# Patient Record
Sex: FEMALE | Race: WHITE | Employment: UNEMPLOYED | URBAN - METROPOLITAN AREA
[De-identification: names, ages, dates, MRNs, and addresses within clinical notes are randomized per-mention and may not be internally consistent; named-entity substitution may affect disease eponyms.]

---

## 2019-10-21 ENCOUNTER — HOSPITAL ENCOUNTER (OUTPATIENT)
Age: 69
Discharge: HOME | End: 2019-10-21
Payer: MEDICARE

## 2019-10-21 ENCOUNTER — HOSPITAL ENCOUNTER (OUTPATIENT)
Dept: GENERAL RADIOLOGY | Age: 69
Discharge: HOME | End: 2019-10-21
Payer: MEDICARE

## 2019-10-21 ENCOUNTER — APPOINTMENT (OUTPATIENT)
Dept: CT IMAGING | Age: 69
End: 2019-10-21
Payer: MEDICARE

## 2019-10-21 ENCOUNTER — HOSPITAL ENCOUNTER (EMERGENCY)
Age: 69
Discharge: HOME | End: 2019-10-21
Payer: MEDICARE

## 2019-10-21 VITALS
HEIGHT: 58 IN | WEIGHT: 160 LBS | TEMPERATURE: 97.8 F | BODY MASS INDEX: 33.58 KG/M2 | DIASTOLIC BLOOD PRESSURE: 90 MMHG | RESPIRATION RATE: 16 BRPM | SYSTOLIC BLOOD PRESSURE: 182 MMHG | OXYGEN SATURATION: 96 % | HEART RATE: 90 BPM

## 2019-10-21 DIAGNOSIS — R10.9 STOMACH ACHE: ICD-10-CM

## 2019-10-21 DIAGNOSIS — K57.32 DIVERTICULITIS OF COLON: Primary | ICD-10-CM

## 2019-10-21 LAB
ALBUMIN SERPL-MCNC: 4.1 GM/DL (ref 3.4–5)
ALP BLD-CCNC: 139 IU/L (ref 40–128)
ALT SERPL-CCNC: 5 U/L (ref 10–40)
ANION GAP SERPL CALCULATED.3IONS-SCNC: 12 MMOL/L (ref 4–16)
AST SERPL-CCNC: 15 IU/L (ref 15–37)
BACTERIA: ABNORMAL /HPF
BASOPHILS ABSOLUTE: 0.1 K/CU MM
BASOPHILS RELATIVE PERCENT: 0.4 % (ref 0–1)
BILIRUB SERPL-MCNC: 0.2 MG/DL (ref 0–1)
BILIRUBIN URINE: NEGATIVE MG/DL
BLOOD, URINE: NEGATIVE
BUN BLDV-MCNC: 10 MG/DL (ref 6–23)
CALCIUM SERPL-MCNC: 9.8 MG/DL (ref 8.3–10.6)
CHLORIDE BLD-SCNC: 93 MMOL/L (ref 99–110)
CLARITY: ABNORMAL
CO2: 29 MMOL/L (ref 21–32)
COLOR: YELLOW
CREAT SERPL-MCNC: 0.7 MG/DL (ref 0.6–1.1)
DIFFERENTIAL TYPE: ABNORMAL
EOSINOPHILS ABSOLUTE: 0.2 K/CU MM
EOSINOPHILS RELATIVE PERCENT: 1.4 % (ref 0–3)
GFR AFRICAN AMERICAN: >60 ML/MIN/1.73M2
GFR NON-AFRICAN AMERICAN: >60 ML/MIN/1.73M2
GLUCOSE BLD-MCNC: 98 MG/DL (ref 70–99)
GLUCOSE, URINE: NEGATIVE MG/DL
HCT VFR BLD CALC: 46.5 % (ref 37–47)
HEMOGLOBIN: 14.4 GM/DL (ref 12.5–16)
IMMATURE NEUTROPHIL %: 0.6 % (ref 0–0.43)
KETONES, URINE: NEGATIVE MG/DL
LEUKOCYTE ESTERASE, URINE: NEGATIVE
LYMPHOCYTES ABSOLUTE: 1.6 K/CU MM
LYMPHOCYTES RELATIVE PERCENT: 9.5 % (ref 24–44)
MCH RBC QN AUTO: 29 PG (ref 27–31)
MCHC RBC AUTO-ENTMCNC: 31 % (ref 32–36)
MCV RBC AUTO: 93.8 FL (ref 78–100)
MONOCYTES ABSOLUTE: 1.4 K/CU MM
MONOCYTES RELATIVE PERCENT: 8.7 % (ref 0–4)
NITRITE URINE, QUANTITATIVE: NEGATIVE
NUCLEATED RBC %: 0 %
PDW BLD-RTO: 13.3 % (ref 11.7–14.9)
PH, URINE: 5 (ref 5–8)
PLATELET # BLD: 555 K/CU MM (ref 140–440)
PMV BLD AUTO: 9.6 FL (ref 7.5–11.1)
POTASSIUM SERPL-SCNC: 5.3 MMOL/L (ref 3.5–5.1)
PROTEIN UA: NEGATIVE MG/DL
RBC # BLD: 4.96 M/CU MM (ref 4.2–5.4)
RBC URINE: ABNORMAL /HPF (ref 0–6)
SEGMENTED NEUTROPHILS ABSOLUTE COUNT: 12.9 K/CU MM
SEGMENTED NEUTROPHILS RELATIVE PERCENT: 79.4 % (ref 36–66)
SODIUM BLD-SCNC: 134 MMOL/L (ref 135–145)
SPECIFIC GRAVITY UA: 1.03 (ref 1–1.03)
SQUAMOUS EPITHELIAL: 13 /HPF
TOTAL IMMATURE NEUTOROPHIL: 0.09 K/CU MM
TOTAL NUCLEATED RBC: 0 K/CU MM
TOTAL PROTEIN: 7.1 GM/DL (ref 6.4–8.2)
TRICHOMONAS: ABNORMAL /HPF
UROBILINOGEN, URINE: NORMAL MG/DL (ref 0.2–1)
WBC # BLD: 16.3 K/CU MM (ref 4–10.5)
WBC UA: <1 /HPF (ref 0–5)

## 2019-10-21 PROCEDURE — 87086 URINE CULTURE/COLONY COUNT: CPT

## 2019-10-21 PROCEDURE — 74019 RADEX ABDOMEN 2 VIEWS: CPT

## 2019-10-21 PROCEDURE — 80053 COMPREHEN METABOLIC PANEL: CPT

## 2019-10-21 PROCEDURE — 99284 EMERGENCY DEPT VISIT MOD MDM: CPT

## 2019-10-21 PROCEDURE — 81001 URINALYSIS AUTO W/SCOPE: CPT

## 2019-10-21 PROCEDURE — 85025 COMPLETE CBC W/AUTO DIFF WBC: CPT

## 2019-10-21 PROCEDURE — 36415 COLL VENOUS BLD VENIPUNCTURE: CPT

## 2019-10-21 PROCEDURE — 74176 CT ABD & PELVIS W/O CONTRAST: CPT

## 2019-10-21 RX ORDER — CIPROFLOXACIN 500 MG/1
500 TABLET, FILM COATED ORAL 2 TIMES DAILY
Qty: 20 TABLET | Refills: 0 | Status: ON HOLD | OUTPATIENT
Start: 2019-10-21 | End: 2019-10-30 | Stop reason: ALTCHOICE

## 2019-10-21 RX ORDER — CIPROFLOXACIN 2 MG/ML
400 INJECTION, SOLUTION INTRAVENOUS ONCE
Status: DISCONTINUED | OUTPATIENT
Start: 2019-10-21 | End: 2019-10-21 | Stop reason: HOSPADM

## 2019-10-21 RX ORDER — DICYCLOMINE HYDROCHLORIDE 10 MG/1
20 CAPSULE ORAL
Qty: 40 CAPSULE | Refills: 0 | Status: ON HOLD | OUTPATIENT
Start: 2019-10-21 | End: 2019-10-30 | Stop reason: ALTCHOICE

## 2019-10-21 RX ORDER — METRONIDAZOLE 500 MG/1
500 TABLET ORAL 3 TIMES DAILY
Qty: 30 TABLET | Refills: 0 | Status: ON HOLD | OUTPATIENT
Start: 2019-10-21 | End: 2019-10-30 | Stop reason: ALTCHOICE

## 2019-10-21 RX ORDER — ONDANSETRON 4 MG/1
4 TABLET, ORALLY DISINTEGRATING ORAL EVERY 6 HOURS
Qty: 20 TABLET | Refills: 0 | Status: ON HOLD | OUTPATIENT
Start: 2019-10-21 | End: 2019-10-30 | Stop reason: ALTCHOICE

## 2019-10-21 ASSESSMENT — PAIN DESCRIPTION - LOCATION: LOCATION: ABDOMEN

## 2019-10-21 ASSESSMENT — PAIN DESCRIPTION - PAIN TYPE: TYPE: ACUTE PAIN

## 2019-10-21 ASSESSMENT — PAIN DESCRIPTION - ORIENTATION: ORIENTATION: RIGHT;LEFT;LOWER

## 2019-10-21 ASSESSMENT — PAIN SCALES - GENERAL: PAINLEVEL_OUTOF10: 5

## 2019-10-23 LAB
CULTURE: ABNORMAL
CULTURE: ABNORMAL
Lab: ABNORMAL
SPECIMEN: ABNORMAL
TOTAL COLONY COUNT: ABNORMAL

## 2019-10-29 ENCOUNTER — HOSPITAL ENCOUNTER (INPATIENT)
Age: 69
LOS: 1 days | Discharge: HOME | DRG: 305 | End: 2019-10-31
Attending: EMERGENCY MEDICINE | Admitting: INTERNAL MEDICINE
Payer: MEDICARE

## 2019-10-29 ENCOUNTER — APPOINTMENT (OUTPATIENT)
Dept: CT IMAGING | Age: 69
DRG: 305 | End: 2019-10-29
Payer: MEDICARE

## 2019-10-29 DIAGNOSIS — R51.9 ACUTE INTRACTABLE HEADACHE, UNSPECIFIED HEADACHE TYPE: ICD-10-CM

## 2019-10-29 DIAGNOSIS — I16.0 HYPERTENSIVE URGENCY: Primary | ICD-10-CM

## 2019-10-29 LAB
ALBUMIN SERPL-MCNC: 3.5 GM/DL (ref 3.4–5)
ALP BLD-CCNC: 108 IU/L (ref 40–129)
ALT SERPL-CCNC: 9 U/L (ref 10–40)
ANION GAP SERPL CALCULATED.3IONS-SCNC: 8 MMOL/L (ref 4–16)
AST SERPL-CCNC: 30 IU/L (ref 15–37)
BASOPHILS ABSOLUTE: 0.1 K/CU MM
BASOPHILS RELATIVE PERCENT: 0.6 % (ref 0–1)
BILIRUB SERPL-MCNC: 0.1 MG/DL (ref 0–1)
BUN BLDV-MCNC: 13 MG/DL (ref 6–23)
CALCIUM SERPL-MCNC: 9.4 MG/DL (ref 8.3–10.6)
CHLORIDE BLD-SCNC: 97 MMOL/L (ref 99–110)
CO2: 31 MMOL/L (ref 21–32)
CREAT SERPL-MCNC: 0.7 MG/DL (ref 0.6–1.1)
DIFFERENTIAL TYPE: ABNORMAL
EOSINOPHILS ABSOLUTE: 0.2 K/CU MM
EOSINOPHILS RELATIVE PERCENT: 1.8 % (ref 0–3)
GFR AFRICAN AMERICAN: >60 ML/MIN/1.73M2
GFR NON-AFRICAN AMERICAN: >60 ML/MIN/1.73M2
GLUCOSE BLD-MCNC: 100 MG/DL (ref 70–99)
HCT VFR BLD CALC: 43.6 % (ref 37–47)
HEMOGLOBIN: 13.7 GM/DL (ref 12.5–16)
IMMATURE NEUTROPHIL %: 1.3 % (ref 0–0.43)
LYMPHOCYTES ABSOLUTE: 1.8 K/CU MM
LYMPHOCYTES RELATIVE PERCENT: 15.6 % (ref 24–44)
MCH RBC QN AUTO: 28.8 PG (ref 27–31)
MCHC RBC AUTO-ENTMCNC: 31.4 % (ref 32–36)
MCV RBC AUTO: 91.8 FL (ref 78–100)
MONOCYTES ABSOLUTE: 1 K/CU MM
MONOCYTES RELATIVE PERCENT: 8.6 % (ref 0–4)
NUCLEATED RBC %: 0 %
PDW BLD-RTO: 13.3 % (ref 11.7–14.9)
PLATELET # BLD: 557 K/CU MM (ref 140–440)
PMV BLD AUTO: 8.9 FL (ref 7.5–11.1)
POTASSIUM SERPL-SCNC: 4 MMOL/L (ref 3.5–5.1)
RBC # BLD: 4.75 M/CU MM (ref 4.2–5.4)
SEGMENTED NEUTROPHILS ABSOLUTE COUNT: 8.5 K/CU MM
SEGMENTED NEUTROPHILS RELATIVE PERCENT: 72.1 % (ref 36–66)
SODIUM BLD-SCNC: 136 MMOL/L (ref 135–145)
TOTAL IMMATURE NEUTOROPHIL: 0.15 K/CU MM
TOTAL NUCLEATED RBC: 0 K/CU MM
TOTAL PROTEIN: 6.7 GM/DL (ref 6.4–8.2)
TROPONIN T: <0.01 NG/ML
WBC # BLD: 11.8 K/CU MM (ref 4–10.5)

## 2019-10-29 PROCEDURE — 96374 THER/PROPH/DIAG INJ IV PUSH: CPT

## 2019-10-29 PROCEDURE — 36415 COLL VENOUS BLD VENIPUNCTURE: CPT

## 2019-10-29 PROCEDURE — 6360000002 HC RX W HCPCS: Performed by: EMERGENCY MEDICINE

## 2019-10-29 PROCEDURE — 85025 COMPLETE CBC W/AUTO DIFF WBC: CPT

## 2019-10-29 PROCEDURE — 93005 ELECTROCARDIOGRAM TRACING: CPT | Performed by: EMERGENCY MEDICINE

## 2019-10-29 PROCEDURE — 96361 HYDRATE IV INFUSION ADD-ON: CPT

## 2019-10-29 PROCEDURE — 2580000003 HC RX 258: Performed by: EMERGENCY MEDICINE

## 2019-10-29 PROCEDURE — 84484 ASSAY OF TROPONIN QUANT: CPT

## 2019-10-29 PROCEDURE — 80053 COMPREHEN METABOLIC PANEL: CPT

## 2019-10-29 PROCEDURE — 99284 EMERGENCY DEPT VISIT MOD MDM: CPT

## 2019-10-29 PROCEDURE — 70450 CT HEAD/BRAIN W/O DYE: CPT

## 2019-10-29 PROCEDURE — 6370000000 HC RX 637 (ALT 250 FOR IP): Performed by: EMERGENCY MEDICINE

## 2019-10-29 RX ORDER — PROCHLORPERAZINE EDISYLATE 5 MG/ML
10 INJECTION INTRAMUSCULAR; INTRAVENOUS ONCE
Status: COMPLETED | OUTPATIENT
Start: 2019-10-29 | End: 2019-10-29

## 2019-10-29 RX ORDER — 0.9 % SODIUM CHLORIDE 0.9 %
500 INTRAVENOUS SOLUTION INTRAVENOUS ONCE
Status: COMPLETED | OUTPATIENT
Start: 2019-10-29 | End: 2019-10-30

## 2019-10-29 RX ORDER — DIPHENHYDRAMINE HCL 25 MG
25 TABLET ORAL ONCE
Status: COMPLETED | OUTPATIENT
Start: 2019-10-29 | End: 2019-10-29

## 2019-10-29 RX ADMIN — DIPHENHYDRAMINE HYDROCHLORIDE 25 MG: 25 TABLET ORAL at 23:00

## 2019-10-29 RX ADMIN — PROCHLORPERAZINE EDISYLATE 10 MG: 5 INJECTION INTRAMUSCULAR; INTRAVENOUS at 23:00

## 2019-10-29 RX ADMIN — SODIUM CHLORIDE 500 ML: 9 INJECTION, SOLUTION INTRAVENOUS at 23:00

## 2019-10-29 ASSESSMENT — PAIN DESCRIPTION - DESCRIPTORS: DESCRIPTORS: THROBBING

## 2019-10-29 ASSESSMENT — ENCOUNTER SYMPTOMS
SHORTNESS OF BREATH: 0
ABDOMINAL PAIN: 0
BACK PAIN: 0
SORE THROAT: 0
VOMITING: 0
DIARRHEA: 0
CONSTIPATION: 0
COUGH: 0
NAUSEA: 0
RHINORRHEA: 0
EYE REDNESS: 0

## 2019-10-29 ASSESSMENT — PAIN SCALES - GENERAL: PAINLEVEL_OUTOF10: 8

## 2019-10-29 ASSESSMENT — PAIN DESCRIPTION - PAIN TYPE: TYPE: ACUTE PAIN

## 2019-10-29 ASSESSMENT — PAIN DESCRIPTION - LOCATION: LOCATION: HEAD

## 2019-10-30 PROBLEM — I16.0 HYPERTENSIVE URGENCY: Status: ACTIVE | Noted: 2019-10-30

## 2019-10-30 PROBLEM — E78.5 HYPERLIPIDEMIA WITH TARGET LOW DENSITY LIPOPROTEIN (LDL) CHOLESTEROL LESS THAN 70 MG/DL: Status: ACTIVE | Noted: 2019-10-30

## 2019-10-30 PROBLEM — K57.90 DIVERTICULOSIS: Status: ACTIVE | Noted: 2019-10-30

## 2019-10-30 LAB
BACTERIA: ABNORMAL /HPF
BILIRUBIN URINE: NEGATIVE MG/DL
BLOOD, URINE: NEGATIVE
CALCIUM OXALATE CRYSTALS: ABNORMAL /HPF
CLARITY: ABNORMAL
COLOR: YELLOW
EKG ATRIAL RATE: 76 BPM
EKG DIAGNOSIS: NORMAL
EKG P AXIS: 85 DEGREES
EKG P-R INTERVAL: 144 MS
EKG Q-T INTERVAL: 398 MS
EKG QRS DURATION: 78 MS
EKG QTC CALCULATION (BAZETT): 447 MS
EKG R AXIS: -52 DEGREES
EKG T AXIS: 47 DEGREES
EKG VENTRICULAR RATE: 76 BPM
GLUCOSE, URINE: NEGATIVE MG/DL
KETONES, URINE: NEGATIVE MG/DL
LEUKOCYTE ESTERASE, URINE: ABNORMAL
NITRITE URINE, QUANTITATIVE: NEGATIVE
PH, URINE: 5 (ref 5–8)
PROTEIN UA: NEGATIVE MG/DL
RBC URINE: 1 /HPF (ref 0–6)
SPECIFIC GRAVITY UA: 1.02 (ref 1–1.03)
SQUAMOUS EPITHELIAL: 3 /HPF
TRICHOMONAS: ABNORMAL /HPF
UROBILINOGEN, URINE: NORMAL MG/DL (ref 0.2–1)
WBC UA: 2 /HPF (ref 0–5)

## 2019-10-30 PROCEDURE — 2580000003 HC RX 258: Performed by: EMERGENCY MEDICINE

## 2019-10-30 PROCEDURE — 6370000000 HC RX 637 (ALT 250 FOR IP): Performed by: EMERGENCY MEDICINE

## 2019-10-30 PROCEDURE — 93010 ELECTROCARDIOGRAM REPORT: CPT | Performed by: INTERNAL MEDICINE

## 2019-10-30 PROCEDURE — 2500000003 HC RX 250 WO HCPCS: Performed by: EMERGENCY MEDICINE

## 2019-10-30 PROCEDURE — 6370000000 HC RX 637 (ALT 250 FOR IP): Performed by: INTERNAL MEDICINE

## 2019-10-30 PROCEDURE — 2140000000 HC CCU INTERMEDIATE R&B

## 2019-10-30 PROCEDURE — 81001 URINALYSIS AUTO W/SCOPE: CPT

## 2019-10-30 RX ORDER — ACETAMINOPHEN 325 MG/1
650 TABLET ORAL EVERY 4 HOURS PRN
Status: DISCONTINUED | OUTPATIENT
Start: 2019-10-30 | End: 2019-10-31 | Stop reason: HOSPADM

## 2019-10-30 RX ORDER — SODIUM CHLORIDE 0.9 % (FLUSH) 0.9 %
10 SYRINGE (ML) INJECTION PRN
Status: DISCONTINUED | OUTPATIENT
Start: 2019-10-30 | End: 2019-10-31 | Stop reason: HOSPADM

## 2019-10-30 RX ORDER — ATORVASTATIN CALCIUM 80 MG/1
80 TABLET, FILM COATED ORAL DAILY
COMMUNITY
End: 2022-01-03

## 2019-10-30 RX ORDER — ASPIRIN 81 MG/1
81 TABLET ORAL DAILY
Status: DISCONTINUED | OUTPATIENT
Start: 2019-10-30 | End: 2019-10-31 | Stop reason: HOSPADM

## 2019-10-30 RX ORDER — ATORVASTATIN CALCIUM 40 MG/1
80 TABLET, FILM COATED ORAL NIGHTLY
Status: DISCONTINUED | OUTPATIENT
Start: 2019-10-30 | End: 2019-10-31 | Stop reason: HOSPADM

## 2019-10-30 RX ORDER — LISINOPRIL 5 MG/1
5 TABLET ORAL DAILY
Status: DISCONTINUED | OUTPATIENT
Start: 2019-10-30 | End: 2019-10-31 | Stop reason: HOSPADM

## 2019-10-30 RX ORDER — SODIUM CHLORIDE 0.9 % (FLUSH) 0.9 %
10 SYRINGE (ML) INJECTION EVERY 12 HOURS SCHEDULED
Status: DISCONTINUED | OUTPATIENT
Start: 2019-10-30 | End: 2019-10-31 | Stop reason: HOSPADM

## 2019-10-30 RX ADMIN — DEXTROSE MONOHYDRATE 5 MG/HR: 50 INJECTION, SOLUTION INTRAVENOUS at 05:06

## 2019-10-30 RX ADMIN — ATORVASTATIN CALCIUM 80 MG: 40 TABLET, FILM COATED ORAL at 22:40

## 2019-10-30 RX ADMIN — DEXTROSE MONOHYDRATE 5 MG/HR: 50 INJECTION, SOLUTION INTRAVENOUS at 01:21

## 2019-10-30 RX ADMIN — ASPIRIN 81 MG: 81 TABLET, COATED ORAL at 12:58

## 2019-10-30 RX ADMIN — Medication 10 ML: at 22:41

## 2019-10-30 RX ADMIN — LISINOPRIL 5 MG: 5 TABLET ORAL at 16:17

## 2019-10-30 RX ADMIN — ACETAMINOPHEN 650 MG: 325 TABLET ORAL at 05:04

## 2019-10-30 RX ADMIN — ACETAMINOPHEN 650 MG: 325 TABLET ORAL at 22:40

## 2019-10-30 ASSESSMENT — PAIN DESCRIPTION - PROGRESSION
CLINICAL_PROGRESSION: NOT CHANGED

## 2019-10-30 ASSESSMENT — PAIN SCALES - GENERAL
PAINLEVEL_OUTOF10: 4
PAINLEVEL_OUTOF10: 4
PAINLEVEL_OUTOF10: 0
PAINLEVEL_OUTOF10: 0

## 2019-10-30 ASSESSMENT — PAIN - FUNCTIONAL ASSESSMENT: PAIN_FUNCTIONAL_ASSESSMENT: ACTIVITIES ARE NOT PREVENTED

## 2019-10-30 ASSESSMENT — PAIN DESCRIPTION - DESCRIPTORS: DESCRIPTORS: ACHING

## 2019-10-30 ASSESSMENT — PAIN DESCRIPTION - PAIN TYPE: TYPE: ACUTE PAIN

## 2019-10-30 ASSESSMENT — PAIN DESCRIPTION - LOCATION: LOCATION: HEAD

## 2019-10-30 ASSESSMENT — PAIN DESCRIPTION - FREQUENCY: FREQUENCY: CONTINUOUS

## 2019-10-30 ASSESSMENT — PAIN DESCRIPTION - ONSET: ONSET: GRADUAL

## 2019-10-31 VITALS
WEIGHT: 164.3 LBS | TEMPERATURE: 98.1 F | DIASTOLIC BLOOD PRESSURE: 67 MMHG | HEIGHT: 58 IN | HEART RATE: 80 BPM | OXYGEN SATURATION: 96 % | RESPIRATION RATE: 15 BRPM | BODY MASS INDEX: 34.49 KG/M2 | SYSTOLIC BLOOD PRESSURE: 153 MMHG

## 2019-10-31 PROCEDURE — 94761 N-INVAS EAR/PLS OXIMETRY MLT: CPT

## 2019-10-31 PROCEDURE — 6370000000 HC RX 637 (ALT 250 FOR IP): Performed by: INTERNAL MEDICINE

## 2019-10-31 RX ORDER — LISINOPRIL 5 MG/1
5 TABLET ORAL DAILY
Qty: 30 TABLET | Refills: 3 | Status: ON HOLD | OUTPATIENT
Start: 2019-11-01 | End: 2021-12-23 | Stop reason: SDUPTHER

## 2019-10-31 RX ADMIN — ASPIRIN 81 MG: 81 TABLET, COATED ORAL at 09:05

## 2019-10-31 RX ADMIN — LISINOPRIL 5 MG: 5 TABLET ORAL at 09:05

## 2019-12-28 ENCOUNTER — HOSPITAL ENCOUNTER (OUTPATIENT)
Age: 69
Discharge: HOME | End: 2019-12-28
Payer: MEDICARE

## 2019-12-28 LAB
ALBUMIN SERPL-MCNC: 4.2 GM/DL (ref 3.4–5)
ALP BLD-CCNC: 117 IU/L (ref 40–128)
ALT SERPL-CCNC: 9 U/L (ref 10–40)
ANION GAP SERPL CALCULATED.3IONS-SCNC: 11 MMOL/L (ref 4–16)
AST SERPL-CCNC: 16 IU/L (ref 15–37)
BACTERIA: ABNORMAL /HPF
BASOPHILS ABSOLUTE: 0.1 K/CU MM
BASOPHILS RELATIVE PERCENT: 0.7 % (ref 0–1)
BILIRUB SERPL-MCNC: 0.3 MG/DL (ref 0–1)
BILIRUBIN URINE: NEGATIVE MG/DL
BLOOD, URINE: ABNORMAL
BUN BLDV-MCNC: 17 MG/DL (ref 6–23)
CALCIUM SERPL-MCNC: 9.7 MG/DL (ref 8.3–10.6)
CHLORIDE BLD-SCNC: 104 MMOL/L (ref 99–110)
CHOLESTEROL: 144 MG/DL
CLARITY: ABNORMAL
CO2: 27 MMOL/L (ref 21–32)
COLOR: YELLOW
CREAT SERPL-MCNC: 0.8 MG/DL (ref 0.6–1.1)
DIFFERENTIAL TYPE: ABNORMAL
EOSINOPHILS ABSOLUTE: 0.4 K/CU MM
EOSINOPHILS RELATIVE PERCENT: 4.3 % (ref 0–3)
GFR AFRICAN AMERICAN: >60 ML/MIN/1.73M2
GFR NON-AFRICAN AMERICAN: >60 ML/MIN/1.73M2
GLUCOSE BLD-MCNC: 113 MG/DL (ref 70–99)
GLUCOSE, URINE: NEGATIVE MG/DL
HCT VFR BLD CALC: 45.7 % (ref 37–47)
HDLC SERPL-MCNC: 44 MG/DL
HEMOGLOBIN: 14.5 GM/DL (ref 12.5–16)
IMMATURE NEUTROPHIL %: 0.2 % (ref 0–0.43)
KETONES, URINE: NEGATIVE MG/DL
LDL CHOLESTEROL DIRECT: 81 MG/DL
LEUKOCYTE ESTERASE, URINE: ABNORMAL
LYMPHOCYTES ABSOLUTE: 1.7 K/CU MM
LYMPHOCYTES RELATIVE PERCENT: 18.7 % (ref 24–44)
MCH RBC QN AUTO: 29.4 PG (ref 27–31)
MCHC RBC AUTO-ENTMCNC: 31.7 % (ref 32–36)
MCV RBC AUTO: 92.5 FL (ref 78–100)
MONOCYTES ABSOLUTE: 0.7 K/CU MM
MONOCYTES RELATIVE PERCENT: 8.1 % (ref 0–4)
MUCUS: ABNORMAL HPF
NITRITE URINE, QUANTITATIVE: NEGATIVE
NUCLEATED RBC %: 0 %
PDW BLD-RTO: 14.6 % (ref 11.7–14.9)
PH, URINE: 5 (ref 5–8)
PLATELET # BLD: 497 K/CU MM (ref 140–440)
PMV BLD AUTO: 9.7 FL (ref 7.5–11.1)
POTASSIUM SERPL-SCNC: 4.4 MMOL/L (ref 3.5–5.1)
PROTEIN UA: NEGATIVE MG/DL
RBC # BLD: 4.94 M/CU MM (ref 4.2–5.4)
RBC URINE: 6 /HPF (ref 0–6)
SEGMENTED NEUTROPHILS ABSOLUTE COUNT: 6.2 K/CU MM
SEGMENTED NEUTROPHILS RELATIVE PERCENT: 68 % (ref 36–66)
SODIUM BLD-SCNC: 142 MMOL/L (ref 135–145)
SPECIFIC GRAVITY UA: 1.02 (ref 1–1.03)
SQUAMOUS EPITHELIAL: 4 /HPF
TOTAL IMMATURE NEUTOROPHIL: 0.02 K/CU MM
TOTAL NUCLEATED RBC: 0 K/CU MM
TOTAL PROTEIN: 6.7 GM/DL (ref 6.4–8.2)
TRICHOMONAS: ABNORMAL /HPF
TRIGL SERPL-MCNC: 127 MG/DL
TSH HIGH SENSITIVITY: 3.35 UIU/ML (ref 0.27–4.2)
UROBILINOGEN, URINE: NORMAL MG/DL (ref 0.2–1)
WBC # BLD: 9 K/CU MM (ref 4–10.5)
WBC CLUMP: ABNORMAL /HPF
WBC UA: 92 /HPF (ref 0–5)

## 2019-12-28 PROCEDURE — 36415 COLL VENOUS BLD VENIPUNCTURE: CPT

## 2019-12-28 PROCEDURE — 83721 ASSAY OF BLOOD LIPOPROTEIN: CPT

## 2019-12-28 PROCEDURE — 81001 URINALYSIS AUTO W/SCOPE: CPT

## 2019-12-28 PROCEDURE — 80053 COMPREHEN METABOLIC PANEL: CPT

## 2019-12-28 PROCEDURE — 85025 COMPLETE CBC W/AUTO DIFF WBC: CPT

## 2019-12-28 PROCEDURE — 84443 ASSAY THYROID STIM HORMONE: CPT

## 2019-12-28 PROCEDURE — 80061 LIPID PANEL: CPT

## 2021-07-03 ENCOUNTER — APPOINTMENT (OUTPATIENT)
Dept: GENERAL RADIOLOGY | Age: 71
End: 2021-07-03
Payer: MEDICARE

## 2021-07-03 ENCOUNTER — HOSPITAL ENCOUNTER (EMERGENCY)
Age: 71
Discharge: HOME | End: 2021-07-03
Payer: MEDICARE

## 2021-07-03 VITALS
RESPIRATION RATE: 16 BRPM | DIASTOLIC BLOOD PRESSURE: 103 MMHG | SYSTOLIC BLOOD PRESSURE: 215 MMHG | WEIGHT: 158 LBS | TEMPERATURE: 97.8 F | HEIGHT: 57 IN | HEART RATE: 98 BPM | BODY MASS INDEX: 34.09 KG/M2 | OXYGEN SATURATION: 92 %

## 2021-07-03 DIAGNOSIS — I16.0 HYPERTENSIVE URGENCY: ICD-10-CM

## 2021-07-03 DIAGNOSIS — M25.512 ACUTE PAIN OF LEFT SHOULDER: Primary | ICD-10-CM

## 2021-07-03 LAB
ALBUMIN SERPL-MCNC: 4.1 GM/DL (ref 3.4–5)
ALP BLD-CCNC: 119 IU/L (ref 40–129)
ALT SERPL-CCNC: 7 U/L (ref 10–40)
ANION GAP SERPL CALCULATED.3IONS-SCNC: 9 MMOL/L (ref 4–16)
AST SERPL-CCNC: 15 IU/L (ref 15–37)
BASOPHILS ABSOLUTE: 0 K/CU MM
BASOPHILS RELATIVE PERCENT: 0.3 % (ref 0–1)
BILIRUB SERPL-MCNC: 0.3 MG/DL (ref 0–1)
BUN BLDV-MCNC: 12 MG/DL (ref 6–23)
CALCIUM SERPL-MCNC: 9.1 MG/DL (ref 8.3–10.6)
CHLORIDE BLD-SCNC: 101 MMOL/L (ref 99–110)
CO2: 27 MMOL/L (ref 21–32)
CREAT SERPL-MCNC: 0.9 MG/DL (ref 0.6–1.1)
DIFFERENTIAL TYPE: ABNORMAL
EOSINOPHILS ABSOLUTE: 0.2 K/CU MM
EOSINOPHILS RELATIVE PERCENT: 1.6 % (ref 0–3)
GFR AFRICAN AMERICAN: >60 ML/MIN/1.73M2
GFR NON-AFRICAN AMERICAN: >60 ML/MIN/1.73M2
GLUCOSE BLD-MCNC: 163 MG/DL (ref 70–99)
HCT VFR BLD CALC: 44.6 % (ref 37–47)
HEMOGLOBIN: 14.7 GM/DL (ref 12.5–16)
IMMATURE NEUTROPHIL %: 0.3 % (ref 0–0.43)
LYMPHOCYTES ABSOLUTE: 1.4 K/CU MM
LYMPHOCYTES RELATIVE PERCENT: 15 % (ref 24–44)
MCH RBC QN AUTO: 30.6 PG (ref 27–31)
MCHC RBC AUTO-ENTMCNC: 33 % (ref 32–36)
MCV RBC AUTO: 92.9 FL (ref 78–100)
MONOCYTES ABSOLUTE: 0.5 K/CU MM
MONOCYTES RELATIVE PERCENT: 5.9 % (ref 0–4)
NUCLEATED RBC %: 0 %
PDW BLD-RTO: 13.5 % (ref 11.7–14.9)
PLATELET # BLD: 455 K/CU MM (ref 140–440)
PMV BLD AUTO: 9.1 FL (ref 7.5–11.1)
POTASSIUM SERPL-SCNC: 4.1 MMOL/L (ref 3.5–5.1)
RBC # BLD: 4.8 M/CU MM (ref 4.2–5.4)
SEGMENTED NEUTROPHILS ABSOLUTE COUNT: 7.1 K/CU MM
SEGMENTED NEUTROPHILS RELATIVE PERCENT: 76.9 % (ref 36–66)
SODIUM BLD-SCNC: 137 MMOL/L (ref 135–145)
TOTAL IMMATURE NEUTOROPHIL: 0.03 K/CU MM
TOTAL NUCLEATED RBC: 0 K/CU MM
TOTAL PROTEIN: 6.9 GM/DL (ref 6.4–8.2)
TROPONIN T: <0.01 NG/ML
WBC # BLD: 9.2 K/CU MM (ref 4–10.5)

## 2021-07-03 PROCEDURE — 36415 COLL VENOUS BLD VENIPUNCTURE: CPT

## 2021-07-03 PROCEDURE — 93005 ELECTROCARDIOGRAM TRACING: CPT | Performed by: PHYSICIAN ASSISTANT

## 2021-07-03 PROCEDURE — 71045 X-RAY EXAM CHEST 1 VIEW: CPT

## 2021-07-03 PROCEDURE — 85025 COMPLETE CBC W/AUTO DIFF WBC: CPT

## 2021-07-03 PROCEDURE — 6360000002 HC RX W HCPCS: Performed by: PHYSICIAN ASSISTANT

## 2021-07-03 PROCEDURE — 6370000000 HC RX 637 (ALT 250 FOR IP): Performed by: PHYSICIAN ASSISTANT

## 2021-07-03 PROCEDURE — 80053 COMPREHEN METABOLIC PANEL: CPT

## 2021-07-03 PROCEDURE — 96372 THER/PROPH/DIAG INJ SC/IM: CPT

## 2021-07-03 PROCEDURE — 73030 X-RAY EXAM OF SHOULDER: CPT

## 2021-07-03 PROCEDURE — 84484 ASSAY OF TROPONIN QUANT: CPT

## 2021-07-03 PROCEDURE — 99283 EMERGENCY DEPT VISIT LOW MDM: CPT

## 2021-07-03 RX ORDER — METHOCARBAMOL 500 MG/1
500 TABLET, FILM COATED ORAL 4 TIMES DAILY
Qty: 40 TABLET | Refills: 0 | Status: SHIPPED | OUTPATIENT
Start: 2021-07-03 | End: 2021-07-13

## 2021-07-03 RX ORDER — MELOXICAM 7.5 MG/1
15 TABLET ORAL DAILY
Qty: 20 TABLET | Refills: 0 | Status: SHIPPED | OUTPATIENT
Start: 2021-07-03 | End: 2021-07-03 | Stop reason: SDUPTHER

## 2021-07-03 RX ORDER — METHOCARBAMOL 500 MG/1
500 TABLET, FILM COATED ORAL ONCE
Status: COMPLETED | OUTPATIENT
Start: 2021-07-03 | End: 2021-07-03

## 2021-07-03 RX ORDER — KETOROLAC TROMETHAMINE 30 MG/ML
15 INJECTION, SOLUTION INTRAMUSCULAR; INTRAVENOUS ONCE
Status: COMPLETED | OUTPATIENT
Start: 2021-07-03 | End: 2021-07-03

## 2021-07-03 RX ORDER — METHOCARBAMOL 500 MG/1
500 TABLET, FILM COATED ORAL 4 TIMES DAILY
Qty: 40 TABLET | Refills: 0 | Status: SHIPPED | OUTPATIENT
Start: 2021-07-03 | End: 2021-07-03 | Stop reason: SDUPTHER

## 2021-07-03 RX ORDER — MELOXICAM 7.5 MG/1
15 TABLET ORAL DAILY
Qty: 20 TABLET | Refills: 0 | Status: ON HOLD | OUTPATIENT
Start: 2021-07-03 | End: 2022-01-11 | Stop reason: HOSPADM

## 2021-07-03 RX ORDER — HYDROCODONE BITARTRATE AND ACETAMINOPHEN 5; 325 MG/1; MG/1
1 TABLET ORAL ONCE
Status: COMPLETED | OUTPATIENT
Start: 2021-07-03 | End: 2021-07-03

## 2021-07-03 RX ADMIN — METHOCARBAMOL 500 MG: 500 TABLET ORAL at 19:00

## 2021-07-03 RX ADMIN — HYDROCODONE BITARTRATE AND ACETAMINOPHEN 1 TABLET: 5; 325 TABLET ORAL at 19:00

## 2021-07-03 RX ADMIN — KETOROLAC TROMETHAMINE 15 MG: 30 INJECTION, SOLUTION INTRAMUSCULAR; INTRAVENOUS at 19:00

## 2021-07-03 ASSESSMENT — PAIN SCALES - GENERAL
PAINLEVEL_OUTOF10: 10
PAINLEVEL_OUTOF10: 10

## 2021-07-03 NOTE — ED PROVIDER NOTES
EKG Interpretation    Interpreted by emergency department physician from July 3 at 1630    Rhythm: normal sinus   Rate: normal  Axis: left  Ectopy: none  Conduction: normal  ST Segments: no acute change  T Waves: no acute change  Q Waves: none    Clinical Impression: Normal sinus rhythm with a rate of 99 and a QTC of 451 with no ischemia or ectopy, movement noted.     MD Adam Barraza MD  07/03/21 [id number]

## 2021-07-04 LAB
EKG ATRIAL RATE: 99 BPM
EKG DIAGNOSIS: NORMAL
EKG P AXIS: 85 DEGREES
EKG P-R INTERVAL: 136 MS
EKG Q-T INTERVAL: 352 MS
EKG QRS DURATION: 74 MS
EKG QTC CALCULATION (BAZETT): 451 MS
EKG R AXIS: -57 DEGREES
EKG T AXIS: 54 DEGREES
EKG VENTRICULAR RATE: 99 BPM

## 2021-07-04 PROCEDURE — 93010 ELECTROCARDIOGRAM REPORT: CPT | Performed by: INTERNAL MEDICINE

## 2021-07-04 NOTE — ED PROVIDER NOTES
EMERGENCY DEPARTMENT ENCOUNTER      PCP: Nhi Ohara MD    [address] [facility]    Chief Complaint   Patient presents with    Generalized Body Aches    Arm Pain     nki, left, x couple weeks     This patient was not evaluated by the attending physician. I have independently evaluated this patient . HPI    Angelica Medellin is a 66 y.o. female who presents to the emergency department a day with a chief complaint of left-sided shoulder pain/left upper extremity pain. States developed over the last weeks progressing of the last several days. She describes a dull achy pain into the posterior shoulder radiating into the biceps area. She describes a pins-and-needles feeling. No weakness. She also states is intermittent in nature. No recent injury or trauma. Patient is denying chest pain palpitation shortness of breath. No dyspnea on exertion. Does have history of coronary artery disease, also has history of uncontrolled blood pressure, she states that she has not been compliant with her antihypertensive regimen. Denying any pleuritic pain. No other weakness of the face upper or lower extremity. No other strokelike symptoms. REVIEW OF SYSTEMS    General: Denies fever or chills  Cardiac: Denies chest pain  Pulmonary: Denies shortness of breath, wheezes, or difficulty breathing  GI: Denies abdominal pain, vomiting, or diarrhea  : No dysuria or hematuria  Denies any other muscles skeletal injuries, including chest wall and back.   All other review of systems are negative  See HPI and nursing notes for additional information     PAST MEDICAL & SURGICAL HISTORY    Past Medical History:   Diagnosis Date    CAD (coronary artery disease)     Hypertension      Past Surgical History:   Procedure Laterality Date    CAROTID ENDARTERECTOMY      CHOLECYSTECTOMY      CORONARY ARTERY BYPASS GRAFT      NECK SURGERY         CURRENT MEDICATIONS    Current Outpatient Rx   Medication Sig Dispense Refill    meloxicam (MOBIC) 7.5 MG tablet Take 2 tablets by mouth daily for 10 days 20 tablet 0    methocarbamol (ROBAXIN) 500 MG tablet Take 1 tablet by mouth 4 times daily for 10 days 40 tablet 0    lisinopril (PRINIVIL;ZESTRIL) 5 MG tablet Take 1 tablet by mouth daily 30 tablet 3    aspirin 81 MG tablet Take 81 mg by mouth daily      atorvastatin (LIPITOR) 80 MG tablet Take 80 mg by mouth daily         ALLERGIES    Allergies   Allergen Reactions    Penicillins     Sulfa Antibiotics Swelling       SOCIAL & FAMILY HISTORY    Social History     Socioeconomic History    Marital status:      Spouse name: None    Number of children: None    Years of education: None    Highest education level: None   Occupational History    None   Tobacco Use    Smoking status: Current Every Day Smoker     Packs/day: 1.50     Types: Cigarettes    Smokeless tobacco: Never Used   Substance and Sexual Activity    Alcohol use: No    Drug use: No    Sexual activity: None   Other Topics Concern    None   Social History Narrative    None     Social Determinants of Health     Financial Resource Strain:     Difficulty of Paying Living Expenses:    Food Insecurity:     Worried About Running Out of Food in the Last Year:     Ran Out of Food in the Last Year:    Transportation Needs:     Lack of Transportation (Medical):  Lack of Transportation (Non-Medical):    Physical Activity:     Days of Exercise per Week:     Minutes of Exercise per Session:    Stress:     Feeling of Stress :    Social Connections:     Frequency of Communication with Friends and Family:     Frequency of Social Gatherings with Friends and Family:     Attends Shinto Services:     Active Member of Clubs or Organizations:     Attends Club or Organization Meetings:     Marital Status:    Intimate Partner Violence:     Fear of Current or Ex-Partner:     Emotionally Abused:     Physically Abused:     Sexually Abused:      History reviewed.  No pertinent family history. PHYSICAL EXAM    VITAL SIGNS: BP (!) 215/103   Pulse 98   Temp 97.8 °F (36.6 °C) (Oral)   Resp 16   Ht 4' 9\" (1.448 m)   Wt 158 lb (71.7 kg)   SpO2 92%   BMI 34.19 kg/m²   Constitutional:  Well developed, Appears comfortable  HEENT. PERRLA, EOMI  Cardiovascular: Tachycardic rate, regular rhythm, no murmurs rubs or gallops  Respiratory: Clear to auscultation bilaterally, no wheezing rhonchi  Abdomen: Positive bowel sounds, no pulsatile mass, no tenderness  Musculoskeletal:    Neck:  No gross swelling or discoloration on inspection. No tenderness to palpation or palpable defect. Full range of motion without pain. Left Shoulder Exam:   -Inspection:   No obvious defects, deformities, or discoloration. Skin intact   - Palpation: No swelling     No redness, or warmth     No tenderness        -ROM:   Adequate range of motion   -Provocative tests: Manual manipulation difficult to assess secondary to patient pain and guarding    No swelling, discoloration, tenderness to palpation, or range of motion deficit of the ipsilateral elbow. Vascular: Distal pulses intact   Integument:  Well hydrated, no rash   Neurologic:  Alert and oriented. Distal sensation intact. No functional deficits of elbows, wrists, hands, or fingers.   Psychiatric: Cooperative, pleasant affect    EKG  See supervising physician note for EKG interpretation    Labs  Results for orders placed or performed during the hospital encounter of 07/03/21   CBC auto diff   Result Value Ref Range    WBC 9.2 4.0 - 10.5 K/CU MM    RBC 4.80 4.2 - 5.4 M/CU MM    Hemoglobin 14.7 12.5 - 16.0 GM/DL    Hematocrit 44.6 37 - 47 %    MCV 92.9 78 - 100 FL    MCH 30.6 27 - 31 PG    MCHC 33.0 32.0 - 36.0 %    RDW 13.5 11.7 - 14.9 %    Platelets 356 (H) 401 - 440 K/CU MM    MPV 9.1 7.5 - 11.1 FL    Differential Type AUTOMATED DIFFERENTIAL     Segs Relative 76.9 (H) 36 - 66 %    Lymphocytes % 15.0 (L) 24 - 44 %    Monocytes % 5.9 (H) 0 - 4 %    Eosinophils % 1.6 0 - 3 %    Basophils % 0.3 0 - 1 %    Segs Absolute 7.1 K/CU MM    Lymphocytes Absolute 1.4 K/CU MM    Monocytes Absolute 0.5 K/CU MM    Eosinophils Absolute 0.2 K/CU MM    Basophils Absolute 0.0 K/CU MM    Nucleated RBC % 0.0 %    Total Nucleated RBC 0.0 K/CU MM    Total Immature Neutrophil 0.03 K/CU MM    Immature Neutrophil % 0.3 0 - 0.43 %   CMP   Result Value Ref Range    Sodium 137 135 - 145 MMOL/L    Potassium 4.1 3.5 - 5.1 MMOL/L    Chloride 101 99 - 110 mMol/L    CO2 27 21 - 32 MMOL/L    BUN 12 6 - 23 MG/DL    CREATININE 0.9 0.6 - 1.1 MG/DL    Glucose 163 (H) 70 - 99 MG/DL    Calcium 9.1 8.3 - 10.6 MG/DL    Albumin 4.1 3.4 - 5.0 GM/DL    Total Protein 6.9 6.4 - 8.2 GM/DL    Total Bilirubin 0.3 0.0 - 1.0 MG/DL    ALT 7 (L) 10 - 40 U/L    AST 15 15 - 37 IU/L    Alkaline Phosphatase 119 40 - 129 IU/L    GFR Non-African American >60 >60 mL/min/1.73m2    GFR African American >60 >60 mL/min/1.73m2    Anion Gap 9 4 - 16   Troponin   Result Value Ref Range    Troponin T <0.010 <0.01 NG/ML       RADIOLOGY/PROCEDURES    XR SHOULDER LEFT (MIN 2 VIEWS)    Result Date: 7/3/2021  EXAMINATION: THREE XRAY VIEWS OF THE LEFT SHOULDER 7/3/2021 3:14 pm COMPARISON: Chest radiograph performed 1 hour prior to the exam. HISTORY: ORDERING SYSTEM PROVIDED HISTORY: left shoulder pain TECHNOLOGIST PROVIDED HISTORY: Reason for exam:->left shoulder pain Reason for Exam: left shoulder pain Acuity: Acute Type of Exam: Initial FINDINGS: No acute fracture or dislocation is identified. There is moderate to severe glenohumeral arthrosis, characterized by joint space loss and bony remodeling of the glenoid and the humeral head. Enthesopathy changes are seen involving the greater and lesser tuberosities. The acromioclavicular joint is mildly hypertrophied but otherwise unremarkable. Prominent cardiophrenic fat pad is noted. Visualized left lung otherwise unremarkable.      No acute abnormality detected within the left shoulder. XR CHEST PORTABLE    Result Date: 7/3/2021  EXAMINATION: ONE XRAY VIEW OF THE CHEST 7/3/2021 1:42 pm COMPARISON: 03/29/2010 HISTORY: ORDERING SYSTEM PROVIDED HISTORY: Chest pain TECHNOLOGIST PROVIDED HISTORY: Reason for exam:->Chest pain Reason for Exam: Chest pain Acuity: Unknown Type of Exam: Initial Additional signs and symptoms: none Relevant Medical/Surgical History: CAD FINDINGS: Cardial pericardial silhouette is unremarkable. Midline sternotomy wires are unchanged in configuration. Calcified granuloma seen in the left upper lung. No acute focal infiltrate. No pneumothorax is seen. No free air. No acute bony abnormality. No acute abnormality detected. ED COURSE & MEDICAL DECISION MAKING      Patient presents as above. Emerge etiologies considered. Patient hypertensive on arrival, has had history of hypertension urgency, has been noncompliant with her hypertension regimen. She states that her blood pressure has been normal she is only having high blood pressure because she is in pain, does not want me to intervene or provide any medications she just wants to be addressed for her shoulder pain. We did obtain an EKG that otherwise appeared well, chest x-ray was negative, lab work did not show any significant abnormalities troponin testing within normal limits. Left shoulder x-ray negative. Was given pain pill, IM Toradol a low dose. Again was offered for admission for blood pressure control and further evaluation patient refuses, wants to be treated for shoulder pain. She will be given NSAIDs, muscle relaxer and instructed to follow-up with orthopedics. Patient agrees to return emergency department if symptoms worsen or any new symptoms develop. Vital signs and nursing notes reviewed during ED course. All pertinent Lab data and radiographic results reviewed with patient at bedside.   The patient and/or the family were informed of the results of any tests/labs/imaging, the treatment plan, and time was allotted to answer questions. Clinical  IMPRESSION    1. Acute pain of left shoulder    2. Hypertensive urgency                Comment: Please note this report has been produced using speech recognition software and may contain errors related to that system including errors in grammar, punctuation, and spelling, as well as words and phrases that may be inappropriate. If there are any questions or concerns please feel free to contact the dictating provider for clarification.       Jose David Gorman [address], PA  07/03/21 7470

## 2021-10-25 ENCOUNTER — HOSPITAL ENCOUNTER (EMERGENCY)
Age: 71
Discharge: ANOTHER HOSPITAL | End: 2021-10-25
Attending: EMERGENCY MEDICINE
Payer: MEDICARE

## 2021-10-25 ENCOUNTER — APPOINTMENT (OUTPATIENT)
Dept: CT IMAGING | Age: 71
End: 2021-10-25
Payer: MEDICARE

## 2021-10-25 ENCOUNTER — APPOINTMENT (OUTPATIENT)
Dept: GENERAL RADIOLOGY | Age: 71
End: 2021-10-25
Payer: MEDICARE

## 2021-10-25 VITALS
DIASTOLIC BLOOD PRESSURE: 72 MMHG | RESPIRATION RATE: 23 BRPM | WEIGHT: 210 LBS | TEMPERATURE: 98.2 F | OXYGEN SATURATION: 93 % | SYSTOLIC BLOOD PRESSURE: 179 MMHG | BODY MASS INDEX: 45.3 KG/M2 | HEIGHT: 57 IN | HEART RATE: 83 BPM

## 2021-10-25 DIAGNOSIS — R47.01 APHASIA: ICD-10-CM

## 2021-10-25 DIAGNOSIS — R29.898 RIGHT LEG WEAKNESS: Primary | ICD-10-CM

## 2021-10-25 DIAGNOSIS — I63.9 CEREBROVASCULAR ACCIDENT (CVA), UNSPECIFIED MECHANISM (HCC): ICD-10-CM

## 2021-10-25 LAB
ALBUMIN SERPL-MCNC: 4.5 GM/DL (ref 3.4–5)
ALP BLD-CCNC: 144 IU/L (ref 40–129)
ALT SERPL-CCNC: 7 U/L (ref 10–40)
ANION GAP SERPL CALCULATED.3IONS-SCNC: 12 MMOL/L (ref 4–16)
AST SERPL-CCNC: 16 IU/L (ref 15–37)
BASOPHILS ABSOLUTE: 0 K/CU MM
BASOPHILS RELATIVE PERCENT: 0.3 % (ref 0–1)
BILIRUB SERPL-MCNC: 0.3 MG/DL (ref 0–1)
BUN BLDV-MCNC: 15 MG/DL (ref 6–23)
CALCIUM SERPL-MCNC: 9.6 MG/DL (ref 8.3–10.6)
CHLORIDE BLD-SCNC: 100 MMOL/L (ref 99–110)
CO2: 27 MMOL/L (ref 21–32)
CREAT SERPL-MCNC: 0.6 MG/DL (ref 0.6–1.1)
DIFFERENTIAL TYPE: ABNORMAL
EKG ATRIAL RATE: 97 BPM
EKG DIAGNOSIS: NORMAL
EKG P AXIS: 82 DEGREES
EKG P-R INTERVAL: 140 MS
EKG Q-T INTERVAL: 354 MS
EKG QRS DURATION: 74 MS
EKG QTC CALCULATION (BAZETT): 450 MS
EKG R AXIS: -49 DEGREES
EKG T AXIS: 73 DEGREES
EKG VENTRICULAR RATE: 97 BPM
EOSINOPHILS ABSOLUTE: 0.2 K/CU MM
EOSINOPHILS RELATIVE PERCENT: 2 % (ref 0–3)
GFR AFRICAN AMERICAN: >60 ML/MIN/1.73M2
GFR NON-AFRICAN AMERICAN: >60 ML/MIN/1.73M2
GLUCOSE BLD-MCNC: 157 MG/DL
GLUCOSE BLD-MCNC: 157 MG/DL (ref 70–99)
GLUCOSE BLD-MCNC: 168 MG/DL (ref 70–99)
HCT VFR BLD CALC: 46.2 % (ref 37–47)
HEMOGLOBIN: 14.7 GM/DL (ref 12.5–16)
IMMATURE NEUTROPHIL %: 0.3 % (ref 0–0.43)
INR BLD: 0.79 INDEX
LYMPHOCYTES ABSOLUTE: 1.9 K/CU MM
LYMPHOCYTES RELATIVE PERCENT: 16.1 % (ref 24–44)
MCH RBC QN AUTO: 29.5 PG (ref 27–31)
MCHC RBC AUTO-ENTMCNC: 31.8 % (ref 32–36)
MCV RBC AUTO: 92.8 FL (ref 78–100)
MONOCYTES ABSOLUTE: 0.8 K/CU MM
MONOCYTES RELATIVE PERCENT: 6.5 % (ref 0–4)
NUCLEATED RBC %: 0 %
PDW BLD-RTO: 13.2 % (ref 11.7–14.9)
PLATELET # BLD: 545 K/CU MM (ref 140–440)
PMV BLD AUTO: 9.5 FL (ref 7.5–11.1)
POTASSIUM SERPL-SCNC: 4.4 MMOL/L (ref 3.5–5.1)
PROTHROMBIN TIME: 10.2 SECONDS (ref 11.7–14.5)
RBC # BLD: 4.98 M/CU MM (ref 4.2–5.4)
SEGMENTED NEUTROPHILS ABSOLUTE COUNT: 8.8 K/CU MM
SEGMENTED NEUTROPHILS RELATIVE PERCENT: 74.8 % (ref 36–66)
SODIUM BLD-SCNC: 139 MMOL/L (ref 135–145)
TOTAL IMMATURE NEUTOROPHIL: 0.04 K/CU MM
TOTAL NUCLEATED RBC: 0 K/CU MM
TOTAL PROTEIN: 6.9 GM/DL (ref 6.4–8.2)
TROPONIN T: <0.01 NG/ML
WBC # BLD: 11.8 K/CU MM (ref 4–10.5)

## 2021-10-25 PROCEDURE — 37195 THROMBOLYTIC THERAPY STROKE: CPT

## 2021-10-25 PROCEDURE — 99291 CRITICAL CARE FIRST HOUR: CPT

## 2021-10-25 PROCEDURE — 96375 TX/PRO/DX INJ NEW DRUG ADDON: CPT

## 2021-10-25 PROCEDURE — 71045 X-RAY EXAM CHEST 1 VIEW: CPT

## 2021-10-25 PROCEDURE — 93010 ELECTROCARDIOGRAM REPORT: CPT | Performed by: INTERNAL MEDICINE

## 2021-10-25 PROCEDURE — 70498 CT ANGIOGRAPHY NECK: CPT

## 2021-10-25 PROCEDURE — 2580000003 HC RX 258: Performed by: EMERGENCY MEDICINE

## 2021-10-25 PROCEDURE — 99285 EMERGENCY DEPT VISIT HI MDM: CPT

## 2021-10-25 PROCEDURE — 6360000002 HC RX W HCPCS: Performed by: EMERGENCY MEDICINE

## 2021-10-25 PROCEDURE — 85610 PROTHROMBIN TIME: CPT

## 2021-10-25 PROCEDURE — 96365 THER/PROPH/DIAG IV INF INIT: CPT

## 2021-10-25 PROCEDURE — 82962 GLUCOSE BLOOD TEST: CPT

## 2021-10-25 PROCEDURE — 85025 COMPLETE CBC W/AUTO DIFF WBC: CPT

## 2021-10-25 PROCEDURE — 93005 ELECTROCARDIOGRAM TRACING: CPT | Performed by: EMERGENCY MEDICINE

## 2021-10-25 PROCEDURE — 6360000004 HC RX CONTRAST MEDICATION: Performed by: EMERGENCY MEDICINE

## 2021-10-25 PROCEDURE — 80053 COMPREHEN METABOLIC PANEL: CPT

## 2021-10-25 PROCEDURE — 84484 ASSAY OF TROPONIN QUANT: CPT

## 2021-10-25 PROCEDURE — 2500000003 HC RX 250 WO HCPCS: Performed by: EMERGENCY MEDICINE

## 2021-10-25 RX ORDER — LABETALOL HYDROCHLORIDE 5 MG/ML
10 INJECTION, SOLUTION INTRAVENOUS ONCE
Status: COMPLETED | OUTPATIENT
Start: 2021-10-25 | End: 2021-10-25

## 2021-10-25 RX ORDER — SODIUM CHLORIDE 0.9 % (FLUSH) 0.9 %
5-40 SYRINGE (ML) INJECTION 2 TIMES DAILY
Status: DISCONTINUED | OUTPATIENT
Start: 2021-10-25 | End: 2021-10-25 | Stop reason: HOSPADM

## 2021-10-25 RX ORDER — DEXTROSE MONOHYDRATE 25 G/50ML
12.5 INJECTION, SOLUTION INTRAVENOUS
Status: DISCONTINUED | OUTPATIENT
Start: 2021-10-25 | End: 2021-10-25 | Stop reason: HOSPADM

## 2021-10-25 RX ORDER — SODIUM CHLORIDE 9 MG/ML
25 INJECTION, SOLUTION INTRAVENOUS PRN
Status: DISCONTINUED | OUTPATIENT
Start: 2021-10-25 | End: 2021-10-25 | Stop reason: HOSPADM

## 2021-10-25 RX ORDER — 0.9 % SODIUM CHLORIDE 0.9 %
50 INTRAVENOUS SOLUTION INTRAVENOUS ONCE
Status: DISCONTINUED | OUTPATIENT
Start: 2021-10-25 | End: 2021-10-25 | Stop reason: HOSPADM

## 2021-10-25 RX ORDER — SODIUM CHLORIDE 0.9 % (FLUSH) 0.9 %
5-40 SYRINGE (ML) INJECTION PRN
Status: DISCONTINUED | OUTPATIENT
Start: 2021-10-25 | End: 2021-10-25 | Stop reason: HOSPADM

## 2021-10-25 RX ORDER — SODIUM CHLORIDE 0.9 % (FLUSH) 0.9 %
5-40 SYRINGE (ML) INJECTION EVERY 12 HOURS SCHEDULED
Status: DISCONTINUED | OUTPATIENT
Start: 2021-10-25 | End: 2021-10-25 | Stop reason: HOSPADM

## 2021-10-25 RX ADMIN — IOPAMIDOL 75 ML: 755 INJECTION, SOLUTION INTRAVENOUS at 07:20

## 2021-10-25 RX ADMIN — LABETALOL HYDROCHLORIDE 10 MG: 5 INJECTION, SOLUTION INTRAVENOUS at 08:13

## 2021-10-25 RX ADMIN — ALTEPLASE 8.6 MG: KIT at 08:09

## 2021-10-25 RX ADMIN — DEXTROSE MONOHYDRATE 3.14 MG/HR: 50 INJECTION, SOLUTION INTRAVENOUS at 08:47

## 2021-10-25 RX ADMIN — ALTEPLASE 77.2 MG: KIT at 08:24

## 2021-10-25 ASSESSMENT — ENCOUNTER SYMPTOMS
BACK PAIN: 0
SHORTNESS OF BREATH: 0
EYE PAIN: 0
EYE DISCHARGE: 0
SORE THROAT: 0
VOMITING: 0
NAUSEA: 0
COUGH: 0
RHINORRHEA: 0
ABDOMINAL PAIN: 0

## 2021-10-25 NOTE — ED NOTES
Bed: 03TR-03  Expected date:   Expected time:   Means of arrival:   Comments:  AMS lethargic        Johnathon El RN  10/25/21 [id number]

## 2021-10-25 NOTE — ED TRIAGE NOTES
Pt arrives via ems with c/o a fall. When she was walking she stumbled over her feet. Pt appears a/o x4 with some weakness on her right side.

## 2021-10-25 NOTE — ED PROVIDER NOTES
[id number]      Pt Name: Brennan Cabrales  MRN: [id number]  [place] 1950  Date of evaluation: 10/25/2021  Provider: Lamont García MD    CHIEF COMPLAINT       Chief Complaint   Patient presents with   Sierra Robert Altered Mental Status         HISTORY OF PRESENT ILLNESS      Brennan Cabrales is a 66 y.o. female who presents to the emergency department  for   Chief Complaint   Patient presents with   Sierra Robert Altered Mental Status       60-year-old female presents to the emergency department with acute onset right-sided weakness. She is coming from home. She typically does not have any sort of right-sided deficits or use any kind of walker at baseline. Reportedly she was okay this morning but  found her in the bathroom unable to get to a standing position. EMS was called to scene. They state that patient was unable to bear any weight on her right side. She is brought to the emergency department for evaluation. She was answering questions in the emergency department but did have a bit difficulty with words. She is grossly moving all extremities but cannot resist gravity with the right leg. No report of any other injuries. She denies any chest pain or abdominal pain. No fevers or chills. No respiratory difficulty. Stroke alert was called given that the onset of her symptoms seem to been around 6:00 in the morning. Nursing Notes, Triage Notes & Vital Signs were reviewed. REVIEW OF SYSTEMS    (2-9 systems for level 4, 10 or more for level 5)     Review of Systems   Constitutional: Negative for chills and fever. HENT: Negative for congestion, rhinorrhea and sore throat. Eyes: Negative for pain and discharge. Respiratory: Negative for cough and shortness of breath. Cardiovascular: Negative for chest pain and palpitations. Gastrointestinal: Negative for abdominal pain, nausea and vomiting. Endocrine: Negative for polydipsia and polyuria. Genitourinary: Negative for dysuria and flank pain. Musculoskeletal: Negative for back pain and neck pain. Neurological: Positive for weakness. Psychiatric/Behavioral: Positive for confusion. Except as noted above the remainder of the review of systems was reviewed and negative. PAST MEDICAL HISTORY     Past Medical History:   Diagnosis Date    CAD (coronary artery disease)     Hypertension        Prior to Admission medications    Medication Sig Start Date End Date Taking?  Authorizing Provider   meloxicam (MOBIC) 7.5 MG tablet Take 2 tablets by mouth daily for 10 days 7/3/21 7/13/21  BEBETO Ballesteros   lisinopril (PRINIVIL;ZESTRIL) 5 MG tablet Take 1 tablet by mouth daily 11/1/19   Matt Calderón MD   aspirin 81 MG tablet Take 81 mg by mouth daily    Historical Provider, MD   atorvastatin (LIPITOR) 80 MG tablet Take 80 mg by mouth daily    Historical Provider, MD        Patient Active Problem List   Diagnosis    Hypertensive urgency    CAD (coronary artery disease)    Diverticulosis    Hyperlipidemia with target low density lipoprotein (LDL) cholesterol less than 70 mg/dL         SURGICAL HISTORY       Past Surgical History:   Procedure Laterality Date    CAROTID ENDARTERECTOMY      CHOLECYSTECTOMY      CORONARY ARTERY BYPASS GRAFT      NECK SURGERY           CURRENT MEDICATIONS       Discharge Medication List as of 10/25/2021  9:17 AM      CONTINUE these medications which have NOT CHANGED    Details   meloxicam (MOBIC) 7.5 MG tablet Take 2 tablets by mouth daily for 10 days, Disp-20 tablet, R-0Print      lisinopril (PRINIVIL;ZESTRIL) 5 MG tablet Take 1 tablet by mouth daily, Disp-30 tablet, R-3Normal      aspirin 81 MG tablet Take 81 mg by mouth dailyHistorical Med      atorvastatin (LIPITOR) 80 MG tablet Take 80 mg by mouth dailyHistorical Med             ALLERGIES     Penicillins and Sulfa antibiotics    FAMILY HISTORY     No family history on file. SOCIAL HISTORY       Social History     Socioeconomic History    Marital status:      Spouse name: Not on file    Number of children: Not on file    Years of education: Not on file    Highest education level: Not on file   Occupational History    Not on file   Tobacco Use    Smoking status: Current Every Day Smoker     Packs/day: 1.50     Types: Cigarettes    Smokeless tobacco: Never Used   Substance and Sexual Activity    Alcohol use: No    Drug use: No    Sexual activity: Not on file   Other Topics Concern    Not on file   Social History Narrative    Not on file     Social Determinants of Health     Financial Resource Strain:     Difficulty of Paying Living Expenses:    Food Insecurity:     Worried About Running Out of Food in the Last Year:     [address] in the Last Year:    Transportation Needs:     Lack of Transportation (Medical):  Lack of Transportation (Non-Medical):    Physical Activity:     Days of Exercise per Week:     Minutes of Exercise per Session:    Stress:     Feeling of Stress :    Social Connections:     Frequency of Communication with Friends and Family:     Frequency of Social Gatherings with Friends and Family:     Attends Christian Services:     Active Member of Clubs or Organizations:     Attends Club or Organization Meetings:     Marital Status:    Intimate Partner Violence:     Fear of Current or Ex-Partner:     Emotionally Abused:     Physically Abused:     Sexually Abused:        SCREENINGS   NIH Stroke Scale  Interval: Hand-off/Transfer  Level of Consciousness (1a. ): Alert  LOC Questions (1b. ):  Answers one correctly  LOC Commands (1c. ): Performs one task correctly  Best Gaze (2. ): Normal  Visual (3. ): (!) Partial hemianopia  Facial Palsy (4. ): (!) Minor paralysis  Motor Arm, Left (5a. ): Drift, but does not hit bed  Motor Arm, Right (5b. ): Some effort against gravity  Motor Leg, Left (6a. ): Drift, but does not hit bed  Motor Leg, Right (6b. ): No effort against gravity, limb falls  Limb Ataxia (7. ): (!) Present in two limbs  Sensory (8. ): (!) Mild to Moderate  Best Language (9. ): Mild to moderate aphasia  Dysarthria (10. ): Mild to moderate, slurs some words  Extinction and Inattention (11): (!) Profound fidelia-inattention or extinction to more than one modality  Total: 18           PHYSICAL EXAM    (up to 7 for level 4, 8 or more for level 5)     ED Triage Vitals   BP Temp Temp src Pulse Resp SpO2 Height Weight   -- -- -- -- -- -- -- --       Physical Exam  Vitals reviewed. Constitutional:       Appearance: She is not toxic-appearing or diaphoretic. HENT:      Head: Normocephalic and atraumatic. Mouth/Throat:      Mouth: Mucous membranes are moist.   Eyes:      Extraocular Movements: Extraocular movements intact. Right eye: No nystagmus. Left eye: No nystagmus. Pupils: Pupils are equal, round, and reactive to light. Cardiovascular:      Rate and Rhythm: Normal rate. Heart sounds: No friction rub. No gallop. Pulmonary:      Breath sounds: No rales. Comments: B/l breath sounds  Respirations unlabored  Chest:      Chest wall: No tenderness. Abdominal:      Palpations: Abdomen is soft. Tenderness: There is no abdominal tenderness. There is no guarding. Musculoskeletal:         General: No swelling or tenderness. Cervical back: Normal range of motion and neck supple. Skin:     General: Skin is warm. Capillary Refill: Capillary refill takes less than 2 seconds. Neurological:      Mental Status: She is alert.       Comments: Not resisting therapy with right lower leg, described with other extremities, face is symmetric, does appear to be having some aphasia         DIAGNOSTIC RESULTS     Labs Reviewed   CBC WITH AUTO DIFFERENTIAL - Abnormal; Notable for the following components:       Result Value    WBC 11.8 (*)     MCHC 31.8 (*)     Platelets 715 (*) Segs Relative 74.8 (*)     Lymphocytes % 16.1 (*)     Monocytes % 6.5 (*)     All other components within normal limits   COMPREHENSIVE METABOLIC PANEL W/ REFLEX TO MG FOR LOW K - Abnormal; Notable for the following components:    Glucose 168 (*)     ALT 7 (*)     Alkaline Phosphatase 144 (*)     All other components within normal limits   PROTIME-INR - Abnormal; Notable for the following components:    Protime 10.2 (*)     All other components within normal limits   POCT GLUCOSE - Abnormal; Notable for the following components:    POC Glucose 157 (*)     All other components within normal limits   POCT GLUCOSE - Normal   TROPONIN          EKG: All EKG's are interpreted by the Emergency Department Physician who either signs or Co-signs this chart in the absence of a cardiologist.       EKG Interpretation    Interpreted by emergency department physician    EKG is interpreted by me. EKG shows tachycardia 106 beats per minutes, there is amount of artifact in the EKG making it difficult to interpret, no Abelino's elevations or depressions, T waves overall appear to be unremarkable, VT interval 140, QRS duration of 74, QTC of 4-70. Final impression, sinus tachycardia, nonspecific EKG. Wendy Guillen MD     RADIOLOGY:     Non-plain film images such as CT, Ultrasound and MRI are read by the radiologist. Plain radiographic images are visualized and preliminarily interpreted by the emergency physician. Interpretation per the Radiologist below, if available at the time of this note:    XR CHEST PORTABLE   Final Result   No acute process. CTA HEAD NECK W CONTRAST   Final Result   1. No large vessel occlusion identified within the [id number]. 2. Severe short-segment stenosis of the P2 segment of the left posterior   cerebral artery. 3. Severe short-segment stenosis of the distal aspect of the V4 segment of   the left vertebral artery.    4. Severe stenosis at the junction of the left carotid endarterectomy graft   and the left internal carotid artery measuring 80% based on NASCET criteria. 5. Short segment 60% stenosis of the proximal right internal carotid artery   based on NASCET criteria. 6. Moderate to severe stenosis at the origin of the right vertebral artery. 7. Moderate stenosis at the origin of the left subclavian artery. CT HEAD WO CONTRAST   Final Result   1. No acute intracranial process identified. 2. Remote left cerebellar infarct, unchanged. 3. Remote left occipital infarct, new from the previous exam.   4. Remote lacunar infarcts within the bilateral basal ganglia. The above findings were discussed with Dr. Juan C Rodriguez at 7:15 a.m. on   10/25/2021. ED BEDSIDE ULTRASOUND:   Performed by ED Physician Shelbie Curiel MD       LABS:  Labs Reviewed   CBC WITH AUTO DIFFERENTIAL - Abnormal; Notable for the following components:       Result Value    WBC 11.8 (*)     MCHC 31.8 (*)     Platelets 466 (*)     Segs Relative 74.8 (*)     Lymphocytes % 16.1 (*)     Monocytes % 6.5 (*)     All other components within normal limits   COMPREHENSIVE METABOLIC PANEL W/ REFLEX TO MG FOR LOW K - Abnormal; Notable for the following components:    Glucose 168 (*)     ALT 7 (*)     Alkaline Phosphatase 144 (*)     All other components within normal limits   PROTIME-INR - Abnormal; Notable for the following components:    Protime 10.2 (*)     All other components within normal limits   POCT GLUCOSE - Abnormal; Notable for the following components:    POC Glucose 157 (*)     All other components within normal limits   POCT GLUCOSE - Normal   TROPONIN       All other labs were within normal range or not returned as of this dictation.     EMERGENCY DEPARTMENT COURSE and DIFFERENTIAL DIAGNOSIS/MDM:   Vitals:    Vitals:    10/25/21 0843 10/25/21 0846 10/25/21 0857 10/25/21 0902   BP: (!) 181/78  (!) 177/73 (!) 179/72   Pulse: 80  84 83   Resp: 22  20 23   Temp:    98.2 °F (36.8 °C)   TempSrc:    Oral   SpO2: 93% 93% 93% 93%   Weight:       Height:               MDM  Number of Diagnoses or Management Options  Aphasia  Cerebrovascular accident (CVA), unspecified mechanism ([address]  Right leg weakness  Diagnosis management comments: 71-year-old female presents as a stroke alert for acute onset right sided leg weakness. Symptoms seemed of started around 6:00 in the morning. We did obtain collateral information from her . She was unable to get to a standing position so EMS was called to scene. She is brought to the emergency department with mild elevated blood pressure. Active saturations are in the low 90s on room air. She cannot resist gravity with her right leg and does have some aphasia. Stroke scale of 3 upon presentation. Stroke alert was called. She was taken to CT scan. CTA of the head neck was unremarkable. CT of the head was grossly unremarkable. She was evaluated by Dr. Zohreh Medina ([facility] stroke neurologist). Recommendation was for TPA. Patient's blood pressure did elevate the emergency department so she was given labetalol. Once the blood pressure was in the septal range, TPA was administered. She will be transferred to [place] as a level 1 stroke alert. She is transferred in stable condition.      -  Patient seen and evaluated in the emergency department. -  Triage and nursing notes reviewed and incorporated. -  Old chart records reviewed and incorporated. -  Work-up included:  See above  -  Results discussed with patient. REASSESSMENT          CRITICAL CARE TIME       Total critical care time today provided was 35 minutes. This excludes seperately billable procedure. Critical care time provided for stroke, right sided weakness, aphasia, stroke protocol, tpa administration that required close evaluation and/or intervention with concern for patient decompensation. This excludes seperately billable procedures and family discussion time.  Critical care time provided for obtaining history, conducting a physical exam, performing and monitoring interventions, ordering, collecting and interpreting tests, and establishing medical decision-making. There was a potential for life/limb threatening pathology requiring close evaluation and intervention with concern for patient decompensation. CONSULTS:  IP CONSULT TO PHARMACY  PHARMACY TO CHANGE BASE FLUIDS  IP CONSULT TO PHARMACY  PHARMACY TO CHANGE BASE FLUIDS  IP CONSULT TO STROKE TEAM    PROCEDURES:  None performed unless otherwise noted below     Procedures        FINAL IMPRESSION      1. Right leg weakness    2. Cerebrovascular accident (CVA), unspecified mechanism ([address]    3. Aphasia          DISPOSITION/PLAN   DISPOSITION Decision To Transfer 10/25/2021 08:28:02 AM      PATIENT REFERRED TO:  No follow-up provider specified. DISCHARGE MEDICATIONS:  Discharge Medication List as of 10/25/2021  9:17 AM          ED Provider Disposition Time  DISPOSITION Decision To Transfer 10/25/2021 08:28:02 AM      Appropriate personal protective equipment was worn during the patient's evaluation. These included surgical, eye protection, surgical mask or in 95 respirator and gloves. The patient was also placed in a surgical mask for the prevention of possible spread of respiratory viral illnesses. The Patient was instructed to read the package inserts with any medication that was prescribed. Major potential reactions and medication interactions were discussed. The Patient understands that there are numerous possible adverse reactions not covered. The patient was also instructed to arrange follow-up with his or her primary care provider for review of any pending labwork or incidental findings on any radiology results that were obtained. All efforts were made to discuss any incidental findings that require further monitoring. Controlled Substances Monitoring:     No flowsheet data found.     (Please note that portions of this note were completed with a voice recognition program.  Efforts were made to edit the dictations but occasionally words are mis-transcribed.)    Yun Parikh MD (electronically signed)  Attending Emergency Physician           Yun Parikh MD  10/25/21 [phone]

## 2021-11-15 ENCOUNTER — HOSPITAL ENCOUNTER (OUTPATIENT)
Age: 71
Discharge: HOME | End: 2021-11-15
Payer: MEDICARE

## 2021-11-15 LAB
ALBUMIN SERPL-MCNC: 4.6 GM/DL (ref 3.4–5)
ALP BLD-CCNC: 142 IU/L (ref 40–129)
ALT SERPL-CCNC: 8 U/L (ref 10–40)
ANION GAP SERPL CALCULATED.3IONS-SCNC: 10 MMOL/L (ref 4–16)
AST SERPL-CCNC: 17 IU/L (ref 15–37)
BASOPHILS ABSOLUTE: 0.1 K/CU MM
BASOPHILS RELATIVE PERCENT: 0.7 % (ref 0–1)
BILIRUB SERPL-MCNC: 0.3 MG/DL (ref 0–1)
BUN BLDV-MCNC: 12 MG/DL (ref 6–23)
CALCIUM SERPL-MCNC: 10.3 MG/DL (ref 8.3–10.6)
CHLORIDE BLD-SCNC: 99 MMOL/L (ref 99–110)
CHOLESTEROL: 183 MG/DL
CO2: 28 MMOL/L (ref 21–32)
CREAT SERPL-MCNC: 0.7 MG/DL (ref 0.6–1.1)
DIFFERENTIAL TYPE: ABNORMAL
EOSINOPHILS ABSOLUTE: 0.4 K/CU MM
EOSINOPHILS RELATIVE PERCENT: 3.5 % (ref 0–3)
ERYTHROCYTE SEDIMENTATION RATE: 9 MM/HR (ref 0–30)
FOLATE: 12.9 NG/ML (ref 3.1–17.5)
GFR AFRICAN AMERICAN: >60 ML/MIN/1.73M2
GFR NON-AFRICAN AMERICAN: >60 ML/MIN/1.73M2
GLUCOSE BLD-MCNC: 108 MG/DL (ref 70–99)
HCT VFR BLD CALC: 46.9 % (ref 37–47)
HDLC SERPL-MCNC: 42 MG/DL
HEMOGLOBIN: 14.3 GM/DL (ref 12.5–16)
IMMATURE NEUTROPHIL %: 0.5 % (ref 0–0.43)
LDL CHOLESTEROL DIRECT: 108 MG/DL
LYMPHOCYTES ABSOLUTE: 1.7 K/CU MM
LYMPHOCYTES RELATIVE PERCENT: 14.9 % (ref 24–44)
MCH RBC QN AUTO: 28.9 PG (ref 27–31)
MCHC RBC AUTO-ENTMCNC: 30.5 % (ref 32–36)
MCV RBC AUTO: 94.9 FL (ref 78–100)
MONOCYTES ABSOLUTE: 0.8 K/CU MM
MONOCYTES RELATIVE PERCENT: 7.2 % (ref 0–4)
NUCLEATED RBC %: 0 %
PDW BLD-RTO: 13.2 % (ref 11.7–14.9)
PLATELET # BLD: 712 K/CU MM (ref 140–440)
PMV BLD AUTO: 9.8 FL (ref 7.5–11.1)
POTASSIUM SERPL-SCNC: 4.5 MMOL/L (ref 3.5–5.1)
RBC # BLD: 4.94 M/CU MM (ref 4.2–5.4)
SEGMENTED NEUTROPHILS ABSOLUTE COUNT: 8.2 K/CU MM
SEGMENTED NEUTROPHILS RELATIVE PERCENT: 73.2 % (ref 36–66)
SODIUM BLD-SCNC: 137 MMOL/L (ref 135–145)
TOTAL IMMATURE NEUTOROPHIL: 0.06 K/CU MM
TOTAL NUCLEATED RBC: 0 K/CU MM
TOTAL PROTEIN: 7.3 GM/DL (ref 6.4–8.2)
TRIGL SERPL-MCNC: 225 MG/DL
TSH HIGH SENSITIVITY: 2.78 UIU/ML (ref 0.27–4.2)
VITAMIN B-12: 632.2 PG/ML (ref 211–911)
WBC # BLD: 11.2 K/CU MM (ref 4–10.5)

## 2021-11-15 PROCEDURE — 84443 ASSAY THYROID STIM HORMONE: CPT

## 2021-11-15 PROCEDURE — 36415 COLL VENOUS BLD VENIPUNCTURE: CPT

## 2021-11-15 PROCEDURE — 80053 COMPREHEN METABOLIC PANEL: CPT

## 2021-11-15 PROCEDURE — 82607 VITAMIN B-12: CPT

## 2021-11-15 PROCEDURE — 86200 CCP ANTIBODY: CPT

## 2021-11-15 PROCEDURE — 82746 ASSAY OF FOLIC ACID SERUM: CPT

## 2021-11-15 PROCEDURE — 86430 RHEUMATOID FACTOR TEST QUAL: CPT

## 2021-11-15 PROCEDURE — 86038 ANTINUCLEAR ANTIBODIES: CPT

## 2021-11-15 PROCEDURE — 80061 LIPID PANEL: CPT

## 2021-11-15 PROCEDURE — 85025 COMPLETE CBC W/AUTO DIFF WBC: CPT

## 2021-11-15 PROCEDURE — 83721 ASSAY OF BLOOD LIPOPROTEIN: CPT

## 2021-11-15 PROCEDURE — 85652 RBC SED RATE AUTOMATED: CPT

## 2021-11-16 ENCOUNTER — HOSPITAL ENCOUNTER (OUTPATIENT)
Age: 71
Setting detail: SPECIMEN
Discharge: HOME | End: 2021-11-16
Payer: MEDICARE

## 2021-11-16 PROBLEM — I63.9 CEREBROVASCULAR ACCIDENT (HCC): Status: ACTIVE | Noted: 2021-10-25

## 2021-11-16 LAB
BACTERIA: ABNORMAL /HPF
BILIRUBIN URINE: NEGATIVE MG/DL
BLOOD, URINE: NEGATIVE
CLARITY: ABNORMAL
COLOR: YELLOW
GLUCOSE, URINE: NEGATIVE MG/DL
KETONES, URINE: NEGATIVE MG/DL
LEUKOCYTE ESTERASE, URINE: ABNORMAL
MUCUS: ABNORMAL HPF
NITRITE URINE, QUANTITATIVE: NEGATIVE
PH, URINE: 5 (ref 5–8)
PROTEIN UA: NEGATIVE MG/DL
RBC URINE: <1 /HPF (ref 0–6)
SPECIFIC GRAVITY UA: 1.01 (ref 1–1.03)
SQUAMOUS EPITHELIAL: 7 /HPF
TRICHOMONAS: ABNORMAL /HPF
UROBILINOGEN, URINE: NEGATIVE MG/DL (ref 0.2–1)
WBC UA: 25 /HPF (ref 0–5)

## 2021-11-16 PROCEDURE — 81001 URINALYSIS AUTO W/SCOPE: CPT

## 2021-11-17 LAB
ANTI-NUCLEAR ANTIBODY (ANA): NORMAL
CYCLIC CITRULLINATED PEPTIDE ANTIBODY IGG: 2 UNITS (ref 0–19)
RHEUMATOID FACTOR: <10 IU/ML (ref 0–14)

## 2021-11-22 ENCOUNTER — HOSPITAL ENCOUNTER (OUTPATIENT)
Dept: ULTRASOUND IMAGING | Age: 71
Discharge: HOME | End: 2021-11-22
Payer: MEDICARE

## 2021-11-22 DIAGNOSIS — I65.23 BILATERAL CAROTID ARTERY STENOSIS: ICD-10-CM

## 2021-11-22 PROCEDURE — 93880 EXTRACRANIAL BILAT STUDY: CPT

## 2021-12-21 ENCOUNTER — APPOINTMENT (OUTPATIENT)
Dept: CT IMAGING | Age: 71
DRG: 064 | End: 2021-12-21
Payer: MEDICARE

## 2021-12-21 ENCOUNTER — HOSPITAL ENCOUNTER (INPATIENT)
Age: 71
LOS: 2 days | Discharge: HOME HEALTH | DRG: 064 | End: 2021-12-23
Attending: STUDENT IN AN ORGANIZED HEALTH CARE EDUCATION/TRAINING PROGRAM | Admitting: FAMILY MEDICINE
Payer: MEDICARE

## 2021-12-21 ENCOUNTER — APPOINTMENT (OUTPATIENT)
Dept: GENERAL RADIOLOGY | Age: 71
DRG: 064 | End: 2021-12-21
Payer: MEDICARE

## 2021-12-21 DIAGNOSIS — R44.1 VISUAL HALLUCINATIONS: ICD-10-CM

## 2021-12-21 DIAGNOSIS — R47.01 EXPRESSIVE APHASIA: Primary | ICD-10-CM

## 2021-12-21 DIAGNOSIS — J18.9 ATYPICAL PNEUMONIA: ICD-10-CM

## 2021-12-21 PROBLEM — I63.9 ACUTE CEREBROVASCULAR ACCIDENT (CVA) (HCC): Status: ACTIVE | Noted: 2021-12-21

## 2021-12-21 LAB
ALBUMIN SERPL-MCNC: 4.3 GM/DL (ref 3.4–5)
ALP BLD-CCNC: 138 IU/L (ref 40–129)
ALT SERPL-CCNC: 8 U/L (ref 10–40)
AMMONIA: 15 UMOL/L (ref 11–51)
ANION GAP SERPL CALCULATED.3IONS-SCNC: 10 MMOL/L (ref 4–16)
AST SERPL-CCNC: 15 IU/L (ref 15–37)
BACTERIA: NEGATIVE /HPF
BASOPHILS ABSOLUTE: 0.1 K/CU MM
BASOPHILS RELATIVE PERCENT: 0.8 % (ref 0–1)
BILIRUB SERPL-MCNC: 0.3 MG/DL (ref 0–1)
BILIRUBIN URINE: NEGATIVE MG/DL
BLOOD, URINE: NEGATIVE
BUN BLDV-MCNC: 15 MG/DL (ref 6–23)
CALCIUM SERPL-MCNC: 8.8 MG/DL (ref 8.3–10.6)
CHLORIDE BLD-SCNC: 102 MMOL/L (ref 99–110)
CHP ED QC CHECK: NORMAL
CLARITY: CLEAR
CO2: 27 MMOL/L (ref 21–32)
COLOR: YELLOW
CREAT SERPL-MCNC: 0.8 MG/DL (ref 0.6–1.1)
DIFFERENTIAL TYPE: ABNORMAL
EKG ATRIAL RATE: 80 BPM
EKG DIAGNOSIS: NORMAL
EKG P AXIS: 94 DEGREES
EKG P-R INTERVAL: 150 MS
EKG Q-T INTERVAL: 384 MS
EKG QRS DURATION: 72 MS
EKG QTC CALCULATION (BAZETT): 442 MS
EKG R AXIS: -49 DEGREES
EKG T AXIS: 65 DEGREES
EKG VENTRICULAR RATE: 80 BPM
EOSINOPHILS ABSOLUTE: 0.3 K/CU MM
EOSINOPHILS RELATIVE PERCENT: 3.2 % (ref 0–3)
GFR AFRICAN AMERICAN: >60 ML/MIN/1.73M2
GFR NON-AFRICAN AMERICAN: >60 ML/MIN/1.73M2
GLUCOSE BLD-MCNC: 100 MG/DL
GLUCOSE BLD-MCNC: 100 MG/DL (ref 70–99)
GLUCOSE BLD-MCNC: 132 MG/DL (ref 70–99)
GLUCOSE, URINE: NEGATIVE MG/DL
HCT VFR BLD CALC: 45.3 % (ref 37–47)
HEMOGLOBIN: 14.4 GM/DL (ref 12.5–16)
HYALINE CASTS: 0 /LPF
IMMATURE NEUTROPHIL %: 0.4 % (ref 0–0.43)
INR BLD: 0.83 INDEX
KETONES, URINE: NEGATIVE MG/DL
LEUKOCYTE ESTERASE, URINE: NEGATIVE
LYMPHOCYTES ABSOLUTE: 1.7 K/CU MM
LYMPHOCYTES RELATIVE PERCENT: 19.4 % (ref 24–44)
MCH RBC QN AUTO: 29.3 PG (ref 27–31)
MCHC RBC AUTO-ENTMCNC: 31.8 % (ref 32–36)
MCV RBC AUTO: 92.1 FL (ref 78–100)
MONOCYTES ABSOLUTE: 0.7 K/CU MM
MONOCYTES RELATIVE PERCENT: 7.9 % (ref 0–4)
MUCUS: ABNORMAL HPF
NITRITE URINE, QUANTITATIVE: NEGATIVE
NUCLEATED RBC %: 0 %
PDW BLD-RTO: 13.7 % (ref 11.7–14.9)
PH, URINE: 5 (ref 5–8)
PLATELET # BLD: 497 K/CU MM (ref 140–440)
PMV BLD AUTO: 9.4 FL (ref 7.5–11.1)
POTASSIUM SERPL-SCNC: 4.3 MMOL/L (ref 3.5–5.1)
PROTEIN UA: NEGATIVE MG/DL
PROTHROMBIN TIME: 10.7 SECONDS (ref 11.7–14.5)
RBC # BLD: 4.92 M/CU MM (ref 4.2–5.4)
RBC URINE: ABNORMAL /HPF (ref 0–6)
SARS-COV-2, NAAT: NOT DETECTED
SEGMENTED NEUTROPHILS ABSOLUTE COUNT: 5.9 K/CU MM
SEGMENTED NEUTROPHILS RELATIVE PERCENT: 68.3 % (ref 36–66)
SODIUM BLD-SCNC: 139 MMOL/L (ref 135–145)
SOURCE: NORMAL
SPECIFIC GRAVITY UA: 1.01 (ref 1–1.03)
SQUAMOUS EPITHELIAL: 1 /HPF
T4 FREE: 0.88 NG/DL (ref 0.9–1.8)
TOTAL IMMATURE NEUTOROPHIL: 0.03 K/CU MM
TOTAL NUCLEATED RBC: 0 K/CU MM
TOTAL PROTEIN: 7 GM/DL (ref 6.4–8.2)
TRICHOMONAS: ABNORMAL /HPF
TROPONIN T: <0.01 NG/ML
TSH HIGH SENSITIVITY: 2.06 UIU/ML (ref 0.27–4.2)
UROBILINOGEN, URINE: NEGATIVE MG/DL (ref 0.2–1)
WBC # BLD: 8.6 K/CU MM (ref 4–10.5)
WBC UA: ABNORMAL /HPF (ref 0–5)

## 2021-12-21 PROCEDURE — 96365 THER/PROPH/DIAG IV INF INIT: CPT

## 2021-12-21 PROCEDURE — 82962 GLUCOSE BLOOD TEST: CPT

## 2021-12-21 PROCEDURE — 85610 PROTHROMBIN TIME: CPT

## 2021-12-21 PROCEDURE — 6370000000 HC RX 637 (ALT 250 FOR IP): Performed by: FAMILY MEDICINE

## 2021-12-21 PROCEDURE — 36415 COLL VENOUS BLD VENIPUNCTURE: CPT

## 2021-12-21 PROCEDURE — 81001 URINALYSIS AUTO W/SCOPE: CPT

## 2021-12-21 PROCEDURE — 84439 ASSAY OF FREE THYROXINE: CPT

## 2021-12-21 PROCEDURE — 83605 ASSAY OF LACTIC ACID: CPT

## 2021-12-21 PROCEDURE — 85025 COMPLETE CBC W/AUTO DIFF WBC: CPT

## 2021-12-21 PROCEDURE — 70496 CT ANGIOGRAPHY HEAD: CPT

## 2021-12-21 PROCEDURE — 94761 N-INVAS EAR/PLS OXIMETRY MLT: CPT

## 2021-12-21 PROCEDURE — 84443 ASSAY THYROID STIM HORMONE: CPT

## 2021-12-21 PROCEDURE — 87635 SARS-COV-2 COVID-19 AMP PRB: CPT

## 2021-12-21 PROCEDURE — 99285 EMERGENCY DEPT VISIT HI MDM: CPT

## 2021-12-21 PROCEDURE — 93010 ELECTROCARDIOGRAM REPORT: CPT | Performed by: INTERNAL MEDICINE

## 2021-12-21 PROCEDURE — 82140 ASSAY OF AMMONIA: CPT

## 2021-12-21 PROCEDURE — 71045 X-RAY EXAM CHEST 1 VIEW: CPT

## 2021-12-21 PROCEDURE — 84484 ASSAY OF TROPONIN QUANT: CPT

## 2021-12-21 PROCEDURE — 6360000002 HC RX W HCPCS: Performed by: STUDENT IN AN ORGANIZED HEALTH CARE EDUCATION/TRAINING PROGRAM

## 2021-12-21 PROCEDURE — 6360000002 HC RX W HCPCS: Performed by: FAMILY MEDICINE

## 2021-12-21 PROCEDURE — 2580000003 HC RX 258: Performed by: STUDENT IN AN ORGANIZED HEALTH CARE EDUCATION/TRAINING PROGRAM

## 2021-12-21 PROCEDURE — 87040 BLOOD CULTURE FOR BACTERIA: CPT

## 2021-12-21 PROCEDURE — 93005 ELECTROCARDIOGRAM TRACING: CPT | Performed by: STUDENT IN AN ORGANIZED HEALTH CARE EDUCATION/TRAINING PROGRAM

## 2021-12-21 PROCEDURE — 80053 COMPREHEN METABOLIC PANEL: CPT

## 2021-12-21 PROCEDURE — 6360000004 HC RX CONTRAST MEDICATION: Performed by: STUDENT IN AN ORGANIZED HEALTH CARE EDUCATION/TRAINING PROGRAM

## 2021-12-21 PROCEDURE — 70450 CT HEAD/BRAIN W/O DYE: CPT

## 2021-12-21 PROCEDURE — 2140000000 HC CCU INTERMEDIATE R&B

## 2021-12-21 RX ORDER — MAGNESIUM SULFATE IN WATER 40 MG/ML
2000 INJECTION, SOLUTION INTRAVENOUS PRN
Status: DISCONTINUED | OUTPATIENT
Start: 2021-12-21 | End: 2021-12-23 | Stop reason: HOSPADM

## 2021-12-21 RX ORDER — POTASSIUM CHLORIDE 20 MEQ/1
40 TABLET, EXTENDED RELEASE ORAL PRN
Status: DISCONTINUED | OUTPATIENT
Start: 2021-12-21 | End: 2021-12-23 | Stop reason: HOSPADM

## 2021-12-21 RX ORDER — LABETALOL HYDROCHLORIDE 5 MG/ML
10 INJECTION, SOLUTION INTRAVENOUS EVERY 10 MIN PRN
Status: DISCONTINUED | OUTPATIENT
Start: 2021-12-21 | End: 2021-12-23 | Stop reason: HOSPADM

## 2021-12-21 RX ORDER — ATORVASTATIN CALCIUM 40 MG/1
80 TABLET, FILM COATED ORAL NIGHTLY
Status: DISCONTINUED | OUTPATIENT
Start: 2021-12-21 | End: 2021-12-23 | Stop reason: HOSPADM

## 2021-12-21 RX ORDER — PROMETHAZINE HYDROCHLORIDE 25 MG/1
12.5 TABLET ORAL EVERY 6 HOURS PRN
Status: DISCONTINUED | OUTPATIENT
Start: 2021-12-21 | End: 2021-12-21 | Stop reason: SDUPTHER

## 2021-12-21 RX ORDER — ONDANSETRON 2 MG/ML
4 INJECTION INTRAMUSCULAR; INTRAVENOUS EVERY 6 HOURS PRN
Status: DISCONTINUED | OUTPATIENT
Start: 2021-12-21 | End: 2021-12-21 | Stop reason: SDUPTHER

## 2021-12-21 RX ORDER — LISINOPRIL 5 MG/1
5 TABLET ORAL DAILY
Status: DISCONTINUED | OUTPATIENT
Start: 2021-12-21 | End: 2021-12-23 | Stop reason: HOSPADM

## 2021-12-21 RX ORDER — ONDANSETRON 2 MG/ML
4 INJECTION INTRAMUSCULAR; INTRAVENOUS EVERY 6 HOURS PRN
Status: DISCONTINUED | OUTPATIENT
Start: 2021-12-21 | End: 2021-12-23 | Stop reason: HOSPADM

## 2021-12-21 RX ORDER — ASPIRIN 81 MG/1
81 TABLET, CHEWABLE ORAL DAILY
Status: DISCONTINUED | OUTPATIENT
Start: 2021-12-21 | End: 2021-12-23 | Stop reason: HOSPADM

## 2021-12-21 RX ORDER — POTASSIUM CHLORIDE 7.45 MG/ML
10 INJECTION INTRAVENOUS PRN
Status: DISCONTINUED | OUTPATIENT
Start: 2021-12-21 | End: 2021-12-23 | Stop reason: HOSPADM

## 2021-12-21 RX ORDER — PROMETHAZINE HYDROCHLORIDE 25 MG/1
12.5 TABLET ORAL EVERY 6 HOURS PRN
Status: DISCONTINUED | OUTPATIENT
Start: 2021-12-21 | End: 2021-12-23 | Stop reason: HOSPADM

## 2021-12-21 RX ADMIN — IOPAMIDOL 80 ML: 755 INJECTION, SOLUTION INTRAVENOUS at 13:03

## 2021-12-21 RX ADMIN — ATORVASTATIN CALCIUM 80 MG: 40 TABLET, FILM COATED ORAL at 22:09

## 2021-12-21 RX ADMIN — AZITHROMYCIN MONOHYDRATE 500 MG: 500 INJECTION, POWDER, LYOPHILIZED, FOR SOLUTION INTRAVENOUS at 18:09

## 2021-12-21 RX ADMIN — LISINOPRIL 5 MG: 5 TABLET ORAL at 22:09

## 2021-12-21 RX ADMIN — CEFTRIAXONE 2000 MG: 2 INJECTION, POWDER, FOR SOLUTION INTRAMUSCULAR; INTRAVENOUS at 15:56

## 2021-12-21 RX ADMIN — ASPIRIN 81 MG CHEWABLE TABLET 81 MG: 81 TABLET CHEWABLE at 22:10

## 2021-12-21 RX ADMIN — ENOXAPARIN SODIUM 40 MG: 100 INJECTION SUBCUTANEOUS at 22:10

## 2021-12-21 NOTE — ED PROVIDER NOTES
Emergency Department Encounter    Patient: Domingo Bowling  MRN: [id number]  : 1950  Date of Evaluation: 2021  ED Provider:  Tali Downs MD    Triage Chief Complaint:   Hypertension    Nikolai:  Domingo Bowling is a 66 y.o. female with history seen below with daughter at bedside complaining of word finding difficulties, inappropriate word use as well as hallucinations. Symptoms started 48 to 72 hours prior to presentation. Dr. Jayne Lynn that she has noticed that patient seems to know what she will say but is having difficulty getting the words out. States patient is also complaining of men standing in the room when no one else is in the room. Patient denies any weakness, sensory changes. Patient has chronic blurred vision in the left eye but denies any new changes in vision. Patient has had a mild cough, no fevers, no chest pain or shortness of breath. No abdominal pain, nausea vomiting, change in bowel habits from baseline, change in urination. Last family multiple times are consistent that symptoms started about 48 to 72 hours prior to presentation. ROS - see HPI, below listed is current ROS at time of my eval:  At least 14 systems reviewed, negative other HPI    Past Medical History:   Diagnosis Date    CAD (coronary artery disease)     Hypertension      Past Surgical History:   Procedure Laterality Date    CAROTID ENDARTERECTOMY      CHOLECYSTECTOMY      CORONARY ARTERY BYPASS GRAFT      NECK SURGERY       No family history on file. Social History     Socioeconomic History    Marital status:      Spouse name: Not on file    Number of children: Not on file    Years of education: Not on file    Highest education level: Not on file   Occupational History    Not on file   Tobacco Use    Smoking status: Current Every Day Smoker     Packs/day: 1.50     Types: Cigarettes    Smokeless tobacco: Never Used   Substance and Sexual Activity    Alcohol use: No    Drug use:  No  Sexual activity: Not on file   Other Topics Concern    Not on file   Social History Narrative    Not on file     Social Determinants of Health     Financial Resource Strain:     Difficulty of Paying Living Expenses: Not on file   Food Insecurity:     Worried About Running Out of Food in the Last Year: Not on file    Patricia of Food in the Last Year: Not on file   Transportation Needs:     Lack of Transportation (Medical): Not on file    Lack of Transportation (Non-Medical):  Not on file   Physical Activity:     Days of Exercise per Week: Not on file    Minutes of Exercise per Session: Not on file   Stress:     Feeling of Stress : Not on file   Social Connections:     Frequency of Communication with Friends and Family: Not on file    Frequency of Social Gatherings with Friends and Family: Not on file    Attends [employer] Services: Not on file    Active Member of [address] [place] [address] [employer] or Organizations: Not on file    Attends Club or Organization Meetings: Not on file    Marital Status: Not on file   Intimate Partner Violence:     Fear of Current or Ex-Partner: Not on file    Emotionally Abused: Not on file    Physically Abused: Not on file    Sexually Abused: Not on file   Housing Stability:     Unable to Pay for Housing in the Last Year: Not on file    Number of [place] in the Last Year: Not on file    Unstable Housing in the Last Year: Not on file     Current Facility-Administered Medications   Medication Dose Route Frequency Provider Last Rate Last Admin    azithromycin (ZITHROMAX) 500 mg in dextrose 5 % 250 mL IVPB  500 mg IntraVENous Once Rosemary Gardiner MD         Current Outpatient Medications   Medication Sig Dispense Refill    meloxicam (MOBIC) 7.5 MG tablet Take 2 tablets by mouth daily for 10 days 20 tablet 0    lisinopril (PRINIVIL;ZESTRIL) 5 MG tablet Take 1 tablet by mouth daily 30 tablet 3    aspirin 81 MG tablet Take 81 mg by mouth daily      atorvastatin (LIPITOR) 80 MG tablet Take 80 mg by mouth daily       Allergies   Allergen Reactions    Penicillins     Sulfa Antibiotics Swelling       Nursing Notes Reviewed    Physical Exam:  Triage VS:    ED Triage Vitals [12/21/21 1112]   Enc Vitals Group      BP (!) 166/72      Pulse 96      Resp 18      Temp 98.1 °F (36.7 °C)      Temp Source Oral      SpO2 97 %      Weight 155 lb (70.3 kg)      Height 4' 10\" (1.473 m)      Head Circumference       Peak Flow       Pain Score       Pain Loc       Pain Edu? Excl. in [address]? My pulse ox interpretation is - normal    General appearance:  No acute distress. Skin:  Warm. Dry. Eye:  Extraocular movements intact. Ears, nose, mouth and throat:  Oral mucosa moist   Neck:  Trachea midline. Extremity:  No swelling. Normal ROM     Heart:  Regular rate and rhythm, normal S1 & S2, no extra heart sounds. Perfusion:  intact  Respiratory:  Lungs clear to auscultation bilaterally. Respirations nonlabored. Abdominal:  Normal bowel sounds. Soft. Nontender. Non distended. Back:  No CVA tenderness to palpation     Neurological:  Alert and oriented times 3.   Patient is alert and keenly responsive, knows the month as well as her age, blinks eyes and squeezes hands appropriately, normal horizontal extraocular movements, visual fields are grossly intact, no facial palsy/normal symmetry of the face no drift of the bilateral upper or lower extremities normal finger-nose-finger and heel shin, normal sensation light touch in all extremities, patient does have mildly slurred speech and dysarthric language, follows commands appropriately          Psychiatric:  Appropriate    I have reviewed and interpreted all of the currently available lab results from this visit (if applicable):  Results for orders placed or performed during the hospital encounter of 12/21/21   COVID-19, Rapid    Specimen: Nasopharyngeal   Result Value Ref Range    Source THROAT     SARS-CoV-2, NAAT NOT DETECTED NOT DETECTED CBC Auto Differential   Result Value Ref Range    WBC 8.6 4.0 - 10.5 K/CU MM    RBC 4.92 4.2 - 5.4 M/CU MM    Hemoglobin 14.4 12.5 - 16.0 GM/DL    Hematocrit 45.3 37 - 47 %    MCV 92.1 78 - 100 FL    MCH 29.3 27 - 31 PG    MCHC 31.8 (L) 32.0 - 36.0 %    RDW 13.7 11.7 - 14.9 %    Platelets 074 (H) 438 - 440 K/CU MM    MPV 9.4 7.5 - 11.1 FL    Differential Type AUTOMATED DIFFERENTIAL     Segs Relative 68.3 (H) 36 - 66 %    Lymphocytes % 19.4 (L) 24 - 44 %    Monocytes % 7.9 (H) 0 - 4 %    Eosinophils % 3.2 (H) 0 - 3 %    Basophils % 0.8 0 - 1 %    Segs Absolute 5.9 K/CU MM    Lymphocytes Absolute 1.7 K/CU MM    Monocytes Absolute 0.7 K/CU MM    Eosinophils Absolute 0.3 K/CU MM    Basophils Absolute 0.1 K/CU MM    Nucleated RBC % 0.0 %    Total Nucleated RBC 0.0 K/CU MM    Total Immature Neutrophil 0.03 K/CU MM    Immature Neutrophil % 0.4 0 - 0.43 %   Comprehensive Metabolic Panel w/ Reflex to MG   Result Value Ref Range    Sodium 139 135 - 145 MMOL/L    Potassium 4.3 3.5 - 5.1 MMOL/L    Chloride 102 99 - 110 mMol/L    CO2 27 21 - 32 MMOL/L    BUN 15 6 - 23 MG/DL    CREATININE 0.8 0.6 - 1.1 MG/DL    Glucose 132 (H) 70 - 99 MG/DL    Calcium 8.8 8.3 - 10.6 MG/DL    Albumin 4.3 3.4 - 5.0 GM/DL    Total Protein 7.0 6.4 - 8.2 GM/DL    Total Bilirubin 0.3 0.0 - 1.0 MG/DL    ALT 8 (L) 10 - 40 U/L    AST 15 15 - 37 IU/L    Alkaline Phosphatase 138 (H) 40 - 129 IU/L    GFR Non-African American >60 >60 mL/min/1.73m2    GFR African American >60 >60 mL/min/1.73m2    Anion Gap 10 4 - 16   Troponin   Result Value Ref Range    Troponin T <0.010 <0.01 NG/ML   Protime-INR   Result Value Ref Range    Protime 10.7 (L) 11.7 - 14.5 SECONDS    INR 0.83 INDEX   Urinalysis   Result Value Ref Range    Color, UA YELLOW YELLOW    Clarity, UA CLEAR CLEAR    Glucose, Urine NEGATIVE NEGATIVE MG/DL    Bilirubin Urine NEGATIVE NEGATIVE MG/DL    Ketones, Urine NEGATIVE NEGATIVE MG/DL    Specific Gravity, UA 1.012 1.001 - 1.035    Blood, Urine NEGATIVE NEGATIVE    pH, Urine 5.0 5.0 - 8.0    Protein, UA NEGATIVE NEGATIVE MG/DL    Urobilinogen, Urine NEGATIVE 0.2 - 1.0 MG/DL    Nitrite Urine, Quantitative NEGATIVE NEGATIVE    Leukocyte Esterase, Urine NEGATIVE NEGATIVE    RBC, UA NONE SEEN 0 - 6 /HPF    WBC, UA NONE SEEN 0 - 5 /HPF    Bacteria, UA NEGATIVE NEGATIVE /HPF    Squam Epithel, UA 1 /HPF    Mucus, UA RARE (A) NEGATIVE HPF    Trichomonas, UA NONE SEEN NONE SEEN /HPF    Hyaline Casts, UA 0 /LPF   TSH without Reflex   Result Value Ref Range    TSH, High Sensitivity 2.060 0.270 - 4.20 uIu/ml   T4, free   Result Value Ref Range    T4 Free 0.88 (L) 0.9 - 1.8 NG/DL   Ammonia   Result Value Ref Range    Ammonia 15 11 - 51 UMOL/L   POCT Glucose   Result Value Ref Range    Glucose 100 mg/dL    QC OK? ok    POCT Glucose   Result Value Ref Range    POC Glucose 100 (H) 70 - 99 MG/DL   EKG 12 Lead   Result Value Ref Range    Ventricular Rate 80 BPM    Atrial Rate 80 BPM    P-R Interval 150 ms    QRS Duration 72 ms    Q-T Interval 384 ms    QTc Calculation (Bazett) 442 ms    P Axis 94 degrees    R Axis -49 degrees    T Axis 65 degrees    Diagnosis       Normal sinus rhythm  Possible Left atrial enlargement  Left anterior fascicular block  Inferior infarct (cited on or before 29-OCT-2019)  Anterior infarct (cited on or before 25-OCT-2021)  Abnormal ECG  When compared with ECG of 25-OCT-2021 06:46,  No significant change was found  Confirmed by [facility] ([id number]) on 12/21/2021 2:30:04 PM        Radiographs (if obtained):  Radiologist's Report Reviewed:  CT HEAD WO CONTRAST    Result Date: 12/21/2021  EXAMINATION: CT OF THE HEAD WITHOUT CONTRAST  12/21/2021 1:02 pm TECHNIQUE: CT of the head was performed without the administration of intravenous contrast. Dose modulation, iterative reconstruction, and/or weight based adjustment of the mA/kV was utilized to reduce the radiation dose to as low as reasonably achievable.  COMPARISON: 10/25/2021 HISTORY: ORDERING SYSTEM PROVIDED HISTORY: possible stroke 3 days ago TECHNOLOGIST PROVIDED HISTORY: Reason for exam:->possible stroke 3 days ago Has a \"code stroke\" or \"stroke alert\" been called? ->No Decision Support Exception - unselect if not a suspected or confirmed emergency medical condition->Emergency Medical Condition (MA) Reason for Exam: possible stroke 3 days ago Initial evaluation FINDINGS: BRAIN/VENTRICLES:  The ventricles and cisternal spaces are prominent consistent with cerebral atrophy. There is an area of previous injury or infarct in the left occipital lobe. There are chronic small vessel ischemic changes in the periventricular white matter. There are remote lacunar infarcts in the basal ganglia bilaterally. There is atherosclerotic calcification of the cavernous carotid arteries and vertebral arteries. There is hypoattenuation in the left frontal lobe which may represent a more acute or subacute area of infarct. MRI could further characterize. No hemorrhage is identified in the brain parenchyma. There is no midline shift or mass effect. ORBITS: The visualized portion of the orbits demonstrate no acute abnormality. SINUSES:  The visualized paranasal sinuses and mastoid air cells are for the most part clear. SOFT TISSUES/SKULL:  No acute abnormality of the visualized skull or soft tissues. Area of hypoattenuation involving the left frontal lobe which may represent an acute to subacute area of infarct. MRI could further characterize. Previous areas of injury or infarct in the basal ganglia bilaterally and left occipital lobe.  RECOMMENDATIONS: Unavailable     XR CHEST PORTABLE    Result Date: 12/21/2021  EXAMINATION: ONE XRAY VIEW OF THE CHEST 12/21/2021 1:06 pm COMPARISON: 10/25/2021 HISTORY: ORDERING SYSTEM PROVIDED HISTORY: stroke symptoms TECHNOLOGIST PROVIDED HISTORY: Reason for exam:->stroke symptoms Reason for Exam: stroke systems FINDINGS: Bilateral hilar and perihilar infiltrative change/bronchial pulmonary marking prominence consistent with central bronchitis/pneumonitis. Findings may be accentuated by low lung volumes. No large areas of pulmonary consolidations or definite airspace infiltrates. No detectable pleural effusion, pneumothorax, pulmonary edema, cardiomegaly or mediastinal widening. Stable postoperative changes and granuloma calcifications noted. Findings consistent with central hilar/perihilar bronchitis/pneumonitis or atypical pneumonia. Otherwise, radiographically nonacute portable chest.     CTA HEAD NECK W CONTRAST    Result Date: 12/21/2021  EXAMINATION: CTA OF THE HEAD AND NECK WITH CONTRAST 12/21/2021 1:03 pm: TECHNIQUE: CTA of the head and neck was performed with the administration of intravenous contrast. Multiplanar reformatted images are provided for review. MIP images are provided for review. Stenosis of the internal carotid arteries measured using NASCET criteria. Dose modulation, iterative reconstruction, and/or weight based adjustment of the mA/kV was utilized to reduce the radiation dose to as low as reasonably achievable. COMPARISON: 10/25/2021 HISTORY: ORDERING SYSTEM PROVIDED HISTORY: possible stroke 3 days ago TECHNOLOGIST PROVIDED HISTORY: Reason for exam:->possible stroke 3 days ago Decision Support Exception - unselect if not a suspected or confirmed emergency medical condition->Emergency Medical Condition (MA) Reason for Exam: possible stroke 3 days ago Relevant Medical/Surgical History: 80 ML ISOVUE 370 Initial evaluation FINDINGS: CTA NECK: AORTIC ARCH/ARCH VESSELS: There is atherosclerotic calcification of the aortic arch. No abnormality of the left common carotid artery or innominate is identified. CAROTID ARTERIES: The right common carotid artery is patent without any narrowing or stenosis. There is atherosclerotic disease in the right internal carotid artery bulb with approximately 65% stenosis using NASCET criteria.   The right internal carotid artery is then patent through the skull base. The proximal left common carotid artery is patent. There is diffuse narrowing of the distal left common carotid artery with approximately 50% stenosis. There is sequela of a left carotid endarterectomy. There is atherosclerotic disease in the region of the enderectomy with a short segment high-grade stenosis of the left internal carotid artery bulb measuring greater than 80% using NASCET criteria. The left internal carotid artery is then patent through the skull base. VERTEBRAL ARTERIES: The right vertebral artery is dominant. There is moderate narrowing of the origin of the right vertebral artery. The right vertebral artery is otherwise patent throughout the skull base. The left vertebral artery is small in caliber but patent in its visualized course. SOFT TISSUES: There is no acute abnormality of the visualized soft tissues. There are a few small lymph nodes scattered throughout the neck that are nonspecific. BONES: No acute osseous abnormality. CTA HEAD: ANTERIOR CIRCULATION: There is atherosclerotic disease of the cavernous carotid arteries. There is no significant narrowing of the right cavernous carotid artery. There are mild areas of narrowing in the left cavernous carotid. The anterior cerebral arteries are patent. The anterior communicating artery is tiny but patent. The middle cerebral arteries are patent bilaterally. POSTERIOR CIRCULATION: The right distal vertebral artery has minimal atherosclerotic disease without any significant narrowing. The distal left vertebral artery has a short segment severe stenosis just before forming the basilar. The basilar artery is patent. There is a short segment severe stenosis of the P2 segment of the left posterior cerebral artery. The right posterior cerebral artery is patent. OTHER: No dural venous sinus thrombosis on this non-dedicated study. BRAIN: Refer to the CT head of the same date.      No large vessel intracranial occlusion. Short segment severe stenosis of the P2 segment of the left posterior cerebral artery. Mild areas of stenosis in the left internal carotid cavernous segment. No other abnormality of the intracranial circulation. Severe short segment stenosis of the distal V4 segment of the left vertebral artery. Severe stenosis of the left internal carotid artery at the junction of the endarterectomy graft and the left internal carotid artery measuring greater than 80% using NASCET criteria. Approximately 65% stenosis of the right internal carotid artery in the region of the bulb. Stenosis at the origin of the right vertebral artery. RECOMMENDATIONS: Unavailable       EKG (if obtained): (All EKG's are interpreted by myself in the absence of a cardiologist)  Normal sinus rhythm, left anterior fascicular block, ventricular rate 80, TX interval 150, QRS duration 72, QTc 442, no significant ischemic changes, similar to prior exam    MDM:    65-year-old female presenting with expressive aphasia slurred speech with intermittent visual hallucinations. Symptoms started about 48 to 72 hours prior to presentation. History and be seen above. Vitals on presentation patient is hypertensive 162/95 otherwise vitals are reassuring and patient afebrile satting on room air. Physical exam can be seen above. Patient is outside the 24-hour window for any acute intervention for stroke so no stroke alert was called. CBC and CMP are overall reassuring at baseline for patient. EKG is nonacute and initial troponin is within normal limits. Free T4 is mildly low at 0.88. Ammonia was within normal limits. UA is not consistent with infection. Chest x-ray shows findings consistent with central hilar/perihilar bronchial colitis/pneumonitis or atypical pneumonia. In setting of patient hallucinations patient was started on antibiotics. Rapid Covid is negative.   CT head shows area of hypoattenuation involving the left frontal lobe which may represent an acute or subacute area of infarct. Her previous areas of injury or infarct in the basal ganglia bilaterally left occipital lobe. CTA showed no large vessel intracranial occlusion there is a short segment severe stenosis of the P2 segment of the left posterior cerebral artery as well as mild area of stenosis in the left internal carotid cavernous segment. No other abnormality of the anterior cranial circulation. There is severe short segment stenosis of the distal V4 segment of the left vertebral artery and severe stenosis of the left internal carotid artery at the junction of the endarterectomy graft of the left internal carotid artery measuring greater than 80% using NASCET criteria. There is also approximately 65% stenosis of the right internal carotid artery. Believe patient would benefit from admission for further neuro evaluation MRI/stroke work-up. Hospitalist was called and patient was admitted their service for further evaluation and treatment. Clinical Impression:  1. Expressive aphasia    2. Visual hallucinations      Comment: Please note this report has been produced using speech recognition software and may contain errors related to that system including errors in grammar, punctuation, and spelling, as well as words and phrases that may be inappropriate. Efforts were made to edit the dictations.         David Addison MD  12/23/21 [id number]

## 2021-12-21 NOTE — H&P
HISTORY AND PHYSICAL    2021     Patient Information:    Patient: Alvaro Salazar     Gender: female  : 1950   Age: 66 y.o. MRN: [id number]  Room : ED18/ED-18      Pt`s preferred phone number :[phone]  [facility]  Extended Emergency Contact Information  Primary Emergency Contact: Ariel Pederson  Address: [address] [place], [place] [address]os - [facility], [address] [place] [address] Phone: [phone]  Mobile Phone: [phone]  Relation: Spouse   needed? No  Secondary Emergency Contact: jh boswell  [place] Phone: [phone]  Relation: Child        PCP:  Gayla Russell MD (Tel: [phone] )    Chief complaint:    Chief Complaint   Patient presents with    Hypertension            History of Present Illness:  Alvaro Salazar is a 66 y.o. female with   has a past medical history of CAD (coronary artery disease) and Hypertension. who brouth by her family for concerning about difficulty expressing herself and find the appropriate words . Symptoms started couple days ago and it is not getting better , also pt has some visual hallucination with concern of ongoing dementia   Pt denied any new onset weakness , tingling or numbness , she has chronic blurred vision in left eye    In ER lab data revealed no acute finding   And C XR revealed atypical pneumonia  . And head   CT revealed Area of hypoattenuation involving the left frontal lobe. and Previous areas of injury or infarct in the basal ganglia bilaterally and left occipital lobe. Head CTA revealed No large vessel intracranial occlusion.  Short segment severe stenosis of the P2 segment of the left posterior cerebral artery.  Mild areas of stenosis in  the left internal carotid cavernous segment.  No other abnormality of the intracranial circulation. Severe short segment stenosis of the distal V4 segment of the left vertebral artery.   Severe stenosis of the left internal carotid artery at the junction of the endarterectomy graft and the left internal carotid artery measuring greater than 80% using NASCET criteria.   Approximately 65% stenosis of the right internal carotid artery in the region of the bulb. Stenosis at the origin of the right vertebral artery. History obtained from patient . REVIEW OF SYSTEMS:   Constitutional: Negative for fever,chills . ENT: Negative for rhinorrhea,  sore throat. Respiratory: +  for cough, - shortness of breath,wheezing  Cardiovascular: Negative for chest pain, palpitations   Gastrointestinal: Negative for Abdominal pain, nausea, vomiting, diarrhea  Genitourinary: Negative for polyuria, dysuria   Hematologic/Lymphatic: Negative for bleeding tendency, easy bruising  Musculoskeletal: Negative for myalgias and arthralgias  Neurologic: Negative for confusion,dysarthria.+ aphasia, + hallucination   Skin: Negative for itching,rash  Psychiatric: Negative for depression,anxiety, agitation. Endocrine: Negative for polydipsia,polyuria,heat /cold intolerance. Past Medical History:   has a past medical history of CAD (coronary artery disease) and Hypertension. Past Surgical History:   has a past surgical history that includes Coronary artery bypass graft; Neck surgery; Carotid endarterectomy; and Cholecystectomy. Medications:  No current facility-administered medications on file prior to encounter. Current Outpatient Medications on File Prior to Encounter   Medication Sig Dispense Refill    meloxicam (MOBIC) 7.5 MG tablet Take 2 tablets by mouth daily for 10 days 20 tablet 0    lisinopril (PRINIVIL;ZESTRIL) 5 MG tablet Take 1 tablet by mouth daily 30 tablet 3    aspirin 81 MG tablet Take 81 mg by mouth daily      atorvastatin (LIPITOR) 80 MG tablet Take 80 mg by mouth daily         Allergies:   Allergies   Allergen Reactions    Penicillins     Sulfa Antibiotics Swelling        Social History:   reports that she has been smoking cigarettes. She has been smoking about 1.50 packs per day. She has never used smokeless tobacco. She reports that she does not drink alcohol and does not use drugs. Family History:  family history is not on file. ,       There is no immunization history on file for this patient. Physical Exam:  BP (!) 159/71   Pulse 80   Temp 98.1 °F (36.7 °C) (Oral)   Resp 16   Ht 4' 10\" (1.473 m)   Wt 155 lb (70.3 kg)   SpO2 95%   BMI 32.40 kg/m²     General appearance:  no distress . Well nourished  Eyes: Sclera clear, pupils equal  Cardiovascular: Regular rhythm, normal S1, S2. No edema in lower extremities  Respiratory: Clear to auscultation bilaterally, no wheeze, good inspiratory effort  Gastrointestinal: Abdomen soft, non-tender, not distended, normal bowel sounds  Musculoskeletal: No cyanosis in digits, neck supple  Neurology: Cranial nerves grossly intact. Alert and oriented . No speech or motor deficits ,expressive aphasia   Psychiatry: Appropriate affect.  Not agitated  Skin: Warm, dry, normal turgor, no rash    Labs:  CBC:   Lab Results   Component Value Date    WBC 8.6 12/21/2021    RBC 4.92 12/21/2021    HGB 14.4 12/21/2021    HCT 45.3 12/21/2021    MCV 92.1 12/21/2021    MCH 29.3 12/21/2021    MCHC 31.8 12/21/2021    RDW 13.7 12/21/2021     12/21/2021    MPV 9.4 12/21/2021     BMP:    Lab Results   Component Value Date     12/21/2021    K 4.3 12/21/2021     12/21/2021    CO2 27 12/21/2021    BUN 15 12/21/2021    CREATININE 0.8 12/21/2021    CALCIUM 8.8 12/21/2021    GFRAA >60 12/21/2021    LABGLOM >60 12/21/2021    GLUCOSE 100 12/21/2021       Chest Xray:   EKG:    I visualized CXR images and EKG strips      Patient Active Problem List   Diagnosis Code    Hypertensive urgency I16.0    CAD (coronary artery disease) I25.10    Diverticulosis K57.90    Hyperlipidemia with target low density lipoprotein (LDL) cholesterol less than 70 mg/dL E78.5    Cerebrovascular accident [facility]) I63.9         Expressive Aphasia   CVA   Visual hallucination   PAD   Dementia   Atypical pneumonia   HTN  HLP  COPD with Tobacco abuse       Assessment/Plan:   admitted on CVA protocol  Brain MRI,MRA   PT/OT consult , Neuro consult   Swallowing eval   ASA, Lipitor   Will consider psych consult if needed   Tele   IVF  IV Rocephin and Zithromax   Home meds , reviewed and resumed as appropriate   Symptoms releif/Pain control  DVT proph           Shayla Atkins MD    12/21/2021 5:05 PM

## 2021-12-21 NOTE — ED NOTES
Blood Glucose checked and is 100. Dr Mindi Salguero at the bedside. Daughter reports symptoms started about 3 days ago.  Patient having expressive aphasia     Patricia Rudolph RN  12/21/21 9105

## 2021-12-21 NOTE — ED NOTES
Pt refusing to complete swallow screen at this time.  Family at the bedside     [facility]  12/21/21 0110

## 2021-12-22 LAB
ANION GAP SERPL CALCULATED.3IONS-SCNC: 10 MMOL/L (ref 4–16)
BUN BLDV-MCNC: 13 MG/DL (ref 6–23)
CALCIUM SERPL-MCNC: 10 MG/DL (ref 8.3–10.6)
CHLORIDE BLD-SCNC: 102 MMOL/L (ref 99–110)
CHOLESTEROL: 234 MG/DL
CO2: 29 MMOL/L (ref 21–32)
CREAT SERPL-MCNC: 0.8 MG/DL (ref 0.6–1.1)
ESTIMATED AVERAGE GLUCOSE: 126 MG/DL
GFR AFRICAN AMERICAN: >60 ML/MIN/1.73M2
GFR NON-AFRICAN AMERICAN: >60 ML/MIN/1.73M2
GLUCOSE BLD-MCNC: 112 MG/DL (ref 70–99)
HBA1C MFR BLD: 6 % (ref 4.2–6.3)
HCT VFR BLD CALC: 46.1 % (ref 37–47)
HDLC SERPL-MCNC: 41 MG/DL
HEMOGLOBIN: 14.4 GM/DL (ref 12.5–16)
LDL CHOLESTEROL DIRECT: 150 MG/DL
LV EF: 55 %
LVEF MODALITY: NORMAL
MCH RBC QN AUTO: 28.9 PG (ref 27–31)
MCHC RBC AUTO-ENTMCNC: 31.2 % (ref 32–36)
MCV RBC AUTO: 92.4 FL (ref 78–100)
PDW BLD-RTO: 14 % (ref 11.7–14.9)
PLATELET # BLD: 500 K/CU MM (ref 140–440)
PMV BLD AUTO: 9.6 FL (ref 7.5–11.1)
POTASSIUM SERPL-SCNC: 4.7 MMOL/L (ref 3.5–5.1)
RBC # BLD: 4.99 M/CU MM (ref 4.2–5.4)
SODIUM BLD-SCNC: 141 MMOL/L (ref 135–145)
TRIGL SERPL-MCNC: 233 MG/DL
TROPONIN T: <0.01 NG/ML
TROPONIN T: <0.01 NG/ML
WBC # BLD: 10.1 K/CU MM (ref 4–10.5)

## 2021-12-22 PROCEDURE — 84484 ASSAY OF TROPONIN QUANT: CPT

## 2021-12-22 PROCEDURE — 36415 COLL VENOUS BLD VENIPUNCTURE: CPT

## 2021-12-22 PROCEDURE — 2140000000 HC CCU INTERMEDIATE R&B

## 2021-12-22 PROCEDURE — 97530 THERAPEUTIC ACTIVITIES: CPT

## 2021-12-22 PROCEDURE — 99223 1ST HOSP IP/OBS HIGH 75: CPT | Performed by: STUDENT IN AN ORGANIZED HEALTH CARE EDUCATION/TRAINING PROGRAM

## 2021-12-22 PROCEDURE — 80048 BASIC METABOLIC PNL TOTAL CA: CPT

## 2021-12-22 PROCEDURE — 2580000003 HC RX 258: Performed by: FAMILY MEDICINE

## 2021-12-22 PROCEDURE — 6360000002 HC RX W HCPCS: Performed by: FAMILY MEDICINE

## 2021-12-22 PROCEDURE — 93306 TTE W/DOPPLER COMPLETE: CPT

## 2021-12-22 PROCEDURE — 85027 COMPLETE CBC AUTOMATED: CPT

## 2021-12-22 PROCEDURE — 83721 ASSAY OF BLOOD LIPOPROTEIN: CPT

## 2021-12-22 PROCEDURE — 83036 HEMOGLOBIN GLYCOSYLATED A1C: CPT

## 2021-12-22 PROCEDURE — 6370000000 HC RX 637 (ALT 250 FOR IP): Performed by: FAMILY MEDICINE

## 2021-12-22 PROCEDURE — 92610 EVALUATE SWALLOWING FUNCTION: CPT

## 2021-12-22 PROCEDURE — 97166 OT EVAL MOD COMPLEX 45 MIN: CPT

## 2021-12-22 PROCEDURE — 80061 LIPID PANEL: CPT

## 2021-12-22 RX ADMIN — AZITHROMYCIN MONOHYDRATE 500 MG: 500 INJECTION, POWDER, LYOPHILIZED, FOR SOLUTION INTRAVENOUS at 17:33

## 2021-12-22 RX ADMIN — ATORVASTATIN CALCIUM 80 MG: 40 TABLET, FILM COATED ORAL at 19:43

## 2021-12-22 RX ADMIN — CEFTRIAXONE SODIUM 1000 MG: 1 INJECTION, POWDER, FOR SOLUTION INTRAMUSCULAR; INTRAVENOUS at 16:49

## 2021-12-22 RX ADMIN — ASPIRIN 81 MG CHEWABLE TABLET 81 MG: 81 TABLET CHEWABLE at 09:37

## 2021-12-22 RX ADMIN — ENOXAPARIN SODIUM 40 MG: 100 INJECTION SUBCUTANEOUS at 09:38

## 2021-12-22 RX ADMIN — LISINOPRIL 5 MG: 5 TABLET ORAL at 09:37

## 2021-12-22 NOTE — CONSULTS
[facility] THERAPY EVALUATION    Demetri Raygoza, 1950, 3108/3108-A, 12/22/2021    Discharge Recommendation: Home with initial 24 hour supervision/assistance, PRN assistance d/t increased impulsivity this date. History:  [place]:  There were no encounter diagnoses. Subjective:  Patient states: Agreeable to therapy. Pain: Pt denied pain this date (0/10). Communication with other providers: RN  Restrictions: General Precautions, Fall Risk    Home Setup/Prior level of function:  Social/Functional History  Lives With: Spouse  Type of Home: House  Home Layout: Two level,Able to Live on Main level with bedroom/bathroom  Home Access: Stairs to enter with rails  Entrance Stairs - Number of Steps: \"I dont know\"  Bathroom Shower/Tub: Tub/Shower unit,Shower chair with back  Bathroom Toilet: Standard  Bathroom Equipment: Grab bars in [address] [place]: Cane,4 wheeled walker (Pt typically ambulates without AD.)  ADL Assistance: Independent  Homemaking Assistance: Independent  Homemaking Responsibilities: Yes  Ambulation Assistance: Independent  Transfer Assistance: Independent  Active : No  Patient's  Info: Daughter    Examination:  · Observation: Supine in bed upon arrival  · Vision: [employer] [facility]  · Hearing: WFL  · Vitals: Stable vitals throughout session    Body Systems and functions:  · ROM: WFL   · Strength: WFL   · Sensation: WFL  · Tone: Normal  · Coordination: WFL  · Perception: WNL    Activities of Daily Living (ADLs):  · Feeding: Partha  setup and increased time. · Grooming: SUP in standing with setup, increased time, VC.   · UB bathing: Partha  recommend pt complete in seated with setup and increased time. · LB bathing: Partha  recommend pt complete in seated with setup and increased time. · UB dressing: Partha  recommend pt complete in seated with setup and increased time. · LB dressing: Partha  recommend pt complete in seated with setup and increased time. · Toileting: SUP during commode transfers and while balancing in standing to complete rowena care and LB clothing management. Cognitive and Psychosocial Functioning:  · Overall cognitive status: Oriented to time, date, person, place, city  · Affect: Agitated. Expressive Aphasia noted. Balance:   · Sitting: SUP (pt sat EOB demo good dynamic sitting balance). · Standing: SBA (pt stood without use of AD. Demo good dynamic standing balance). Functional Mobility:   · Bed Mobility: SUP (pt performed supine to seated bed mobility with HOB elevated and VC throughout for sequencing). · Transfers: SBA (pt performed STS from EOB without use of AD. Required VC throughout for sequencing and increased time). · Ambulation: SBA - SUP (pt ambulated approx 20ft without use of AD. Demo fair pace throughout and 0 LOB). AM-PAC 6 click short form for inpatient daily activity:   How much help from another person does the patient currently need. .. Unable  Dep A Lot  Max A A Lot   Mod A A Little  Min A A Little   CGA  SBA None   Mod I  Indep  Sup   1. Putting on and taking off regular lower body clothing? [] 1    [] 2   [] 2   [] 3   [] 3   [x] 4      2. Bathing (including washing, rinsing, drying)? [] 1   [] 2   [] 2 [] 3 [] 3 [x] 4   3. Toileting, which includes using toilet, bedpan, or urinal? [] 1    [] 2   [] 2   [] 3   [] 3   [x] 4     4. Putting on and taking off regular upper body clothing? [] 1   [] 2   [] 2   [] 3   [] 3    [x] 4      5. Taking care of personal grooming such as brushing teeth? [] 1   [] 2    [] 2 [] 3    [] 3   [x] 4      6. Eating meals? [] 1   [] 2   [] 2   [] 3   [] 3   [x] 4      Raw Score:  24     [24=0% impaired(CH), 23=1-19%(CI), 20-22=20-39%(CJ), 15-19=40-59%(CK), 10-14=60-79%(CL), 7-9=80-99%(CM), 6=100%(CN)]    Treatment:  Therapeutic Activity Training:   Therapeutic activity training was instructed today.   Cues were given for safety, sequence, UE/LE placement, awareness, and balance. Activities performed today included bed mobility training, sup-sit, sit-stand, ambulation. Safety Measures: Gait belt used, Left in chair, Alarm in place    Assessment:  Assessment  Performance deficits / Impairments: Decreased cognition,Decreased safe awareness  Treatment Diagnosis: acute CVA  Prognosis: Good  Decision Making: Medium Complexity  REQUIRES OT FOLLOW UP: No  Discharge Recommendations: 24 hour supervision or assist    Pt is a 66year old F admitted for acute CVA. Pt reports that prior to admission she was IND in ADLs, IADLs, and functional mobility. This date, pt demo good overall functional mobility for ADL status and appears to be functioning at or near baseline in regards to strength, coordination, and balance. Pt appears agitated this date and appears to demo increased impulsivity. Recommend pt return home with 24 hour SUP/assist d/t impulsive behaviors. No acute OT needs at this time. Time:   Time in: 808  Time out: 826  Timed treatment minutes: 8  Total time: 18      Electronically signed by:       ADINA Santos/L, MARANDA, .[id number]

## 2021-12-22 NOTE — CONSULTS
Per the physical rehabilitation triage process, the PT referral was discontinued. Patient performed very well with OT eval, no skilled acute care PT needs identified at this time. Recommend supervised/independent mobility per nursing staff.   Son Pace, PT  12/22/2021, 1:55 PM

## 2021-12-22 NOTE — PROGRESS NOTES
Speech Language Pathology  Facility/Department: [facility]N   CLINICAL BEDSIDE SWALLOW EVALUATION    NAME: Shruthi Grace  : 1950  MRN: [id number]    IMPRESSIONS: hSruthi Grace was referred for a bedside swallow evaluation following admission to [address] [place] [address] with aphasia, concern for CVA. Per radiologist, head CT reveals \"Area of hypoattenuation involving the left frontal lobe which may represent an acute to subacute area of infarct. .. Previous areas of injury or infarct in the basal ganglia bilaterally and left occipital lobe. \" Brain MRI pending. Medical hx includes recent left frontal lobe CVA (2021), CAD, HTN. No known history of dysphagia prior to admission. Pt seen for evaluation seated upright in bed, alert, cooperative given cues/encouragement. She did not follow all directions to participate in oral mechanism examination; right facial weakness/asymmetry evident. Pt presented with PO trials of thin liquids via cup/straw, puree, and regular solids. Oral stage WFL with intact labial seal, slow/adequate mastication, intact AP transit/oral clearance. Pharyngeal stage WFL with intact swallow initiation/laryngeal elevation. No s/s aspiration across all trials. Speech/language screened and expressive language deficits evident; pt exhibits reduced fluency and word-finding deficits. She answered orientation questions appropriately with min/logical cues for the date. Further speech/language assessment indicated. Recommend initiation of regular diet/thin liquids. No further dysphagia tx indicated. SLP will follow for speech/language/cognitive assessment. ADMISSION DATE: 2021  ADMITTING DIAGNOSIS: has Hypertensive urgency; CAD (coronary artery disease); Diverticulosis; Hyperlipidemia with target low density lipoprotein (LDL) cholesterol less than 70 mg/dL;  Cerebrovascular accident [facility]); and Acute cerebrovascular accident (CVA) ([facility] [address] on their problem list.  ONSET DATE: this admission    Recent Chest Xray/CT of Chest: see chart    Date of Eval: 12/22/2021  Evaluating Therapist: Debby Hernandez SLP    Current Diet level:  Current Diet : NPO  Current Liquid Diet : NPO      Primary Complaint  Patient Complaint: does not clearly state d/t communication deficits    Pain:       Reason for Referral  Fiordaliza Pettit was referred for a bedside swallow evaluation to assess the efficiency of her swallow function, identify signs and symptoms of aspiration and make recommendations regarding safe dietary consistencies, effective compensatory strategies, and safe eating environment. Impression  Dysphagia Diagnosis: Swallow function appears grossly intact  Dysphagia Outcome Severity Scale: Level 6: Within functional limits/Modified independence     Treatment Plan  Requires SLP Intervention: Yes  Duration/Frequency of Treatment: TBD pending speech/language/cog eval     Referral To: Speech Evaluation    Recommended Diet and Intervention  Diet Solids Recommendation: Regular  Liquid Consistency Recommendation: Thin  Recommended Form of Meds: PO     Therapeutic Interventions: Other (comment) (SLE)    Compensatory Swallowing Strategies  Compensatory Swallowing Strategies: Upright as possible for all oral intake    Treatment/Goals  Short-term Goals  Timeframe for Short-term Goals: length of admission  Goal 1: Pt will participate in speech/language/cognitive evaluation. Goal 2: Pt/caregivers will indicate understanding of recommendations. General  Chart Reviewed: Yes  Behavior/Cognition: Alert; Cooperative; Requires cueing  Respiratory Status: Room air  O2 Device: None (Room air)  Communication Observation: Aphasia  Follows Directions: Simple  Dentition: Adequate  Patient Positioning: Upright in bed  Baseline Vocal Quality: Normal  Prior Dysphagia History: none known prior to admission  Consistencies Administered: Reg solid; Dysphagia Pureed (Dysphagia I); Thin - cup; Thin - straw Vision/Hearing  Vision  Vision: Impaired  Vision Exceptions:  (baseline vision impairment per chart)  Hearing  Hearing: Within functional limits    Oral Motor Deficits  Oral/Motor  Oral Motor: Exceptions to [facility]    Oral Phase Dysfunction  Oral Phase  Oral Phase: WFL     Indicators of Pharyngeal Phase Dysfunction   Pharyngeal Phase  Pharyngeal Phase: WFL    Prognosis  Prognosis  Barriers/Prognosis Comment: N/A  Individuals consulted  Consulted and agree with results and recommendations: Patient;RN    Education  Patient Education: results, recommendations  Patient Education Response: Demonstrated understanding;Needs reinforcement  Safety Devices in place: Yes  Type of devices:  All fall risk precautions in place       Therapy Time  SLP Individual Minutes  Time In: 4435  Time Out: 0945  Minutes: [place], [facility]-SLP  12/22/2021 10:14 AM

## 2021-12-22 NOTE — ED NOTES
Pt continuously removing monitoring equipment. VSS at this time. Respirations even and unlabored, NAD noted.      Jack Guzmán RN  12/22/21 0368

## 2021-12-22 NOTE — CARE COORDINATION
CM into see pt to initiate a safe discharge plan. Cm  introduced self and explained role of CM. Pt is kind, alert and oriented to the month/ place. Pt stated yr was 2022, reoriented quickly. Pt lives with her spouse. CM called Spouse to also discuss the discharge plan with him. Pt uses DME walker, shower chr. Pt shared that her  is not able to drive but any of her four children are willing to help with groceries, meals and transportation. Pt has grab bars available. Pt has a PCP pt has insurance and is able to obtain her groceries. CM offered homecare and both are very agreeable . No preference. Cm Ps to Karissa with [address] to follow. Discharge plan is for pt to return home with her spouse. Supported by her 4 children if needed. [address] to follow. Pt has PCP/insurance. CM provided card and encouraged to call for any needs or concern. CM is available if any needs arise.

## 2021-12-22 NOTE — CONSULTS
Neurology Service Consult Note  [facility]  Patient Name: Kashif Rainey  : 1950        Subjective:   Reason for consult: CVA  Patient seen and examined. Chart reviewed in detail. 66 y.o. -female with PMH CAD, hypertension presenting to [facility] for difficulty expressing herself, and finding her words. Per chart review patient began having such symptoms 3 days ago associated with visual hallucinations. Family has concerns for ongoing dementia. On exam, patient is alert and oriented to self, location, date and year however continues to have difficulty finding words, focusing on task at hand and and slightly slurred speech. For the most part patient can have a conversation and answering questions appropriately however her mentation waxes and wanes when she attempts to express certain situations such as: Patient attempted to express how she was ready to go home, however she stated that there are men at home. After of few minutes patient was able to state that she was attempting to indicate her  is at home and is where she wants to go. Patient further explains that she has been on antibiotics for UTI and thinks that the medicine has caused her current symptoms. Patient denies numbness, tingling, weakness or dizziness/lightheaded.     Prior work-up includes CBC, BMP, UA which were unremarkable, CTh shows previous area of injury to the basal ganglia bilaterally and the left occipital lobe and an area of hypoattenuation involving the left frontal lobe which may represent acute versus subacute area of infarct., CTA shows nonacute for LVO however does show severe stenosis to the P2 segment left PCA and severe short segment stenosis of the distal V4 segment of the left vertebral artery, severe stenosis of the left ICA at the junction of the enterectomy graft in the left ICA measuring greater than 80%, and 65% stenosis of the right ICA stenosis of the origin of the right vertebral artery. MRI of brain pending      Past Medical History:   Diagnosis Date    CAD (coronary artery disease)     Hypertension     :   Past Surgical History:   Procedure Laterality Date    CAROTID ENDARTERECTOMY      CHOLECYSTECTOMY      CORONARY ARTERY BYPASS GRAFT      NECK SURGERY       Medications:  Scheduled Meds:   aspirin  81 mg Oral Daily    atorvastatin  80 mg Oral Nightly    lisinopril  5 mg Oral Daily    enoxaparin  40 mg SubCUTAneous Daily    cefTRIAXone (ROCEPHIN) IV  1,000 mg IntraVENous Q24H    azithromycin  500 mg IntraVENous Q24H     Continuous Infusions:  PRN Meds:.potassium chloride **OR** potassium alternative oral replacement **OR** potassium chloride, magnesium sulfate, promethazine **OR** ondansetron, labetalol    Allergies   Allergen Reactions    Penicillins     Sulfa Antibiotics Swelling     Social History     Socioeconomic History    Marital status:      Spouse name: Not on file    Number of children: Not on file    Years of education: Not on file    Highest education level: Not on file   Occupational History    Not on file   Tobacco Use    Smoking status: Current Every Day Smoker     Packs/day: 1.50     Types: Cigarettes    Smokeless tobacco: Never Used   Substance and Sexual Activity    Alcohol use: No    Drug use: No    Sexual activity: Not on file   Other Topics Concern    Not on file   Social History Narrative    Not on file     Social Determinants of Health     Financial Resource Strain:     Difficulty of Paying Living Expenses: Not on file   Food Insecurity:     Worried About Running Out of Food in the Last Year: Not on file    Patricia of Food in the Last Year: Not on file   Transportation Needs:     Lack of Transportation (Medical): Not on file    Lack of Transportation (Non-Medical):  Not on file   Physical Activity:     Days of Exercise per Week: Not on file    Minutes of Exercise per Session: Not on file   Stress:  Feeling of Stress : Not on file   Social Connections:     Frequency of Communication with Friends and Family: Not on file    Frequency of Social Gatherings with Friends and Family: Not on file    Attends Sabianism Services: Not on file    Active Member of Clubs or Organizations: Not on file    Attends Club or Organization Meetings: Not on file    Marital Status: Not on file   Intimate Partner Violence:     Fear of Current or Ex-Partner: Not on file    Emotionally Abused: Not on file    Physically Abused: Not on file    Sexually Abused: Not on file   Housing Stability:     Unable to Pay for Housing in the Last Year: Not on file    Number of [place] in the Last Year: Not on file    Unstable Housing in the Last Year: Not on file      No family history on file.       ROS (10 systems)  In addition to that documented in the HPI above, the additional ROS was obtained:  Constitutional: Denies fevers or chills  Eyes: Denies vision changes  ENMT: Denies sore throat  CV: Denies chest pain  Resp: Denies SOB  GI: Denies vomiting or diarrhea  : Denies painful urination  MSK: Denies recent trauma  Skin: Denies new rashes  Neuro: Denies new numbness or tingling or weakness  Endocrine: Denies unexpected weight loss  Heme: Denies bleeding disorders    Physical Exam:       Vitals:    12/22/21 0253 12/22/21 0302 12/22/21 0745 12/22/21 1100   BP: 130/67  (!) 154/69 (!) 163/61   Pulse: 77  87    Resp: 18  16    Temp:    97.7 °F (36.5 °C)   TempSrc:    Oral   SpO2: 95%  95%    Weight:  151 lb 0.2 oz (68.5 kg)     Height:           Wt Readings from Last 3 Encounters:   12/22/21 151 lb 0.2 oz (68.5 kg)   10/25/21 210 lb (95.3 kg)   07/03/21 158 lb (71.7 kg)     Temp Readings from Last 3 Encounters:   12/22/21 97.7 °F (36.5 °C) (Oral)   10/25/21 98.2 °F (36.8 °C) (Oral)   07/03/21 97.8 °F (36.6 °C) (Oral)     BP Readings from Last 3 Encounters:   12/22/21 (!) 163/61   10/25/21 (!) 179/72   07/03/21 (!) 215/103 Pulse Readings from Last 3 Encounters:   12/22/21 87   10/25/21 83   07/03/21 98        Gen: A&O x 4, NAD, cooperative, and pulseless  HEENT: NC/AT, EOMI, PERRL, mmm, neck supple, no meningeal signs; Heart: SR per EKG  Lungs: Respirations Unlabored  Ext: no edema, no calf tenderness b/l  Psych: Impulsive  Skin: no rashes or lesions    NEUROLOGIC EXAM:    Mental Status: A&O to self, location, month and year, NAD, slurred speech, dysarthric language, repetition and naming intact, follows commands appropriately    Cranial Nerve Exam:   CN II-XII: PERRL, VFF, no nystagmus, no gaze paresis, sensation V1-V3 intact b/l, muscles of facial expression symmetric; hearing intact to conversational tone, palate elevates symmetrically, shoulder elevation symmetric and tongue protrudes midline with movement side to side.     Motor Exam:       Strength 5/5 UE's/LE's b/l  Tone and bulk normal   No pronator drift    Deep Tendon Reflexes: 2/4 biceps, triceps, brachioradialis, patellar, and achilles b/l; flexor plantar responses b/l    Sensation: Intact light touch//vibration UE's/LE's b/l    Coordination/Cerebellum:       Tremors--none      Rapidly alternating movements: no dysdiadochokinesia b/l                Heel-to-Shin: no dysmetria b/l      Finger-to-Nose: no dysmetria b/l    Gait and stance:      Gait: deferred      LABS:        CBC:   Recent Labs     12/22/21  0338   WBC 10.1   RBC 4.99   HGB 14.4   HCT 46.1   *   MCV 92.4     BMP:    Recent Labs     12/22/21  0338      K 4.7      CO2 29   BUN 13   CREATININE 0.8   GLUCOSE 112*   CALCIUM 10.0       IMAGING:    CTH   Impression   Area of hypoattenuation involving the left frontal lobe which may represent   an acute to subacute area of infarct.  MRI could further characterize.       Previous areas of injury or infarct in the basal ganglia bilaterally and left   occipital lobe.       RECOMMENDATIONS:   Unavailable     CTA head / neck   No large vessel intracranial occlusion.  Short segment severe stenosis of the   P2 segment of the left posterior cerebral artery.  Mild areas of stenosis in   the left internal carotid cavernous segment.  No other abnormality of the   intracranial circulation.       Severe short segment stenosis of the distal V4 segment of the left vertebral   artery.       Severe stenosis of the left internal carotid artery at the junction of the   endarterectomy graft and the left internal carotid artery measuring greater   than 80% using NASCET criteria.       Approximately 65% stenosis of the right internal carotid artery in the region   of the bulb.       Stenosis at the origin of the right vertebral artery. Above imaging reviewed in detail. ASSESSMENT/PLAN:   40-year-old female patient presenting with past medical history as stated above to [facility] for dysphasia. Neurology on board to rule out acute stroke. CTA head/neck  is nonacute for LVO however shows multiple areas of arterial stenosis. 1. Expressive dysphasia likely due to acute/subacute left frontal lobe infarct secondary to Left ICA stenosis. Neurodiagnostics  -CT head as above  -CTA head/neck as above  -MRI brain ordered    -Medications: aspirin, statin for secondary stroke prevention  -Rehab: PT/OT/ST recommendations  -Stroke risk factors: Age pre-existing intracranial infarct, CAD, ICA stenosis    2. 65% stenosis of the right ICA,  severe stenosis of the left ICA at the junction of the endarterectomy graft at 80%: Stenosis to the right and left vertebral artery  -Recommend vascular surgery consult    3. Hallucinations likely secondary to dementia    4. Remote injury or infarct bilateral basal ganglia and left occipital lobe      Patient discussed with attending physician Dr. Roddy Diop    Thank you for allowing us to participate in the care of your patient. If there are any questions regarding evaluation please feel free to contact us.      ROSA Tian - CNP, 12/22/2021    ------------------------------------    Attending Note:  I have rounded on this patient with Khushi Small CNP. I have reviewed the chart and we have discussed this case in detail. The patient was seen and examined by myself. Pertinent labs and imaging have been personally reviewed. Our findings and impressions were discussed with the patient. I concur with the Nurse Specialist's assessment and plan. Patient seen and examined at bedside. She has an expressive dysphasia on examination with frequent paraphasic errors. This is suspicious for left frontal lobe infarct. CT head does call a questionable area of hypodensity in the left frontal lobe. More notably there is an area of prior left PCA infarct and bilateral basal ganglia infarcts. Follow-up MRI brain is currently pending. CTA head and neck does demonstrate restenosis of the left ICA just distal to the endarterectomy graft. We do recommend that vascular surgery be consulted in this regard as this is the likely culprit of patient's stroke. Reached out to internal medicine regarding this. Patient should continue on aspirin and statin for secondary stroke prevention. Suspect that patient's hallucinations are likely related to an underlying dementia given the description of the hallucinations being that of familiar people. We will continue to follow along.     Carissa Mcdonnell DO 12/22/2021 5:46 PM

## 2021-12-22 NOTE — PROGRESS NOTES
Attending Progress Note      PCP: Balbir Matute MD      Patient: Simone Hatchet   Gender: female  : 1950   Age: 66 y.o. MRN: [id number]  Room  : [address] [place] [address]      Date of Admission: 2021    Chief Complaint   Patient presents with    Hypertension           Subjective:  Comfortable         Medications:  Reviewed  Infusion Medications   Scheduled Medications    aspirin  81 mg Oral Daily    atorvastatin  80 mg Oral Nightly    lisinopril  5 mg Oral Daily    enoxaparin  40 mg SubCUTAneous Daily    cefTRIAXone (ROCEPHIN) IV  1,000 mg IntraVENous Q24H    azithromycin  500 mg IntraVENous Q24H     PRN Meds: potassium chloride **OR** potassium alternative oral replacement **OR** potassium chloride, magnesium sulfate, promethazine **OR** ondansetron, labetalol    No intake or output data in the 24 hours ending 21 1405    Exam:  BP (!) 163/61   Pulse 87   Temp 97.7 °F (36.5 °C) (Oral)   Resp 16   Ht 4' 10\" (1.473 m)   Wt 151 lb 0.2 oz (68.5 kg)   SpO2 95%   BMI 31.56 kg/m²   General appearance: No distress,   Respiratory:  good air entry , no Rales , No wheezing, or rhonchi,  Cardiovascular: RRR, with normal S1/S2 . Abdomen : Soft, non-tender, non-distended  , normal bowel sounds. Legs : No edema bilaterally. No DVT signs ,    Neurologic:  Alert and oriented ,        Labs:   Recent Labs     21  1135 21  0338   WBC 8.6 10.1   HGB 14.4 14.4   HCT 45.3 46.1   * 500*     Recent Labs     21  1135 21  0338    141   K 4.3 4.7    102   CO2 27 29   BUN 15 13   CREATININE 0.8 0.8   CALCIUM 8.8 10.0     Recent Labs     21  1135   AST 15   ALT 8*   BILITOT 0.3   ALKPHOS 138*     Recent Labs     21  1135   INR 0.83     No results for input(s): CKTOTAL, TROPONINI in the last 72 hours.     Assessment/Plan:  Active Hospital Problems    Diagnosis Date Noted    Acute cerebrovascular accident (CVA) ([facility] [address] [I63.9] 2021   Expressive Aphasia CVA   Visual hallucination   PAD   Dementia   Atypical pneumonia   HTN  HLP  COPD with Tobacco abuse         Assessment/Plan:   Tele : no event - no fever - on room air    CVA protocol  Brain MRI,MRA : pending   PT/OT and Neuro consult / input noted   Vascular surg consult   Swallowing eval   ASA, Lipitor   Will consider psych consult if needed   Tele   IVF  IV Rocephin and Zithromax   Home meds , reviewed and resumed as appropriate   Symptoms releif/Pain control  DVT Prophylaxis   Diet: ADULT DIET; Regular  Code Status: Full Code          Treatment progress and plan was d/w pt/family .     Dilia Raymond MD

## 2021-12-22 NOTE — ED NOTES
Report given to [facility] and care assumed.      Conrad Mauricio, RN  12/21/21 1924

## 2021-12-23 ENCOUNTER — APPOINTMENT (OUTPATIENT)
Dept: MRI IMAGING | Age: 71
DRG: 064 | End: 2021-12-23
Payer: MEDICARE

## 2021-12-23 VITALS
RESPIRATION RATE: 21 BRPM | HEIGHT: 58 IN | DIASTOLIC BLOOD PRESSURE: 86 MMHG | WEIGHT: 151.01 LBS | HEART RATE: 83 BPM | BODY MASS INDEX: 31.7 KG/M2 | SYSTOLIC BLOOD PRESSURE: 164 MMHG | OXYGEN SATURATION: 96 % | TEMPERATURE: 96.6 F

## 2021-12-23 LAB
ANION GAP SERPL CALCULATED.3IONS-SCNC: 9 MMOL/L (ref 4–16)
BUN BLDV-MCNC: 15 MG/DL (ref 6–23)
CALCIUM SERPL-MCNC: 9.8 MG/DL (ref 8.3–10.6)
CHLORIDE BLD-SCNC: 103 MMOL/L (ref 99–110)
CO2: 29 MMOL/L (ref 21–32)
CREAT SERPL-MCNC: 0.8 MG/DL (ref 0.6–1.1)
GFR AFRICAN AMERICAN: >60 ML/MIN/1.73M2
GFR NON-AFRICAN AMERICAN: >60 ML/MIN/1.73M2
GLUCOSE BLD-MCNC: 115 MG/DL (ref 70–99)
HCT VFR BLD CALC: 46.5 % (ref 37–47)
HEMOGLOBIN: 14.4 GM/DL (ref 12.5–16)
MCH RBC QN AUTO: 29 PG (ref 27–31)
MCHC RBC AUTO-ENTMCNC: 31 % (ref 32–36)
MCV RBC AUTO: 93.6 FL (ref 78–100)
PDW BLD-RTO: 14 % (ref 11.7–14.9)
PLATELET # BLD: 479 K/CU MM (ref 140–440)
PMV BLD AUTO: 9.6 FL (ref 7.5–11.1)
POTASSIUM SERPL-SCNC: 4.4 MMOL/L (ref 3.5–5.1)
RBC # BLD: 4.97 M/CU MM (ref 4.2–5.4)
SODIUM BLD-SCNC: 141 MMOL/L (ref 135–145)
WBC # BLD: 7.8 K/CU MM (ref 4–10.5)

## 2021-12-23 PROCEDURE — 70551 MRI BRAIN STEM W/O DYE: CPT

## 2021-12-23 PROCEDURE — 2580000003 HC RX 258: Performed by: FAMILY MEDICINE

## 2021-12-23 PROCEDURE — 6360000002 HC RX W HCPCS: Performed by: FAMILY MEDICINE

## 2021-12-23 PROCEDURE — 36415 COLL VENOUS BLD VENIPUNCTURE: CPT

## 2021-12-23 PROCEDURE — 6370000000 HC RX 637 (ALT 250 FOR IP): Performed by: FAMILY MEDICINE

## 2021-12-23 PROCEDURE — 80048 BASIC METABOLIC PNL TOTAL CA: CPT

## 2021-12-23 PROCEDURE — 94761 N-INVAS EAR/PLS OXIMETRY MLT: CPT

## 2021-12-23 PROCEDURE — 85027 COMPLETE CBC AUTOMATED: CPT

## 2021-12-23 PROCEDURE — 99221 1ST HOSP IP/OBS SF/LOW 40: CPT | Performed by: SURGERY

## 2021-12-23 PROCEDURE — 92523 SPEECH SOUND LANG COMPREHEN: CPT

## 2021-12-23 PROCEDURE — 76937 US GUIDE VASCULAR ACCESS: CPT

## 2021-12-23 PROCEDURE — 99232 SBSQ HOSP IP/OBS MODERATE 35: CPT

## 2021-12-23 RX ORDER — LISINOPRIL 5 MG/1
10 TABLET ORAL DAILY
Qty: 30 TABLET | Refills: 5 | Status: SHIPPED | OUTPATIENT
Start: 2021-12-23 | End: 2022-01-03

## 2021-12-23 RX ORDER — AZITHROMYCIN 500 MG/1
500 TABLET, FILM COATED ORAL DAILY
Qty: 1 PACKET | Refills: 0 | Status: SHIPPED | OUTPATIENT
Start: 2021-12-23 | End: 2021-12-26

## 2021-12-23 RX ORDER — ASPIRIN/CALCIUM/MAG/ALUMINUM 325 MG
1 TABLET ORAL DAILY
Qty: 30 TABLET | Refills: 3 | Status: ON HOLD | OUTPATIENT
Start: 2021-12-23 | End: 2022-01-11 | Stop reason: HOSPADM

## 2021-12-23 RX ADMIN — AZITHROMYCIN MONOHYDRATE 500 MG: 500 INJECTION, POWDER, LYOPHILIZED, FOR SOLUTION INTRAVENOUS at 16:20

## 2021-12-23 RX ADMIN — CEFTRIAXONE SODIUM 1000 MG: 1 INJECTION, POWDER, FOR SOLUTION INTRAMUSCULAR; INTRAVENOUS at 16:21

## 2021-12-23 RX ADMIN — ENOXAPARIN SODIUM 40 MG: 100 INJECTION SUBCUTANEOUS at 08:35

## 2021-12-23 RX ADMIN — ASPIRIN 81 MG CHEWABLE TABLET 81 MG: 81 TABLET CHEWABLE at 08:35

## 2021-12-23 RX ADMIN — LISINOPRIL 5 MG: 5 TABLET ORAL at 08:34

## 2021-12-23 NOTE — PROGRESS NOTES
[facility] Liaison spoke with pt nurse. Asked yes no questions as pt is aphasic. Aware of discharge & will initiate [facility] [place].

## 2021-12-23 NOTE — PROGRESS NOTES
MRI brain reviewed personally and does demonstrate areas of LMCA acute/subacute infarct involving frontal and parietoccipital lobe. Suspect this is secondary to high grade LMCA stenosis. Recommended vascular surgery consult given prior endarterectomy. Awaiting their impression. Patient should continue on ASA, statin for secondary stroke prevention. Patient eager for discharge, however, do feel she needs to be evaluated by vascular prior. If vascular is okay with d/c and outpatient follow up then this would be okay from neurologic standpoint.      Marlene Holder DO 12/23/2021 4:35 PM

## 2021-12-23 NOTE — CARE COORDINATION
Received a call from Lebron Andrews in the outpt pharmacy regarding pt's d/c medications. Pt has [address] and we have not helped pt before. Voucher created for zithromax, lisinopril and faxed to the pharmacy.  Pt will go on the flag list. Ifs he would need any further assistance, she must fill out an application and provide required documentation to be considered for eligibility./MB

## 2021-12-23 NOTE — DISCHARGE SUMMARY
Patient: Danial Smith MD      Gender: female  : 1950   Age: 66 y.o. MRN: [id number]    Admitting Physician: Dilia Raymond MD  Discharge Physician: Dilia Raymond MD     Code Status: Full Code     Admit Date: 2021   Discharge Date: 21      Disposition:  Home       Condition at Discharge:  stable . Follow-up appointments:  f/u one week with PCP , and with consultants as recommended . Outpatient to do list: f/u     Pt`s preferred phone number :[phone]  [facility]  Extended Emergency Contact Information  Primary Emergency Contact: Ariel Pederson  Address: [address] [place], [place] [address]os [facility], [address] [place] [address] Phone: [phone]  Mobile Phone: [phone]  Relation: Spouse   needed? No  Secondary Emergency Contact: jh boswell  [place] Phone: [phone]  Relation: Child      Discharge Diagnoses: Active Hospital Problems    Diagnosis     Acute cerebrovascular accident (CVA) [facility]) [I63.9]                  Hospital Course:         Consults.   IP CONSULT TO PRIMARY CARE PROVIDER  IP CONSULT TO PRIMARY CARE PROVIDER  IP CONSULT TO NEUROLOGY  IP CONSULT TO SOCIAL WORK  IP CONSULT TO CARDIOTHORACIC SURGERY  IP CONSULT TO IV TEAM        Discharge Medications:   Current Discharge Medication List        START taking these medications    Details   Aspirin Buf,EvZgo-FzVob-MuUnc, (BUFFERED ASPIRIN) 325 MG TABS Take 1 tablet by mouth daily  Qty: 30 tablet, Refills: 3      azithromycin (ZITHROMAX) 500 MG tablet Take 1 tablet by mouth daily for 3 days  Qty: 1 packet, Refills: 0    Associated Diagnoses: Atypical pneumonia           Current Discharge Medication List        CONTINUE these medications which have CHANGED    Details   lisinopril (PRINIVIL;ZESTRIL) 5 MG tablet Take 2 tablets by mouth daily  Qty: 30 tablet, Refills: 5           Current Discharge Medication List        CONTINUE these medications which have NOT CHANGED Details   meloxicam (MOBIC) 7.5 MG tablet Take 2 tablets by mouth daily for 10 days  Qty: 20 tablet, Refills: 0      atorvastatin (LIPITOR) 80 MG tablet Take 80 mg by mouth daily           Current Discharge Medication List        STOP taking these medications       aspirin 81 MG tablet Comments:   Reason for Stopping:               Discharge ROS:  A complete review of systems was asked and negative except for      Discharge Exam:  Estimated body mass index is 31.56 kg/m² as calculated from the following:    Height as of this encounter: 4' 10\" (1.473 m). Weight as of this encounter: 151 lb 0.2 oz (68.5 kg). BP (!) 162/95   Pulse 77   Temp 97.9 °F (36.6 °C) (Oral)   Resp 27   Ht 4' 10\" (1.473 m)   Wt 151 lb 0.2 oz (68.5 kg)   SpO2 94%   BMI 31.56 kg/m²   General appearance:  NAD  Heart[de-identified] Normal s1/s2, RRR, no murmurs, gallops, or rubs. No leg edema  Lungs:  Clear to auscultation, bilaterally without Rales/Wheezes/Rhonchi. Abdomen: Soft, non-tender, non-distended, bowel sounds present  Musculoskeletal:   no cyanosis, no edema  Neurologic:  Cranial nerves: II-XII intact, grossly non-focal.  Psychiatric:  A & O x3      Labs:  For convenience and continuity at follow-up the following most recent labs are provided:    Lab Results   Component Value Date    WBC 7.8 12/23/2021    HGB 14.4 12/23/2021    HCT 46.5 12/23/2021    MCV 93.6 12/23/2021     12/23/2021     12/23/2021    K 4.4 12/23/2021     12/23/2021    CO2 29 12/23/2021    BUN 15 12/23/2021    CREATININE 0.8 12/23/2021    CALCIUM 9.8 12/23/2021    ALKPHOS 138 12/21/2021    ALT 8 12/21/2021    AST 15 12/21/2021    BILITOT 0.3 12/21/2021    LABALBU 4.3 12/21/2021    TRIG 233 12/22/2021     Lab Results   Component Value Date    INR 0.83 12/21/2021    INR 0.79 10/25/2021       Results for orders placed or performed during the hospital encounter of 12/21/21   COVID-19, Rapid    Specimen: Nasopharyngeal   Result Value Ref Range    Source THROAT     SARS-CoV-2, NAAT NOT DETECTED NOT DETECTED   CBC Auto Differential   Result Value Ref Range    WBC 8.6 4.0 - 10.5 K/CU MM    RBC 4.92 4.2 - 5.4 M/CU MM    Hemoglobin 14.4 12.5 - 16.0 GM/DL    Hematocrit 45.3 37 - 47 %    MCV 92.1 78 - 100 FL    MCH 29.3 27 - 31 PG    MCHC 31.8 (L) 32.0 - 36.0 %    RDW 13.7 11.7 - 14.9 %    Platelets 782 (H) 492 - 440 K/CU MM    MPV 9.4 7.5 - 11.1 FL    Differential Type AUTOMATED DIFFERENTIAL     Segs Relative 68.3 (H) 36 - 66 %    Lymphocytes % 19.4 (L) 24 - 44 %    Monocytes % 7.9 (H) 0 - 4 %    Eosinophils % 3.2 (H) 0 - 3 %    Basophils % 0.8 0 - 1 %    Segs Absolute 5.9 K/CU MM    Lymphocytes Absolute 1.7 K/CU MM    Monocytes Absolute 0.7 K/CU MM    Eosinophils Absolute 0.3 K/CU MM    Basophils Absolute 0.1 K/CU MM    Nucleated RBC % 0.0 %    Total Nucleated RBC 0.0 K/CU MM    Total Immature Neutrophil 0.03 K/CU MM    Immature Neutrophil % 0.4 0 - 0.43 %   Comprehensive Metabolic Panel w/ Reflex to MG   Result Value Ref Range    Sodium 139 135 - 145 MMOL/L    Potassium 4.3 3.5 - 5.1 MMOL/L    Chloride 102 99 - 110 mMol/L    CO2 27 21 - 32 MMOL/L    BUN 15 6 - 23 MG/DL    CREATININE 0.8 0.6 - 1.1 MG/DL    Glucose 132 (H) 70 - 99 MG/DL    Calcium 8.8 8.3 - 10.6 MG/DL    Albumin 4.3 3.4 - 5.0 GM/DL    Total Protein 7.0 6.4 - 8.2 GM/DL    Total Bilirubin 0.3 0.0 - 1.0 MG/DL    ALT 8 (L) 10 - 40 U/L    AST 15 15 - 37 IU/L    Alkaline Phosphatase 138 (H) 40 - 129 IU/L    GFR Non-African American >60 >60 mL/min/1.73m2    GFR African American >60 >60 mL/min/1.73m2    Anion Gap 10 4 - 16   Troponin   Result Value Ref Range    Troponin T <0.010 <0.01 NG/ML   Protime-INR   Result Value Ref Range    Protime 10.7 (L) 11.7 - 14.5 SECONDS    INR 0.83 INDEX   Urinalysis   Result Value Ref Range    Color, UA YELLOW YELLOW    Clarity, UA CLEAR CLEAR    Glucose, Urine NEGATIVE NEGATIVE MG/DL    Bilirubin Urine NEGATIVE NEGATIVE MG/DL    Ketones, Urine NEGATIVE NEGATIVE MG/DL    Specific Gravity, UA 1.012 1.001 - 1.035    Blood, Urine NEGATIVE NEGATIVE    pH, Urine 5.0 5.0 - 8.0    Protein, UA NEGATIVE NEGATIVE MG/DL    Urobilinogen, Urine NEGATIVE 0.2 - 1.0 MG/DL    Nitrite Urine, Quantitative NEGATIVE NEGATIVE    Leukocyte Esterase, Urine NEGATIVE NEGATIVE    RBC, UA NONE SEEN 0 - 6 /HPF    WBC, UA NONE SEEN 0 - 5 /HPF    Bacteria, UA NEGATIVE NEGATIVE /HPF    Squam Epithel, UA 1 /HPF    Mucus, UA RARE (A) NEGATIVE HPF    Trichomonas, UA NONE SEEN NONE SEEN /HPF    Hyaline Casts, UA 0 /LPF   TSH without Reflex   Result Value Ref Range    TSH, High Sensitivity 2.060 0.270 - 4.20 uIu/ml   T4, free   Result Value Ref Range    T4 Free 0.88 (L) 0.9 - 1.8 NG/DL   Ammonia   Result Value Ref Range    Ammonia 15 11 - 51 UMOL/L   Troponin   Result Value Ref Range    Troponin T <0.010 <0.01 NG/ML   Troponin   Result Value Ref Range    Troponin T <0.010 <0.01 NG/ML   Basic Metabolic Panel w/ Reflex to MG   Result Value Ref Range    Sodium 141 135 - 145 MMOL/L    Potassium 4.7 3.5 - 5.1 MMOL/L    Chloride 102 99 - 110 mMol/L    CO2 29 21 - 32 MMOL/L    Anion Gap 10 4 - 16    BUN 13 6 - 23 MG/DL    CREATININE 0.8 0.6 - 1.1 MG/DL    Glucose 112 (H) 70 - 99 MG/DL    Calcium 10.0 8.3 - 10.6 MG/DL    GFR Non-African American >60 >60 mL/min/1.73m2    GFR African American >60 >60 mL/min/1.73m2   Lipid panel - fasting   Result Value Ref Range    Triglycerides 233 (H) <150 MG/DL    Cholesterol 234 (H) <200 MG/DL    HDL 41 >40 MG/DL    LDL Direct 150 (H) <100 MG/DL   Hemoglobin A1c   Result Value Ref Range    Hemoglobin A1C 6.0 4.2 - 6.3 %    eAG 126 mg/dL   CBC   Result Value Ref Range    WBC 10.1 4.0 - 10.5 K/CU MM    RBC 4.99 4.2 - 5.4 M/CU MM    Hemoglobin 14.4 12.5 - 16.0 GM/DL    Hematocrit 46.1 37 - 47 %    MCV 92.4 78 - 100 FL    MCH 28.9 27 - 31 PG    MCHC 31.2 (L) 32.0 - 36.0 %    RDW 14.0 11.7 - 14.9 %    Platelets 292 (H) 684 - 440 K/CU MM    MPV 9.6 7.5 - 11.1 FL   Basic Metabolic Panel w/ Reflex to MG Result Value Ref Range    Sodium 141 135 - 145 MMOL/L    Potassium 4.4 3.5 - 5.1 MMOL/L    Chloride 103 99 - 110 mMol/L    CO2 29 21 - 32 MMOL/L    Anion Gap 9 4 - 16    BUN 15 6 - 23 MG/DL    CREATININE 0.8 0.6 - 1.1 MG/DL    Glucose 115 (H) 70 - 99 MG/DL    Calcium 9.8 8.3 - 10.6 MG/DL    GFR Non-African American >60 >60 mL/min/1.73m2    GFR African American >60 >60 mL/min/1.73m2   CBC   Result Value Ref Range    WBC 7.8 4.0 - 10.5 K/CU MM    RBC 4.97 4.2 - 5.4 M/CU MM    Hemoglobin 14.4 12.5 - 16.0 GM/DL    Hematocrit 46.5 37 - 47 %    MCV 93.6 78 - 100 FL    MCH 29.0 27 - 31 PG    MCHC 31.0 (L) 32.0 - 36.0 %    RDW 14.0 11.7 - 14.9 %    Platelets 394 (H) 906 - 440 K/CU MM    MPV 9.6 7.5 - 11.1 FL   POCT Glucose   Result Value Ref Range    Glucose 100 mg/dL    QC OK? ok    POCT Glucose   Result Value Ref Range    POC Glucose 100 (H) 70 - 99 MG/DL   EKG 12 Lead   Result Value Ref Range    Ventricular Rate 80 BPM    Atrial Rate 80 BPM    P-R Interval 150 ms    QRS Duration 72 ms    Q-T Interval 384 ms    QTc Calculation (Bazett) 442 ms    P Axis 94 degrees    R Axis -49 degrees    T Axis 65 degrees    Diagnosis       Normal sinus rhythm  Possible Left atrial enlargement  Left anterior fascicular block  Inferior infarct (cited on or before 29-OCT-2019)  Anterior infarct (cited on or before 25-OCT-2021)  Abnormal ECG  When compared with ECG of 25-OCT-2021 06:46,  No significant change was found  Confirmed by [facility] MD, [facility] ([id number]) on 12/21/2021 2:30:04 PM           Chart review shows recent radiographs:  Echocardiogram complete 2D with doppler with color    Result Date: 12/22/2021  Transthoracic Echocardiography Report (TTE)  Demographics   Patient Name       Bhavna Mccormick    Date of Study       12/22/2021   Date of Birth      1950         Gender              Female   Age                66 year(s)         Race                Unknown   Patient Number     [id number]         Room Number         3104   Visit Number       [id number]   Corporate ID       [id number]   Accession Number   [id number]         [facility]   Ordering Physician Clemencia Pope MD                 Physician           Shell ADAMS  Procedure Type of Study   TTE procedure:ECHOCARDIOGRAM COMPLETE 2D W DOPPLER W COLOR. Procedure Date Date: 12/22/2021 Start: 08:36 AM Study Location: Portable Technical Quality: Adequate visualization Indications:CVA. Patient Status: Routine Height: 58 inches Weight: 155 pounds BSA: 1.63 m2 BMI: 32.39 kg/m2 HR: 86 bpm BP: 154/69 mmHg  Conclusions   Summary  Left ventricular systolic function is normal.  Ejection fraction is visually estimated at 55%. Mild left ventricular hypertrophy. Grade I diastolic dysfunction. Suboptimal bubble study; color Doppler does not suggest PFO or ASD. No evidence of any pericardial effusion. Signature   ------------------------------------------------------------------  Electronically signed by Marcia Shaver MD  (Interpreting physician) on 12/22/2021 at 10:50 AM  ------------------------------------------------------------------   Findings   Left Ventricle  Left ventricular systolic function is normal.  Ejection fraction is visually estimated at 55%. Mild left ventricular hypertrophy. Grade I diastolic dysfunction. Left ventricle size is normal.  No regional wall motion abnormalities. Left Atrium  Suboptimal bubble study; color Doppler does not suggest PFO or ASD. Right Atrium  Essentially normal right atrium. Right Ventricle  Essentially normal right ventricle. Aortic Valve  Structurally normal aortic valve. Mitral Valve  Structurally normal mitral valve. Tricuspid Valve  Structurally normal tricuspid valve. Pulmonic Valve  The pulmonic valve was not well visualized.    Pericardial Effusion  No evidence of reconstruction, and/or weight based adjustment of the mA/kV was utilized to reduce the radiation dose to as low as reasonably achievable. COMPARISON: 10/25/2021 HISTORY: ORDERING SYSTEM PROVIDED HISTORY: possible stroke 3 days ago TECHNOLOGIST PROVIDED HISTORY: Reason for exam:->possible stroke 3 days ago Has a \"code stroke\" or \"stroke alert\" been called? ->No Decision Support Exception - unselect if not a suspected or confirmed emergency medical condition->Emergency Medical Condition (MA) Reason for Exam: possible stroke 3 days ago Initial evaluation FINDINGS: BRAIN/VENTRICLES:  The ventricles and cisternal spaces are prominent consistent with cerebral atrophy. There is an area of previous injury or infarct in the left occipital lobe. There are chronic small vessel ischemic changes in the periventricular white matter. There are remote lacunar infarcts in the basal ganglia bilaterally. There is atherosclerotic calcification of the cavernous carotid arteries and vertebral arteries. There is hypoattenuation in the left frontal lobe which may represent a more acute or subacute area of infarct. MRI could further characterize. No hemorrhage is identified in the brain parenchyma. There is no midline shift or mass effect. ORBITS: The visualized portion of the orbits demonstrate no acute abnormality. SINUSES:  The visualized paranasal sinuses and mastoid air cells are for the most part clear. SOFT TISSUES/SKULL:  No acute abnormality of the visualized skull or soft tissues. Area of hypoattenuation involving the left frontal lobe which may represent an acute to subacute area of infarct. MRI could further characterize. Previous areas of injury or infarct in the basal ganglia bilaterally and left occipital lobe.  RECOMMENDATIONS: Unavailable     XR CHEST PORTABLE    Result Date: 12/21/2021  EXAMINATION: ONE XRAY VIEW OF THE CHEST 12/21/2021 1:06 pm COMPARISON: 10/25/2021 HISTORY: ORDERING SYSTEM PROVIDED HISTORY: stroke symptoms TECHNOLOGIST PROVIDED HISTORY: Reason for exam:->stroke symptoms Reason for Exam: stroke systems FINDINGS: Bilateral hilar and perihilar infiltrative change/bronchial pulmonary marking prominence consistent with central bronchitis/pneumonitis. Findings may be accentuated by low lung volumes. No large areas of pulmonary consolidations or definite airspace infiltrates. No detectable pleural effusion, pneumothorax, pulmonary edema, cardiomegaly or mediastinal widening. Stable postoperative changes and granuloma calcifications noted. Findings consistent with central hilar/perihilar bronchitis/pneumonitis or atypical pneumonia. Otherwise, radiographically nonacute portable chest.     CTA HEAD NECK W CONTRAST    Result Date: 12/22/2021  EXAMINATION: CTA OF THE HEAD AND NECK WITH CONTRAST 12/21/2021 1:03 pm: TECHNIQUE: CTA of the head and neck was performed with the administration of intravenous contrast. Multiplanar reformatted images are provided for review. MIP images are provided for review. Stenosis of the internal carotid arteries measured using NASCET criteria. Dose modulation, iterative reconstruction, and/or weight based adjustment of the mA/kV was utilized to reduce the radiation dose to as low as reasonably achievable. COMPARISON: 10/25/2021 HISTORY: ORDERING SYSTEM PROVIDED HISTORY: possible stroke 3 days ago TECHNOLOGIST PROVIDED HISTORY: Reason for exam:->possible stroke 3 days ago Decision Support Exception - unselect if not a suspected or confirmed emergency medical condition->Emergency Medical Condition (MA) Reason for Exam: possible stroke 3 days ago Relevant Medical/Surgical History: 80 ML ISOVUE 370 Initial evaluation FINDINGS: CTA NECK: AORTIC ARCH/ARCH VESSELS: There is atherosclerotic calcification of the aortic arch. No abnormality of the left common carotid artery or innominate is identified.  CAROTID ARTERIES: The right common carotid artery is patent without any narrowing or stenosis. There is atherosclerotic disease in the right internal carotid artery bulb with approximately 65% stenosis using NASCET criteria. The right internal carotid artery is then patent through the skull base. The proximal left common carotid artery is patent. There is diffuse narrowing of the distal left common carotid artery with approximately 50% stenosis. There is sequela of a left carotid endarterectomy. There is atherosclerotic disease in the region of the enderectomy with a short segment high-grade stenosis of the left internal carotid artery bulb measuring greater than 80% using NASCET criteria. The left internal carotid artery is then patent through the skull base. VERTEBRAL ARTERIES: The right vertebral artery is dominant. There is moderate narrowing of the origin of the right vertebral artery. The right vertebral artery is otherwise patent throughout the skull base. The left vertebral artery is small in caliber but patent in its visualized course. SOFT TISSUES: There is no acute abnormality of the visualized soft tissues. There are a few small lymph nodes scattered throughout the neck that are nonspecific. BONES: No acute osseous abnormality. CTA HEAD: ANTERIOR CIRCULATION: There is atherosclerotic disease of the cavernous carotid arteries. There is no significant narrowing of the right cavernous carotid artery. There are mild areas of narrowing in the left cavernous carotid. The anterior cerebral arteries are patent. The anterior communicating artery is tiny but patent. The middle cerebral arteries are patent bilaterally. POSTERIOR CIRCULATION: The right distal vertebral artery has minimal atherosclerotic disease without any significant narrowing. The distal left vertebral artery has a short segment severe stenosis just before forming the basilar. The basilar artery is patent. There is a short segment severe stenosis of the P2 segment of the left posterior cerebral artery. cerebral atrophy. There are numerous punctate and confluent areas of high signal in the periventricular white matter and centrum semiovale that are likely related to chronic small vessel ischemic disease. There is no midline shift or mass effect. ORBITS: The visualized portion of the orbits demonstrate no acute abnormality. SINUSES: There is mild mucoperiosteal thickening of the ethmoid air cells. The remainder of the sinuses are clear. There is mild opacification of the mastoid air cells bilaterally. BONES/SOFT TISSUES: The bone marrow signal intensity appears normal. The soft tissues demonstrate no acute abnormality. Small areas of acute infarct involving the left frontal lobe from an acute left middle cerebral artery territory infarct. Subacute areas of infarct involving the left occipital lobe. An embolic source should be considered. Cerebral atrophy. Severe chronic small vessel ischemic changes. Remote left occipital lobe infarct. Remote lacunar infarcts in the basal ganglia bilaterally and right thalamus. RECOMMENDATIONS: Unavailable       EKG     Rhythm: normal sinus         There is no immunization history on file for this patient. The patient was seen and examined on day of discharge and this discharge summary is in conjunction with any daily progress note from day of discharge. Time Spent on discharge is   >35  min  in the examination, evaluation, counseling and review of medications and discharge plan.             SignedDasejal Raya MD   12/23/2021

## 2021-12-23 NOTE — CONSULTS
Department of Cardiovascular & Thoracic Surgery   Consult Note    Reason for Consult: Carotid artery stenosis and neurologic event    Requesting Physician: Dr. Xena Cordon    Date of Consult: 12/23/21      History Obtained From:  patient     HISTORY OF PRESENT ILLNESS:    The patient is a 66 y.o. female who presents with with 24-48 history of hallucinations and difficulty expressing words  She was evaluated in the emergency room and subsequently underwent CT scan and MRI  She has extensive history of previous cardiac and peripheral vascular and carotid disease  History of coronary bypass surgery remote past and history of left internal carotid endarterectomy also in the remote past  She is a long-term smoker.       Past Medical History:        Diagnosis Date    CAD (coronary artery disease)     Hypertension      Past Surgical History:        Procedure Laterality Date    CAROTID ENDARTERECTOMY      CHOLECYSTECTOMY      CORONARY ARTERY BYPASS GRAFT      NECK SURGERY       Current Medications:   Current Facility-Administered Medications: aspirin chewable tablet 81 mg, 81 mg, Oral, Daily  atorvastatin (LIPITOR) tablet 80 mg, 80 mg, Oral, Nightly  lisinopril (PRINIVIL;ZESTRIL) tablet 5 mg, 5 mg, Oral, Daily  potassium chloride (KLOR-CON M) extended release tablet 40 mEq, 40 mEq, Oral, PRN **OR** potassium bicarb-citric acid (EFFER-K) effervescent tablet 40 mEq, 40 mEq, Oral, PRN **OR** potassium chloride 10 mEq/100 mL IVPB (Peripheral Line), 10 mEq, IntraVENous, PRN  magnesium sulfate 2000 mg in 50 mL IVPB premix, 2,000 mg, IntraVENous, PRN  promethazine (PHENERGAN) tablet 12.5 mg, 12.5 mg, Oral, Q6H PRN **OR** ondansetron (ZOFRAN) injection 4 mg, 4 mg, IntraVENous, Q6H PRN  enoxaparin (LOVENOX) injection 40 mg, 40 mg, SubCUTAneous, Daily  labetalol (NORMODYNE;TRANDATE) injection 10 mg, 10 mg, IntraVENous, Q10 Min PRN  cefTRIAXone (ROCEPHIN) 1000 mg IVPB in 50 mL D5W minibag, 1,000 mg, IntraVENous, Q24H  azithromycin (ZITHROMAX) 500 mg in dextrose 5 % 250 mL IVPB, 500 mg, IntraVENous, Q24H  Allergies: Allergies   Allergen Reactions    Penicillins     Sulfa Antibiotics Swelling       Social History:   Social History     Socioeconomic History    Marital status:      Spouse name: Not on file    Number of children: Not on file    Years of education: Not on file    Highest education level: Not on file   Occupational History    Not on file   Tobacco Use    Smoking status: Current Every Day Smoker     Packs/day: 1.50     Types: Cigarettes    Smokeless tobacco: Never Used   Substance and Sexual Activity    Alcohol use: No    Drug use: No    Sexual activity: Not on file   Other Topics Concern    Not on file   Social History Narrative    Not on file     Social Determinants of Health     Financial Resource Strain:     Difficulty of Paying Living Expenses: Not on file   Food Insecurity:     Worried About Running Out of Food in the Last Year: Not on file    Patricia of Food in the Last Year: Not on file   Transportation Needs:     Lack of Transportation (Medical): Not on file    Lack of Transportation (Non-Medical):  Not on file   Physical Activity:     Days of Exercise per Week: Not on file    Minutes of Exercise per Session: Not on file   Stress:     Feeling of Stress : Not on file   Social Connections:     Frequency of Communication with Friends and Family: Not on file    Frequency of Social Gatherings with Friends and Family: Not on file    Attends Holiness Services: Not on file    Active Member of Clubs or Organizations: Not on file    Attends Club or Organization Meetings: Not on file    Marital Status: Not on file   Intimate Partner Violence:     Fear of Current or Ex-Partner: Not on file    Emotionally Abused: Not on file    Physically Abused: Not on file    Sexually Abused: Not on file   Housing Stability:     Unable to Pay for Housing in the Last Year: Not on file    Number of [place] in the Last Year: Not on file    Unstable Housing in the Last Year: Not on file       Family History:    No family history on file. REVIEW OF SYSTEMS:  Constitutional: Negative for fever, chills, diaphoresis, appetite change and fatigue. HENT: Negative for sore throat, trouble swallowing and voice change. Respiratory: Negative for cough, positive for shortness of breath no wheezing. Cardiovascular: Negative for chest pain positive for SOB with one flight of stairs exertion, no pitting LE edema. Gastrointestinal: Negative for nausea, vomiting, abdominal distention, constipation, no diarrhea, blood in stool, anal bleeding or rectal pain. Musculoskeletal: Negative for joint swelling and arthralgias. Skin: Warm and dry, well perfused. Neurological: Negative for seizures, syncope, speech difficulty and weakness. Hematological/Lymphatic: Negative for adenopathy. No history of DVT/PE. Does not bruise/bleed easily. Psychiatric/Behavioral: Negative for agitation. All others reviewed and negative. EXAM:  Constitutional: Blood pressure (!) 164/86, pulse 83, temperature 96.6 °F (35.9 °C), temperature source Oral, resp. rate 21, height 4' 10\" (1.473 m), weight 151 lb 0.2 oz (68.5 kg), SpO2 96 %. No apparent distress, appears stated age and cooperative. Neurologic: follows commands, no focal weakness noted   Lungs: Good respiratory effort.  Clear to auscultation,   CV: Regular rate/ rhythm , no peripheral edema, feet warm and well perfused  GI: Soft, non-tender in all four quadrants, non-distended, + bowel sounds, liver and spleen no palpable masses  : bladder nondistended   MSK: no obvious deformity   Skin: warm, pink and dry       DATA:    CT scan images and MRI images reviewed    Extensive intracranial disease involving multiple areas of intracranial carotid arteries  Possible recurrence of the LICA stenosis  Multiple old and new strokes in several areas of the brain on both sides    IMPRESSION  Patient Active Problem List   Diagnosis    Hypertensive urgency    CAD (coronary artery disease)    Diverticulosis    Hyperlipidemia with target low density lipoprotein (LDL) cholesterol less than 70 mg/dL    Cerebrovascular accident [facility])    Acute cerebrovascular accident (CVA) ([facility] [address]             RECOMMENDATIONS:    As per the findings and the studies the patient is alert oriented and able to participate in the conversation  She immediately recognized me from her cardiac and peripheral carotid surgeries  And engaged me in conversation regarding the options for treatment  Due to extensive intracranial involvement of the blood vessels and acute stroke recommend antiplatelet therapy and conservative management  Advised smoking cessation and aggressive management hypertension  After resolution of acute stroke status consideration could be given for interventional options of the recurrent LICA stenosis as well as intracranial disease

## 2021-12-23 NOTE — CARE COORDINATION
Notified [facility] liaison/Karissa of d/c. She will fax orders to office.   TE

## 2021-12-23 NOTE — PROGRESS NOTES
Speech Language Pathology  Facility/Department: [address] [place] [address]  Initial Speech/Language/Cognitive Assessment    NAME: Blakc Bosch  : 1950   MRN: [id number]  ADMISSION DATE: 2021  ADMITTING DIAGNOSIS: has Hypertensive urgency; CAD (coronary artery disease); Diverticulosis; Hyperlipidemia with target low density lipoprotein (LDL) cholesterol less than 70 mg/dL; Cerebrovascular accident [facility]); and Acute cerebrovascular accident (CVA) [facility]) on their problem list.  DATE ONSET: this admission    Date of Eval: 2021   Evaluating Therapist: Mckenzie Castro, SLP    IMPRESSIONS AND RECOMMENDATIONS: Black Bosch was seen for speech/language evaluation following admission to [facility] with CVA. Per radiologist, brain MRI reveals \"Small areas of acute infarct involving the left frontal lobe. .. Subacute areas of infarct involving the left occipital lobe. \" No known history of speech/language deficits. Motor speech was assessed informally. Speech is generally clear without evidence of dysarthria. Vocal quality WNL. Occasional and inconsistent phoneme distortions and substitutions noted, consistent with mild apraxia of speech. The MS Aphasia Screening Test was administered to evaluate Carla Pederson's expressive and receptive language skills. She earned the following scores:    Expressive Index:     Receptive Index:   Naming: 1010      Yes/No Accuracy: 18/20   Automatic Speech: 1010    Object Recognition: 10/10   Repetition: 10/10     Following Instructions: 10   Writing: 10/10      Reading Instructions: 10   Verbal Fluency: 10     Receptive Subscale: 40/50   Expressive Subscale: 45/50      Total Score: 85/100    Results of the test indicate relative strengths in the areas of automatic speech, repetition, writing, object recognition, yes/no accuracy. Relative weaknesses were noted in the areas of verbal fluency and complex 2-step direction-following.  Errors in direction-following are suspected to be partially d/t pt impulsivity and failure to listen to the full direction prior to acting. Pt was also noted to produce paraphasias. Pt benefited from verbal cues. In conversation, pt is noted to exhibit reduced fluency and rarely produces complete sentences. Recommend continued therapy to address speech/language deficits as described above. Will benefit from continued therapy at discharge with either home health care or outpatient SLP. Results/recommendations d/w pt. RECENT RESULTS  CT OF HEAD/MRI:  Impression   Small areas of acute infarct involving the left frontal lobe from an acute   left middle cerebral artery territory infarct.  Subacute areas of infarct   involving the left occipital lobe.  An embolic source should be considered.       Cerebral atrophy.  Severe chronic small vessel ischemic changes.  Remote left   occipital lobe infarct.  Remote lacunar infarcts in the basal ganglia   bilaterally and right thalamus. Primary Complaint: speech difficulty    Pain:  Appears comfortable, does not rate    Assessment:  Aphasia Diagnosis: Pt presents with mild-moderate expressive and receptive aphasia. Repetition intact. Speech Diagnosis: Pt presents with mild apraxia characterized by occasional/inconsistent sound distortions and imprecise articulation. No evidence of dysarthria. Recommendations:  Requires SLP Intervention: Yes  Duration/Frequency of Treatment: 1-2x/week for LOS  D/C Recommendations: Home ST;Outpatient; To be determined       Plan:   Goals:  Short-term Goals  Timeframe for Short-term Goals: length of admission  Goal 1: Pt will produce complete sentences 8/10 trials given min cues. Goal 2: Pt will accurately complete convergent naming task 9/10 trials given 3 descriptors. Goal 3: Pt will accurately follow complex 2-step directions 9/10 trials given min cues.    Patient/family involved in developing goals and treatment plan: yes    Subjective:   Previous level of function and limitations: independent  General  Chart Reviewed: Yes     Vision  Vision: Impaired  Vision Exceptions:  (baseline deficts per charting)  Hearing  Hearing: Within functional limits           Objective:     Oral/Motor  Oral Motor: Exceptions to [facility]    Auditory Comprehension  Comprehension: Exceptions         Expression  Primary Mode of Expression: Verbal    Verbal Expression  Verbal Expression: Within functional limits    Written Expression  Dominant Hand: Right  Written Expression: Within Functional Limits    Motor Speech  Motor Speech: Exceptions to [facility]  Apraxia: Mild         Cognition:   DNT       Additional Assessments:  N/A          Prognosis:  Speech Therapy Prognosis  Prognosis: Good  Individuals consulted  Consulted and agree with results and recommendations: Patient    Education:  Patient Education: results, recommendations  Patient Education Response: Demonstrated understanding;Needs reinforcement  Safety Devices in place: Yes  Type of devices:  All fall risk precautions in place    Therapy Time:   Individual Concurrent Group Co-treatment   Time In 1220         Time Out 1300         Minutes [address] [place] [address] CCC-SLP  12/23/2021 1:54 PM

## 2021-12-23 NOTE — PROGRESS NOTES
Not on file     Social Determinants of Health     Financial Resource Strain:     Difficulty of Paying Living Expenses: Not on file   Food Insecurity:     Worried About Running Out of Food in the Last Year: Not on file    Patricia of Food in the Last Year: Not on file   Transportation Needs:     Lack of Transportation (Medical): Not on file    Lack of Transportation (Non-Medical): Not on file   Physical Activity:     Days of Exercise per Week: Not on file    Minutes of Exercise per Session: Not on file   Stress:     Feeling of Stress : Not on file   Social Connections:     Frequency of Communication with Friends and Family: Not on file    Frequency of Social Gatherings with Friends and Family: Not on file    Attends Baptism Services: Not on file    Active Member of [address] [place] [address] [employer] [place] or Organizations: Not on file    Attends Club or Organization Meetings: Not on file    Marital Status: Not on file   Intimate Partner Violence:     Fear of Current or Ex-Partner: Not on file    Emotionally Abused: Not on file    Physically Abused: Not on file    Sexually Abused: Not on file   Housing Stability:     Unable to Pay for Housing in the Last Year: Not on file    Number of [place] in the Last Year: Not on file    Unstable Housing in the Last Year: Not on file      No family history on file.       Physical Exam:       Vitals:    12/22/21 1930 12/23/21 0428 12/23/21 0834 12/23/21 0838   BP: (!) 150/74 (!) 156/81 (!) 162/95 (!) 138/123   Pulse: 75 104  96   Resp: 20 25  26   Temp: 97.6 °F (36.4 °C)   97.7 °F (36.5 °C)   TempSrc: Oral      SpO2: 96% 91%  94%   Weight:       Height:           Wt Readings from Last 3 Encounters:   12/22/21 151 lb 0.2 oz (68.5 kg)   10/25/21 210 lb (95.3 kg)   07/03/21 158 lb (71.7 kg)     Temp Readings from Last 3 Encounters:   12/23/21 97.7 °F (36.5 °C)   10/25/21 98.2 °F (36.8 °C) (Oral)   07/03/21 97.8 °F (36.6 °C) (Oral)     BP Readings from Last 3 Encounters:   12/23/21 (!) 138/123 10/25/21 (!) 179/72   07/03/21 (!) 215/103     Pulse Readings from Last 3 Encounters:   12/23/21 96   10/25/21 83   07/03/21 98        Gen: A&O x 4, NAD, cooperative, and pulseless  HEENT: NC/AT, EOMI, PERRL, mmm, neck supple, no meningeal signs; Heart: SR per EKG  Lungs: Respirations Unlabored  Ext: no edema, no calf tenderness b/l  Psych: Impulsive  Skin: no rashes or lesions    NEUROLOGIC EXAM:    Mental Status: A&O to self, location, month and year, NAD, slurred speech, dysarthric language, repetition and naming intact, follows commands appropriately    Cranial Nerve Exam:   CN II-XII: PERRL, VFF, no nystagmus, no gaze paresis, sensation V1-V3 intact b/l, muscles of facial expression symmetric; hearing intact to conversational tone, palate elevates symmetrically, shoulder elevation symmetric and tongue protrudes midline with movement side to side.     Motor Exam:       Strength 5/5 UE's/LE's b/l  Tone and bulk normal   No pronator drift    Deep Tendon Reflexes: 2/4 biceps, triceps, brachioradialis, patellar, and achilles b/l; flexor plantar responses b/l    Sensation: Intact light touch/vibration UE's/LE's b/l    Coordination/Cerebellum:       Tremors--none      Rapidly alternating movements: no dysdiadochokinesia b/l                Heel-to-Shin: no dysmetria b/l      Finger-to-Nose: no dysmetria b/l    Gait and stance:      Gait: deferred      LABS:        CBC:   Recent Labs     12/23/21  0622   WBC 7.8   RBC 4.97   HGB 14.4   HCT 46.5   *   MCV 93.6     BMP:    Recent Labs     12/23/21  0622      K 4.4      CO2 29   BUN 15   CREATININE 0.8   GLUCOSE 115*   CALCIUM 9.8       IMAGING:    CTH   Impression   Area of hypoattenuation involving the left frontal lobe which may represent   an acute to subacute area of infarct.  MRI could further characterize.       Previous areas of injury or infarct in the basal ganglia bilaterally and left   occipital lobe.       RECOMMENDATIONS:   Unavailable CTA head / neck   No large vessel intracranial occlusion.  Short segment severe stenosis of the   P2 segment of the left posterior cerebral artery.  Mild areas of stenosis in   the left internal carotid cavernous segment.  No other abnormality of the   intracranial circulation.       Severe short segment stenosis of the distal V4 segment of the left vertebral   artery.       Severe stenosis of the left internal carotid artery at the junction of the   endarterectomy graft and the left internal carotid artery measuring greater   than 80% using NASCET criteria.       Approximately 65% stenosis of the right internal carotid artery in the region   of the bulb.       Stenosis at the origin of the right vertebral artery. Above imaging reviewed in detail. ASSESSMENT/PLAN:   40-year-old female patient presenting with past medical history as stated above to [facility] for dysphasia. Neurology on board to rule out acute stroke. CTA head/neck  is nonacute for LVO however shows multiple areas of arterial stenosis. Patient getting MRI this a.m. per nursing staff. 1. Expressive dysphasia likely due to acute/subacute left frontal lobe infarct secondary to Left ICA stenosis. 2. 65% stenosis of the right ICA,  severe stenosis of the left ICA at the junction of the endarterectomy graft at 80%: Stenosis to the right and left vertebral artery  -Vascular team consulted    3. Hallucinations likely secondary to dementia  -No reported hallucinations per patient and nursing staff through the night    4.  Remote injury or infarct bilateral basal ganglia and left occipital lobe    -Medications: aspirin, statin for secondary stroke prevention  -Rehab: PT/OT/ST recommendations  -Stroke risk factors: Age pre-existing intracranial infarct, CAD, ICA stenosis    Neurodiagnostics    -CT head as above  -CTA head/neck as above  -MRI brain ordered    Patient discussed with attending physician Dr. Manan Dudley    Thank you for allowing us to participate in the care of your patient. If there are any questions regarding evaluation please feel free to contact us.      Christiane Garcia, ROSA - CNP, 12/23/2021    ------------------------------------

## 2021-12-26 LAB
CULTURE: NORMAL
CULTURE: NORMAL
Lab: NORMAL
Lab: NORMAL
SPECIMEN: NORMAL
SPECIMEN: NORMAL

## 2022-01-02 ENCOUNTER — HOSPITAL ENCOUNTER (INPATIENT)
Age: 72
LOS: 8 days | Discharge: HOME HEALTH | DRG: 064 | End: 2022-01-11
Attending: EMERGENCY MEDICINE | Admitting: STUDENT IN AN ORGANIZED HEALTH CARE EDUCATION/TRAINING PROGRAM
Payer: MEDICARE

## 2022-01-02 ENCOUNTER — APPOINTMENT (OUTPATIENT)
Dept: CT IMAGING | Age: 72
DRG: 064 | End: 2022-01-02
Payer: MEDICARE

## 2022-01-02 ENCOUNTER — APPOINTMENT (OUTPATIENT)
Dept: GENERAL RADIOLOGY | Age: 72
DRG: 064 | End: 2022-01-02
Payer: MEDICARE

## 2022-01-02 DIAGNOSIS — R82.71 BACTERIURIA: ICD-10-CM

## 2022-01-02 DIAGNOSIS — N17.9 ACUTE KIDNEY INJURY (HCC): Primary | ICD-10-CM

## 2022-01-02 DIAGNOSIS — R53.83 OTHER FATIGUE: ICD-10-CM

## 2022-01-02 DIAGNOSIS — U07.1 COVID-19: ICD-10-CM

## 2022-01-02 DIAGNOSIS — G93.40 ENCEPHALOPATHY: ICD-10-CM

## 2022-01-02 PROCEDURE — 80076 HEPATIC FUNCTION PANEL: CPT

## 2022-01-02 PROCEDURE — 93005 ELECTROCARDIOGRAM TRACING: CPT | Performed by: EMERGENCY MEDICINE

## 2022-01-02 PROCEDURE — 99285 EMERGENCY DEPT VISIT HI MDM: CPT

## 2022-01-02 PROCEDURE — 96360 HYDRATION IV INFUSION INIT: CPT

## 2022-01-02 PROCEDURE — 71045 X-RAY EXAM CHEST 1 VIEW: CPT

## 2022-01-02 PROCEDURE — G0480 DRUG TEST DEF 1-7 CLASSES: HCPCS

## 2022-01-02 PROCEDURE — 87635 SARS-COV-2 COVID-19 AMP PRB: CPT

## 2022-01-02 PROCEDURE — 70450 CT HEAD/BRAIN W/O DYE: CPT

## 2022-01-03 ENCOUNTER — APPOINTMENT (OUTPATIENT)
Dept: CT IMAGING | Age: 72
DRG: 064 | End: 2022-01-03
Payer: MEDICARE

## 2022-01-03 PROBLEM — U07.1 ACUTE KIDNEY INJURY DUE TO COVID-19 (HCC): Status: ACTIVE | Noted: 2022-01-03

## 2022-01-03 PROBLEM — N17.9 ACUTE KIDNEY INJURY DUE TO COVID-19 (HCC): Status: ACTIVE | Noted: 2022-01-03

## 2022-01-03 LAB
ACETAMINOPHEN LEVEL: <5 UG/ML (ref 15–30)
ALBUMIN SERPL-MCNC: 3.5 GM/DL (ref 3.4–5)
ALBUMIN SERPL-MCNC: 4.3 GM/DL (ref 3.4–5)
ALCOHOL SCREEN SERUM: <0.01 %WT/VOL
ALP BLD-CCNC: 98 IU/L (ref 40–129)
ALT SERPL-CCNC: 7 U/L (ref 10–40)
AMORPHOUS: ABNORMAL /HPF
ANION GAP SERPL CALCULATED.3IONS-SCNC: 19 MMOL/L (ref 4–16)
ANION GAP SERPL CALCULATED.3IONS-SCNC: 21 MMOL/L (ref 4–16)
APTT: 21.3 SECONDS (ref 25.1–37.1)
AST SERPL-CCNC: 21 IU/L (ref 15–37)
BACTERIA: ABNORMAL /HPF
BASOPHILS ABSOLUTE: 0 K/CU MM
BASOPHILS RELATIVE PERCENT: 0.1 % (ref 0–1)
BILIRUB SERPL-MCNC: 0.2 MG/DL (ref 0–1)
BILIRUBIN DIRECT: 0.2 MG/DL (ref 0–0.3)
BILIRUBIN URINE: ABNORMAL MG/DL
BILIRUBIN, INDIRECT: 0 MG/DL (ref 0–0.7)
BLOOD, URINE: ABNORMAL
BUN BLDV-MCNC: 48 MG/DL (ref 6–23)
BUN BLDV-MCNC: 50 MG/DL (ref 6–23)
C-REACTIVE PROTEIN, HIGH SENSITIVITY: 12.4 MG/L
CALCIUM SERPL-MCNC: 9.1 MG/DL (ref 8.3–10.6)
CALCIUM SERPL-MCNC: 9.4 MG/DL (ref 8.3–10.6)
CHLORIDE BLD-SCNC: 102 MMOL/L (ref 99–110)
CHLORIDE BLD-SCNC: 103 MMOL/L (ref 99–110)
CLARITY: ABNORMAL
CO2: 18 MMOL/L (ref 21–32)
CO2: 19 MMOL/L (ref 21–32)
COLOR: ABNORMAL
CREAT SERPL-MCNC: 4.7 MG/DL (ref 0.6–1.1)
CREAT SERPL-MCNC: 5.3 MG/DL (ref 0.6–1.1)
CREATININE URINE: 378.6 MG/DL (ref 28–217)
CREATININE URINE: 379 MG/DL (ref 28–217)
D DIMER: 462 NG/ML(DDU)
DIFFERENTIAL TYPE: ABNORMAL
DOSE AMOUNT: ABNORMAL
DOSE AMOUNT: ABNORMAL
DOSE TIME: ABNORMAL
DOSE TIME: ABNORMAL
EKG ATRIAL RATE: 74 BPM
EKG DIAGNOSIS: NORMAL
EKG P AXIS: 75 DEGREES
EKG P-R INTERVAL: 146 MS
EKG Q-T INTERVAL: 410 MS
EKG QRS DURATION: 84 MS
EKG QTC CALCULATION (BAZETT): 455 MS
EKG R AXIS: -51 DEGREES
EKG T AXIS: 71 DEGREES
EKG VENTRICULAR RATE: 74 BPM
EOSINOPHILS ABSOLUTE: 0 K/CU MM
EOSINOPHILS RELATIVE PERCENT: 0 % (ref 0–3)
FERRITIN: 339 NG/ML (ref 15–150)
FIBRINOGEN LEVEL: 431 MG/DL (ref 196.9–442.1)
GFR AFRICAN AMERICAN: 10 ML/MIN/1.73M2
GFR AFRICAN AMERICAN: 11 ML/MIN/1.73M2
GFR NON-AFRICAN AMERICAN: 8 ML/MIN/1.73M2
GFR NON-AFRICAN AMERICAN: 9 ML/MIN/1.73M2
GLUCOSE BLD-MCNC: 110 MG/DL (ref 70–99)
GLUCOSE BLD-MCNC: 132 MG/DL (ref 70–99)
GLUCOSE, URINE: NEGATIVE MG/DL
HCT VFR BLD CALC: 43.4 % (ref 37–47)
HCT VFR BLD CALC: 46.9 % (ref 37–47)
HEMOGLOBIN: 13.8 GM/DL (ref 12.5–16)
HEMOGLOBIN: 15 GM/DL (ref 12.5–16)
HYALINE CASTS: >20 /LPF
ICTOTEST: POSITIVE
IMMATURE NEUTROPHIL %: 0.5 % (ref 0–0.43)
INR BLD: 0.79 INDEX
KETONES, URINE: ABNORMAL MG/DL
LEUKOCYTE ESTERASE, URINE: NEGATIVE
LIPASE: 32 IU/L (ref 13–60)
LYMPHOCYTES ABSOLUTE: 0.8 K/CU MM
LYMPHOCYTES RELATIVE PERCENT: 8.6 % (ref 24–44)
MCH RBC QN AUTO: 28.6 PG (ref 27–31)
MCH RBC QN AUTO: 28.8 PG (ref 27–31)
MCHC RBC AUTO-ENTMCNC: 31.8 % (ref 32–36)
MCHC RBC AUTO-ENTMCNC: 32 % (ref 32–36)
MCV RBC AUTO: 89.9 FL (ref 78–100)
MCV RBC AUTO: 90 FL (ref 78–100)
MONOCYTES ABSOLUTE: 0.7 K/CU MM
MONOCYTES RELATIVE PERCENT: 7.2 % (ref 0–4)
MUCUS: ABNORMAL HPF
NITRITE URINE, QUANTITATIVE: NEGATIVE
NUCLEATED RBC %: 0 %
PDW BLD-RTO: 14.4 % (ref 11.7–14.9)
PDW BLD-RTO: 14.5 % (ref 11.7–14.9)
PH, URINE: 5 (ref 5–8)
PHOSPHORUS: 6.3 MG/DL (ref 2.5–4.9)
PLATELET # BLD: 265 K/CU MM (ref 140–440)
PLATELET # BLD: 289 K/CU MM (ref 140–440)
PMV BLD AUTO: 10.6 FL (ref 7.5–11.1)
PMV BLD AUTO: 11 FL (ref 7.5–11.1)
POTASSIUM SERPL-SCNC: 3.2 MMOL/L (ref 3.5–5.1)
POTASSIUM SERPL-SCNC: 3.5 MMOL/L (ref 3.5–5.1)
PROCALCITONIN: 0.24
PROT/CREAT RATIO, UR: 0.3
PROTEIN UA: 30 MG/DL
PROTHROMBIN TIME: 10.2 SECONDS (ref 11.7–14.5)
RBC # BLD: 4.83 M/CU MM (ref 4.2–5.4)
RBC # BLD: 5.21 M/CU MM (ref 4.2–5.4)
RBC URINE: 4 /HPF (ref 0–6)
SALICYLATE LEVEL: <0.3 MG/DL (ref 15–30)
SARS-COV-2, NAAT: DETECTED
SEGMENTED NEUTROPHILS ABSOLUTE COUNT: 8 K/CU MM
SEGMENTED NEUTROPHILS RELATIVE PERCENT: 83.6 % (ref 36–66)
SODIUM BLD-SCNC: 140 MMOL/L (ref 135–145)
SODIUM BLD-SCNC: 142 MMOL/L (ref 135–145)
SODIUM URINE: 32 MMOL/L (ref 35–167)
SOURCE: ABNORMAL
SPECIFIC GRAVITY UA: 1.02 (ref 1–1.03)
SQUAMOUS EPITHELIAL: 6 /HPF
TOTAL IMMATURE NEUTOROPHIL: 0.05 K/CU MM
TOTAL NUCLEATED RBC: 0 K/CU MM
TOTAL PROTEIN: 7.6 GM/DL (ref 6.4–8.2)
TRICHOMONAS: ABNORMAL /HPF
TROPONIN T: 0.01 NG/ML
TROPONIN T: 0.02 NG/ML
TROPONIN T: <0.01 NG/ML
URINE TOTAL PROTEIN: 113.6 MG/DL
UROBILINOGEN, URINE: NEGATIVE MG/DL (ref 0.2–1)
WBC # BLD: 7.7 K/CU MM (ref 4–10.5)
WBC # BLD: 9.5 K/CU MM (ref 4–10.5)
WBC UA: 19 /HPF (ref 0–5)

## 2022-01-03 PROCEDURE — 1200000000 HC SEMI PRIVATE

## 2022-01-03 PROCEDURE — 82248 BILIRUBIN DIRECT: CPT

## 2022-01-03 PROCEDURE — 86141 C-REACTIVE PROTEIN HS: CPT

## 2022-01-03 PROCEDURE — 82570 ASSAY OF URINE CREATININE: CPT

## 2022-01-03 PROCEDURE — 6360000002 HC RX W HCPCS: Performed by: STUDENT IN AN ORGANIZED HEALTH CARE EDUCATION/TRAINING PROGRAM

## 2022-01-03 PROCEDURE — 82728 ASSAY OF FERRITIN: CPT

## 2022-01-03 PROCEDURE — 80053 COMPREHEN METABOLIC PANEL: CPT

## 2022-01-03 PROCEDURE — 92610 EVALUATE SWALLOWING FUNCTION: CPT

## 2022-01-03 PROCEDURE — 87086 URINE CULTURE/COLONY COUNT: CPT

## 2022-01-03 PROCEDURE — 81001 URINALYSIS AUTO W/SCOPE: CPT

## 2022-01-03 PROCEDURE — G0480 DRUG TEST DEF 1-7 CLASSES: HCPCS

## 2022-01-03 PROCEDURE — 85730 THROMBOPLASTIN TIME PARTIAL: CPT

## 2022-01-03 PROCEDURE — 87899 AGENT NOS ASSAY W/OPTIC: CPT

## 2022-01-03 PROCEDURE — 84484 ASSAY OF TROPONIN QUANT: CPT

## 2022-01-03 PROCEDURE — 6370000000 HC RX 637 (ALT 250 FOR IP): Performed by: STUDENT IN AN ORGANIZED HEALTH CARE EDUCATION/TRAINING PROGRAM

## 2022-01-03 PROCEDURE — 99223 1ST HOSP IP/OBS HIGH 75: CPT | Performed by: PSYCHIATRY & NEUROLOGY

## 2022-01-03 PROCEDURE — 51702 INSERT TEMP BLADDER CATH: CPT

## 2022-01-03 PROCEDURE — 85610 PROTHROMBIN TIME: CPT

## 2022-01-03 PROCEDURE — 80069 RENAL FUNCTION PANEL: CPT

## 2022-01-03 PROCEDURE — 93010 ELECTROCARDIOGRAM REPORT: CPT | Performed by: INTERNAL MEDICINE

## 2022-01-03 PROCEDURE — 84145 PROCALCITONIN (PCT): CPT

## 2022-01-03 PROCEDURE — 2500000003 HC RX 250 WO HCPCS: Performed by: STUDENT IN AN ORGANIZED HEALTH CARE EDUCATION/TRAINING PROGRAM

## 2022-01-03 PROCEDURE — 84300 ASSAY OF URINE SODIUM: CPT

## 2022-01-03 PROCEDURE — 85027 COMPLETE CBC AUTOMATED: CPT

## 2022-01-03 PROCEDURE — 87449 NOS EACH ORGANISM AG IA: CPT

## 2022-01-03 PROCEDURE — 86140 C-REACTIVE PROTEIN: CPT

## 2022-01-03 PROCEDURE — 85025 COMPLETE CBC W/AUTO DIFF WBC: CPT

## 2022-01-03 PROCEDURE — 6360000002 HC RX W HCPCS: Performed by: EMERGENCY MEDICINE

## 2022-01-03 PROCEDURE — 36415 COLL VENOUS BLD VENIPUNCTURE: CPT

## 2022-01-03 PROCEDURE — 74176 CT ABD & PELVIS W/O CONTRAST: CPT

## 2022-01-03 PROCEDURE — 84156 ASSAY OF PROTEIN URINE: CPT

## 2022-01-03 PROCEDURE — 2580000003 HC RX 258: Performed by: EMERGENCY MEDICINE

## 2022-01-03 PROCEDURE — 85384 FIBRINOGEN ACTIVITY: CPT

## 2022-01-03 PROCEDURE — 85379 FIBRIN DEGRADATION QUANT: CPT

## 2022-01-03 PROCEDURE — 2580000003 HC RX 258: Performed by: STUDENT IN AN ORGANIZED HEALTH CARE EDUCATION/TRAINING PROGRAM

## 2022-01-03 PROCEDURE — 83690 ASSAY OF LIPASE: CPT

## 2022-01-03 PROCEDURE — 6370000000 HC RX 637 (ALT 250 FOR IP): Performed by: PSYCHIATRY & NEUROLOGY

## 2022-01-03 RX ORDER — POLYETHYLENE GLYCOL 3350 17 G/17G
17 POWDER, FOR SOLUTION ORAL DAILY PRN
Status: DISCONTINUED | OUTPATIENT
Start: 2022-01-03 | End: 2022-01-11 | Stop reason: HOSPADM

## 2022-01-03 RX ORDER — ACETAMINOPHEN 325 MG/1
650 TABLET ORAL EVERY 6 HOURS PRN
Status: DISCONTINUED | OUTPATIENT
Start: 2022-01-03 | End: 2022-01-11 | Stop reason: HOSPADM

## 2022-01-03 RX ORDER — SODIUM CHLORIDE, SODIUM LACTATE, POTASSIUM CHLORIDE, CALCIUM CHLORIDE 600; 310; 30; 20 MG/100ML; MG/100ML; MG/100ML; MG/100ML
INJECTION, SOLUTION INTRAVENOUS CONTINUOUS
Status: DISCONTINUED | OUTPATIENT
Start: 2022-01-03 | End: 2022-01-03

## 2022-01-03 RX ORDER — CLOPIDOGREL BISULFATE 75 MG/1
75 TABLET ORAL DAILY
Status: DISCONTINUED | OUTPATIENT
Start: 2022-01-03 | End: 2022-01-11 | Stop reason: HOSPADM

## 2022-01-03 RX ORDER — DONEPEZIL HYDROCHLORIDE 5 MG/1
10 TABLET, FILM COATED ORAL NIGHTLY
COMMUNITY
Start: 2021-12-29 | End: 2022-07-13

## 2022-01-03 RX ORDER — ASPIRIN 300 MG/1
300 SUPPOSITORY RECTAL DAILY
Status: DISCONTINUED | OUTPATIENT
Start: 2022-01-03 | End: 2022-01-07

## 2022-01-03 RX ORDER — LABETALOL HYDROCHLORIDE 5 MG/ML
10 INJECTION, SOLUTION INTRAVENOUS EVERY 10 MIN PRN
Status: DISCONTINUED | OUTPATIENT
Start: 2022-01-03 | End: 2022-01-05

## 2022-01-03 RX ORDER — ACETAMINOPHEN 650 MG/1
650 SUPPOSITORY RECTAL EVERY 6 HOURS PRN
Status: DISCONTINUED | OUTPATIENT
Start: 2022-01-03 | End: 2022-01-11 | Stop reason: HOSPADM

## 2022-01-03 RX ORDER — DONEPEZIL HYDROCHLORIDE 5 MG/1
5 TABLET, FILM COATED ORAL NIGHTLY
Status: DISCONTINUED | OUTPATIENT
Start: 2022-01-03 | End: 2022-01-11 | Stop reason: HOSPADM

## 2022-01-03 RX ORDER — AMLODIPINE BESYLATE 5 MG/1
1 TABLET ORAL DAILY
Status: ON HOLD | COMMUNITY
Start: 2021-12-02 | End: 2022-01-28 | Stop reason: SDUPTHER

## 2022-01-03 RX ORDER — ZINC OXIDE
OINTMENT (GRAM) TOPICAL 2 TIMES DAILY PRN
Status: DISCONTINUED | OUTPATIENT
Start: 2022-01-03 | End: 2022-01-11 | Stop reason: HOSPADM

## 2022-01-03 RX ORDER — ASPIRIN 81 MG/1
81 TABLET ORAL DAILY
Status: DISCONTINUED | OUTPATIENT
Start: 2022-01-03 | End: 2022-01-07

## 2022-01-03 RX ORDER — LISINOPRIL 20 MG/1
1 TABLET ORAL DAILY
Status: ON HOLD | COMMUNITY
Start: 2021-12-27 | End: 2022-01-28 | Stop reason: HOSPADM

## 2022-01-03 RX ORDER — ROSUVASTATIN CALCIUM 40 MG/1
1 TABLET, COATED ORAL DAILY
COMMUNITY
Start: 2021-12-02 | End: 2022-07-13

## 2022-01-03 RX ORDER — HEPARIN SODIUM 5000 [USP'U]/ML
7500 INJECTION, SOLUTION INTRAVENOUS; SUBCUTANEOUS EVERY 8 HOURS SCHEDULED
Status: DISCONTINUED | OUTPATIENT
Start: 2022-01-03 | End: 2022-01-07

## 2022-01-03 RX ORDER — ROSUVASTATIN CALCIUM 40 MG/1
40 TABLET, COATED ORAL DAILY
Status: DISCONTINUED | OUTPATIENT
Start: 2022-01-03 | End: 2022-01-11 | Stop reason: HOSPADM

## 2022-01-03 RX ORDER — PROCHLORPERAZINE EDISYLATE 5 MG/ML
10 INJECTION INTRAMUSCULAR; INTRAVENOUS EVERY 6 HOURS PRN
Status: DISCONTINUED | OUTPATIENT
Start: 2022-01-03 | End: 2022-01-11 | Stop reason: HOSPADM

## 2022-01-03 RX ORDER — SODIUM CHLORIDE, SODIUM LACTATE, POTASSIUM CHLORIDE, CALCIUM CHLORIDE 600; 310; 30; 20 MG/100ML; MG/100ML; MG/100ML; MG/100ML
INJECTION, SOLUTION INTRAVENOUS ONCE
Status: COMPLETED | OUTPATIENT
Start: 2022-01-03 | End: 2022-01-03

## 2022-01-03 RX ADMIN — HEPARIN SODIUM 7500 UNITS: 5000 INJECTION INTRAVENOUS; SUBCUTANEOUS at 22:05

## 2022-01-03 RX ADMIN — SODIUM BICARBONATE: 84 INJECTION, SOLUTION INTRAVENOUS at 05:45

## 2022-01-03 RX ADMIN — CEFTRIAXONE SODIUM 1000 MG: 1 INJECTION, POWDER, FOR SOLUTION INTRAMUSCULAR; INTRAVENOUS at 07:10

## 2022-01-03 RX ADMIN — DONEPEZIL HYDROCHLORIDE 5 MG: 5 TABLET, FILM COATED ORAL at 22:05

## 2022-01-03 RX ADMIN — ASPIRIN 81 MG: 81 TABLET, COATED ORAL at 10:43

## 2022-01-03 RX ADMIN — CLOPIDOGREL BISULFATE 75 MG: 75 TABLET ORAL at 18:30

## 2022-01-03 RX ADMIN — SODIUM CHLORIDE, POTASSIUM CHLORIDE, SODIUM LACTATE AND CALCIUM CHLORIDE: 600; 310; 30; 20 INJECTION, SOLUTION INTRAVENOUS at 04:09

## 2022-01-03 RX ADMIN — HEPARIN SODIUM 7500 UNITS: 5000 INJECTION INTRAVENOUS; SUBCUTANEOUS at 07:12

## 2022-01-03 RX ADMIN — ROSUVASTATIN 40 MG: 40 TABLET, FILM COATED ORAL at 10:43

## 2022-01-03 RX ADMIN — HEPARIN SODIUM 7500 UNITS: 5000 INJECTION INTRAVENOUS; SUBCUTANEOUS at 18:29

## 2022-01-03 NOTE — ED NOTES
Report given to [facility].         Chiara Cox RN  01/03/22 5413

## 2022-01-03 NOTE — ED NOTES
Report called to [facility] on [address] at this time. [address]  Corinne Shepard RN  01/03/22 1755

## 2022-01-03 NOTE — ED PROVIDER NOTES
CHIEF COMPLAINT    Chief Complaint   Patient presents with    Aphasia     worsening over the last week    Fatigue     pt unable to walk to cot for EMS, patient can normally walk     HPI  Nigel Dominguez is a 66 y.o. female with history of coronary artery disease, CVA, dementia who presents to the ED via EMS accompanied by daughter with reports of weakness and difficulty with speech. Patient was just discharged on 12/23/2021 following hospitalization for expressive aphasia and was found to have left frontal lobe acute infarct and subacute left occipital lobe infarct. Patient was ultimately discharged on aspirin. Her daughter states that over the last week she has had worsening weakness. Normally she uses a cane to walk at home but has been having extreme difficulty using even a walker at home. She has demonstrated very poor appetite as well and daughter states she continues to have worsening speech deficits. Symptoms are constant and daughter rates them as severe. No other recent illnesses per daughter. Denies fevers, chills, chest pain, shortness of breath, vomiting, or diarrhea      REVIEW OF SYSTEMS  Constitutional: No fever, chills or recent illness. Eye: No visual changes  HENT: No earache or sore throat. Resp: No SOB or productive cough. Cardio: No chest pain or palpitations. GI: No abdominal pain, nausea, vomiting, constipation or diarrhea. No melena. : No dysuria, urgency or frequency. Endocrine: No heat intolerance, no cold intolerance, no polydipsia   Lymphatics: No adenopathy  Musculoskeletal: No new muscle aches or joint pain. Neuro: No headaches. Psych: No homicidal or suicidal thoughts  Skin: No rash, No itching. ?  ?   PAST MEDICAL HISTORY  Past Medical History:   Diagnosis Date    CAD (coronary artery disease)     Hypertension      FAMILY HISTORY  Family History   Family history unknown: Yes     SOCIAL HISTORY  Social History     Socioeconomic History    Marital status:  Spouse name: None    Number of children: None    Years of education: None    Highest education level: None   Occupational History    None   Tobacco Use    Smoking status: Current Every Day Smoker     Packs/day: 1.50     Types: Cigarettes    Smokeless tobacco: Never Used   Substance and Sexual Activity    Alcohol use: No    Drug use: No    Sexual activity: None   Other Topics Concern    None   Social History Narrative    None     Social Determinants of Health     Financial Resource Strain:     Difficulty of Paying Living Expenses: Not on file   Food Insecurity:     Worried About Running Out of Food in the Last Year: Not on file    Patricia of Food in the Last Year: Not on file   Transportation Needs:     Lack of Transportation (Medical): Not on file    Lack of Transportation (Non-Medical):  Not on file   Physical Activity:     Days of Exercise per Week: Not on file    Minutes of Exercise per Session: Not on file   Stress:     Feeling of Stress : Not on file   Social Connections:     Frequency of Communication with Friends and Family: Not on file    Frequency of Social Gatherings with Friends and Family: Not on file    Attends Congregation Services: Not on file    Active Member of [address] [place] [id number] or Organizations: Not on file    Attends Club or Organization Meetings: Not on file    Marital Status: Not on file   Intimate Partner Violence:     Fear of Current or Ex-Partner: Not on file    Emotionally Abused: Not on file    Physically Abused: Not on file    Sexually Abused: Not on file   Housing Stability:     Unable to Pay for Housing in the Last Year: Not on file    Number of [place] in the Last Year: Not on file    Unstable Housing in the Last Year: Not on file       SURGICAL HISTORY  Past Surgical History:   Procedure Laterality Date    CAROTID ENDARTERECTOMY      CHOLECYSTECTOMY      [address]  Previous Medications AMLODIPINE (NORVASC) 5 MG TABLET    Take 1 tablet by mouth daily    ASPIRIN BUF,[id number], (BUFFERED ASPIRIN) 325 MG TABS    Take 1 tablet by mouth daily    DONEPEZIL (ARICEPT) 5 MG TABLET    Take 1 tablet by mouth nightly    LISINOPRIL (PRINIVIL;ZESTRIL) 20 MG TABLET    Take 1 tablet by mouth daily    MELOXICAM (MOBIC) 7.5 MG TABLET    Take 2 tablets by mouth daily for 10 days    ROSUVASTATIN (CRESTOR) 40 MG TABLET    Take 1 tablet by mouth daily     ALLERGIES  Allergies   Allergen Reactions    Penicillins     Sulfa Antibiotics Swelling       Nursing notes reviewed by myself for past medical history, family history, social history, surgical history, current medications, and allergies. PHYSICAL EXAM  VITAL SIGNS: Triage VS:    ED Triage Vitals   Enc Vitals Group      BP       Pulse       Resp       Temp       Temp src       SpO2       Weight       Height       Head Circumference       Peak Flow       Pain Score       Pain Loc       Pain Edu? Excl. in [address]? Constitutional: Well developed, Well nourished, nontoxic appearing  HENT: Normocephalic, Atraumatic, Bilateral external ears normal, dry mucous membranes, No oral exudates, Nose normal.   Eyes: PERRL, EOMI, Conjunctiva normal, No discharge. No scleral icterus. Neck: Normal range of motion, No tenderness, Supple. Lymphatic: No lymphadenopathy noted. Cardiovascular: Normal heart rate, Normal rhythm, No murmurs, gallops or rubs. Thorax & Lungs: Subtle scattered rhonchi bilaterally. No retractions. Abdomen: Soft, No tenderness, No masses, No pulsatile masses, No distention, Normal bowel sounds  Skin: Warm, Dry, Pink, No mottling, No erythema, No rash. Back: No tenderness, No CVA tenderness. Extremities: No edema, No tenderness, No cyanosis, Normal perfusion, No clubbing. Musculoskeletal: Good range of motion in all major joints as observed. No major deformities noted.    Neurologic: Alert & oriented to person only, expressive aphasia noted, normal motor function, Normal sensory function, CN II-XII grossly intact as tested  Psychiatric: Confused, cooperative  EKG  Per my interpretation demonstrates normal sinus rhythm at a rate of 74 bpm.  Normal axis. Normal intervals. No acute ST segment changes.   RADIOLOGY  Labs Reviewed   COVID-19, RAPID - Abnormal; Notable for the following components:       Result Value    SARS-CoV-2, NAAT DETECTED (*)     All other components within normal limits   URINALYSIS WITH MICROSCOPIC - Abnormal; Notable for the following components:    Color, UA STRAW (*)     Clarity, UA HAZY (*)     Bilirubin Urine SMALL (*)     Ketones, Urine SMALL (*)     Blood, Urine SMALL (*)     Protein, UA 30 (*)     WBC, UA 19 (*)     Bacteria, UA OCCASIONAL (*)     Mucus, UA RARE (*)     All other components within normal limits   ACETAMINOPHEN LEVEL - Abnormal; Notable for the following components:    Acetaminophen Level <5.0 (*)     All other components within normal limits   CBC WITH AUTO DIFFERENTIAL - Abnormal; Notable for the following components:    Segs Relative 83.6 (*)     Lymphocytes % 8.6 (*)     Monocytes % 7.2 (*)     Immature Neutrophil % 0.5 (*)     All other components within normal limits   BASIC METABOLIC PANEL - Abnormal; Notable for the following components:    CO2 18 (*)     Anion Gap 21 (*)     BUN 48 (*)     CREATININE 5.3 (*)     Glucose 132 (*)     GFR Non- 8 (*)     GFR  10 (*)     All other components within normal limits   HEPATIC FUNCTION PANEL - Abnormal; Notable for the following components:    ALT 7 (*)     All other components within normal limits   SALICYLATE LEVEL - Abnormal; Notable for the following components:    Salicylate Lvl <2.0 (*)     All other components within normal limits   TROPONIN - Abnormal; Notable for the following components:    Troponin T 0.021 (*)     All other components within normal limits   LEGIONELLA ANTIGEN, URINE   STREP PNEUMONIAE ANTIGEN CULTURE, RESPIRATORY   CULTURE, URINE   ETHANOL   LIPASE   CBC   TROPONIN   TROPONIN   RENAL FUNCTION PANEL   CREATININE, RANDOM URINE   SODIUM, URINE, RANDOM   PROTIME-INR   APTT   FIBRINOGEN   PROCALCITONIN   C-REACTIVE PROTEIN   FERRITIN   D-DIMER, QUANTITATIVE   ICTOTEST, URINE     I personally reviewed the images. The radiologist's interpretation reveals:  Last Imaging results   CT ABDOMEN PELVIS WO CONTRAST Additional Contrast? None   Preliminary Result   1. No acute findings. No evidence of obstructive uropathy. 2. Colonic diverticulosis. RECOMMENDATIONS:   Unavailable         CT HEAD WO CONTRAST   Final Result   No acute hemorrhage or midline shift. Other findings as described. RECOMMENDATIONS:   Unavailable         XR CHEST PORTABLE   Final Result   No acute cardiopulmonary abnormality.              MEDS GIVEN IN ED:  Medications   zinc oxide (DESITIN) 40 % ointment (has no administration in time range)   lactated ringers infusion (has no administration in time range)   sodium bicarbonate 150 mEq in dextrose 5 % 1,000 mL infusion (has no administration in time range)   donepezil (ARICEPT) tablet 5 mg (has no administration in time range)   rosuvastatin (CRESTOR) tablet 40 mg (has no administration in time range)   polyethylene glycol (GLYCOLAX) packet 17 g (has no administration in time range)   aspirin EC tablet 81 mg (has no administration in time range)     Or   aspirin suppository 300 mg (has no administration in time range)   acetaminophen (TYLENOL) tablet 650 mg (has no administration in time range)     Or   acetaminophen (TYLENOL) suppository 650 mg (has no administration in time range)   prochlorperazine (COMPAZINE) injection 10 mg (has no administration in time range)   heparin (porcine) injection 7,500 Units (has no administration in time range)   labetalol (NORMODYNE;TRANDATE) injection 10 mg (has no administration in time range)   cefTRIAXone (ROCEPHIN) 1000 mg IVPB in 50 mL D5W minibag (has no administration in time range)   lactated ringers infusion ( IntraVENous New Bag 1/3/22 1234)     COURSE & MEDICAL DECISION MAKING  77-year-old female presents the emergency department, daughter with reports of speech difficulties and worsening generalized weakness. Was just discharged on 12/23 following hospitalization for CVA. Initial vital signs here are reassuring. On exam she has expressive aphasia and is oriented to person only. No other focal deficits noted. She is noted to have dry mucous membranes. At this time we will obtain CT of the head, CBC, BMP, hepatic panel, lipase, EKG, troponin, urinalysis, COVID-19 testing, and chest x-ray. CBC is reassuring. BMP shows acute kidney injury with a creatinine of 5.3. Patient normally has normal renal function. IV fluid bolus ordered as well as maintenance fluid. 19 testing is positive. EKG today is without evidence of dysrhythmia. CT of the head is without acute intracranial pathology. Chest x-ray is without acute cardiopulmonary process. I did order CT of the abdomen/pelvis to evaluate for any evidence of postobstructive uropathy and this is negative for acute intra-abdominal pathology. Sherwood catheter ordered for urinalysis as well as strict recording of intake and output. I discussed the case with Dr. Gerri Bach of hospitalist team who agreed to hospitalize patient. We discussed that the patient's urinalysis may demonstrate UTI. He will be notified of any positive results. Patient hospitalized for further management. Amount and/or Complexity of Data Reviewed  Clinical lab tests: reviewed  Decide to obtain previous medical records or to obtain history from someone other than the patient: yes       -  Patient seen and evaluated in the emergency department. -  Triage and nursing notes reviewed and incorporated. -  Old chart records reviewed and incorporated.   -  Work-up included:  See above  -  Results discussed with patient. Appropriate PPE utilized as indicated for entire patient encounter? Time of Disposition: See timeline  ? New Prescriptions    No medications on file     FINAL IMPRESSION  1. Acute kidney injury ([facility] [address]    2. Other fatigue    3. Encephalopathy    4. COVID-19    5. Bacteriuria        Electronically signed by:  Oliva Sam DO, 1/3/2022         Oliva Sam DO  01/03/22 [id number]

## 2022-01-03 NOTE — ED TRIAGE NOTES
Patient brought into the ED for worsening aphasia over the last week and increasing generalized weakness.

## 2022-01-03 NOTE — ED NOTES
Daughter Jade Kang, would like to be called with any updates or if patient is being discharged. Her number is in the chart as the work number.       Claudia Bryant RN  01/03/22 [id number]

## 2022-01-03 NOTE — ED NOTES
pts mother called, updated on plan for adm and waiting on room assignment at this time     Richy Madison, RN  01/03/22 2855

## 2022-01-03 NOTE — CONSULTS
Neurology Service Consult Note  [facility]  Patient Name: Anup Archibald  : 1950        Subjective:   Reason for consult: Altered mental status  Patient seen and examined. Chart reviewed in detail. 66 y.o. -female with PMH HTN, CAD, dementia, recent diagnosis of left frontal lobe acute infarct and subacute left occipital lobe infarct, presenting to [facility] for worsening weakness, with worsening speech deficits. Patient has also had decreased appetite and difficulty using her cane over the last week. Patient being admitted with COVID-19. Patient was seen here by this neurology group 2021, which of the time was diagnosed with left frontal lobe acute infarct, subacute left occipital lobe infarct, and was referred to vascular for carotid stenosis. On exam, patient is seen in the ED. Proper PPE worn for exam or droplet precautions. Patient is alert and oriented to self, disoriented to location date and time. Patient states that it is 1942, and she is waiting on her . Patient was unable to communicate where she was at at this time, as patient states I do not know when asked. Exam is nonfocal, nonlateralizing, and her speech has resolved. I did not appreciate any dysphagia on my exam this morning. Prior work-up includes CBC, CMP showing CRP 12.4, creatinine 5.3, BUN 48, UA, on presentation.       Past Medical History:   Diagnosis Date    CAD (coronary artery disease)     Hypertension     :   Past Surgical History:   Procedure Laterality Date    CAROTID ENDARTERECTOMY      CHOLECYSTECTOMY      CORONARY ARTERY BYPASS GRAFT      NECK SURGERY       Medications:  Scheduled Meds:   donepezil  5 mg Oral Nightly    rosuvastatin  40 mg Oral Daily    aspirin  81 mg Oral Daily    Or    aspirin  300 mg Rectal Daily    heparin (porcine)  7,500 Units SubCUTAneous 3 times per day    cefTRIAXone (ROCEPHIN) IV  1,000 mg IntraVENous Once [START ON 1/4/2022] cefTRIAXone (ROCEPHIN) IV  1,000 mg IntraVENous Q24H     Continuous Infusions:   lactated ringers      sodium bicarbonate infusion 100 mL/hr at 01/03/22 0545     PRN Meds:.zinc oxide, polyethylene glycol, acetaminophen **OR** acetaminophen, prochlorperazine, labetalol    Allergies   Allergen Reactions    Penicillins     Sulfa Antibiotics Swelling     Social History     Socioeconomic History    Marital status:      Spouse name: Not on file    Number of children: Not on file    Years of education: Not on file    Highest education level: Not on file   Occupational History    Not on file   Tobacco Use    Smoking status: Current Every Day Smoker     Packs/day: 1.50     Types: Cigarettes    Smokeless tobacco: Never Used   Substance and Sexual Activity    Alcohol use: No    Drug use: No    Sexual activity: Not on file   Other Topics Concern    Not on file   Social History Narrative    Not on file     Social Determinants of Health     Financial Resource Strain:     Difficulty of Paying Living Expenses: Not on file   Food Insecurity:     Worried About Running Out of Food in the Last Year: Not on file    Ran Out of Food in the Last Year: Not on file   Transportation Needs:     Lack of Transportation (Medical): Not on file    Lack of Transportation (Non-Medical):  Not on file   Physical Activity:     Days of Exercise per Week: Not on file    Minutes of Exercise per Session: Not on file   Stress:     Feeling of Stress : Not on file   Social Connections:     Frequency of Communication with Friends and Family: Not on file    Frequency of Social Gatherings with Friends and Family: Not on file    Attends Muslim Services: Not on file    Active Member of Clubs or Organizations: Not on file    Attends Club or Organization Meetings: Not on file    Marital Status: Not on file   Intimate Partner Violence:     Fear of Current or Ex-Partner: Not on file    Emotionally Abused: Not on file    Physically Abused: Not on file    Sexually Abused: Not on file   Housing Stability:     Unable to Pay for Housing in the Last Year: Not on file    Number of Places Lived in the Last Year: Not on file    Unstable Housing in the Last Year: Not on file      Family History   Family history unknown: Yes         ROS (10 systems)  Unable to answer ROS at this time this patient is confused. Physical Exam:       Vitals:    01/03/22 1000 01/03/22 1030 01/03/22 1038 01/03/22 1208   BP: (!) 145/60 (!) 132/58     Pulse: 71 71 84 75   Resp: 19 14  (!) 31   Temp:       TempSrc:       SpO2:  95%         Wt Readings from Last 3 Encounters:   12/22/21 151 lb 0.2 oz (68.5 kg)   10/25/21 210 lb (95.3 kg)   07/03/21 158 lb (71.7 kg)     Temp Readings from Last 3 Encounters:   01/03/22 98.2 °F (36.8 °C) (Oral)   12/23/21 96.6 °F (35.9 °C) (Oral)   10/25/21 98.2 °F (36.8 °C) (Oral)     BP Readings from Last 3 Encounters:   01/03/22 (!) 119/55   12/23/21 (!) 164/86   10/25/21 (!) 179/72     Pulse Readings from Last 3 Encounters:   01/03/22 72   12/23/21 83   10/25/21 83        Gen: A&O x 4, NAD, cooperative  HEENT: NC/AT, EOMI, PERRL, mmm, neck supple, no meningeal signs; Heart:SR on monitor  Lungs: Respirations Unlabored  Ext: no edema, no calf tenderness b/l  Psych: normal mood and affect  Skin: no rashes or lesions    NEUROLOGIC EXAM:    Mental Status: A&O to self,  NAD, speech clear, language fluent,  follows commands appropriately    Cranial Nerve Exam:   CN II-XII: PERRL, VFF, no nystagmus, no gaze paresis, sensation V1-V3 intact b/l, muscles of facial expression symmetric; hearing intact to conversational tone, palate elevates symmetrically, shoulder elevation symmetric and tongue protrudes midline with movement side to side.     Motor Exam:       Strength 4/5 UE's/LE's b/l  Tone and bulk normal   No pronator drift    Deep Tendon Reflexes: 2/4 biceps, triceps, brachioradialis, patellar, and achilles b/l; flexor plantar responses b/l    Sensation: Intact light touch/pinprick/vibration UE's/LE's b/l    Coordination/Cerebellum:       Tremors--none      Rapidly alternating movements: no dysdiadochokinesia b/l                Heel-to-Shin: no dysmetria b/l      Finger-to-Nose: no dysmetria b/l    Gait and stance:      Gait: deferred      LABS:        CBC:   Recent Labs     01/03/22  0115   WBC 9.5   RBC 5.21   HGB 15.0   HCT 46.9      MCV 90.0     BMP:    Recent Labs     01/03/22  0115      K 3.5      CO2 18*   BUN 48*   CREATININE 5.3*   GLUCOSE 132*   CALCIUM 9.4       IMAGING:    CTH  Impression   No acute hemorrhage or midline shift. CTA from 12/21/21  Impression   No large vessel intracranial occlusion. Short segment severe stenosis of the   P2 segment of the left posterior cerebral artery. Mild areas of stenosis in   the left internal carotid cavernous segment. No other abnormality of the   intracranial circulation. Severe short segment stenosis of the distal V4 segment of the left vertebral   artery. Severe stenosis of the left internal carotid artery at the junction of the   endarterectomy graft and the left internal carotid artery measuring greater   than 80% using NASCET criteria. Approximately 65% stenosis of the right internal carotid artery in the region   of the bulb. Stenosis at the origin of the right vertebral artery. MRI brain from 12/23/21  Impression   Small areas of acute infarct involving the left frontal lobe from an acute   left middle cerebral artery territory infarct. Subacute areas of infarct   involving the left occipital lobe. An embolic source should be considered. Cerebral atrophy. Severe chronic small vessel ischemic changes. Remote left   occipital lobe infarct. Remote lacunar infarcts in the basal ganglia   bilaterally and right thalamus. Above imaging personally reviewed in detail.   ASSESSMENT/PLAN:   69-year-old female patient with past medical history as noted above presents to [place] [address] with worsening of weakness, worsening of speech, decreased appetite. Patient subsequently began diagnosed with COVID-19 infection, OLIVER, and altered mental status. 1.  Altered mental status likely secondary to acute metabolic encephalopathy secondary to a recrudescence of strokelike symptoms versus acute intracranial infarct superimposed on COVID-19 infection and OLIVER. -Medications: Aspirin, statin, Plavix as secondary stroke prevention. Patient seen by vascular services as a consult on last admission. Per chart review, his recommendations were to start on Plavix at this time for her vascular stenosis  history of her multiple strokes. -Rehab: PT/OT/ST per their recommendations  Stroke risk factors: CAD, previous stroke, HTN, vascular stenosis  -Follow-up: Neurology as outpatient upon discharge    Neurodiagnostics  -CT head as above  -CTA and MRI from previous admission as above  --Repeat MRI brain ordered      Patient discussed with attending physician Dr. Kaitlyn Polanco    Thank you for allowing us to participate in the care of your patient. If there are any questions regarding evaluation please feel free to contact us. ROSA Patel - CNP, 1/3/2022      ------------------------------------    Attending Note:  I have rounded on this patient with Starr House NP. I have reviewed the chart and we have discussed this case in detail. The patient was seen and examined by myself. Pertinent labs and imaging have been personally reviewed. Our findings and impressions were discussed with the patient. I concur with the Nurse Practitioner's assessment and plan. Recommend repeat MRI of the brain as her left carotid stenosis may be continuingly symptomatic. Unfortunately it does not appear that Plavix was continued upon discharge as recommended by vascular surgery. Will initiate Plavix at this time along with her aspirin and rosuvastatin.   Depending on the MRI of the brain may need to consider more urgent vascular correction of her left carotid stenosis but technically she did not fail maximal medical therapy. Certainly, surgery would be even more high risk at this time given her COVID-19 infection. Will make further recommendations based on her MRI of the brain.     Angi Parmar,  1/3/2022 10:04 PM

## 2022-01-03 NOTE — ED NOTES
Bed: H-01  Expected date:   Expected time:   Means of arrival:   Comments:  EMS aphasia     Mahendra Staley RN  01/02/22 3856

## 2022-01-03 NOTE — PROGRESS NOTES
Speech Language Pathology  Facility/Department: [address] [place] [address] EMERGENCY DEPARTMENT   CLINICAL BEDSIDE SWALLOW EVALUATION    NAME: Luma Rogers  : 1950  MRN: [id number]    IMPRESSIONS AND RECOMMENDATIONS: Luma Rogers was referred for a bedside swallow evaluation following admission to [facility] with COVID-19, OL[place], acute metabolic encephalopathy. She was recently admitted 21-21 with acute CVA (acute left frontal, subacute left occipital). During that admission she was seen by SLP. No dysphagia identified at that time. Pt also participated in speech/language assessment, which revealed mild verbal apraxia and mild-moderate expressive and receptive aphasia. Other medical hx includes CAD, HLD, HTN, dementia. Pt seen for evaluation seated upright in bed, alert, cooperative given cues. She did not follow all commands to participate in oral mechanism examination; reduced movement of orofacial structures on right noted informally. Pt presented with PO trials of thin liquids via cup/straw, puree, and regular solids. Oral stage mildly impaired, characterized by adequate labial seal, prolonged/disorganized mastication, adequate AP transit/clearance. Pharyngeal stage appears [place] with adequate swallow initiation/laryngeal elevation. No s/s aspiration across all trials. Recommend initiation of soft and bite-sized diet/thin liquids, general aspiration precautions. SLP will follow briefly for dysphagia and to address speech/language deficits. Recommendations/plan communicated to pt. ADMISSION DATE: 2022  ADMITTING DIAGNOSIS: has Hypertensive urgency; CAD (coronary artery disease); Diverticulosis; Hyperlipidemia with target low density lipoprotein (LDL) cholesterol less than 70 mg/dL; Cerebrovascular accident [facility]); Acute cerebrovascular accident (CVA) ([facility] [address]  Expressive aphasia; and Acute kidney injury due to COVID-19 [facility]) on their problem list.  ONSET DATE: this admission    Recent Chest Xray/CT of Chest: see chart    Date of Eval: 1/3/2022  Evaluating Therapist: MIKE Wright    Current Diet level:  Current Diet : NPO  Current Liquid Diet : NPO      Primary Complaint  Patient Complaint: does not state d/t communication deficits    Pain:  0/denies    Reason for Referral  Stephenie Blanton was referred for a bedside swallow evaluation to assess the efficiency of her swallow function, identify signs and symptoms of aspiration and make recommendations regarding safe dietary consistencies, effective compensatory strategies, and safe eating environment. Impression  Dysphagia Diagnosis: Mild oral stage dysphagia  Dysphagia Outcome Severity Scale: Level 5: Mild dysphagia- Distant supervision. May need one diet consistency restricted     Treatment Plan  Requires SLP Intervention: Yes  Duration/Frequency of Treatment: 1-2x/week for LOS  D/C Recommendations: To be determined       Recommended Diet and Intervention  Diet Solids Recommendation: Dysphagia Soft and Bite-Sized (Dysphagia III)  Liquid Consistency Recommendation: Thin  Recommended Form of Meds: PO     Therapeutic Interventions: Diet tolerance monitoring;Patient/Family education; Therapeutic PO trials with SLP;Other (comment) (speech/language evaluation/tx)    Compensatory Swallowing Strategies  Compensatory Swallowing Strategies: Upright as possible for all oral intake    Treatment/Goals  Short-term Goals  Timeframe for Short-term Goals: length of admission  Goal 1: Pt will tolerate soft and bite-sized diet/thin liquids with adequate oral manipulation/clearance and no s/s aspiration. Goal 2: Pt will participate in speech/language assessment to determine additional goals of care. Goal 3: Pt/caregivers will indicate understanding of all recommendations. General  Chart Reviewed: Yes  Behavior/Cognition: Alert; Requires cueing  Respiratory Status: Room air  O2 Device: None (Room air)  Communication Observation: Aphasia; Apraxia  Follows Directions: Simple (inconsistently, requires cues)  Dentition: Some missing teeth  Patient Positioning: Upright in bed  Baseline Vocal Quality: Normal  Prior Dysphagia History: none known prior to admission  Consistencies Administered: Reg solid; Dysphagia Pureed (Dysphagia I); Thin - cup; Thin - straw           Vision/Hearing  Hearing  Hearing: Within functional limits    Oral Motor Deficits  Oral/Motor  Oral Motor: Exceptions to [facility]    Oral Phase Dysfunction  Oral Phase  Oral Phase: Exceptions     Indicators of Pharyngeal Phase Dysfunction   Pharyngeal Phase  Pharyngeal Phase: WFL    Prognosis  Prognosis  Prognosis for safe diet advancement: excellent  Individuals consulted  Consulted and agree with results and recommendations: Patient;RN    Education  Patient Education: recommendations/plan  Patient Education Response: Needs reinforcement  Safety Devices in place: Yes  Type of devices:  All fall risk precautions in place       Therapy Time  SLP Individual Minutes  Time In: 1000  Time Out: [id number] Javid Villagran  Minutes: [address] [facility], [facility]-SLP  1/3/2022 11:13 AM

## 2022-01-03 NOTE — PROGRESS NOTES
Assessed pt skin upon admission to [facility]. She had some diarrhea and urine leaking from her woods. I gave her a bath and applied zinc cream and barrier cream to her buttocks and her vagina. Both areas appeared to be very red with a rash.

## 2022-01-03 NOTE — PROGRESS NOTES
Subjective: The patient is a 78-year-old female with history of dementia, hypertension, hyperlipidemia, coronary artery disease, obesity and encephalopathy who presented to the ER with worsening aphasia patient was recently discharged on 12/23 with left frontal lobe infarct and subacute left occipital lobe infarct  Patient had gradual decline over the last 1 week.   On admission creatinine 5.3, potassium 3.5, normal saline started, nephrology consulted, lisinopril held    Vitals: Afebrile, heart rate 77, blood pressure 119/55, on room air      Home medications: Amlodipine, donepezil, Crestor, lisinopril, meloxicam, atorvastatin      Current Medications: Aspirin, ceftriaxone, donepezil, heparin, Crestor, Ringer's lactate      Labs: Sodium 140, potassium 3.2, chloride 102, bicarb 19, creatinine 4.7  LFTs normal  WBC 7.7, hemoglobin 13.8, platelets 381  SARS-CoV-2 positive      Imaging: CT abdomen pelvis did not show any acute findings  CT head without contrast no acute hemorrhage or midline shift  Chest x-ray did not show any acute findings    Plan:   Acute kidney injury  COVID-19 upper respiratory tract infection  Acute metabolic encephalopathy  Expressive aphasia recent stroke  Generalized weakness  Essential hypertension  Hyperlipidemia  Dementia without acute behavioral  History of coronary artery disease    Admitted with creatinine of 5.3, improving with hydration, nephrology consulted  Meloxicam and lisinopril started  Creatinine trending downwards, continue IV hydration      Acute metabolic encephalopathy s  DC ceftriaxone, urine negative for cystitis  Most likely confused secondary to acute kidney injury in setting of recent stroke  Continue aspirin and statin  Continue Crestor  Continue amlodipine, as needed hydralazine  PT OT consultation      Patient admitted this morning we will continue to follow from tomorrow onwards

## 2022-01-03 NOTE — CONSULTS
Patient:   Lillian Falling    Date:  22  :  1950, 66 y.o. Nephrologist: Itz Joshua MD  Provider: Charlie Lennox, MD    Reason for Consult: Acute kidney injury    Consult requested by : Dr Francesca Miller DO     Chief Complaint:   Mental status changes and hallucination    HISTORY OF PRESENT ILLNESS:   Ms. Berniece Nissen is an unfortunate 74-year-old female who was brought to the emergency room after being found by family members hallucinating and mentation change. She was not eating and drinking much apparently. In the emergency department she was rather hypotensive and underwent several diagnostic tests which include imaging and biochemical.  Imaging study including CT of the abdomen and chest as well as CT of the head and chest x-ray was unrevealing. Biochemical testing including urine analysis showed hyaline casts suggestive of volume depletion and some leukocyturia, but most notably her creatinine was 5.3, with BUN of 48 and bicarb of 18, recent creatinine was 0.8 mg/dL. She was given fluid bolus followed by normal saline    When I saw her in the emergency room she is alert awake but not oriented-she is lying flat without any respiratory distress    PMH :   1.  Coronary artery disease status post coronary artery bypass surgery-recent echo showed relatively preserved left ventricular ejection fraction with mild diastolic dysfunction  2. Hypertension she was on ACE inhibitor  3. Hyperlipidemia  4. Atherosclerotic cardiovascular disease requiring carotid endarterectomy        PSH :  1.  Status post coronary artery bypass surgery  2.   Other significant surgery include neck surgery, carotid endarterectomy, cholecystectomy    OB GYN Hx:  According to patient  5 para 5  I might have to verify the information  Habits :   According to patient she does smokes  Pack and 1/2/day for a long time  No history of alcohol or illicit drug abuse    Soc Hx:  She is  lives with   Other social history is unknown to [facility] [place]   Unknown at this time        REVIEW OF SYSTEMS:     All pertinent ROS neg except   Hallucination and mental status changes  Poor oral intake    Current Facility-Administered Medications   Medication Dose Route Frequency Provider Last Rate Last Admin    zinc oxide (DESITIN) 40 % ointment   Topical BID PRN Levonia Petite, DO        lactated ringers infusion   IntraVENous Continuous Levonia Petite, DO        sodium bicarbonate 150 mEq in dextrose 5 % 1,000 mL infusion   IntraVENous Continuous Levonia Petite,  mL/hr at 01/03/22 0545 New Bag at 01/03/22 0545    donepezil (ARICEPT) tablet 5 mg  5 mg Oral Nightly Fadi Teresa, DO        rosuvastatin (CRESTOR) tablet 40 mg  40 mg Oral Daily Levonia Petite, DO        polyethylene glycol (GLYCOLAX) packet 17 g  17 g Oral Daily PRN Levonia Petite, DO        aspirin EC tablet 81 mg  81 mg Oral Daily Levonia Petite, DO        Or    aspirin suppository 300 mg  300 mg Rectal Daily Levonia Petite, DO        acetaminophen (TYLENOL) tablet 650 mg  650 mg Oral Q6H PRN Levonia Petite, DO        Or    acetaminophen (TYLENOL) suppository 650 mg  650 mg Rectal Q6H PRN Levonia Petite, DO        prochlorperazine (COMPAZINE) injection 10 mg  10 mg IntraVENous Q6H PRN Levonia Petite, DO        heparin (porcine) injection 7,500 Units  7,500 Units SubCUTAneous 3 times per day Levonia Petite, DO   7,500 Units at 01/03/22 9620    labetalol (NORMODYNE;TRANDATE) injection 10 mg  10 mg IntraVENous Q10 Min PRN Levonia Petite, DO        cefTRIAXone (ROCEPHIN) 1000 mg IVPB in 50 mL D5W minibag  1,000 mg IntraVENous Once Karson Guajardo,  mL/hr at 01/03/22 0710 1,000 mg at 01/03/22 0710    [START ON 1/4/2022] cefTRIAXone (ROCEPHIN) 1000 mg IVPB in 50 mL D5W minibag  1,000 mg IntraVENous Q24H Levonia Petite, DO         Current Outpatient Medications   Medication Sig Dispense Refill    amLODIPine (NORVASC) 5 MG tablet Take 1 tablet by mouth daily      donepezil (ARICEPT) 5 MG tablet Take 1 tablet by mouth nightly      rosuvastatin (CRESTOR) 40 MG tablet Take 1 tablet by mouth daily      lisinopril (PRINIVIL;ZESTRIL) 20 MG tablet Take 1 tablet by mouth daily      Aspirin Buf,YrQrm-ImDmz-FbUrx, (BUFFERED ASPIRIN) 325 MG TABS Take 1 tablet by mouth daily 30 tablet 3    meloxicam (MOBIC) 7.5 MG tablet Take 2 tablets by mouth daily for 10 days 20 tablet 0       BP (!) 119/55   Pulse 72   Temp 98.2 °F (36.8 °C) (Oral)   Resp 18   SpO2 94%     PHYSICAL EXAM:  General appearance: Alert awake but not oriented-she is lying flat without any respiratory distress  Head: No trauma, 1+ conjunctival pallor  Neck: Supple-  Heart: Seems regular rate and rhythm-well-healed incision from previous sternotomy  LUNGS: Only limited exam no gross crackles  Abdomen: Soft nontender, she is wearing diaper  Extremities: No lower extremity edema    LABS:  CBC:   Lab Results   Component Value Date    WBC 9.5 01/03/2022    WBC 7.8 12/23/2021    WBC 10.1 12/22/2021    HGB 15.0 01/03/2022    HGB 14.4 12/23/2021    HGB 14.4 12/22/2021     01/03/2022     12/23/2021     12/22/2021     Renal Panel:   Lab Results   Component Value Date     01/03/2022     12/23/2021     12/22/2021    K 3.5 01/03/2022    K 4.4 12/23/2021    K 4.7 12/22/2021     01/03/2022     12/23/2021     12/22/2021    CO2 18 01/03/2022    CO2 29 12/23/2021    CO2 29 12/22/2021    BUN 48 01/03/2022    BUN 15 12/23/2021    BUN 13 12/22/2021    CREATININE 5.3 01/03/2022    CREATININE 0.8 12/23/2021    CREATININE 0.8 12/22/2021    GFRAA 10 01/03/2022    GFRAA >60 12/23/2021    GFRAA >60 12/22/2021    LABGLOM 8 01/03/2022    LABGLOM >60 12/23/2021    LABGLOM >60 12/22/2021    LABALBU 4.3 01/03/2022    LABALBU 4.3 12/21/2021    LABALBU 4.6 11/15/2021         Calcium:    Lab Results   Component Value Date    CALCIUM 9.4 01/03/2022     Phosphorus: No results found for: PHOS    U/A:    Lab Results   Component Value Date    PROTEINU 30 01/03/2022    NITRU NEGATIVE 01/03/2022    COLORU STRAW 01/03/2022    WBCUA 19 01/03/2022    RBCUA 4 01/03/2022    MUCUS RARE 01/03/2022    TRICHOMONAS NONE SEEN 01/03/2022    BACTERIA OCCASIONAL 01/03/2022    CLARITYU HAZY 01/03/2022    SPECGRAV 1.020 01/03/2022    UROBILINOGEN NEGATIVE 01/03/2022    BILIRUBINUR SMALL 01/03/2022    BLOODU SMALL 01/03/2022    KETUA SMALL 01/03/2022    AMORPHOUS RARE 01/03/2022           IMPRESSION:  1. Acute kidney injury-unknown urine output-based on the available data likely from severe acute tubular injury from volume depletion-she does not have any obstruction-we will need to make sure there is no other additional intrinsic renal disease-infection would be 1 possibility-if she was taking ACE inhibitor's at home as well as naproxen as the home medication list suggest-that might have worsened her kidney function  2. Confusion-rule out infection or other process  3.   Underlying coronary artery disease and hypertension    PLAN:    She is an IV fluid with with her coronary artery disease watch for pulmonary status  Measure urine output-if necessary [place]  Look for precise source of infection appropriate treatment  Follow clinically and biochemically  If her kidney function improved with volume repletion-I will have to look for other etiology-if not then I will broaden the differential diagnosis

## 2022-01-03 NOTE — H&P
History and Physical      Name:  Elmo Hayes /Age/Sex: 1950  (66 y.o. female)   MRN & CSN:  [id number] & [id number] Admission Date/Time: 2022  9:49 PM   Location:  ED19/ED-19 PCP: Charlotte Zheng MD       Hospital Day: 2    Assessment and Plan:   Elmo Hayes is a 66 y.o.  female  who presents with Acute kidney injury due to COVID-19 [facility])    OLIVER likely multifactorial  [id number]  Acute metabolic encephalopathy, likely secondary to above  Poor oral intake  -Admit to inpatient Covid services with telemetry  -OLIVER likely multifactorial and includes poor oral intake and COVID-19 related acute renal dysfunction  -Cr 5.3 in ED w baseline ~0.8 per EMR  -Potassium 3.5  -ED started patient on lactated Ringer's at 150 mL an hour continuous as well as sodium bicarbonate 150 mEq in dextrose 5% at 150 mL an hour continuous. We will continue both of these except at a rate of 100 mL an hour each for 10 hours each.  -Follow Cr with daily labs, check UA, monitor I & O, check urine osmolality, urine sodium, and urine creatinine  -Holding home lisinopril  -Heparin for DVT ppx  -UA pending  -Consideration for renal US if no improvement in 24h   -Nephrology consulted, appreciate recommendations    Expressive aphasia  -Worsening expressive aphasia since discharge on 2021  -CT head without contrast shows no acute hemorrhage or midline shift. Please see below for further details. CTA head and neck with IV contrast not undertaken secondary to ARF.   Deferring that imaging and MRI to neurology  -Continue aspirin statin  -Reviewed recent hemoglobin A1c, lipid panel, and 2D echo  -Neuro checks q4 hours, bedside swallow evaluation, SLP consult, PT/OT consult, and fall precautions  -Permissive hypertension unless BP >220/120 or pt symptomatic  -Consulted neurology, appreciate recommendations    Generalized weakness with ambulatory dysfunction  -Likely secondary to Covid and poor oral intake  -No focal deficits except for expressive aphasia as above  -PT and OT when appropriate    Abdominal pain  -Generalized with no guarding or rebound noted  -CT abdomen pelvis without contrast showed no acute findings. No evidence of obstructive uropathy. Colonic diverticulosis. See below for further details    Elevated troponins  -Encephalopathic demented patient denies any chest pain. Troponin 0.021, cycle troponins x2.  -If significant rise in troponins would recommend cardiology consult  -Appears to be demand based ischemia versus decreased renal clearance in the setting of acute renal failure    Update:  ED started patient on ceftriaxone as UA came back showing 19 WBCs, occasional bacteria. Leukocyte esterase negative and nitrite negative. At this time we can continue given her metabolic encephalopathy. Day team to reassess and determine if other causes of encephalopathy and if that is the case then likely can discontinue antibiotics. Other chronic medical conditions:   Continue all home meds except stated above or contraindicated. HTN  HLD  Dementia  CAD  Obesity    Diet Diet NPO   DVT Prophylaxis [] Lovenox, [x]  Heparin, [] SCDs, [] Ambulation   GI Prophylaxis [] PPI,  [] H2 Blocker,  [] Carafate,  [] Diet/Tube Feeds   Code Status Full Code   Disposition Patient requires continued admission due to Acute kidney injury due to [facility] [place] [address])   MDM [] Low, [] Moderate,[x]  High  Patient's risk as above due to acuity of condition with potential for decompensation. History of Present Illness:     Chief Complaint: [facility] [place] [facility] [place] [facility]    Lillian Mast is a 66 y.o.  female with a family history that is unable to be reviewed secondary to dementia and encephalopathy and a PMH as stated above, who presents with worsening expressive aphasia over the last week since discharge from the hospital on 12/23/2021 for acute left frontal lobe infarct and subacute left occipital lobe infarct.   Patient said generalized decline and worsening weakness over the last 1 week. Patient has had very poor oral intake over this time as well. Patient has no prior history of renal disease. Patient is encephalopathic in the setting of dementia and is only able to state her name and date of birth. Patient only complains of abdominal pain with no further context. Daughter was not at bedside to aid in questions. No further information was obtained from patient. Please see ED providers HPI for further detail. Discussed case with ED provider. ROS:   Review of Systems   Unable to perform ROS: Mental status change       Objective:   No intake or output data in the 24 hours ending 01/03/22 0521   Vitals:   Vitals:    01/03/22 0444   BP:    Pulse: 74   Resp: 18   Temp:    SpO2:      BP (!) 140/55   Pulse 74   Temp 98.2 °F (36.8 °C) (Oral)   Resp 18   SpO2 98%   Physical Exam:   Physical Exam  Vitals and nursing note reviewed. Constitutional:       General: She is not in acute distress. Appearance: Normal appearance. She is obese. She is ill-appearing. She is not toxic-appearing or diaphoretic. Interventions: She is not intubated. HENT:      Head: Atraumatic. Right Ear: External ear normal.      Left Ear: External ear normal.      Nose: Nose normal. No rhinorrhea. Mouth/Throat:      Mouth: Mucous membranes are dry. Tongue: Tongue does not deviate from midline. Pharynx: Uvula midline. Eyes:      General: No scleral icterus. Extraocular Movements: Extraocular movements intact. Conjunctiva/sclera: Conjunctivae normal.      Pupils: Pupils are equal, round, and reactive to light. Cardiovascular:      Rate and Rhythm: Normal rate and regular rhythm. Pulses: Normal pulses. Heart sounds: Normal heart sounds. No murmur heard. No gallop. Pulmonary:      Effort: Pulmonary effort is normal. No tachypnea, accessory muscle usage or prolonged expiration. She is not intubated. Breath sounds: Decreased air movement present. Rhonchi present. No wheezing or rales. Abdominal:      General: Abdomen is protuberant. Bowel sounds are normal. There is no distension. Palpations: Abdomen is soft. Tenderness: There is generalized abdominal tenderness. There is no guarding or rebound. Negative signs include Miller's sign and Rovsing's sign. Musculoskeletal:         General: Normal range of motion. Cervical back: Neck supple. Right lower leg: No edema. Left lower leg: No edema. Skin:     General: Skin is warm and dry. Capillary Refill: Capillary refill takes less than 2 seconds. Neurological:      Mental Status: She is disoriented and confused. Cranial Nerves: Cranial nerve deficit present. No dysarthria or facial asymmetry. Sensory: Sensation is intact. No sensory deficit. Motor: Weakness (Generalized with equal strength bilaterally) present. No tremor or seizure activity. Comments: Expressive aphasia noted   Psychiatric:         Attention and Perception: She is inattentive. Mood and Affect: Mood is not anxious. Speech: She is communicative. Speech is not slurred. Behavior: Behavior is cooperative. Past Medical History:      Past Medical History:   Diagnosis Date    CAD (coronary artery disease)     Hypertension      PSHX:  has a past surgical history that includes Coronary artery bypass graft; Neck surgery; Carotid endarterectomy; and Cholecystectomy. Allergies: Allergies   Allergen Reactions    Penicillins     Sulfa Antibiotics Swelling       FAM HX: family history is not on file.   Soc HX:   Social History     Socioeconomic History    Marital status:      Spouse name: None    Number of children: None    Years of education: None    Highest education level: None   Occupational History    None   Tobacco Use    Smoking status: Current Every Day Smoker     Packs/day: 1.50     Types: Cigarettes  Smokeless tobacco: Never Used   Substance and Sexual Activity    Alcohol use: No    Drug use: No    Sexual activity: None   Other Topics Concern    None   Social History Narrative    None     Social Determinants of Health     Financial Resource Strain:     Difficulty of Paying Living Expenses: Not on file   Food Insecurity:     Worried About Running Out of Food in the Last Year: Not on file    Patricia of Food in the Last Year: Not on file   Transportation Needs:     Lack of Transportation (Medical): Not on file    Lack of Transportation (Non-Medical):  Not on file   Physical Activity:     Days of Exercise per Week: Not on file    Minutes of Exercise per Session: Not on file   Stress:     Feeling of Stress : Not on file   Social Connections:     Frequency of Communication with Friends and Family: Not on file    Frequency of Social Gatherings with Friends and Family: Not on file    Attends Zoroastrian Services: Not on file    Active Member of [address] [place] [address] or Organizations: Not on file    Attends Club or Organization Meetings: Not on file    Marital Status: Not on file   Intimate Partner Violence:     Fear of Current or Ex-Partner: Not on file    Emotionally Abused: Not on file    Physically Abused: Not on file    Sexually Abused: Not on file   Housing Stability:     Unable to Pay for Housing in the Last Year: Not on file    Number of [place] in the Last Year: Not on file    Unstable Housing in the Last Year: Not on file       Data:   CBC with Differential:    Lab Results   Component Value Date    WBC 9.5 01/03/2022    RBC 5.21 01/03/2022    HGB 15.0 01/03/2022    HCT 46.9 01/03/2022     01/03/2022    MCV 90.0 01/03/2022    MCH 28.8 01/03/2022    MCHC 32.0 01/03/2022    RDW 14.4 01/03/2022    SEGSPCT 83.6 01/03/2022    LYMPHOPCT 8.6 01/03/2022    MONOPCT 7.2 01/03/2022    BASOPCT 0.1 01/03/2022    MONOSABS 0.7 01/03/2022    LYMPHSABS 0.8 01/03/2022    EOSABS 0.0 01/03/2022    BASOSABS 0.0 01/03/2022    DIFFTYPE AUTOMATED DIFFERENTIAL 01/03/2022       CMP:     Lab Results   Component Value Date     01/03/2022    K 3.5 01/03/2022     01/03/2022    CO2 18 01/03/2022    BUN 48 01/03/2022    CREATININE 5.3 01/03/2022    GFRAA 10 01/03/2022    LABGLOM 8 01/03/2022    GLUCOSE 132 01/03/2022    PROT 7.6 01/03/2022    LABALBU 4.3 01/03/2022    CALCIUM 9.4 01/03/2022    BILITOT 0.2 01/03/2022    ALKPHOS 98 01/03/2022    AST 21 01/03/2022    ALT 7 01/03/2022       Troponin:  Lab Results   Component Value Date    TROPONINT 0.021 01/03/2022       U/A:    Lab Results   Component Value Date    COLORU YELLOW 12/21/2021    PROTEINU NEGATIVE 12/21/2021    WBCUA NONE SEEN 12/21/2021    RBCUA NONE SEEN 12/21/2021    MUCUS RARE 12/21/2021    TRICHOMONAS NONE SEEN 12/21/2021    BACTERIA NEGATIVE 12/21/2021    CLARITYU CLEAR 12/21/2021    SPECGRAV 1.012 12/21/2021    LEUKOCYTESUR NEGATIVE 12/21/2021    UROBILINOGEN NEGATIVE 12/21/2021    BILIRUBINUR NEGATIVE 12/21/2021    BLOODU NEGATIVE 12/21/2021     Radiology results:  CT ABDOMEN PELVIS WO CONTRAST Additional Contrast? None   Preliminary Result   1. No acute findings. No evidence of obstructive uropathy. 2. Colonic diverticulosis. RECOMMENDATIONS:   Unavailable         CT HEAD WO CONTRAST   Final Result   No acute hemorrhage or midline shift. Other findings as described. RECOMMENDATIONS:   Unavailable         XR CHEST PORTABLE   Final Result   No acute cardiopulmonary abnormality. Medications:   Home Medications:   Prior to Admission medications    Medication Sig Start Date End Date Taking?  Authorizing Provider   amLODIPine (NORVASC) 5 MG tablet Take 1 tablet by mouth daily 12/2/21   Historical Provider, MD   donepezil (ARICEPT) 5 MG tablet Take 1 tablet by mouth nightly 12/29/21   Historical Provider, MD   rosuvastatin (CRESTOR) 40 MG tablet Take 1 tablet by mouth daily 12/2/21   Historical Provider, MD   lisinopril (PRINIVIL;ZESTRIL) 20 MG tablet Take 1 tablet by mouth daily 12/27/21   Historical Provider, MD   Aspirin Buf,FvKdc-OoDts-OyZor, (BUFFERED ASPIRIN) 325 MG TABS Take 1 tablet by mouth daily 12/23/21   Braulio Caicedo MD   meloxicam (MOBIC) 7.5 MG tablet Take 2 tablets by mouth daily for 10 days 7/3/21 7/13/21  Zac RolandmBEBETO francois     Medications:    donepezil  5 mg Oral Nightly    rosuvastatin  40 mg Oral Daily    aspirin  81 mg Oral Daily    Or    aspirin  300 mg Rectal Daily    heparin (porcine)  7,500 Units SubCUTAneous 3 times per day      Infusions:    lactated ringers      sodium bicarbonate infusion       PRN Meds: zinc oxide, , BID PRN  polyethylene glycol, 17 g, Daily PRN  acetaminophen, 650 mg, Q6H PRN   Or  acetaminophen, 650 mg, Q6H PRN  prochlorperazine, 10 mg, Q6H PRN  labetalol, 10 mg, Q10 Min PRN        Electronically signed by Justyna Benz DO on 1/3/2022 at 5:21 AM      This dictation was created with voice recognition software. While attempts have been made to review the dictation as it is transcribed, on occasion the spoken word can be misinterpreted by the technology leading to omissions or inappropriate words, phrases or sentences.

## 2022-01-04 ENCOUNTER — APPOINTMENT (OUTPATIENT)
Dept: MRI IMAGING | Age: 72
DRG: 064 | End: 2022-01-04
Payer: MEDICARE

## 2022-01-04 LAB
ALBUMIN SERPL-MCNC: 3.8 GM/DL (ref 3.4–5)
ALP BLD-CCNC: 83 IU/L (ref 40–128)
ALT SERPL-CCNC: <5 U/L (ref 10–40)
ANION GAP SERPL CALCULATED.3IONS-SCNC: 18 MMOL/L (ref 4–16)
AST SERPL-CCNC: 21 IU/L (ref 15–37)
BILIRUB SERPL-MCNC: 0.3 MG/DL (ref 0–1)
BUN BLDV-MCNC: 42 MG/DL (ref 6–23)
C-REACTIVE PROTEIN, HIGH SENSITIVITY: 33.6 MG/L
CALCIUM SERPL-MCNC: 8.8 MG/DL (ref 8.3–10.6)
CHLORIDE BLD-SCNC: 106 MMOL/L (ref 99–110)
CO2: 22 MMOL/L (ref 21–32)
CREAT SERPL-MCNC: 1.8 MG/DL (ref 0.6–1.1)
CULTURE: NORMAL
D DIMER: 380 NG/ML(DDU)
GFR AFRICAN AMERICAN: 33 ML/MIN/1.73M2
GFR NON-AFRICAN AMERICAN: 28 ML/MIN/1.73M2
GLUCOSE BLD-MCNC: 98 MG/DL (ref 70–99)
Lab: NORMAL
MAGNESIUM: 1.9 MG/DL (ref 1.8–2.4)
PHOSPHORUS: 3.9 MG/DL (ref 2.5–4.9)
POTASSIUM SERPL-SCNC: 3.2 MMOL/L (ref 3.5–5.1)
SODIUM BLD-SCNC: 146 MMOL/L (ref 135–145)
SPECIMEN: NORMAL
TOTAL PROTEIN: 6.2 GM/DL (ref 6.4–8.2)

## 2022-01-04 PROCEDURE — 99232 SBSQ HOSP IP/OBS MODERATE 35: CPT

## 2022-01-04 PROCEDURE — 83735 ASSAY OF MAGNESIUM: CPT

## 2022-01-04 PROCEDURE — 97166 OT EVAL MOD COMPLEX 45 MIN: CPT

## 2022-01-04 PROCEDURE — 85379 FIBRIN DEGRADATION QUANT: CPT

## 2022-01-04 PROCEDURE — 6370000000 HC RX 637 (ALT 250 FOR IP): Performed by: PSYCHIATRY & NEUROLOGY

## 2022-01-04 PROCEDURE — 94761 N-INVAS EAR/PLS OXIMETRY MLT: CPT

## 2022-01-04 PROCEDURE — 6370000000 HC RX 637 (ALT 250 FOR IP): Performed by: STUDENT IN AN ORGANIZED HEALTH CARE EDUCATION/TRAINING PROGRAM

## 2022-01-04 PROCEDURE — 86141 C-REACTIVE PROTEIN HS: CPT

## 2022-01-04 PROCEDURE — 86140 C-REACTIVE PROTEIN: CPT

## 2022-01-04 PROCEDURE — 80053 COMPREHEN METABOLIC PANEL: CPT

## 2022-01-04 PROCEDURE — 36415 COLL VENOUS BLD VENIPUNCTURE: CPT

## 2022-01-04 PROCEDURE — 97530 THERAPEUTIC ACTIVITIES: CPT

## 2022-01-04 PROCEDURE — 84100 ASSAY OF PHOSPHORUS: CPT

## 2022-01-04 PROCEDURE — 97535 SELF CARE MNGMENT TRAINING: CPT

## 2022-01-04 PROCEDURE — 6370000000 HC RX 637 (ALT 250 FOR IP): Performed by: INTERNAL MEDICINE

## 2022-01-04 PROCEDURE — 6360000002 HC RX W HCPCS: Performed by: STUDENT IN AN ORGANIZED HEALTH CARE EDUCATION/TRAINING PROGRAM

## 2022-01-04 PROCEDURE — 70551 MRI BRAIN STEM W/O DYE: CPT

## 2022-01-04 PROCEDURE — 1200000000 HC SEMI PRIVATE

## 2022-01-04 RX ORDER — POTASSIUM CHLORIDE 20 MEQ/1
40 TABLET, EXTENDED RELEASE ORAL 2 TIMES DAILY WITH MEALS
Status: COMPLETED | OUTPATIENT
Start: 2022-01-04 | End: 2022-01-05

## 2022-01-04 RX ADMIN — HEPARIN SODIUM 7500 UNITS: 5000 INJECTION INTRAVENOUS; SUBCUTANEOUS at 21:50

## 2022-01-04 RX ADMIN — CLOPIDOGREL BISULFATE 75 MG: 75 TABLET ORAL at 10:11

## 2022-01-04 RX ADMIN — HEPARIN SODIUM 7500 UNITS: 5000 INJECTION INTRAVENOUS; SUBCUTANEOUS at 06:15

## 2022-01-04 RX ADMIN — HEPARIN SODIUM 7500 UNITS: 5000 INJECTION INTRAVENOUS; SUBCUTANEOUS at 13:53

## 2022-01-04 RX ADMIN — POTASSIUM CHLORIDE 40 MEQ: 1500 TABLET, EXTENDED RELEASE ORAL at 21:50

## 2022-01-04 RX ADMIN — ROSUVASTATIN 40 MG: 40 TABLET, FILM COATED ORAL at 10:11

## 2022-01-04 RX ADMIN — DONEPEZIL HYDROCHLORIDE 5 MG: 5 TABLET, FILM COATED ORAL at 21:50

## 2022-01-04 RX ADMIN — ASPIRIN 81 MG: 81 TABLET, COATED ORAL at 10:11

## 2022-01-04 RX ADMIN — ACETAMINOPHEN 650 MG: 325 TABLET ORAL at 10:11

## 2022-01-04 ASSESSMENT — PAIN SCALES - GENERAL
PAINLEVEL_OUTOF10: 0
PAINLEVEL_OUTOF10: 2

## 2022-01-04 NOTE — PROGRESS NOTES
patient currently need. .. Unable  Dep A Lot  Max A A Lot   Mod A A Little  Min A A Little   CGA  SBA None   Mod I  Indep  Sup   1. Putting on and taking off regular lower body clothing? [] 1    [x] 2   [] 2   [] 3   [] 3   [] 4      2. Bathing (including washing, rinsing, drying)? [] 1   [] 2   [x] 2 [] 3 [] 3 [] 4   3. Toileting, which includes using toilet, bedpan, or urinal? [] 1    [x] 2   [] 2   [] 3   [] 3   [] 4     4. Putting on and taking off regular upper body clothing? [] 1   [] 2   [] 2   [x] 3   [] 3    [] 4      5. Taking care of personal grooming such as brushing teeth? [] 1   [] 2    [] 2 [x] 3    [] 3   [] 4      6. Eating meals? [] 1   [] 2   [] 2   [] 3   [x] 3   [] 4      Raw Score:  15    [24=0% impaired(CH), 23=1-19%(CI), 20-22=20-39%(CJ), 15-19=40-59%(CK), 10-14=60-79%(CL), 7-9=80-99%(CM), 6=100%(CN)]     Treatment:  Therapeutic Activity Training:   Therapeutic activity training was instructed today. Cues were given for safety, sequence, UE/LE placement, awareness, and balance. Activities performed today included bed mobility training, sup-sit, sit-stand, SPT, edu on role of OT, edu on discharge recommendations/planning. Self Care Training:   Cues were given for safety, sequence, UE/LE placement, visual cues, and balance. Activities performed today included LB dressing and grooming task of brushing teeth and washing face. Safety Measures: Gait belt used, Left in chair, Alarm in place, Needs in reach     Assessment:  Pt is a 66year old female with a past medical history of CAD (coronary artery disease) and Hypertension. Pt admitted 1/2 with worsening aphasia and fatigue. Pt with recent admission on 12/21 and dx with with acute CVA. Pt is from home where she lives with her  in [place] house. Pt reports being indepenednt with ADLs and IADLs at baseline and ambulating with a cane.  Pt presents as above and would benefit from continued acute care OT services as well as OT in the SNF setting at d/c. Complexity: Moderate  Prognosis: Good  Plan: 3x/week      Goals:  1. Pt will complete all aspects of bed mobility for EOB/OOB ADLs SBA with HOB flat  2. Pt will complete UB/LB bathing Min A seated  3. Pt will complete all aspects of LB dressing Mod A seated  4. Pt will complete all functional transfers to and from bed, chair, toilet, shower chair CGA with RW  5. Pt will ambulate HH distance to bathroom for toileting CGA with RW  6. Pt will complete all aspects of toileting task Min A   7. Pt will complete oral hygiene/grooming routine in standing at sink CGA  8.  Pt will complete ther ex/ther act with focus on task sequencing and initiation, safety, safe mgmt of RW, activity tolerance, dynamic sitting/standing balance         Time:   Time in: 0821  Time out: 0900  Timed treatment minutes: 25  Total time: 39      Electronically signed by:        ADINA Chirinos/L, [id number] [facility] GAIL [id number]

## 2022-01-04 NOTE — PROGRESS NOTES
Physician Progress Note      Megan Mariano  CSN #:                  [id number]  :                       1950  ADMIT DATE:       2022 9:49 PM  [address] DATE:  RESPONDING  PROVIDER #:        ARLETH Mckenzie MD          QUERY TEXT:    Hospitalists,    Pt admitted with COVID and OLIVER. If possible, please document in the progress   notes and discharge summary if you are evaluating and/or treating any of the   following: The medical record reflects the following:  Risk Factors: COVID  Clinical Indicators: creatinine baseline 0.8, now 4.7-5.3,UA with> 20 hyaline   casts  Treatment: labs, imaging, IVF, Nephrology consult    Defined by Kidney Disease Improving Global Outcomes (KDIGO) clinical practice   guideline for acute kidney injury:  -Increase in SCr by greater than or equal to 0.3 mg/dl within 48 hours; or  -Increase or decrease in SCr to greater than or equal to 1.5 times baseline,   which is known or presumed to have occurred within the prior 7 days; or  -Urine volume < 0.5ml/kg/h for 6 hours    Thank you,  Trish Patel RN CDS  [phone]  Options provided:  -- Acute kidney failure only  -- Acute kidney failure with acute tubular necrosis  -- Other - I will add my own diagnosis  -- Disagree - Not applicable / Not valid  -- Disagree - Clinically unable to determine / Unknown  -- Refer to Clinical Documentation Reviewer    PROVIDER RESPONSE TEXT:    This patient is in Acute kidney failure only.     Query created by: Roseline Hernandez on 2022 9:56 AM      Electronically signed by:  Anil Rehman MD 2022 10:22 AM

## 2022-01-04 NOTE — PROGRESS NOTES
Neurology Service Progress Note  [facility]  Patient Name: Farideh Shepard  : 1950        Subjective:   Reason for consult: Altered mental status  Patient seen and examined. Chart reviewed in detail. Patient laying in bed kicking legs around stating that she  cannot get comfortable. Patient states that she is ready to go home, however when asked what her current location is patient states I do not know. Patient remains pleasantly confused and alert to self, however does state that she lives at home with , but states that she does not know why she is here, but does not know where here is.       Past Medical History:   Diagnosis Date    CAD (coronary artery disease)     Hypertension     :   Past Surgical History:   Procedure Laterality Date    CAROTID ENDARTERECTOMY      CHOLECYSTECTOMY      CORONARY ARTERY BYPASS GRAFT      NECK SURGERY       Medications:  Scheduled Meds:   donepezil  5 mg Oral Nightly    rosuvastatin  40 mg Oral Daily    aspirin  81 mg Oral Daily    Or    aspirin  300 mg Rectal Daily    heparin (porcine)  7,500 Units SubCUTAneous 3 times per day    clopidogrel  75 mg Oral Daily     Continuous Infusions:    PRN Meds:.zinc oxide, polyethylene glycol, acetaminophen **OR** acetaminophen, prochlorperazine, labetalol    Allergies   Allergen Reactions    Penicillins     Sulfa Antibiotics Swelling     Social History     Socioeconomic History    Marital status:      Spouse name: Not on file    Number of children: Not on file    Years of education: Not on file    Highest education level: Not on file   Occupational History    Not on file   Tobacco Use    Smoking status: Current Every Day Smoker     Packs/day: 1.50     Types: Cigarettes    Smokeless tobacco: Never Used   Substance and Sexual Activity    Alcohol use: No    Drug use: No    Sexual activity: Not on file   Other Topics Concern    Not on file   Social History Narrative    Not on file     Social Determinants of Health     Financial Resource Strain:     Difficulty of Paying Living Expenses: Not on file   Food Insecurity:     Worried About Running Out of Food in the Last Year: Not on file    Patricia of Food in the Last Year: Not on file   Transportation Needs:     Lack of Transportation (Medical): Not on file    Lack of Transportation (Non-Medical):  Not on file   Physical Activity:     Days of Exercise per Week: Not on file    Minutes of Exercise per Session: Not on file   Stress:     Feeling of Stress : Not on file   Social Connections:     Frequency of Communication with Friends and Family: Not on file    Frequency of Social Gatherings with Friends and Family: Not on file    Attends [employer] Services: Not on file    Active Member of Clubs or Organizations: Not on file    Attends Club or Organization Meetings: Not on file    Marital Status: Not on file   Intimate Partner Violence:     Fear of Current or Ex-Partner: Not on file    Emotionally Abused: Not on file    Physically Abused: Not on file    Sexually Abused: Not on file   Housing Stability:     Unable to Pay for Housing in the Last Year: Not on file    Number of [place] in the Last Year: Not on file    Unstable Housing in the Last Year: Not on file      Family History   Family history unknown: Yes         Physical Exam:       Vitals:    01/03/22 2215 01/04/22 0400 01/04/22 0836 01/04/22 1000   BP: (!) 158/62 (!) 155/68  (!) 159/88   Pulse: 76 86  78   Resp: 20 19 21 20   Temp: 98 °F (36.7 °C) 98 °F (36.7 °C)  98.7 °F (37.1 °C)   TempSrc: Oral Oral  Oral   SpO2: 96% 97% 96% 95%   Weight:       Height:           Wt Readings from Last 3 Encounters:   01/03/22 151 lb (68.5 kg)   12/22/21 151 lb 0.2 oz (68.5 kg)   10/25/21 210 lb (95.3 kg)     Temp Readings from Last 3 Encounters:   01/04/22 98.7 °F (37.1 °C) (Oral)   12/23/21 96.6 °F (35.9 °C) (Oral)   10/25/21 98.2 °F (36.8 °C) (Oral)     BP Readings from Last 3 Encounters:   01/04/22 (!) 159/88   12/23/21 (!) 164/86   10/25/21 (!) 179/72     Pulse Readings from Last 3 Encounters:   01/04/22 78   12/23/21 83   10/25/21 83        Gen: A&O x 1, NAD, cooperative  HEENT: NC/AT, EOMI, PERRL, mmm, neck supple, no meningeal signs; Heart:SR on monitor  Lungs: Respirations Unlabored  Ext: no edema, no calf tenderness b/l  Psych: normal mood and affect  Skin: no rashes or lesions    NEUROLOGIC EXAM:    Mental Status: A&O to self,  NAD, speech clear, language dysarthric,  follows commands appropriately    Cranial Nerve Exam:   CN II-XII: PERRL, VFF, no nystagmus, no gaze paresis, sensation V1-V3 intact b/l, muscles of facial expression symmetric; hearing intact to conversational tone, palate elevates symmetrically, shoulder elevation symmetric and tongue protrudes midline with movement side to side. Motor Exam:       Strength 4/5 UE's/LE's b/l  Tone and bulk normal   No pronator drift    Deep Tendon Reflexes: 2/4 biceps, triceps, brachioradialis, patellar, and achilles b/l; flexor plantar responses b/l    Sensation: Intact light touch UE's/LE's b/l    Coordination/Cerebellum:       Tremors--none      Rapidly alternating movements: no dysdiadochokinesia b/l                Finger-to-Nose: Mild dysmetria b/l    Gait and stance:      Gait: deferred      LABS:        CBC:   Recent Labs     01/03/22  0730   WBC 7.7   RBC 4.83   HGB 13.8   HCT 43.4      MCV 89.9     BMP:    Recent Labs     01/03/22  0730      K 3.2*      CO2 19*   BUN 50*   CREATININE 4.7*   GLUCOSE 110*   CALCIUM 9.1       IMAGING:    CTH  Impression   No acute hemorrhage or midline shift. CTA from 12/21/21  Impression   No large vessel intracranial occlusion. Short segment severe stenosis of the   P2 segment of the left posterior cerebral artery. Mild areas of stenosis in   the left internal carotid cavernous segment. No other abnormality of the   intracranial circulation.        Severe short segment stenosis of the distal V4 segment of the left vertebral   artery. Severe stenosis of the left internal carotid artery at the junction of the   endarterectomy graft and the left internal carotid artery measuring greater   than 80% using NASCET criteria. Approximately 65% stenosis of the right internal carotid artery in the region   of the bulb. Stenosis at the origin of the right vertebral artery. MRI brain from 12/23/21  Impression   Small areas of acute infarct involving the left frontal lobe from an acute   left middle cerebral artery territory infarct. Subacute areas of infarct   involving the left occipital lobe. An embolic source should be considered. Cerebral atrophy. Severe chronic small vessel ischemic changes. Remote left   occipital lobe infarct. Remote lacunar infarcts in the basal ganglia   bilaterally and right thalamus. Above imaging personally reviewed in detail. ASSESSMENT/PLAN:   65-year-old female patient with past medical history as noted above [facility] with worsening of weakness, worsening of speech, decreased appetite. Patient subsequently diagnosed with COVID-19 infection, OLIVER, and altered mental status. 1.  Altered mental status likely secondary to acute metabolic encephalopathy secondary to a recrudescence of strokelike symptoms versus acute intracranial infarct superimposed on COVID-19 infection and OLIVER. 2.  Dysarthria possibly due to acute intracranial infarct versus recrudescence of  strokelike symptoms secondary to acute infection  3. Left carotid stenosis  -Medications: Aspirin, statin, Plavix as secondary stroke prevention. Patient seen by vascular services as a consult on last admission. Per chart review, vascular's recommendations were to start on Plavix at that time for her vascular stenosis, however, we have started her on Plavix at this time.  Vascular may need reconsulted  -Rehab: PT/OT/ST per their recommendations  Stroke risk factors: CAD, previous stroke, HTN, vascular stenosis  -Follow-up: Neurology as outpatient upon discharge    Neurodiagnostics  -CT head as above  -CTA and MRI from previous admission as above  --Repeat MRI brain pending   --Recommend repeat MRI of the brain as her left carotid stenosis may be continuingly symptomatic. Patient discussed with attending physician Dr. Jose Thompson    Thank you for allowing us to participate in the care of your patient. If there are any questions regarding evaluation please feel free to contact us.

## 2022-01-04 NOTE — CARE COORDINATION
Covid+ 1/2 on room air. Pt with dementia. CM call to Pt daughter Mik Woodward to initiate discharge planning. Pt from home with her . Pt has 4 children that assist her as needed. Pt has DME to include a shower chair and walker. Pt is active with [address] [place] [address] Rd. Pt has insurance, pcp, and can afford medications. Carla requesting update from attending and RN. RN updated, PS to Dr. Theo Manuel to update. Discharge plan at this time is home with support of family and [address] [place] [address] Rd.       CM following

## 2022-01-04 NOTE — PROGRESS NOTES
Nephrology Progress Note  1/4/2022 2:45 PM        Subjective:   Admit Date: 1/2/2022  PCP: Tai Crisostomo MD    Interval History: Patient seen early morning, this is a late entry        ROS: More awake this morning  Urine output recorded 525 cc for the last shift  No fever and acceptable blood pressure        Data:     Current meds:    donepezil  5 mg Oral Nightly    rosuvastatin  40 mg Oral Daily    aspirin  81 mg Oral Daily    Or    aspirin  300 mg Rectal Daily    heparin (porcine)  7,500 Units SubCUTAneous 3 times per day    clopidogrel  75 mg Oral Daily           I/O last 3 completed shifts:  In: -   Out: 525 [Urine:525]    CBC:   Recent Labs     01/03/22  0115 01/03/22  0730   WBC 9.5 7.7   HGB 15.0 13.8    265          Recent Labs     01/03/22  0115 01/03/22  0730 01/04/22  1258    140 146*   K 3.5 3.2* 3.2*    102 106   CO2 18* 19* 22   BUN 48* 50* 42*   CREATININE 5.3* 4.7* 1.8*   GLUCOSE 132* 110* 98       Lab Results   Component Value Date    CALCIUM 8.8 01/04/2022    PHOS 3.9 01/04/2022       Objective:     Vitals: BP (!) 125/58   Pulse 70   Temp 98 °F (36.7 °C) (Oral)   Resp 18   Ht 4' 10\" (1.473 m)   Wt 151 lb (68.5 kg)   SpO2 94%   BMI 31.56 kg/m²     General appearance: Awake   HEENT: 1+ conjunctival pallor  Neck: Supple   Lungs: Coarse breath sound bilaterally  Heart: Seems regular rate and rhythm this morning  Abdomen: Soft, nontender  Extremities: No overt edema  She has a Sherwood catheter      Problem List :         Impression :     1. Stage III acute kidney injury-nonoliguric-likely from ischemic and toxic acute tubular injury-recovering by creatinine criteria  2. Confusion-could have been from underlying infection-specially SARS-CoV-2-unless she has some neurological event  3. She does have history of hypertension and coronary artery disease  4. Hypokalemia likely from poor total body still  5.  She also tested positive for COVID-19    Recommendation/Plan  : 1. If she can tolerate then p.o. food and fluid  2. Rule out any occult infection-she did have some leukocyturia  3. Watch for iatrogenic nosocomial complication  4. Replete potassium specially with coronary artery disease  5.  Follow clinically and biochemically      Any Branham MD MD

## 2022-01-04 NOTE — PROGRESS NOTES
[facility] HOSPITALIST PROGRESS NOTE      PCP: Brooke Lennox, MD    Date of Admission: 1/2/2022    Subjective: no complaints     Brief Hospital summary patient is a 79-year-old female with history of dementia, hypertension, hyperlipidemia, coronary artery disease, obesity and encephalopathy who presented to the ER with worsening aphasia.   Patient was recently discharged on 12/23 with left frontal lobe infarct and subacute left occipital lobe infarct  On admission creatinine was 5.3, potassium 3.5, lisinopril held normal saline started, nephrology consulted  Neurology consulted    Vitals signs:  Afebrile, heart rate 70s to 80s range, blood pressure 155/68, on room air    Medications: Aspirin, Plavix, donepezil, heparin, Crestor    Antibiotics: None     Fluid status: 525 cc     Labs:   D dimer 380    Imaging:   CT abdomen did not show any acute findings, no evidence of obstructive uropathy    Assessment/Plan:     Acute kidney injury:   Creatinine 5.2 on admission, lisinopril held, secondary to dehydration, nephrology consulted  Creatinine trending downwards, continue gentle hydration avoid nephrotoxic medications    Acute metabolic encephalopathy  History of dementia without acute behavioral problem  Recent CVA with expressive aphasia  Generalized weakness  : Most likely secondary to acute kidney injury in setting of recent stroke with recrudescence of symptoms  Continue aspirin Plavix and statin  Neurology consulted, PT OT consultation  MRI brain ordered    SARS-CoV-2 upper respiratory tract infection:   Positive for Covid, currently on room air,  Continue to monitor procalcitonin, CRP  No indication for dexamethasone at this time    Hyperlipidemia: Crestor    History of coronary artery disease: Aspirin statin    Essential hypertension: Amlodipine, lisinopril held, as needed hydralazine    DVT prophlaxis:   Heparin      Physical Exam Performed:       BP (!) 155/68   Pulse 86   Temp 98 °F (36.7 °C) (Oral)   Resp 19   Ht 4' 10\" (1.473 m)   Wt 151 lb (68.5 kg)   SpO2 97%   BMI 31.56 kg/m²     Physical Exam  Constitutional:       General: She is not in acute distress. Appearance: Normal appearance. HENT:      Head: Normocephalic and atraumatic. Right Ear: External ear normal.      Left Ear: External ear normal.   Eyes:      Extraocular Movements: Extraocular movements intact. Pupils: Pupils are equal, round, and reactive to light. Cardiovascular:      Rate and Rhythm: Normal rate and regular rhythm. Heart sounds: No murmur heard. Pulmonary:      Effort: Pulmonary effort is normal. No respiratory distress. Breath sounds: Normal breath sounds. No wheezing. Abdominal:      General: Bowel sounds are normal. There is no distension. Palpations: Abdomen is soft. Tenderness: There is no abdominal tenderness. Musculoskeletal:         General: No swelling. Cervical back: Normal range of motion. Skin:     General: Skin is warm. Neurological:      General: No focal deficit present. Mental Status: She is alert and oriented to person, place, and time. Cranial Nerves: No cranial nerve deficit. Psychiatric:         Mood and Affect: Mood normal.         Labs:   Recent Labs     01/03/22 0115 01/03/22  0730   WBC 9.5 7.7   HGB 15.0 13.8   HCT 46.9 43.4    265     Recent Labs     01/03/22 0115 01/03/22  0730    140   K 3.5 3.2*    102   CO2 18* 19*   BUN 48* 50*   CREATININE 5.3* 4.7*   CALCIUM 9.4 9.1   PHOS  --  6.3*     Recent Labs     01/03/22 0115   AST 21   ALT 7*   BILIDIR 0.2   BILITOT 0.2   ALKPHOS 98     Recent Labs     01/03/22  0730   INR 0.79     No results for input(s): Myles Bartlett in the last 72 hours.     Urinalysis:      Lab Results   Component Value Date    NITRU NEGATIVE 01/03/2022    WBCUA 19 01/03/2022    BACTERIA OCCASIONAL 01/03/2022    RBCUA 4 01/03/2022    BLOODU SMALL 01/03/2022    SPECGRAV 1.020 01/03/2022 Radiology:  CT ABDOMEN PELVIS WO CONTRAST Additional Contrast? None   Final Result   1. No acute findings. No evidence of obstructive uropathy. 2. Colonic diverticulosis. RECOMMENDATIONS:   Unavailable         CT HEAD WO CONTRAST   Final Result   No acute hemorrhage or midline shift. Other findings as described. RECOMMENDATIONS:   Unavailable         XR CHEST PORTABLE   Final Result   No acute cardiopulmonary abnormality.          MRI BRAIN WO CONTRAST    (Results Pending)           Rahul Spaulding MD  1/4/2022 8:07 AM

## 2022-01-05 LAB
ANION GAP SERPL CALCULATED.3IONS-SCNC: 16 MMOL/L (ref 4–16)
BASOPHILS ABSOLUTE: 0 K/CU MM
BASOPHILS RELATIVE PERCENT: 0.1 % (ref 0–1)
BUN BLDV-MCNC: 30 MG/DL (ref 6–23)
C-REACTIVE PROTEIN, HIGH SENSITIVITY: 34.8 MG/L
CALCIUM SERPL-MCNC: 8.6 MG/DL (ref 8.3–10.6)
CHLORIDE BLD-SCNC: 108 MMOL/L (ref 99–110)
CO2: 22 MMOL/L (ref 21–32)
CREAT SERPL-MCNC: 1.2 MG/DL (ref 0.6–1.1)
DIFFERENTIAL TYPE: ABNORMAL
EOSINOPHILS ABSOLUTE: 0 K/CU MM
EOSINOPHILS RELATIVE PERCENT: 0.1 % (ref 0–3)
GFR AFRICAN AMERICAN: 53 ML/MIN/1.73M2
GFR NON-AFRICAN AMERICAN: 44 ML/MIN/1.73M2
GLUCOSE BLD-MCNC: 114 MG/DL (ref 70–99)
HCT VFR BLD CALC: 41.3 % (ref 37–47)
HEMOGLOBIN: 13.3 GM/DL (ref 12.5–16)
IMMATURE NEUTROPHIL %: 0.4 % (ref 0–0.43)
LEGIONELLA URINARY AG: NEGATIVE
LYMPHOCYTES ABSOLUTE: 0.9 K/CU MM
LYMPHOCYTES RELATIVE PERCENT: 12.3 % (ref 24–44)
MCH RBC QN AUTO: 28.4 PG (ref 27–31)
MCHC RBC AUTO-ENTMCNC: 32.2 % (ref 32–36)
MCV RBC AUTO: 88.1 FL (ref 78–100)
MONOCYTES ABSOLUTE: 0.6 K/CU MM
MONOCYTES RELATIVE PERCENT: 8.6 % (ref 0–4)
NUCLEATED RBC %: 0 %
PDW BLD-RTO: 14.4 % (ref 11.7–14.9)
PLATELET # BLD: 339 K/CU MM (ref 140–440)
PMV BLD AUTO: 10.8 FL (ref 7.5–11.1)
POTASSIUM SERPL-SCNC: 3.9 MMOL/L (ref 3.5–5.1)
PROCALCITONIN: 0.07
RBC # BLD: 4.69 M/CU MM (ref 4.2–5.4)
SEGMENTED NEUTROPHILS ABSOLUTE COUNT: 5.8 K/CU MM
SEGMENTED NEUTROPHILS RELATIVE PERCENT: 78.5 % (ref 36–66)
SODIUM BLD-SCNC: 146 MMOL/L (ref 135–145)
STREP PNEUMONIAE ANTIGEN: NORMAL
TOTAL IMMATURE NEUTOROPHIL: 0.03 K/CU MM
TOTAL NUCLEATED RBC: 0 K/CU MM
WBC # BLD: 7.4 K/CU MM (ref 4–10.5)

## 2022-01-05 PROCEDURE — 36415 COLL VENOUS BLD VENIPUNCTURE: CPT

## 2022-01-05 PROCEDURE — 85025 COMPLETE CBC W/AUTO DIFF WBC: CPT

## 2022-01-05 PROCEDURE — 6370000000 HC RX 637 (ALT 250 FOR IP): Performed by: STUDENT IN AN ORGANIZED HEALTH CARE EDUCATION/TRAINING PROGRAM

## 2022-01-05 PROCEDURE — 87449 NOS EACH ORGANISM AG IA: CPT

## 2022-01-05 PROCEDURE — 84145 PROCALCITONIN (PCT): CPT

## 2022-01-05 PROCEDURE — 6370000000 HC RX 637 (ALT 250 FOR IP): Performed by: PSYCHIATRY & NEUROLOGY

## 2022-01-05 PROCEDURE — 80048 BASIC METABOLIC PNL TOTAL CA: CPT

## 2022-01-05 PROCEDURE — 99231 SBSQ HOSP IP/OBS SF/LOW 25: CPT

## 2022-01-05 PROCEDURE — 1200000000 HC SEMI PRIVATE

## 2022-01-05 PROCEDURE — 86140 C-REACTIVE PROTEIN: CPT

## 2022-01-05 PROCEDURE — 94761 N-INVAS EAR/PLS OXIMETRY MLT: CPT

## 2022-01-05 PROCEDURE — 86141 C-REACTIVE PROTEIN HS: CPT

## 2022-01-05 PROCEDURE — 87324 CLOSTRIDIUM AG IA: CPT

## 2022-01-05 PROCEDURE — 6360000002 HC RX W HCPCS: Performed by: STUDENT IN AN ORGANIZED HEALTH CARE EDUCATION/TRAINING PROGRAM

## 2022-01-05 PROCEDURE — 6370000000 HC RX 637 (ALT 250 FOR IP): Performed by: INTERNAL MEDICINE

## 2022-01-05 RX ORDER — LOPERAMIDE HYDROCHLORIDE 2 MG/1
2 CAPSULE ORAL ONCE
Status: DISCONTINUED | OUTPATIENT
Start: 2022-01-05 | End: 2022-01-05

## 2022-01-05 RX ADMIN — ASPIRIN 81 MG: 81 TABLET, COATED ORAL at 08:02

## 2022-01-05 RX ADMIN — HEPARIN SODIUM 7500 UNITS: 5000 INJECTION INTRAVENOUS; SUBCUTANEOUS at 14:57

## 2022-01-05 RX ADMIN — CLOPIDOGREL BISULFATE 75 MG: 75 TABLET ORAL at 08:02

## 2022-01-05 RX ADMIN — ROSUVASTATIN 40 MG: 40 TABLET, FILM COATED ORAL at 08:02

## 2022-01-05 RX ADMIN — HEPARIN SODIUM 7500 UNITS: 5000 INJECTION INTRAVENOUS; SUBCUTANEOUS at 21:39

## 2022-01-05 RX ADMIN — POTASSIUM CHLORIDE 40 MEQ: 1500 TABLET, EXTENDED RELEASE ORAL at 07:53

## 2022-01-05 RX ADMIN — DONEPEZIL HYDROCHLORIDE 5 MG: 5 TABLET, FILM COATED ORAL at 21:39

## 2022-01-05 RX ADMIN — POTASSIUM CHLORIDE 40 MEQ: 1500 TABLET, EXTENDED RELEASE ORAL at 17:41

## 2022-01-05 ASSESSMENT — PAIN SCALES - GENERAL: PAINLEVEL_OUTOF10: 0

## 2022-01-05 NOTE — PROGRESS NOTES
Neurology Service Progress Note  [facility]  Patient Name: Ankush Mendes  : 1950        Subjective:   Reason for consult: Altered mental status  Patient seen and examined. Chart reviewed in detail. Exam findings grossly unchanged, patient mildly dysarthric, however is able to communicate her needs appropriately. Patient remains disoriented to situation, month and date, alert to self and location.     Past Medical History:   Diagnosis Date    CAD (coronary artery disease)     Hypertension     :   Past Surgical History:   Procedure Laterality Date    CAROTID ENDARTERECTOMY      CHOLECYSTECTOMY      CORONARY ARTERY BYPASS GRAFT      NECK SURGERY       Medications:  Scheduled Meds:   donepezil  5 mg Oral Nightly    rosuvastatin  40 mg Oral Daily    aspirin  81 mg Oral Daily    Or    aspirin  300 mg Rectal Daily    heparin (porcine)  7,500 Units SubCUTAneous 3 times per day    clopidogrel  75 mg Oral Daily     Continuous Infusions:    PRN Meds:.zinc oxide, polyethylene glycol, acetaminophen **OR** acetaminophen, prochlorperazine    Allergies   Allergen Reactions    Penicillins     Sulfa Antibiotics Swelling     Social History     Socioeconomic History    Marital status:      Spouse name: Not on file    Number of children: Not on file    Years of education: Not on file    Highest education level: Not on file   Occupational History    Not on file   Tobacco Use    Smoking status: Current Every Day Smoker     Packs/day: 1.50     Types: Cigarettes    Smokeless tobacco: Never Used   Substance and Sexual Activity    Alcohol use: No    Drug use: No    Sexual activity: Not on file   Other Topics Concern    Not on file   Social History Narrative    Not on file     Social Determinants of Health     Financial Resource Strain:     Difficulty of Paying Living Expenses: Not on file   Food Insecurity:     Worried About Running Out of Food in the Last Year: Not on file  Ran Out of Food in the Last Year: Not on file   Transportation Needs:     Lack of Transportation (Medical): Not on file    Lack of Transportation (Non-Medical):  Not on file   Physical Activity:     Days of Exercise per Week: Not on file    Minutes of Exercise per Session: Not on file   Stress:     Feeling of Stress : Not on file   Social Connections:     Frequency of Communication with Friends and Family: Not on file    Frequency of Social Gatherings with Friends and Family: Not on file    Attends Islam Services: Not on file    Active Member of [address] [place] [address] [employer] or Organizations: Not on file    Attends Club or Organization Meetings: Not on file    Marital Status: Not on file   Intimate Partner Violence:     Fear of Current or Ex-Partner: Not on file    Emotionally Abused: Not on file    Physically Abused: Not on file    Sexually Abused: Not on file   Housing Stability:     Unable to Pay for Housing in the Last Year: Not on file    Number of [place] in the Last Year: Not on file    Unstable Housing in the Last Year: Not on file      Family History   Family history unknown: Yes         Physical Exam:       Vitals:    01/05/22 0630 01/05/22 0745 01/05/22 1315 01/05/22 1450   BP:  (!) 143/64 (!) 131/105 139/70   Pulse:  71 77 74   Resp:  21 21 20   Temp:  97.3 °F (36.3 °C)  97.7 °F (36.5 °C)   TempSrc:  Oral  Oral   SpO2:  94%  95%   Weight: 138 lb 14.2 oz (63 kg)      Height:           Wt Readings from Last 3 Encounters:   01/05/22 138 lb 14.2 oz (63 kg)   12/22/21 151 lb 0.2 oz (68.5 kg)   10/25/21 210 lb (95.3 kg)     Temp Readings from Last 3 Encounters:   01/05/22 97.7 °F (36.5 °C) (Oral)   12/23/21 96.6 °F (35.9 °C) (Oral)   10/25/21 98.2 °F (36.8 °C) (Oral)     BP Readings from Last 3 Encounters:   01/05/22 139/70   12/23/21 (!) 164/86   10/25/21 (!) 179/72     Pulse Readings from Last 3 Encounters:   01/05/22 74   12/23/21 83   10/25/21 83        Gen: A&O x 1, NAD, cooperative  HEENT: NC/AT, EOMI, PERRL, mmm, neck supple, no meningeal signs; Heart:SR on monitor  Lungs: Respirations Unlabored  Ext: no edema, no calf tenderness b/l  Psych: normal mood and affect  Skin: no rashes or lesions    NEUROLOGIC EXAM:    Mental Status: A&O to self,  NAD, speech clear, language dysarthric,  follows commands appropriately    Cranial Nerve Exam:   CN II-XII: PERRL, VFF, no nystagmus, no gaze paresis, sensation V1-V3 intact b/l, muscles of facial expression symmetric; hearing intact to conversational tone, palate elevates symmetrically, shoulder elevation symmetric and tongue protrudes midline with movement side to side. Motor Exam:       Strength 4/5 UE's/LE's b/l  Tone and bulk normal   No pronator drift    Deep Tendon Reflexes: 2/4 biceps, triceps, brachioradialis, patellar, and achilles b/l; flexor plantar responses b/l    Sensation: Intact light touch UE's/LE's b/l    Coordination/Cerebellum:       Tremors--none      Rapidly alternating movements: no dysdiadochokinesia b/l                Finger-to-Nose: Mild dysmetria b/l    Gait and stance:      Gait: deferred      LABS:        CBC:   Recent Labs     01/05/22  1111   WBC 7.4   RBC 4.69   HGB 13.3   HCT 41.3      MCV 88.1     BMP:    Recent Labs     01/05/22  1111   *   K 3.9      CO2 22   BUN 30*   CREATININE 1.2*   GLUCOSE 114*   CALCIUM 8.6       IMAGING:    CTH  Impression   No acute hemorrhage or midline shift. CTA from 12/21/21  Impression   No large vessel intracranial occlusion. Short segment severe stenosis of the   P2 segment of the left posterior cerebral artery. Mild areas of stenosis in   the left internal carotid cavernous segment. No other abnormality of the   intracranial circulation. Severe short segment stenosis of the distal V4 segment of the left vertebral   artery.        Severe stenosis of the left internal carotid artery at the junction of the   endarterectomy graft and the left internal carotid artery measuring greater   than 80% using NASCET criteria. Approximately 65% stenosis of the right internal carotid artery in the region   of the bulb. Stenosis at the origin of the right vertebral artery. MRI brain from 12/23/21  Impression   Small areas of acute infarct involving the left frontal lobe from an acute   left middle cerebral artery territory infarct. Subacute areas of infarct   involving the left occipital lobe. An embolic source should be considered. Cerebral atrophy. Severe chronic small vessel ischemic changes. Remote left   occipital lobe infarct. Remote lacunar infarcts in the basal ganglia   bilaterally and right thalamus. MRI brain 1/4/2022  Impression   1. New acute infarcts in the right middle cerebral and left posterior   cerebral artery territories.  Evolving infarcts in the left middle cerebral   artery territory.  No acute hemorrhage given artifact. 2. Other findings as described. Above imaging personally reviewed in detail. ASSESSMENT/PLAN:   79-year-old female patient with past medical history as noted above presents to [facility] with worsening of weakness, worsening of speech, decreased appetite. Patient subsequently diagnosed with COVID-19 infection, OLIVER, and altered mental status. Exam findings grossly unchanged, patient mildly dysarthric, however is able to communicate her needs appropriately. Patient remains disoriented, alert to self and location. Multiple acute cerebral infarcts noted to repeat MRI. See plan below:  1. Altered mental status likely secondary to acute metabolic encephalopathy secondary to a recrudescence of strokelike symptoms vs  acute intracranial infarct superimposed on COVID-19 infection and OLIVER.     2.  Dysarthria due to  acute infarcts in the right MCA, left PCA  Territories and Left MCA territory.    --Recommend cardiology consult for possible cardioembolic etiology  -Recommend KELSEY and loop recorder  -Patient is on aspirin, Plavix, statin for secondary stroke prevention    3. Left carotid stenosis  -Medications: Aspirin, statin, Plavix as secondary stroke prevention. Patient seen by vascular services as a consult on last admission. Per chart review, vascular's recommendations were to start on Plavix at that time for her vascular stenosis, however, we have started her on Plavix at this time. Vascular may need reconsulted  -Rehab: PT/OT/ST per their recommendations  Stroke risk factors: CAD, previous stroke, HTN, vascular stenosis  -Follow-up: Neurology as outpatient upon discharge    Neurodiagnostics  -CT head as above  -CTA and MRI from previous admission as above  --Repeat MRI brain as above  --Repeated MRI of the brain as her left carotid stenosis may be continuingly symptomatic. Patient discussed with attending physician Dr. Pati Steward    Thank you for allowing us to participate in the care of your patient. If there are any questions regarding evaluation please feel free to contact us.        (Please note that portions of this note were completed with a voice recognition program.  Efforts were made to edit the dictations but occasionally words are mistranscribed.)

## 2022-01-05 NOTE — PROGRESS NOTES
Nephrology Progress Note  1/5/2022 4:40 PM        Subjective:   Admit Date: 1/2/2022  PCP: Natalya Paez MD    Interval History: Patient seen early morning, the seated entry    Diet: Probably some    ROS: She has a cough this morning and had good conversation with me-although she probably has underlying severe dementia  Urine output recorded only 400 cc for the last shift  No fever and acceptable blood pressure      Data:     Current meds:    potassium chloride  40 mEq Oral BID WC    donepezil  5 mg Oral Nightly    rosuvastatin  40 mg Oral Daily    aspirin  81 mg Oral Daily    Or    aspirin  300 mg Rectal Daily    heparin (porcine)  7,500 Units SubCUTAneous 3 times per day    clopidogrel  75 mg Oral Daily           I/O last 3 completed shifts:  In: -   Out: 825 [Urine:825]    CBC:   Recent Labs     01/03/22  0115 01/03/22  0730 01/05/22  1111   WBC 9.5 7.7 7.4   HGB 15.0 13.8 13.3    265 339          Recent Labs     01/03/22  0730 01/04/22  1258 01/05/22  1111    146* 146*   K 3.2* 3.2* 3.9    106 108   CO2 19* 22 22   BUN 50* 42* 30*   CREATININE 4.7* 1.8* 1.2*   GLUCOSE 110* 98 114*       Lab Results   Component Value Date    CALCIUM 8.6 01/05/2022    PHOS 3.9 01/04/2022       Objective:     Vitals: /70   Pulse 74   Temp 97.7 °F (36.5 °C) (Oral)   Resp 20   Ht 4' 10\" (1.473 m)   Wt 138 lb 14.2 oz (63 kg)   SpO2 95%   BMI 29.03 kg/m²     General appearance: She was alert awake but not oriented at all  HEENT: At least 1+ conjunctival pallor-she seems to be edentulous  Neck: Supple  Lungs:  Few rhonchi on auscultation milliliter posterior lung field  Heart: Seems regular rate and rhythm  Abdomen: Soft, nontender on palpation  Extremities: Trace lower extremity edema  She has a [place] with very concentrated urine in the bag      Problem List :         Impression :     1. Stage III acute kidney injury-nonoliguric but low urine output and concentrated urine-recovering by

## 2022-01-05 NOTE — PROGRESS NOTES
[facility] HOSPITALIST PROGRESS NOTE      PCP: Lexus German MD    Date of Admission: 1/2/2022    Subjective: one word response      Brief Hospital summary patient is a 76-year-old female with history of dementia, hypertension, hyperlipidemia, coronary artery disease, obesity and encephalopathy who presented to the ER with worsening aphasia. Patient was recently discharged on 12/23 with left frontal lobe infarct and subacute left occipital lobe infarct  On admission creatinine was 5.3, potassium 3.5, lisinopril held normal saline started, nephrology consulted  Neurology consulted    Vitals signs: Afebrile, heart rate 70s, blood pressure 1 43/64, on room air    Medications: Aspirin, Plavix, donepezil, heparin, Crestor    Antibiotics: None     Fluid status: 300 cc  Labs:   Sodium 146, potassium 3.2, chloride 1 6, bicarb 20, creatinine 1.8  Mag 1.9  LFTs normal    Imaging:   CT abdomen did not show any acute findings, no evidence of obstructive uropathy    Assessment/Plan:     Acute kidney injury:   Creatinine 5.2 on admission, lisinopril held, secondary to dehydration, nephrology consulted  Continue gentle hydration  Avoid nephrotoxic medication  Creatinine down to 1.6    Acute metabolic encephalopathy  History of dementia without acute behavioral problem  Recent CVA with expressive aphasia  Generalized weakness  : Most likely secondary to acute kidney injury in setting of recent stroke with recrudescence of symptoms  Continue aspirin Plavix and statin  Neurology consulted, PT OT consultation  MRI brain shows new infarct in the right middle cerebral and right posterior cerebral arteries.   Check echocardiogram per neuro  May need a KELSEY  We will consult vascular surgery    SARS-CoV-2 upper respiratory tract infection:   Positive for Covid, currently on room air,   Continue to monitor procalcitonin, CRP  No indication for dexamethasone at this time    Hyperlipidemia: Crestor    History of coronary artery disease: Aspirin statin    Essential hypertension: Amlodipine, lisinopril held, as needed hydralazine    DVT prophlaxis:   Heparin      Physical Exam Performed:       BP (!) 143/64   Pulse 71   Temp 97.3 °F (36.3 °C) (Oral)   Resp 21   Ht 4' 10\" (1.473 m)   Wt 138 lb 14.2 oz (63 kg)   SpO2 94%   BMI 29.03 kg/m²     Physical Exam  Constitutional:       General: She is not in acute distress. Appearance: Normal appearance. HENT:      Head: Normocephalic and atraumatic. Right Ear: External ear normal.      Left Ear: External ear normal.   Eyes:      Extraocular Movements: Extraocular movements intact. Pupils: Pupils are equal, round, and reactive to light. Cardiovascular:      Rate and Rhythm: Normal rate and regular rhythm. Heart sounds: No murmur heard. Pulmonary:      Effort: Pulmonary effort is normal. No respiratory distress. Breath sounds: Normal breath sounds. No wheezing. Abdominal:      General: Bowel sounds are normal. There is no distension. Palpations: Abdomen is soft. Tenderness: There is no abdominal tenderness. Musculoskeletal:         General: No swelling. Cervical back: Normal range of motion. Skin:     General: Skin is warm. Neurological:      General: No focal deficit present. Mental Status: She is alert. Cranial Nerves: No cranial nerve deficit.       Comments: Expressive aphasia    Psychiatric:         Mood and Affect: Mood normal.         Labs:   Recent Labs     01/03/22  0115 01/03/22  0730   WBC 9.5 7.7   HGB 15.0 13.8   HCT 46.9 43.4    265     Recent Labs     01/03/22  0115 01/03/22  0730 01/04/22  1258    140 146*   K 3.5 3.2* 3.2*    102 106   CO2 18* 19* 22   BUN 48* 50* 42*   CREATININE 5.3* 4.7* 1.8*   CALCIUM 9.4 9.1 8.8   PHOS  --  6.3* 3.9     Recent Labs     01/03/22  0115 01/04/22  1258   AST 21 21   ALT 7* <5*   BILIDIR 0.2  --    BILITOT 0.2 0.3   ALKPHOS 98 83     Recent Labs 01/03/22  0730   INR 0.79     No results for input(s): Traci Smiley in the last 72 hours. Urinalysis:      Lab Results   Component Value Date    NITRU NEGATIVE 01/03/2022    WBCUA 19 01/03/2022    BACTERIA OCCASIONAL 01/03/2022    RBCUA 4 01/03/2022    BLOODU SMALL 01/03/2022    SPECGRAV 1.020 01/03/2022       Radiology:  MRI BRAIN WO CONTRAST   Final Result   1. New acute infarcts in the right middle cerebral and left posterior   cerebral artery territories. Evolving infarcts in the left middle cerebral   artery territory. No acute hemorrhage given artifact. 2. Other findings as described. RECOMMENDATIONS:   Unavailable         CT ABDOMEN PELVIS WO CONTRAST Additional Contrast? None   Final Result   1. No acute findings. No evidence of obstructive uropathy. 2. Colonic diverticulosis. RECOMMENDATIONS:   Unavailable         CT HEAD WO CONTRAST   Final Result   No acute hemorrhage or midline shift. Other findings as described. RECOMMENDATIONS:   Unavailable         XR CHEST PORTABLE   Final Result   No acute cardiopulmonary abnormality.                  Denisha Valdez MD  1/5/2022 10:35 AM

## 2022-01-06 LAB
ALBUMIN SERPL-MCNC: 4 GM/DL (ref 3.4–5)
ALP BLD-CCNC: 90 IU/L (ref 40–128)
ALT SERPL-CCNC: 7 U/L (ref 10–40)
ANION GAP SERPL CALCULATED.3IONS-SCNC: 12 MMOL/L (ref 4–16)
AST SERPL-CCNC: 23 IU/L (ref 15–37)
BILIRUB SERPL-MCNC: 0.3 MG/DL (ref 0–1)
BUN BLDV-MCNC: 20 MG/DL (ref 6–23)
C DIFF AG + TOXIN: NORMAL
CALCIUM SERPL-MCNC: 9 MG/DL (ref 8.3–10.6)
CHLORIDE BLD-SCNC: 109 MMOL/L (ref 99–110)
CO2: 23 MMOL/L (ref 21–32)
CREAT SERPL-MCNC: 1 MG/DL (ref 0.6–1.1)
GFR AFRICAN AMERICAN: >60 ML/MIN/1.73M2
GFR NON-AFRICAN AMERICAN: 55 ML/MIN/1.73M2
GLUCOSE BLD-MCNC: 115 MG/DL (ref 70–99)
POTASSIUM SERPL-SCNC: 4.1 MMOL/L (ref 3.5–5.1)
SODIUM BLD-SCNC: 144 MMOL/L (ref 135–145)
SOURCE: NORMAL
TOTAL PROTEIN: 6.7 GM/DL (ref 6.4–8.2)

## 2022-01-06 PROCEDURE — 94761 N-INVAS EAR/PLS OXIMETRY MLT: CPT

## 2022-01-06 PROCEDURE — 99223 1ST HOSP IP/OBS HIGH 75: CPT | Performed by: INTERNAL MEDICINE

## 2022-01-06 PROCEDURE — 6360000002 HC RX W HCPCS: Performed by: STUDENT IN AN ORGANIZED HEALTH CARE EDUCATION/TRAINING PROGRAM

## 2022-01-06 PROCEDURE — 36415 COLL VENOUS BLD VENIPUNCTURE: CPT

## 2022-01-06 PROCEDURE — 6370000000 HC RX 637 (ALT 250 FOR IP): Performed by: HOSPITALIST

## 2022-01-06 PROCEDURE — 1200000000 HC SEMI PRIVATE

## 2022-01-06 PROCEDURE — 99222 1ST HOSP IP/OBS MODERATE 55: CPT | Performed by: THORACIC SURGERY (CARDIOTHORACIC VASCULAR SURGERY)

## 2022-01-06 PROCEDURE — 80053 COMPREHEN METABOLIC PANEL: CPT

## 2022-01-06 PROCEDURE — 99231 SBSQ HOSP IP/OBS SF/LOW 25: CPT

## 2022-01-06 PROCEDURE — 6370000000 HC RX 637 (ALT 250 FOR IP): Performed by: PSYCHIATRY & NEUROLOGY

## 2022-01-06 PROCEDURE — 6370000000 HC RX 637 (ALT 250 FOR IP): Performed by: STUDENT IN AN ORGANIZED HEALTH CARE EDUCATION/TRAINING PROGRAM

## 2022-01-06 RX ORDER — LISINOPRIL 5 MG/1
5 TABLET ORAL DAILY
Status: DISCONTINUED | OUTPATIENT
Start: 2022-01-06 | End: 2022-01-11 | Stop reason: HOSPADM

## 2022-01-06 RX ADMIN — HEPARIN SODIUM 7500 UNITS: 5000 INJECTION INTRAVENOUS; SUBCUTANEOUS at 21:40

## 2022-01-06 RX ADMIN — ASPIRIN 81 MG: 81 TABLET, COATED ORAL at 11:03

## 2022-01-06 RX ADMIN — DONEPEZIL HYDROCHLORIDE 5 MG: 5 TABLET, FILM COATED ORAL at 21:40

## 2022-01-06 RX ADMIN — CLOPIDOGREL BISULFATE 75 MG: 75 TABLET ORAL at 11:03

## 2022-01-06 RX ADMIN — HEPARIN SODIUM 7500 UNITS: 5000 INJECTION INTRAVENOUS; SUBCUTANEOUS at 14:49

## 2022-01-06 RX ADMIN — ROSUVASTATIN 40 MG: 40 TABLET, FILM COATED ORAL at 11:03

## 2022-01-06 RX ADMIN — LISINOPRIL 5 MG: 5 TABLET ORAL at 11:04

## 2022-01-06 RX ADMIN — PROCHLORPERAZINE EDISYLATE 10 MG: 5 INJECTION INTRAMUSCULAR; INTRAVENOUS at 21:41

## 2022-01-06 RX ADMIN — HEPARIN SODIUM 7500 UNITS: 5000 INJECTION INTRAVENOUS; SUBCUTANEOUS at 07:10

## 2022-01-06 ASSESSMENT — PAIN SCALES - GENERAL
PAINLEVEL_OUTOF10: 0
PAINLEVEL_OUTOF10: 0

## 2022-01-06 NOTE — CONSULTS
CARDIOLOGY CONSULT NOTE   Reason for consultation:  CVA    Referring physician:  Frances Nair DO     Primary care physician: Tracie Chicas MD      Dear  Dr. Frances Nair DO   Thanks for the consult. Chief Complaints :  Chief Complaint   Patient presents with    Aphasia     worsening over the last week    Fatigue     pt unable to walk to cot for EMS, patient can normally walk        History of present illness:Carla is a 66 y. o.year old who presents with expressive aphasia in setting of underlying dementia and encephalopathy which was initially seen 2 weeks ago due to acute left frontal lobe infarct and late subacute left occipital infarct. Her work-up now shows bilateral cerebral new strokes which are evolving with no evidence of hemorrhage in spite of being on aspirin and Plavix cardiology consulted to evaluate for possible cardioembolic source of strokes further complicating issue is she is diagnosed with having Covid the chest x-ray is normal  She denies any chest pain or palpitations  CT scan is concerning for bilateral significant carotid disease    Past medical history:    has a past medical history of CAD (coronary artery disease) and Hypertension. Past surgical history:   has a past surgical history that includes Coronary artery bypass graft; Neck surgery; Carotid endarterectomy; and Cholecystectomy. Social History:   reports that she has been smoking cigarettes. She has been smoking about 1.50 packs per day. She has never used smokeless tobacco. She reports that she does not drink alcohol and does not use drugs.   Family history:   no family history of CAD, STROKE of DM at early age    Allergies   Allergen Reactions    Penicillins     Sulfa Antibiotics Swelling       lisinopril (PRINIVIL;ZESTRIL) tablet 5 mg, Daily  zinc oxide (DESITIN) 40 % ointment, BID PRN  donepezil (ARICEPT) tablet 5 mg, Nightly  rosuvastatin (CRESTOR) tablet 40 mg, Daily  polyethylene glycol (GLYCOLAX) packet 17 g, Daily PRN  aspirin EC tablet 81 mg, Daily   Or  aspirin suppository 300 mg, Daily  acetaminophen (TYLENOL) tablet 650 mg, Q6H PRN   Or  acetaminophen (TYLENOL) suppository 650 mg, Q6H PRN  prochlorperazine (COMPAZINE) injection 10 mg, Q6H PRN  heparin (porcine) injection 7,500 Units, 3 times per day  clopidogrel (PLAVIX) tablet 75 mg, Daily      Current Facility-Administered Medications   Medication Dose Route Frequency Provider Last Rate Last Admin    lisinopril (PRINIVIL;ZESTRIL) tablet 5 mg  5 mg Oral Daily Ashok Zarco MD   5 mg at 01/06/22 1104    zinc oxide (DESITIN) 40 % ointment   Topical BID PRN Corita Fiddler, DO        donepezil (ARICEPT) tablet 5 mg  5 mg Oral Nightly Corita Fiddler, DO   5 mg at 01/05/22 2139    rosuvastatin (CRESTOR) tablet 40 mg  40 mg Oral Daily Corita Fiddler, DO   40 mg at 01/06/22 1103    polyethylene glycol (GLYCOLAX) packet 17 g  17 g Oral Daily PRN Corita Fiddler, DO        aspirin EC tablet 81 mg  81 mg Oral Daily Corita Fiddler, DO   81 mg at 01/06/22 1103    Or    aspirin suppository 300 mg  300 mg Rectal Daily Corita Fiddler, DO        acetaminophen (TYLENOL) tablet 650 mg  650 mg Oral Q6H PRN Corita Fiddler, DO   650 mg at 01/04/22 1011    Or    acetaminophen (TYLENOL) suppository 650 mg  650 mg Rectal Q6H PRN Corita Fiddler, DO        prochlorperazine (COMPAZINE) injection 10 mg  10 mg IntraVENous Q6H PRN Corita Fiddler, DO        heparin (porcine) injection 7,500 Units  7,500 Units SubCUTAneous 3 times per day Corita Fiddler, DO   7,500 Units at 01/06/22 0710    clopidogrel (PLAVIX) tablet 75 mg  75 mg Oral Daily Javier Cole, DO   75 mg at 01/06/22 1103     Review of Systems:   · Constitutional: No Fever or Weight Loss   · Eyes: No Decreased Vision  · ENT: No Headaches, Hearing Loss or Vertigo  · Cardiovascular: As per HPI  · Respiratory: As per HPI  · Gastrointestinal: No abdominal pain, appetite loss, blood in stools, constipation, diarrhea or heartburn  · Genitourinary: No dysuria, trouble voiding, or hematuria  · Musculoskeletal:  No gait disturbance, weakness or joint complaints  · Integumentary: No rash or pruritis  · Neurological: No TIA or stroke symptoms  · Psychiatric: No anxiety or depression  · Endocrine: No malaise, fatigue or temperature intolerance  · Hematologic/Lymphatic: No bleeding problems, blood clots or swollen lymph nodes  · Allergic/Immunologic: No nasal congestion or hives  All systems negative except as marked. Physical Examination:    Vitals:    01/05/22 2143 01/06/22 0156 01/06/22 0934 01/06/22 1101   BP: (!) 163/75 114/84  (!) 166/70   Pulse: 85 88  84   Resp: 21 19 (!) 32 25   Temp: 97.9 °F (36.6 °C) 97.8 °F (36.6 °C)     TempSrc: Oral Oral     SpO2:   93% 94%   Weight:  143 lb 8.3 oz (65.1 kg)     Height:           General Appearance:  No distress, conversant    Constitutional:  Well developed, Well nourished, No acute distress, Non-toxic appearance. HENT:  Normocephalic, Atraumatic, Bilateral external ears normal, Oropharynx moist, No oral exudates, Nose normal. Neck- Normal range of motion, No tenderness, Supple, No stridor,no apical-carotid delay  Lymphatics : no palpable lymph nodes  Eyes:  PERRL, EOMI, Conjunctiva normal, No discharge. Respiratory:  Normal breath sounds, No respiratory distress, No wheezing, No chest tenderness. ,no use of accessory muscles, crackles Absent   Cardiovascular: (PMI) apex non displaced,no lifts no thrills, ankle swelling Absent  , 1+, s1 and s2 audible,Murmur. Present, JVD not noted    Abdomen /GI:  Bowel sounds normal, Soft, No tenderness, No masses, No gross visceromegaly   :  No costovertebral angle tenderness   Musculoskeletal:  No edema, no tenderness, no deformities.  Back- no tenderness  Integument:  Well hydrated, no rash   Lymphatic:  No lymphadenopathy noted   Neurologic:  Alert & oriented x 3, CN 2-12 normal, normal motor function, normal sensory function, no focal deficits noted           Medical decision making and Data review:    Lab Review   Recent Labs     01/05/22  1111   WBC 7.4   HGB 13.3   HCT 41.3         Recent Labs     01/04/22  1258 01/05/22  1111 01/06/22  0939   *   < > 144   K 3.2*   < > 4.1      < > 109   CO2 22   < > 23   PHOS 3.9  --   --    BUN 42*   < > 20   CREATININE 1.8*   < > 1.0    < > = values in this interval not displayed. Recent Labs     01/06/22  0939   AST 23   ALT 7*   BILITOT 0.3   ALKPHOS 90     No results for input(s): TROPONINT in the last 72 hours. No results for input(s): PROBNP in the last 72 hours. Lab Results   Component Value Date    INR 0.79 01/03/2022    PROTIME 10.2 (L) 01/03/2022       EKG: (reviewed by myself)    ECHO:(reviewed by myself)    Chest Xray:(reviewed by myself)  Echocardiogram complete 2D with doppler with color    Result Date: 12/22/2021  Transthoracic Echocardiography Report (TTE)  Demographics   Patient Name       Shilpa Goldberg    Date of Study       12/22/2021   Date of Birth      1950         Gender              Female   Age                66 year(s)         Race                Unknown   Patient Number     [id number]         Room Number         3108   Visit Number       [id number]   Corporate ID       [id number]   Accession Number   [id number]         Bro Waggoner RVT   Ordering Physician Aurea Morris MD                 Physician           Shell ADAMS  Procedure Type of Study   TTE procedure:ECHOCARDIOGRAM COMPLETE 2D W DOPPLER W COLOR. Procedure Date Date: 12/22/2021 Start: 08:36 AM Study Location: Portable Technical Quality: Adequate visualization Indications:CVA.  Patient Status: Routine Height: 58 inches Weight: 155 pounds BSA: 1.63 m2 BMI: 32.39 kg/m2 HR: 86 bpm BP: 154/69 mmHg  Conclusions   Summary  Left ventricular systolic function is normal.  Ejection fraction is visually estimated at 55%. Mild left ventricular hypertrophy. Grade I diastolic dysfunction. Suboptimal bubble study; color Doppler does not suggest PFO or ASD. No evidence of any pericardial effusion. Signature   ------------------------------------------------------------------  Electronically signed by Hood Mclain MD  (Interpreting physician) on 12/22/2021 at 10:50 AM  ------------------------------------------------------------------   Findings   Left Ventricle  Left ventricular systolic function is normal.  Ejection fraction is visually estimated at 55%. Mild left ventricular hypertrophy. Grade I diastolic dysfunction. Left ventricle size is normal.  No regional wall motion abnormalities. Left Atrium  Suboptimal bubble study; color Doppler does not suggest PFO or ASD. Right Atrium  Essentially normal right atrium. Right Ventricle  Essentially normal right ventricle. Aortic Valve  Structurally normal aortic valve. Mitral Valve  Structurally normal mitral valve. Tricuspid Valve  Structurally normal tricuspid valve. Pulmonic Valve  The pulmonic valve was not well visualized. Pericardial Effusion  No evidence of any pericardial effusion. Pleural Effusion  No evidence of pleural effusion. Miscellaneous  Aorta was not clearly visualized.   M-Mode/2D Measurements & Calculations   LV Diastolic Dimension:  LV Systolic Dimension:  LA Dimension: 4.1 cmAO Root  2.6 cm                   1.43 cm                 Dimension: 2.8 cmLA Area:  LV FS:45 %               LV Volume Diastolic: 50 08.8 cm2  LV PW Diastolic: 9.65 cm ml  LV PW Systolic: 6.09 cm  LV Volume Systolic: 26  Septum Diastolic: 0.14   ml  cm                       LV EDV/LV EDV Index: 50 RV Diastolic Dimension:  Septum Systolic: 3.21 cm EF/14 O4RR ESV/LV ESV   2.08 cm  CO: 5.6 l/min            Index: 26 ml/16 m2  CI: 3.44 l/m*m2          EF Calculated (A4C): 48 LA/Aorta: 1.46 %  LV Area Diastolic: 78.1  EF Calculated (2D):     LA volume/Index: 55 ml  cm2                      78.3 %                  /07N8  LV Area Systolic: 14 cm2                           LV Length: 6.96 cm                            LVOT: 2.1 cm  Doppler Measurements & Calculations   MV Peak E-Wave: 69.6    AV Peak Velocity: 138 cm/s   LVOT Peak Velocity:  cm/s                    AV Peak Gradient: 7.62 mmHg  97.8 cm/s  MV Peak A-Wave: 117     AV Mean Velocity: 105 cm/s   LVOT Mean Velocity:  cm/s                    AV Mean Gradient: 5 mmHg     73.6 cm/s  MV E/A Ratio: 0.59      AV VTI: 32.4 cm              LVOT Peak Gradient: 4  MV Peak Gradient: 1.94  AV Area (Continuity):2.01    mmHgLVOT Mean Gradient:  mmHg                    cm2                          2 mmHg   MV P1/2t: 62 msec       LVOT VTI: 18.8 cm  MVA by PHT:3.55 cm2   MV E' Septal Velocity:  3.62 cm/s  MV E' Lateral Velocity:  5.59 cm/s  MV E/E' septal: 19.23  MV E/E' lateral: 12.45      CT ABDOMEN PELVIS WO CONTRAST Additional Contrast? None    Result Date: 1/3/2022  EXAMINATION: CT OF THE ABDOMEN AND PELVIS WITHOUT CONTRAST 1/3/2022 3:12 am TECHNIQUE: CT of the abdomen and pelvis was performed without the administration of intravenous contrast. Multiplanar reformatted images are provided for review. Dose modulation, iterative reconstruction, and/or weight based adjustment of the mA/kV was utilized to reduce the radiation dose to as low as reasonably achievable. COMPARISON: CT abdomen and pelvis dated October 21, 2019. HISTORY: ORDERING SYSTEM PROVIDED HISTORY: Evaluate for obstructive uropathy TECHNOLOGIST PROVIDED HISTORY: Reason for exam:->Evaluate for obstructive uropathy Additional Contrast?->None Decision Support Exception - unselect if not a suspected or confirmed emergency medical condition->Emergency Medical Condition (MA) Reason for Exam: Aphasia; Fatigue FINDINGS: Lower Chest: The visualized lungs are clear. Organs:  There is been a cholecystectomy. The liver, spleen, adrenal glands, pancreas, and kidneys are grossly unremarkable. There is no evidence of hydronephrosis. GI/Bowel: Colonic diverticulosis is seen. There is no bowel obstruction. The appendix is normal. Pelvis: The bladder is unremarkable. There has been a hysterectomy. There is no free fluid. Peritoneum/Retroperitoneum: There is no free air or lymphadenopathy. Atherosclerotic disease of the aorta is seen. There is a small fat containing umbilical hernia. Bones/Soft Tissues: No destructive osseous lesions are identified. 1. No acute findings. No evidence of obstructive uropathy. 2. Colonic diverticulosis. RECOMMENDATIONS: Unavailable     CT HEAD WO CONTRAST    Result Date: 1/2/2022  EXAMINATION: CT OF THE HEAD WITHOUT CONTRAST  1/2/2022 10:12 pm TECHNIQUE: CT of the head was performed without the administration of intravenous contrast. Dose modulation, iterative reconstruction, and/or weight based adjustment of the mA/kV was utilized to reduce the radiation dose to as low as reasonably achievable. COMPARISON: CT head 12/21/2021. HISTORY: ORDERING SYSTEM PROVIDED HISTORY: Weakness TECHNOLOGIST PROVIDED HISTORY: Reason for exam:->Weakness Has a \"code stroke\" or \"stroke alert\" been called? ->No Decision Support Exception - unselect if not a suspected or confirmed emergency medical condition->Emergency Medical Condition (MA) Reason for Exam: Aphasia; Fatigue FINDINGS: BRAIN/VENTRICLES: No acute hemorrhage. Periventricular and subcortical hypoattenuation is nonspecific and may be related to microvascular disease. Encephalomalacia in the left cerebellum. Encephalomalacia in the left temporal occipital lobe. Encephalomalacia in the basal ganglia. Chronic lacunar infarcts in the basal ganglia and right thalamus. Artifact partially obscures the cindy. [place] white differentiation appears maintained given artifact near the skull base and through the posterior fossa.   Mild prominence of the ventricles again visualized. There is no midline shift. Basal cisterns appear patent. ORBITS: Thinning of the left lens again visualized. Gweneth Latch SINUSES: Mild mucosal thickening of the ethmoid sinuses. Partial opacification of the left ethmoid sinus. Partial opacification of the inferior mastoid air cells. SOFT TISSUES/SKULL: No depressed calvarial fracture. No acute hemorrhage or midline shift. Other findings as described. RECOMMENDATIONS: Unavailable     CT HEAD WO CONTRAST    Result Date: 12/22/2021  EXAMINATION: CT OF THE HEAD WITHOUT CONTRAST  12/21/2021 1:02 pm TECHNIQUE: CT of the head was performed without the administration of intravenous contrast. Dose modulation, iterative reconstruction, and/or weight based adjustment of the mA/kV was utilized to reduce the radiation dose to as low as reasonably achievable. COMPARISON: 10/25/2021 HISTORY: ORDERING SYSTEM PROVIDED HISTORY: possible stroke 3 days ago TECHNOLOGIST PROVIDED HISTORY: Reason for exam:->possible stroke 3 days ago Has a \"code stroke\" or \"stroke alert\" been called? ->No Decision Support Exception - unselect if not a suspected or confirmed emergency medical condition->Emergency Medical Condition (MA) Reason for Exam: possible stroke 3 days ago Initial evaluation FINDINGS: BRAIN/VENTRICLES:  The ventricles and cisternal spaces are prominent consistent with cerebral atrophy. There is an area of previous injury or infarct in the left occipital lobe. There are chronic small vessel ischemic changes in the periventricular white matter. There are remote lacunar infarcts in the basal ganglia bilaterally. There is atherosclerotic calcification of the cavernous carotid arteries and vertebral arteries. There is hypoattenuation in the left frontal lobe which may represent a more acute or subacute area of infarct. MRI could further characterize. No hemorrhage is identified in the brain parenchyma.   There is no midline shift or mass effect. ORBITS: The visualized portion of the orbits demonstrate no acute abnormality. SINUSES:  The visualized paranasal sinuses and mastoid air cells are for the most part clear. SOFT TISSUES/SKULL:  No acute abnormality of the visualized skull or soft tissues. Area of hypoattenuation involving the left frontal lobe which may represent an acute to subacute area of infarct. MRI could further characterize. Previous areas of injury or infarct in the basal ganglia bilaterally and left occipital lobe. RECOMMENDATIONS: Unavailable     XR CHEST PORTABLE    Result Date: 1/2/2022  EXAMINATION: ONE XRAY VIEW OF THE CHEST 1/2/2022 10:17 pm COMPARISON: 12/21/2021 HISTORY: ORDERING SYSTEM PROVIDED HISTORY: weakness TECHNOLOGIST PROVIDED HISTORY: Reason for exam:->weakness Reason for Exam: aphasia, fatigue Additional signs and symptoms: weakness FINDINGS: Clear lungs other than a chronic calcified left upper lobe granuloma. No pneumothorax or pleural effusion. Cardiac and mediastinal contours stable. Stable changes of prior CABG. No acute osseous abnormality. No acute cardiopulmonary abnormality. XR CHEST PORTABLE    Result Date: 12/21/2021  EXAMINATION: ONE XRAY VIEW OF THE CHEST 12/21/2021 1:06 pm COMPARISON: 10/25/2021 HISTORY: ORDERING SYSTEM PROVIDED HISTORY: stroke symptoms TECHNOLOGIST PROVIDED HISTORY: Reason for exam:->stroke symptoms Reason for Exam: stroke systems FINDINGS: Bilateral hilar and perihilar infiltrative change/bronchial pulmonary marking prominence consistent with central bronchitis/pneumonitis. Findings may be accentuated by low lung volumes. No large areas of pulmonary consolidations or definite airspace infiltrates. No detectable pleural effusion, pneumothorax, pulmonary edema, cardiomegaly or mediastinal widening. Stable postoperative changes and granuloma calcifications noted. Findings consistent with central hilar/perihilar bronchitis/pneumonitis or atypical pneumonia. Otherwise, radiographically nonacute portable chest.     CTA HEAD NECK W CONTRAST    Result Date: 12/22/2021  EXAMINATION: CTA OF THE HEAD AND NECK WITH CONTRAST 12/21/2021 1:03 pm: TECHNIQUE: CTA of the head and neck was performed with the administration of intravenous contrast. Multiplanar reformatted images are provided for review. MIP images are provided for review. Stenosis of the internal carotid arteries measured using NASCET criteria. Dose modulation, iterative reconstruction, and/or weight based adjustment of the mA/kV was utilized to reduce the radiation dose to as low as reasonably achievable. COMPARISON: 10/25/2021 HISTORY: ORDERING SYSTEM PROVIDED HISTORY: possible stroke 3 days ago TECHNOLOGIST PROVIDED HISTORY: Reason for exam:->possible stroke 3 days ago Decision Support Exception - unselect if not a suspected or confirmed emergency medical condition->Emergency Medical Condition (MA) Reason for Exam: possible stroke 3 days ago Relevant Medical/Surgical History: 80 ML ISOVUE 370 Initial evaluation FINDINGS: CTA NECK: AORTIC ARCH/ARCH VESSELS: There is atherosclerotic calcification of the aortic arch. No abnormality of the left common carotid artery or innominate is identified. CAROTID ARTERIES: The right common carotid artery is patent without any narrowing or stenosis. There is atherosclerotic disease in the right internal carotid artery bulb with approximately 65% stenosis using NASCET criteria. The right internal carotid artery is then patent through the skull base. The proximal left common carotid artery is patent. There is diffuse narrowing of the distal left common carotid artery with approximately 50% stenosis. There is sequela of a left carotid endarterectomy. There is atherosclerotic disease in the region of the enderectomy with a short segment high-grade stenosis of the left internal carotid artery bulb measuring greater than 80% using NASCET criteria.   The left internal carotid artery is then patent through the skull base. VERTEBRAL ARTERIES: The right vertebral artery is dominant. There is moderate narrowing of the origin of the right vertebral artery. The right vertebral artery is otherwise patent throughout the skull base. The left vertebral artery is small in caliber but patent in its visualized course. SOFT TISSUES: There is no acute abnormality of the visualized soft tissues. There are a few small lymph nodes scattered throughout the neck that are nonspecific. BONES: No acute osseous abnormality. CTA HEAD: ANTERIOR CIRCULATION: There is atherosclerotic disease of the cavernous carotid arteries. There is no significant narrowing of the right cavernous carotid artery. There are mild areas of narrowing in the left cavernous carotid. The anterior cerebral arteries are patent. The anterior communicating artery is tiny but patent. The middle cerebral arteries are patent bilaterally. POSTERIOR CIRCULATION: The right distal vertebral artery has minimal atherosclerotic disease without any significant narrowing. The distal left vertebral artery has a short segment severe stenosis just before forming the basilar. The basilar artery is patent. There is a short segment severe stenosis of the P2 segment of the left posterior cerebral artery. The right posterior cerebral artery is patent. OTHER: No dural venous sinus thrombosis on this non-dedicated study. BRAIN: Refer to the CT head of the same date. No large vessel intracranial occlusion. Short segment severe stenosis of the P2 segment of the left posterior cerebral artery. Mild areas of stenosis in the left internal carotid cavernous segment. No other abnormality of the intracranial circulation. Severe short segment stenosis of the distal V4 segment of the left vertebral artery.  Severe stenosis of the left internal carotid artery at the junction of the endarterectomy graft and the left internal carotid artery measuring greater than 80% using NASCET criteria. Approximately 65% stenosis of the right internal carotid artery in the region of the bulb. Stenosis at the origin of the right vertebral artery. RECOMMENDATIONS: Unavailable     MRI BRAIN WO CONTRAST    Result Date: 1/4/2022  EXAMINATION: MRI OF THE BRAIN WITHOUT CONTRAST  1/4/2022 3:19 pm TECHNIQUE: Multiplanar multisequence MRI of the brain was performed without the administration of intravenous contrast. COMPARISON: MR brain 12/23/2021. CTA head and neck 12/21/2021. HISTORY: ORDERING SYSTEM PROVIDED HISTORY: Dysarthria TECHNOLOGIST PROVIDED HISTORY: Reason for exam:->Dysarthria Reason for Exam: Acute kidney injury due to COVID-19 ( FINDINGS: INTRACRANIAL STRUCTURES/VENTRICLES: New restricted diffusion in the right parietal temporal and left occipital lobes. Residual restricted diffusion in the left frontal and temporal lobes. No acute hemorrhage given artifact. Encephalomalacia in the left cerebellum, left parietal temporal occipital lobe, and right basal ganglia. Chronic lacunar infarct in the left basal ganglia. Residual susceptibility in right basal ganglia suggestive of chronic hemorrhage. Scattered periventricular and subcortical foci of T2 prolongation are nonspecific and may be related to microvascular disease. Pituitary gland is within normal limits in size. No cerebellar tonsillar herniation. No midline shift. Cerebral volume loss and minimal prominence of the ventricles. Basal cisterns appear patent. ORBITS: Slight thinning of the left lens again visualized. SINUSES: Mild mucosal thickening in the paranasal sinuses. Partial opacification of the mastoid air cells. BONES/SOFT TISSUES: The bones demonstrate no acute abnormality given limitations of modality. Soft tissues demonstrate no acute process. 1. New acute infarcts in the right middle cerebral and left posterior cerebral artery territories. Evolving infarcts in the left middle cerebral artery territory.   No acute hemorrhage given artifact. 2. Other findings as described. RECOMMENDATIONS: Unavailable     MRI BRAIN WO CONTRAST    Result Date: 12/23/2021  EXAMINATION: MRI OF THE BRAIN WITHOUT CONTRAST  12/23/2021 10:45 am TECHNIQUE: Multiplanar multisequence MRI of the brain was performed without the administration of intravenous contrast. COMPARISON: None. HISTORY: ORDERING SYSTEM PROVIDED HISTORY: aphasia TECHNOLOGIST PROVIDED HISTORY: Reason for exam:->aphasia Reason for Exam: aphasia Relevant Medical/Surgical History: none Initial evaluation FINDINGS: INTRACRANIAL STRUCTURES/VENTRICLES: There are several areas of restricted diffusion in the posterior and left frontal lobe consistent with acute areas of infarct. There are a few subtle areas of restricted diffusion in the left occipital lobe suggestive of areas of subacute infarct. Given the vascular distributions, an embolic source should be considered. There is a remote left occipital lobe infarct with encephalomalacia and gliosis in the left occipital lobe. There are remote infarcts in the basal ganglia bilaterally and the right thalamus. The ventricles and cisternal spaces are prominent consistent with cerebral atrophy. There are numerous punctate and confluent areas of high signal in the periventricular white matter and centrum semiovale that are likely related to chronic small vessel ischemic disease. There is no midline shift or mass effect. ORBITS: The visualized portion of the orbits demonstrate no acute abnormality. SINUSES: There is mild mucoperiosteal thickening of the ethmoid air cells. The remainder of the sinuses are clear. There is mild opacification of the mastoid air cells bilaterally. BONES/SOFT TISSUES: The bone marrow signal intensity appears normal. The soft tissues demonstrate no acute abnormality. Small areas of acute infarct involving the left frontal lobe from an acute left middle cerebral artery territory infarct.   Subacute areas of infarct involving the left occipital lobe. An embolic source should be considered. Cerebral atrophy. Severe chronic small vessel ischemic changes. Remote left occipital lobe infarct. Remote lacunar infarcts in the basal ganglia bilaterally and right thalamus. RECOMMENDATIONS: Unavailable       All labs, medications and tests reviewed by myself including data  from outside source , patient and available family . Continue all other medications of all above medical condition listed as is. Impression:  Principal Problem:    Acute kidney injury due to COVID-19 [facility])  Active Problems:    CAD (coronary artery disease)    Acute cerebrovascular accident (CVA) ([address]    Expressive aphasia  Resolved Problems:    * No resolved hospital problems. *      Assessment: 66 y. o.year old with PMH of  has a past medical history of CAD (coronary artery disease) and Hypertension. Plan and Recommendations:    Clinical imaging and pictures very concerning for possible cardioembolic etiology of bilateral CVA agree with aspirin and Plavix for now consider escalating to anticoagulation  We will plan outpatient KELSEY . Patient currently has Covid which also could be contributing to hypercoagulable status   Consider anticoagulation if no contraindication from neurology point  Plan outpatient 30-day monitor to screen for A. Fib  Covid treatment as per primary team supportive care  DVT prophylaxis if no contraindication  6. Dyslipidemia: continue statins           Thank you  much for consult and giving us the opportunity in contributing in the care of this patient. Please feel free to call me for any questions.        Gale Montana MD, 1/6/2022 11:54 AM

## 2022-01-06 NOTE — PROGRESS NOTES
Nephrology Progress Note  1/6/2022 10:27 AM        Subjective:   Admit Date: 1/2/2022  PCP: Aldon Hashimoto, MD    Interval History: Patient seen earlier today, this is a late entry    Diet: She is reported eating some    ROS: She is alert awake but not oriented but she can answer some of the questions quite appropriately  Urine output recorded only 400 cc for the last 24 hours  No fever      Data:     Current meds:    lisinopril  5 mg Oral Daily    donepezil  5 mg Oral Nightly    rosuvastatin  40 mg Oral Daily    aspirin  81 mg Oral Daily    Or    aspirin  300 mg Rectal Daily    heparin (porcine)  7,500 Units SubCUTAneous 3 times per day    clopidogrel  75 mg Oral Daily           I/O last 3 completed shifts:  In: -   Out: 400 [Urine:400]    CBC:   Recent Labs     01/05/22  1111   WBC 7.4   HGB 13.3             Recent Labs     01/04/22  1258 01/05/22  1111   * 146*   K 3.2* 3.9    108   CO2 22 22   BUN 42* 30*   CREATININE 1.8* 1.2*   GLUCOSE 98 114*       Lab Results   Component Value Date    CALCIUM 8.6 01/05/2022    PHOS 3.9 01/04/2022       Objective:     Vitals: /84   Pulse 88   Temp 97.8 °F (36.6 °C) (Oral)   Resp (!) 32   Ht 4' 10\" (1.473 m)   Wt 143 lb 8.3 oz (65.1 kg)   SpO2 93%   BMI 30.00 kg/m²     General appearance: Alert awake but not completely oriented  HEENT: 1+ conjunctival pallor  Neck: Supple  Lungs: Few expiratory rhonchi did not hear any crackles  Heart: Regular rate and rhythm  Abdomen: Soft, nontender  Extremities: No overt edema this morning  She does have a Sherwood catheter      Problem List :         Impression :     1. Stage III acute kidney injury by creatinine criteria-acceptable urine output but low in quantity-no lab available today but creatinine is 1.2  2. Transient confusion with underlying dementia-could have been from infection and neurological event she does have COVID-19  3. Underlying atherosclerotic cardiovascular disease  4.  Had mild high sodium  5. Hypertension she is on ACE inhibitor therapy    Recommendation/Plan  :     1. P.o. food and fluid  2. Goal /80  3. From kidney standpoint she can be discharged  4. If she get discharged need CMP in a week  5. Follow-up with me in 2 weeks  6.  Otherwise follow clinically      Porsche Ames MD MD

## 2022-01-06 NOTE — PROGRESS NOTES
Received call from patient's daughter Samina regarding discharge planning. Patient's daughter states that she is concerned about the patient coming home before SNF as patient was too weak to walk at home two days prior to admission. She would like for patient to go to SNF if patient is agreeable, however patient has been refusing. Patient's daughter Samina requested for case management to talk with the patient tomorrow and call the daughter at [phone].

## 2022-01-06 NOTE — CONSULTS
Department of Cardiovascular & Thoracic Surgery   Consult Note    Reason for Consult:  Left carotid stenosis    Requesting Physician: Dr. Leif Robert     Date of Consult: 1/6/22      History Obtained From:  patient     HISTORY OF PRESENT ILLNESS:    The patient is a 66 y.o. female who presents with worsening expressive aphasia. She suffered a CVA a couple of weeks ago and was evaluated by Dr. Rishi Fitzgerald at that time for bilateral carotid stenosis L>R. She is a poor historian and still suffering from aphasia at this time. No family at bedside as she has covid. According to chart review, she declined after discharge from her recent admission. She had poor oral intake. She has a hx of CABG and L CEA. She is a current 1.5 PPD smoker. Past Medical History:        Diagnosis Date    CAD (coronary artery disease)     Hypertension      Past Surgical History:        Procedure Laterality Date    CAROTID ENDARTERECTOMY      CHOLECYSTECTOMY      CORONARY ARTERY BYPASS GRAFT      NECK SURGERY       Current Medications:   Current Facility-Administered Medications: lisinopril (PRINIVIL;ZESTRIL) tablet 5 mg, 5 mg, Oral, Daily  zinc oxide (DESITIN) 40 % ointment, , Topical, BID PRN  donepezil (ARICEPT) tablet 5 mg, 5 mg, Oral, Nightly  rosuvastatin (CRESTOR) tablet 40 mg, 40 mg, Oral, Daily  polyethylene glycol (GLYCOLAX) packet 17 g, 17 g, Oral, Daily PRN  aspirin EC tablet 81 mg, 81 mg, Oral, Daily **OR** aspirin suppository 300 mg, 300 mg, Rectal, Daily  acetaminophen (TYLENOL) tablet 650 mg, 650 mg, Oral, Q6H PRN **OR** acetaminophen (TYLENOL) suppository 650 mg, 650 mg, Rectal, Q6H PRN  prochlorperazine (COMPAZINE) injection 10 mg, 10 mg, IntraVENous, Q6H PRN  heparin (porcine) injection 7,500 Units, 7,500 Units, SubCUTAneous, 3 times per day  clopidogrel (PLAVIX) tablet 75 mg, 75 mg, Oral, Daily  Allergies:     Allergies   Allergen Reactions    Penicillins     Sulfa Antibiotics Swelling       Social History:   Social History Socioeconomic History    Marital status:      Spouse name: Not on file    Number of children: Not on file    Years of education: Not on file    Highest education level: Not on file   Occupational History    Not on file   Tobacco Use    Smoking status: Current Every Day Smoker     Packs/day: 1.50     Types: Cigarettes    Smokeless tobacco: Never Used   Substance and Sexual Activity    Alcohol use: No    Drug use: No    Sexual activity: Not on file   Other Topics Concern    Not on file   Social History Narrative    Not on file     Social Determinants of Health     Financial Resource Strain:     Difficulty of Paying Living Expenses: Not on file   Food Insecurity:     Worried About Running Out of Food in the Last Year: Not on file    Patricia of Food in the Last Year: Not on file   Transportation Needs:     Lack of Transportation (Medical): Not on file    Lack of Transportation (Non-Medical):  Not on file   Physical Activity:     Days of Exercise per Week: Not on file    Minutes of Exercise per Session: Not on file   Stress:     Feeling of Stress : Not on file   Social Connections:     Frequency of Communication with Friends and Family: Not on file    Frequency of Social Gatherings with Friends and Family: Not on file    Attends Hinduism Services: Not on file    Active Member of [address] [place] [address] [employer] or Organizations: Not on file    Attends Club or Organization Meetings: Not on file    Marital Status: Not on file   Intimate Partner Violence:     Fear of Current or Ex-Partner: Not on file    Emotionally Abused: Not on file    Physically Abused: Not on file    Sexually Abused: Not on file   Housing Stability:     Unable to Pay for Housing in the Last Year: Not on file    Number of [place] in the Last Year: Not on file    Unstable Housing in the Last Year: Not on file       Family History:        Family history unknown: Yes       REVIEW OF SYSTEMS:  Constitutional: - fatigue, - fever, - chills, - night sweats  Eyes: - vision loss  Cardiovascular: -  chest pain, - palpitations, - leg swelling, - leg pain   Respiratory: - cough, - shortness of breath, - wheezing   GI: - nausea, - vomiting, - abdominal pain, - constipation, - diarrhea   : - dysuria   MSK: - joint pain, - muscle pain  Integument: - rash, - skin color change   Heme: - easy bruising or bleeding  Neurologic: - headache, - weakness, - dizziness, - paresthesias       EXAM:  Constitutional: Blood pressure (!) 166/70, pulse 84, temperature 97.8 °F (36.6 °C), temperature source Oral, resp. rate 25, height 4' 10\" (1.473 m), weight 143 lb 8.3 oz (65.1 kg), SpO2 94 %. No apparent distress, appears stated age and cooperative. Neurologic: follows commands, no focal weakness noted, + expressive aphasia   Lungs: Good respiratory effort. Clear to auscultation,   CV: Regular rate/ rhythm , no peripheral edema, feet warm and well perfused  GI: Soft, non-tender in all four quadrants, non-distended, + bowel sounds, liver and spleen no palpable masses  : bladder nondistended   MSK: no obvious deformity   Skin: warm, pink and dry       DATA:  CTA  Impression   No large vessel intracranial occlusion.  Short segment severe stenosis of the   P2 segment of the left posterior cerebral artery.  Mild areas of stenosis in   the left internal carotid cavernous segment.  No other abnormality of the   intracranial circulation.       Severe short segment stenosis of the distal V4 segment of the left vertebral   artery.       Severe stenosis of the left internal carotid artery at the junction of the   endarterectomy graft and the left internal carotid artery measuring greater   than 80% using NASCET criteria.       Approximately 65% stenosis of the right internal carotid artery in the region   of the bulb.       Stenosis at the origin of the right vertebral artery. MRI  Impression   1.  New acute infarcts in the right middle cerebral and left posterior cerebral artery territories.  Evolving infarcts in the left middle cerebral   artery territory.  No acute hemorrhage given artifact. 2. Other findings as described. IMPRESSION  Patient Active Problem List   Diagnosis    Hypertensive urgency    CAD (coronary artery disease)    Diverticulosis    Hyperlipidemia with target low density lipoprotein (LDL) cholesterol less than 70 mg/dL    Cerebrovascular accident [facility])    [facility] ([facility]    Expressive aphasia    Acute kidney injury due to COVID-19 ([facility]       DAVID 36%, recurrent LICA stenosis 59%  Acute bilateral CVA  covid-19    RECOMMENDATIONS:  Pt having cardiac workup to rule out cardio-embolic source. Recommend OP follow up in 2 weeks with Dr. Rick Torres to discuss options regarding redo L CEA vs carotid stent. Pt seen with Dr. Max Miner.      Nenita Lama PA-C

## 2022-01-06 NOTE — PROGRESS NOTES
[facility] HOSPITALIST PROGRESS NOTE      PCP: Brooke Lennox, MD    Date of Admission: 1/2/2022    Subjective: one word responses  intermittentlyconfused      [address] summary patient is a 50-year-old female with history of dementia, hypertension, hyperlipidemia, coronary artery disease, obesity and encephalopathy who presented to the ER with worsening aphasia. Patient was recently discharged on 12/23 with left frontal lobe infarct and subacute left occipital lobe infarct  On admission creatinine was 5.3, potassium 3.5, lisinopril held normal saline started, nephrology consulted  Neurology consulted    Vitals signs: Afebrile, HR  range, on room air,       Medications: Aspirin, Plavix, donepezil, heparin, Crestor    Antibiotics: None     Fluid status: 300 cc    Labs:   No labs today     Imaging:   CT abdomen did not show any acute findings, no evidence of obstructive uropathy    Assessment/Plan:     Acute kidney injury:   Creatinine 5.2 on admission, lisinopril held, secondary to dehydration, nephrology consulted  Continue gentle hydration  Avoid nephrotoxic medication  Creatinine down to 1.2  Encourage PO intake     Acute metabolic encephalopathy  History of dementia without acute behavioral problem  Recent CVA with expressive aphasia  Generalized weakness  : Most likely secondary to acute kidney injury in setting of recent stroke with recrudescence of symptoms  Continue aspirin Plavix and statin  Neurology consulted, PT OT consultation  MRI brain shows new infarct in the right middle cerebral and right posterior cerebral arteries.     KELSEY per neuro  Consult cards,  Covid positive will wait till can be safely done   [facility] per neurology    SARS-CoV-2 upper respiratory tract infection:   Positive for Covid, currently on room air,   Continue to monitor procalcitonin, CRP  No indication for dexamethasone at this time    Hyperlipidemia: Crestor    History of coronary artery disease: Aspirin statin    Essential hypertension: Amlodipine, lisinopril resumed, as needed hydralazine    DVT prophlaxis:   Heparin      Physical Exam Performed:       /84   Pulse 88   Temp 97.8 °F (36.6 °C) (Oral)   Resp (!) 32   Ht 4' 10\" (1.473 m)   Wt 143 lb 8.3 oz (65.1 kg)   SpO2 93%   BMI 30.00 kg/m²     Physical Exam  Constitutional:       General: She is not in acute distress. Appearance: Normal appearance. HENT:      Head: Normocephalic and atraumatic. Right Ear: External ear normal.      Left Ear: External ear normal.   Eyes:      Extraocular Movements: Extraocular movements intact. Pupils: Pupils are equal, round, and reactive to light. Cardiovascular:      Rate and Rhythm: Normal rate and regular rhythm. Heart sounds: No murmur heard. Pulmonary:      Effort: Pulmonary effort is normal. No respiratory distress. Breath sounds: Normal breath sounds. No wheezing. Abdominal:      General: Bowel sounds are normal. There is no distension. Palpations: Abdomen is soft. Tenderness: There is no abdominal tenderness. Musculoskeletal:         General: No swelling. Cervical back: Normal range of motion. Skin:     General: Skin is warm. Neurological:      General: No focal deficit present. Mental Status: She is alert. Cranial Nerves: No cranial nerve deficit. Comments: Expressive aphasia    Psychiatric:         Mood and Affect: Mood normal.         Labs:   Recent Labs     01/05/22  1111   WBC 7.4   HGB 13.3   HCT 41.3        Recent Labs     01/04/22  1258 01/05/22  1111   * 146*   K 3.2* 3.9    108   CO2 22 22   BUN 42* 30*   CREATININE 1.8* 1.2*   CALCIUM 8.8 8.6   PHOS 3.9  --      Recent Labs     01/04/22  1258   AST 21   ALT <5*   BILITOT 0.3   ALKPHOS 83     No results for input(s): INR in the last 72 hours. No results for input(s): Earnie Lent in the last 72 hours.     Urinalysis:      Lab Results   Component Value Date    NITRU NEGATIVE 01/03/2022    WBCUA 19 01/03/2022    BACTERIA OCCASIONAL 01/03/2022    RBCUA 4 01/03/2022    BLOODU SMALL 01/03/2022    SPECGRAV 1.020 01/03/2022       Radiology:  MRI BRAIN WO CONTRAST   Final Result   1. New acute infarcts in the right middle cerebral and left posterior   cerebral artery territories. Evolving infarcts in the left middle cerebral   artery territory. No acute hemorrhage given artifact. 2. Other findings as described. RECOMMENDATIONS:   Unavailable         CT ABDOMEN PELVIS WO CONTRAST Additional Contrast? None   Final Result   1. No acute findings. No evidence of obstructive uropathy. 2. Colonic diverticulosis. RECOMMENDATIONS:   Unavailable         CT HEAD WO CONTRAST   Final Result   No acute hemorrhage or midline shift. Other findings as described. RECOMMENDATIONS:   Unavailable         XR CHEST PORTABLE   Final Result   No acute cardiopulmonary abnormality.                  Marimar Tejada MD  1/6/2022 10:23 AM

## 2022-01-07 LAB
ANION GAP SERPL CALCULATED.3IONS-SCNC: 11 MMOL/L (ref 4–16)
BACTERIA: NEGATIVE /HPF
BILIRUBIN URINE: ABNORMAL MG/DL
BLOOD, URINE: ABNORMAL
BUN BLDV-MCNC: 17 MG/DL (ref 6–23)
CALCIUM SERPL-MCNC: 8.8 MG/DL (ref 8.3–10.6)
CHLORIDE BLD-SCNC: 110 MMOL/L (ref 99–110)
CLARITY: ABNORMAL
CO2: 24 MMOL/L (ref 21–32)
COLOR: ABNORMAL
CREAT SERPL-MCNC: 0.9 MG/DL (ref 0.6–1.1)
GFR AFRICAN AMERICAN: >60 ML/MIN/1.73M2
GFR NON-AFRICAN AMERICAN: >60 ML/MIN/1.73M2
GLUCOSE BLD-MCNC: 114 MG/DL (ref 70–99)
GLUCOSE, URINE: NEGATIVE MG/DL
ICTOTEST: NEGATIVE
KETONES, URINE: ABNORMAL MG/DL
LEUKOCYTE ESTERASE, URINE: ABNORMAL
MAGNESIUM: 1.7 MG/DL (ref 1.8–2.4)
MUCUS: ABNORMAL HPF
NITRITE URINE, QUANTITATIVE: NEGATIVE
PH, URINE: 6 (ref 5–8)
PHOSPHORUS: 1.7 MG/DL (ref 2.5–4.9)
POTASSIUM SERPL-SCNC: 4.2 MMOL/L (ref 3.5–5.1)
PROTEIN UA: 300 MG/DL
RBC URINE: 2200 /HPF (ref 0–6)
SODIUM BLD-SCNC: 145 MMOL/L (ref 135–145)
SPECIFIC GRAVITY UA: 1.03 (ref 1–1.03)
TRICHOMONAS: ABNORMAL /HPF
UROBILINOGEN, URINE: 0.2 MG/DL (ref 0.2–1)
WBC UA: 54 /HPF (ref 0–5)

## 2022-01-07 PROCEDURE — 81001 URINALYSIS AUTO W/SCOPE: CPT

## 2022-01-07 PROCEDURE — 6370000000 HC RX 637 (ALT 250 FOR IP): Performed by: STUDENT IN AN ORGANIZED HEALTH CARE EDUCATION/TRAINING PROGRAM

## 2022-01-07 PROCEDURE — 6360000002 HC RX W HCPCS: Performed by: INTERNAL MEDICINE

## 2022-01-07 PROCEDURE — 94761 N-INVAS EAR/PLS OXIMETRY MLT: CPT

## 2022-01-07 PROCEDURE — 97116 GAIT TRAINING THERAPY: CPT

## 2022-01-07 PROCEDURE — 97530 THERAPEUTIC ACTIVITIES: CPT

## 2022-01-07 PROCEDURE — 6370000000 HC RX 637 (ALT 250 FOR IP): Performed by: INTERNAL MEDICINE

## 2022-01-07 PROCEDURE — APPSS60 APP SPLIT SHARED TIME 46-60 MINUTES: Performed by: NURSE PRACTITIONER

## 2022-01-07 PROCEDURE — 36415 COLL VENOUS BLD VENIPUNCTURE: CPT

## 2022-01-07 PROCEDURE — 76937 US GUIDE VASCULAR ACCESS: CPT

## 2022-01-07 PROCEDURE — 6370000000 HC RX 637 (ALT 250 FOR IP): Performed by: PSYCHIATRY & NEUROLOGY

## 2022-01-07 PROCEDURE — 97162 PT EVAL MOD COMPLEX 30 MIN: CPT

## 2022-01-07 PROCEDURE — 99233 SBSQ HOSP IP/OBS HIGH 50: CPT | Performed by: INTERNAL MEDICINE

## 2022-01-07 PROCEDURE — 80048 BASIC METABOLIC PNL TOTAL CA: CPT

## 2022-01-07 PROCEDURE — 84100 ASSAY OF PHOSPHORUS: CPT

## 2022-01-07 PROCEDURE — 6370000000 HC RX 637 (ALT 250 FOR IP): Performed by: HOSPITALIST

## 2022-01-07 PROCEDURE — 97535 SELF CARE MNGMENT TRAINING: CPT

## 2022-01-07 PROCEDURE — 83735 ASSAY OF MAGNESIUM: CPT

## 2022-01-07 PROCEDURE — 6360000002 HC RX W HCPCS: Performed by: STUDENT IN AN ORGANIZED HEALTH CARE EDUCATION/TRAINING PROGRAM

## 2022-01-07 PROCEDURE — 6370000000 HC RX 637 (ALT 250 FOR IP): Performed by: NURSE PRACTITIONER

## 2022-01-07 PROCEDURE — 1200000000 HC SEMI PRIVATE

## 2022-01-07 RX ORDER — MAGNESIUM SULFATE IN WATER 40 MG/ML
2000 INJECTION, SOLUTION INTRAVENOUS ONCE
Status: COMPLETED | OUTPATIENT
Start: 2022-01-07 | End: 2022-01-07

## 2022-01-07 RX ORDER — LOPERAMIDE HYDROCHLORIDE 2 MG/1
2 CAPSULE ORAL 4 TIMES DAILY PRN
Status: DISCONTINUED | OUTPATIENT
Start: 2022-01-07 | End: 2022-01-11 | Stop reason: HOSPADM

## 2022-01-07 RX ADMIN — MAGNESIUM SULFATE HEPTAHYDRATE 2000 MG: 2 INJECTION, SOLUTION INTRAVENOUS at 10:27

## 2022-01-07 RX ADMIN — LOPERAMIDE HYDROCHLORIDE 2 MG: 2 CAPSULE ORAL at 10:23

## 2022-01-07 RX ADMIN — ACETAMINOPHEN 650 MG: 325 TABLET ORAL at 23:44

## 2022-01-07 RX ADMIN — ROSUVASTATIN 40 MG: 40 TABLET, FILM COATED ORAL at 09:24

## 2022-01-07 RX ADMIN — CLOPIDOGREL BISULFATE 75 MG: 75 TABLET ORAL at 09:24

## 2022-01-07 RX ADMIN — DONEPEZIL HYDROCHLORIDE 5 MG: 5 TABLET, FILM COATED ORAL at 22:30

## 2022-01-07 RX ADMIN — ASPIRIN 81 MG: 81 TABLET, COATED ORAL at 09:24

## 2022-01-07 RX ADMIN — DIBASIC SODIUM PHOSPHATE, MONOBASIC POTASSIUM PHOSPHATE AND MONOBASIC SODIUM PHOSPHATE 1 TABLET: 852; 155; 130 TABLET ORAL at 12:49

## 2022-01-07 RX ADMIN — LOPERAMIDE HYDROCHLORIDE 2 MG: 2 CAPSULE ORAL at 23:44

## 2022-01-07 RX ADMIN — HEPARIN SODIUM 7500 UNITS: 5000 INJECTION INTRAVENOUS; SUBCUTANEOUS at 06:08

## 2022-01-07 RX ADMIN — DIBASIC SODIUM PHOSPHATE, MONOBASIC POTASSIUM PHOSPHATE AND MONOBASIC SODIUM PHOSPHATE 1 TABLET: 852; 155; 130 TABLET ORAL at 22:30

## 2022-01-07 RX ADMIN — LISINOPRIL 5 MG: 5 TABLET ORAL at 09:24

## 2022-01-07 RX ADMIN — APIXABAN 5 MG: 5 TABLET, FILM COATED ORAL at 22:30

## 2022-01-07 ASSESSMENT — PAIN SCALES - GENERAL
PAINLEVEL_OUTOF10: 0

## 2022-01-07 NOTE — CARE COORDINATION
Covid+ 1/2 on room air. CM call to Pt daughter Kash Leslie to update of how Pt did with therapy today and discuss discharge planning. Left VM. CM call to Pt spouse Sarah Doll. Sarah Doll states that he is not in the best of health. That his children provide the care for he and his wife. Sarah Doll states that his daughter Kash Leslie is to make the decisions for the Pt.      3:23 PM   CM call to Pt daughter Kash Leslie. Discharge plan at this time remaining home with support of family and Geri Gallego will provide transportation at discharge. Carla requesting discharge tomorrow morning if the Pt is medically ready due to working 3rd shift. PS to Dr. Justin Vazquez to update.

## 2022-01-07 NOTE — PROGRESS NOTES
CINDY ([facility] PHYSICAL [facility]  [address]  Phone: [phone]    Fax [phone]                  Prasad Rashid MD, Delaney Vee MD, Jadon Addison MD, MD Shira Warren MD Marlaine Face, MD Media Joe, MD Beatrix Feil, ROSA Valle, ROSA Wilson, ROSA Diego, ROSA Kay PAKASEY     Cardiology Progress Note     Today's Plan: continue monitor     Admit Date:  1/2/2022    Consult reason/ Seen today for: CVA    Subjective and Overnight Events:  Patient states that she is feeling a little better. Denies chest pain or shortness of breath. Assessment / Plan / Recommendation:     1. Clinical imaging and pictures very concerning for possible cardioembolic etiology of bilateral CVA agree with aspirin and Plavix for now consider escalating to anticoagulation. Reviewed with Neurology and they are agreeable to use [facility] with plavix, therefore will start eliquis 5 mg BID and stop ASA and heparin. 2. We will plan outpatient KELSEY. 3. Patient currently has Covid which also could be contributing to hypercoagulable status   4. Covid treatment as per primary team supportive care  5. DVT prophylaxis if no contraindication  6. Dyslipidemia: continue statins       History of Presenting Illness:    Chief complain on admission : 66 y. o.year old who is admitted for  Chief Complaint   Patient presents with    Aphasia     worsening over the last week    Fatigue     pt unable to walk to cot for EMS, patient can normally walk        Past medical history:    has a past medical history of CAD (coronary artery disease) and Hypertension. Past surgical history:   has a past surgical history that includes Coronary artery bypass graft; Neck surgery; Carotid endarterectomy; and Cholecystectomy. Social History:   reports that she has been smoking cigarettes. She has been smoking about 1.50 packs per day.  She has never used smokeless tobacco. She reports that she does not drink alcohol and does not use drugs. Family history:  Family history is unknown by patient. Allergies   Allergen Reactions    Penicillins     Sulfa Antibiotics Swelling       Review of Systems   All 14 systems were reviewed and are negative  Except for the positive findings  which as documented     BP (!) 149/76   Pulse 81   Temp 97.9 °F (36.6 °C) (Oral)   Resp 20   Ht 4' 10\" (1.473 m)   Wt 143 lb 8.3 oz (65.1 kg)   SpO2 92%   BMI 30.00 kg/m²       Intake/Output Summary (Last 24 hours) at 1/7/2022 1247  Last data filed at 1/7/2022 0700  Gross per 24 hour   Intake 420 ml   Output 1170 ml   Net -750 ml       Physical Exam  Vitals reviewed. Constitutional:       General: She is not in acute distress. Appearance: Normal appearance. She is obese. She is not ill-appearing. HENT:      Head: Atraumatic. Neck:      Vascular: No carotid bruit. Cardiovascular:      Rate and Rhythm: Normal rate and regular rhythm. Pulses: Normal pulses. Heart sounds: Normal heart sounds. No murmur heard. Pulmonary:      Effort: Pulmonary effort is normal. No respiratory distress. Breath sounds: Decreased breath sounds present. Musculoskeletal:         General: No swelling or deformity. Cervical back: Neck supple. No muscular tenderness. Neurological:      Mental Status: She is alert.          Telemetry Reviewed:   Sinus rhythm    Medications:    phosphorus  250 mg Oral BID    lisinopril  5 mg Oral Daily    donepezil  5 mg Oral Nightly    rosuvastatin  40 mg Oral Daily    aspirin  81 mg Oral Daily    Or    aspirin  300 mg Rectal Daily    heparin (porcine)  7,500 Units SubCUTAneous 3 times per day    clopidogrel  75 mg Oral Daily       loperamide, zinc oxide, polyethylene glycol, acetaminophen **OR** acetaminophen, prochlorperazine    Lab Data:  CBC:   Recent Labs     01/05/22  1111   WBC 7.4   HGB 13.3   HCT 41.3   MCV 88.1   PLT 339     BMP:   Recent Labs     01/04/22  1258 01/04/22  1258 01/05/22  1111 01/06/22  0939 01/07/22  0550   *   < > 146* 144 145   K 3.2*   < > 3.9 4.1 4.2      < > 108 109 110   CO2 22   < > 22 23 24   PHOS 3.9  --   --   --  1.7*   BUN 42*   < > 30* 20 17   CREATININE 1.8*   < > 1.2* 1.0 0.9    < > = values in this interval not displayed. PT/INR: No results for input(s): PROTIME, INR in the last 72 hours. BNP:  No results for input(s): PROBNP in the last 72 hours. TROPONIN: No results for input(s): TROPONINT in the last 72 hours. All labs, medications and tests reviewed by myself , continue all other medications of all above medical condition listed as is except for changes mentioned above. Thank you very much for consult , please call with questions. Electronically signed by ROSA Rose CNP on 1/7/2022 at 12:47 PM    CARDIOLOGY ATTENDING ADDENDUM    I have seen ,spoken to  and examined this patient personally, independently of the nurse practitioner. I have spent substantiate  portion of this encounter independently myself in examining patient and developing the medical management plan . I have reviewed the hospital care given to date and reviewed all pertinent labs and imaging. The plan was developed mutually at the time of the visit with the patient,  NP   and myself. I have spoken with patient, nursing staff and provided written and verbal instructions . The above note has been reviewed and I agree with the assessment, diagnosis, and treatment plan with changes made by me as follows . My documented MDM is a substantive portion of the supervisory note. HPI:  I have reviewed the above HPI  And agree with above   Telma Lerner is a 66 y. o.year old who and presents with had concerns including Aphasia (worsening over the last week) and Fatigue (pt unable to walk to cot for EMS, patient can normally walk).   Chief Complaint   Patient presents with    Aphasia     worsening over the last week    Fatigue     pt unable to walk to cot for EMS, patient can normally walk     Please review addendum/changes made to note above   Interval history:  No change    Physical Exam:  General:  Awake, alert, NAD  Head:normal  Eye:normal  Neck:  No JVD   Chest:  Clear to auscultation, respiration easy  Cardiovascular:  S1 and S2 audible, No added heart sounds, No signs of ankle edema, or volume overload, No evidence of JVD, No crackles  Abdomen:   nontender  Extremities:  No * edema  Pulses; palpable  Neuro: grossly normal      MEDICAL DECISION MAKING;    I agree with the above plan, which was planned by myself and discussed with NP.   Plan outpatient KELSEY   Treat covid  Stop plavix in 1 mth if she is going to be on anticoagulation   Plan 30 day outpatient monitor      Eliel George MD [facility] [place] 01/07/22

## 2022-01-07 NOTE — PROGRESS NOTES
Comprehensive Nutrition Assessment    Type and Reason for Visit:  Initial,Consult (Poor Intake/Appetite 5 or more days)    Nutrition Recommendations/Plan:     Liberalized diet to regular and ordered renal oral nutrition supplements BID. Please consider appetite stimulant. Please encourage and record PO intake TID. Nutrition Assessment:  Pt with \"minimal to none\" oral intake per noted. Dietitian consult for poor oral intake for 5 or more days. Pt with no PO intake recorded per flowsheets. Pt with COVID but no respiratory symptoms. Currently with UTI and abnormal kidney function. Will start with liberalizing diet and ordering nutrition supplements. Can also consider appetite stimulant. Will follow at high nutrition risk for additional intervention as needed. Malnutrition Assessment:  Malnutrition Status:  Severe malnutrition    Context:  Acute Illness     Findings of the 6 clinical characteristics of malnutrition:  Energy Intake:  7 - 50% or less of estimated energy requirements for 5 or more days  Weight Loss:  7 - Greater than 5% over 1 month       Estimated Daily Nutrient Needs:  Energy (kcal):  9374-6255; Weight Used for Energy Requirements:  Current     Protein (g):  41-61; Weight Used for Protein Requirements:  Ideal        Fluid (ml/day):  per nephrology;       Nutrition Related Findings:  < 1 L UOP, Phos 1.7, mag 1.7      Wounds:   (reddness, excoriation)       Current Nutrition Therapies:    ADULT DIET; Regular; Less than 60 gm    Anthropometric Measures:  · Height: 4' 10\" (147.3 cm)  · Current Body Weight: 143 lb 8.3 oz (65.1 kg)   · Admission Body Weight: 143 lb 8.3 oz (65.1 kg)    · Usual Body Weight: 151 lb (68.5 kg) (12/21/21)     · Ideal Body Weight: 90 lbs; % Ideal Body Weight 159.5 %   · BMI: 30  · BMI Categories: Overweight (BMI 25.0-29. 9)       Nutrition Diagnosis:   · Inadequate energy intake related to acute injury/trauma as evidenced by intake 0-25%,weight loss      Nutrition Interventions:   Food and/or Nutrient Delivery:  Modify Current Diet,Start Oral Nutrition Supplement  Nutrition Education/Counseling:  Education not indicated   Coordination of Nutrition Care:  Continue to monitor while inpatient    Goals:  Pt will tolerate at least 50% intake of meals and supplements during los       Nutrition Monitoring and Evaluation:   Behavioral-Environmental Outcomes:  None Identified   Food/Nutrient Intake Outcomes:  Food and Nutrient Intake,Supplement Intake,IVF Intake  Physical Signs/Symptoms Outcomes:  Biochemical Data,Weight,Skin     Discharge Planning:     Too soon to determine     Electronically signed by Niko Martinez RD, LD on 1/7/22 at 2:01 PM EST    Contact: [phone]

## 2022-01-07 NOTE — CARE COORDINATION
Covid+ 1/2 on room air. CM call to Pt daughter Darryle Deeds to follow up on discharge planning. Discharge plan pending visit by Pt daughter this morning.   Visit approved by RN supervisor    SHADI following

## 2022-01-07 NOTE — PROGRESS NOTES
[place] bag with bloody urine overnight and low urine output. Dr Han Palacios notified and at bedside to assess.

## 2022-01-07 NOTE — PROGRESS NOTES
Hospitalist Progress Note      Name:  Brenda Cross /Age/Sex: 1950  (66 y.o. female)   MRN & CSN:  [id number] & [id number] Admission Date/Time: 2022  9:49 PM   Location:  [address] [place] [address] PCP: Tracie Chicas MD         Hospital Day: 6    Assessment and Plan:   24-year-old female that presented to the ER initially with worsening aphasia    Acute kidney injury   -Creatinine normalized  -Home lisinopril was held on admission now resumed at 5 mg daily  -Nephrology following appreciate recommendations     Acute metabolic encephalopathy  History of dementia without acute behavioral problem  Recent CVA with expressive aphasia  Generalized weakness  : Most likely secondary to acute kidney injury in setting of recent stroke with recrudescence of symptoms  - Continue aspirin Plavix and statin  - Neurology followed previously course of hospitalization, PT OT recommending SNF  - MRI brain shows new infarct in the right middle cerebral and right posterior cerebral arteries.  -Neurology recommended KELSEY so cardiology was consulted who recommend outpatient KELSEY but do recommend a 30-day event monitor to monitor for atrial fibrillation    Hematuria  -Dark urine noted this morning's UA obtained showing a significant amount of RBCs a small amount of WBCs and bilirubin  -We will monitor and if no improvement will consult urology for further evaluation; hemoglobin is stable  -Given recent stroke stable vitals with stable hemoglobin we will continue antiplatelet therapy for now and monitor    SARS-CoV-2 upper respiratory tract infection:   -Incidentally found remains on room air  -No respiratory symptoms    Diarrhea  -Still having episodes but improved  -C. difficile negative  -Imodium started today     Hyperlipidemia: Crestor     History of coronary artery disease: Aspirin statin     Essential hypertension: Amlodipine, lisinopril resumed, as needed hydralazine    Reason for continued hospitalization: Diarrhea resolution and awaiting SNF placement as well as improvement in hematuria    Diet ADULT DIET; Regular; Less than 60 gm   DVT Prophylaxis [] Lovenox, []  Heparin, [] SCDs, [] Ambulation   GI Prophylaxis [] PPI,  [] H2 Blocker,  [] Carafate,  [] Diet/Tube Feeds   Code Status Full Code   Disposition Patient requires continued admission due to    MDM [] Low, [] Moderate,[]  High  Patient's risk as above due to      History of Present Illness:     Patient resting comfortably but states she is very tired and wants to sleep\" all the time. \"  Denies any fevers chills nausea vomiting chest pain or shortness of breath. Objective: Intake/Output Summary (Last 24 hours) at 1/7/2022 1302  Last data filed at 1/7/2022 0700  Gross per 24 hour   Intake 420 ml   Output 1170 ml   Net -750 ml      Vitals:   Vitals:    01/07/22 1253   BP:    Pulse: 87   Resp:    Temp:    SpO2:      Physical Exam:   GEN Awake female, sitting upright in bed in no apparent distress. Appears given age. NECK Supple, no apparent thyromegaly or masses. RESP Clear to auscultation, no wheezes, rales or rhonchi. CARDIO/VASC S1/S2 auscultated. Regular rate without appreciable murmurs, rubs, or gallops. No JVD or carotid bruits. GI Abdomen is soft without significant tenderness, masses, or guarding. Bowel sounds are normoactive. Rectal exam deferred.     Dark urine this AM  NEURO Cranial nerves appear grossly intact, normal speech  PSYCH Awake, alert, oriented     Medications:   Medications:    phosphorus  250 mg Oral BID    lisinopril  5 mg Oral Daily    donepezil  5 mg Oral Nightly    rosuvastatin  40 mg Oral Daily    aspirin  81 mg Oral Daily    Or    aspirin  300 mg Rectal Daily    heparin (porcine)  7,500 Units SubCUTAneous 3 times per day    clopidogrel  75 mg Oral Daily      Infusions:   PRN Meds: loperamide, 2 mg, 4x Daily PRN  zinc oxide, , BID PRN  polyethylene glycol, 17 g, Daily PRN  acetaminophen, 650 mg, Q6H PRN   Or  acetaminophen, 650 mg, Q6H PRN  prochlorperazine, 10 mg, Q6H PRN        Electronically signed by Cayla Wu MD on 1/7/2022 at 1:02 PM

## 2022-01-07 NOTE — FLOWSHEET NOTE
Pt is having blooding urine output into her woods cath. Catherter is draining likely in place. She denies pain at perineal area. NP Siva notified.      01/07/22 0614   Vital Signs   Temp 98.6 °F (37 °C)   Temp Source Oral   Pulse 88   Heart Rate Source Monitor   Resp 30   BP (!) 140/79   BP Location Right upper arm   MAP (mmHg) 99   Patient Position High fowlers   Level of Consciousness Alert (0)   MEWS Score 3   Patient Currently in Pain No   Pain Assessment   Pain Assessment 0-10   Pain Level 0   Oxygen Therapy   SpO2 95 %   Pulse Oximeter Device Mode Intermittent   Pulse Oximeter Device Location Right;Finger   O2 Device None (Room air)

## 2022-01-07 NOTE — CONSULTS
[facility] PHYSICAL THERAPY EVALUATION  Harmony Siegel, 1950, [id number], 1/7/2022    History  [place]:  The primary encounter diagnosis was Acute kidney injury ([facility] [address] Diagnoses of Other fatigue, Encephalopathy, COVID-19, and Bacteriuria were also pertinent to this visit. Patient  has a past medical history of CAD (coronary artery disease) and Hypertension. Patient  has a past surgical history that includes Coronary artery bypass graft; Neck surgery; Carotid endarterectomy; and Cholecystectomy. Subjective:  Patient states: \"Yeah\", \"no\", \"I'm thirsty\", limited verbalizations. Noted pt verbal responses often contradict actions. Pt likely aphasic. Pain:  Denies. Communication with other providers:  Handoff to RN, co-eval with Omar Small for safety. Called RN to alert to pt pulled IV out as IV was found on bed upon entry. Restrictions: Fall risk, (+)COVID, general precaution    Home Setup/Prior level of function  Social/Functional History  Lives With: Spouse  Type of Home: House  Home Layout: Two level,Bed/Bath upstairs  Home Access: Stairs to enter with rails  Entrance Stairs - Number of Steps: 2-3  Bathroom Shower/Tub: Walk-in shower  Bathroom Equipment: Grab bars in shower,Grab bars around Crowdcube chair  Home Equipment: Cane  ADL Assistance: [address]: Independent  Homemaking Responsibilities: Yes (shares with )  Ambulation Assistance: Independent (with cane)  Transfer Assistance: Independent  Active : No  Patient's  Info:  and dtr    Examination of body systems (includes body structures/functions, activity/participation limitations):  · Observation:  Pt resting in R SL upon arrival, awake, room air, IV pulled out  · Vision:  IMPAIRED, see neuro section.    · Hearing:  [facility]  · Cardiopulmonary:  On room air, stable  · Cognition: Impaired, pt with confusion, aphasia, flat affect, poor insight, see OT/SLP note for further evaluation. Musculoskeletal  · ROM R/L:  WFL BLE. · Strength R/L:  Generally 4 to 4+/5 BLE, decreased in function and endurance. · Neuro:  Poor LE coordination, lacking visual acuity- may be CHRISTI inferior field cut. Pt does not show signs of limb/side neglect. · Visual impairments: Pt appears to demonstrate loss of vision in both R/L inferior fields. PT performs smooth pursuits, pt states \"yes\" to confirm she sees the marker, but is not able to track well, intermittently staring in opposite direction. Pt with mild tracking/hitching with center of visual field, poor peripheral vision. · Gait pattern: Pt demonstrates slow salud, poor sequencing and initiation, narrow EL with intermittent scissoring. Mobility:  · Supine to sit:  CGA x2  · Transfers: Min  · Sitting balance:  SBA-CGA. · Standing balance:  Min-CGA. · Gait: Min-CGA    [facility] 6 Clicks Inpatient Mobility:  AM-PAC Inpatient Mobility Raw Score : 17    Treatment:    Bed mobility: PT encourages sup>sit, provides v/c for sequencing. Pt demonstrates fair ability to advance LE, requires CGA for LE/ hips to EOB and CGA for trunk to upright. PT v/c for scooting to EOB, pt requires Mod A as pt does not follow through with v/c for scooting. Pt demonstrates increased time for transfer and lethargy. Sitting balance: Seated EOB pt demonstrates fair-  balance,  BUE support required for maintaining upright. Min increased postural sway without significant LOB, CGA progressing to SBA with time. Increased time in seated EOB as PT performs neuro screening. Sit<>Stand: Pt performed STS from EOB to RW  with Min A, v/c for proper sequencing. Pt demonstrates increased time and effort to upright, stands with increased kyphosis, requires v/c for upright. Pt able to improve posture mildly. Return to seated in recliner end of session pt demonstrates fair control, v/c for safe sequencing with good carryover.      Standing: Pt demonstrates fair- standing balance at BR sink x3', pt shows flat affect with conversing, pt declines hygiene tasks or need for BR. PT notes narrow EL, min increased sway. With time pt increased fatigue and leans forearms on RW. Decision to return to chair. Gait: Pt AMB x15 ft with RW, slow salud, poor sequencing and initiation, narrow EL with intermittent scissoring, requires CGA-Min for management of RW as pt demonstrates confusion. PT increased v/c's for sequence with RW however pt continues to push the RW heavily too far anteriorly before stepping. Constant v/c for LE sequencing and resistance placed against RW for safety. .    End of session pt left in recliner with lines managed, call light, phone, exit alarm, tray, all needs, RN aware. Assessment:    Pt is a 65 y/o female admitted 1/2 with c/o  Aphasia and fatigue. Pt found to have OLIVER. Patient with significant h/o  left frontal lobe acute infarct and subacute left occipital lobe infarct found on recent admission on 12/21. Per charting pt has been performing ADLs/IADLs independently, performing AMB with cane, pt lives at home with spouse. At this time pt appears to be functioning below baseline. Pt is now presenting with impairments in vision, LE strength, functional endurance, safety awareness, balance, gait deficits, insight. Pt would benefit from skilled PT services in order to address impairments and promote return to PLOF. PT to recommend d/c to SNF. Complexity: Moderate  Prognosis: Good, no significant barriers to participation at this time. Plan Times per week: 4+/week, 1 week,   Discharge Recommendations: Subacute/Skilled Nursing Facility  Equipment: Defer    Goals:  Short term goals  Time Frame for Short term goals: 1 week  Short term goal 1: Pt will AMB x25 ft with RW, CGA-SBA. Short term goal 2: Pt will demonstrate stand-step transfer from EOB<>chair with RW and SBA. Short term goal 3: Pt will demonstrate STS from low surface to RW with CGA.   Short term goal 4: Pt will tolerate x5' standing dynamic balance activity with emphasis on visual tracking, CGA, UE support,       Treatment plan:  Bed mobility, transfers, balance, gait, TA, TX    Recommendations for NURSING mobility: Stand-pivot to [facility] with RW , belt, Min A    Time:   Time in: 11:37  Time out: 12:02  Timed treatment minutes: 15  Total time: 25    Electronically signed by:    Lani Lechuga, PT  8/0/1012, 2:12 PM  PT Lic #: [id number]

## 2022-01-07 NOTE — PROGRESS NOTES
Nephrology Progress Note  1/7/2022 10:01 AM        Subjective:   Admit Date: 1/2/2022  PCP: Karmen Riojas MD    Interval History: Hematuria      Diet: Minimal to none    ROS: She has underlying dementia and now his hematuria could be from [place] trauma  Urine output less than 1 L/day  Has significant stool output  No fever      Data:     Current meds:    magnesium sulfate  2,000 mg IntraVENous Once    lisinopril  5 mg Oral Daily    donepezil  5 mg Oral Nightly    rosuvastatin  40 mg Oral Daily    aspirin  81 mg Oral Daily    Or    aspirin  300 mg Rectal Daily    heparin (porcine)  7,500 Units SubCUTAneous 3 times per day    clopidogrel  75 mg Oral Daily           I/O last 3 completed shifts: In: 5 [P.O.:400; I.V.:20]  Out: 1170 [Urine:820; Stool:350]    CBC:   Recent Labs     01/05/22  1111   WBC 7.4   HGB 13.3             Recent Labs     01/05/22  1111 01/06/22  0939 01/07/22  0550   * 144 145   K 3.9 4.1 4.2    109 110   CO2 22 23 24   BUN 30* 20 17   CREATININE 1.2* 1.0 0.9   GLUCOSE 114* 115* 114*       Lab Results   Component Value Date    CALCIUM 8.8 01/07/2022    PHOS 1.7 (L) 01/07/2022       Objective:     Vitals: BP (!) 149/76   Pulse 81   Temp 97.9 °F (36.6 °C) (Oral)   Resp 20   Ht 4' 10\" (1.473 m)   Wt 143 lb 8.3 oz (65.1 kg)   SpO2 92%   BMI 30.00 kg/m²     General appearance: She is awake couple but not oriented  HEENT: At least 1+ conjunctival pallor  Neck: Supple  Lungs: Positive rhonchi on auscultation  Heart: Seems regular rate and rhythm  Abdomen: Soft  Extremities: No edema  She has a [place]      Problem List :         Impression :     1. Stage III acute kidney injury-nonoliguric recovering by creatinine criteria  2. Hematuria likely from [place] trauma-start with UA  3. Transient confusion thought to be from dementia and perhaps infection or underlying neurological event-she of course is COVID-19  4.  Underlying atherosclerotic cardiovascular disease  5. Several electrolyte deficiency  6. Underlying hypertension with ACE inhibitor therapy    Recommendation/Plan  :     1. I will start with a plain UA  2. Also replete electrolyte perhaps with hypotonic solution if she is not drinking  3. Need better nutrition  4. Hopefully the hematuria will improve  5. Watch for iatrogenic nosocomial complication  6. If possible bladder irrigation  7.  Follow clinically and biochemically      Reg Solitario MD MD    Urine has massive RBC and albumin and some WBC  If hematuria does not clear up-then will consider urology evaluation

## 2022-01-07 NOTE — PLAN OF CARE
Problem: Airway Clearance - Ineffective  Goal: Achieve or maintain patent airway  Outcome: Ongoing     Problem: Gas Exchange - Impaired  Goal: Absence of hypoxia  Outcome: Ongoing  Goal: Promote optimal lung function  Outcome: Ongoing     Problem: Breathing Pattern - Ineffective  Goal: Ability to achieve and maintain a regular respiratory rate  Outcome: Ongoing     Problem:  Body Temperature -  Risk of, Imbalanced  Goal: Ability to maintain a body temperature within defined limits  Outcome: Ongoing  Goal: Will regain or maintain usual level of consciousness  Outcome: Ongoing  Goal: Complications related to the disease process, condition or treatment will be avoided or minimized  Outcome: Ongoing     Problem: Isolation Precautions - Risk of Spread of Infection  Goal: Prevent transmission of infection  Outcome: Ongoing     Problem: Nutrition Deficits  Goal: Optimize nutritional status  Outcome: Ongoing     Problem: Risk for Fluid Volume Deficit  Goal: Maintain normal heart rhythm  Outcome: Ongoing  Goal: Maintain absence of muscle cramping  Outcome: Ongoing  Goal: Maintain normal serum potassium, sodium, calcium, phosphorus, and pH  Outcome: Ongoing     Problem: Loneliness or Risk for Loneliness  Goal: Demonstrate positive use of time alone when socialization is not possible  Outcome: Ongoing     Problem: Fatigue  Goal: Verbalize increase energy and improved vitality  Outcome: Ongoing     Problem: Patient Education: Go to Patient Education Activity  Goal: Patient/Family Education  Outcome: Ongoing     Problem: HEMODYNAMIC STATUS  Goal: Patient has stable vital signs and fluid balance  Outcome: Ongoing     Problem: ACTIVITY INTOLERANCE/IMPAIRED MOBILITY  Goal: Mobility/activity is maintained at optimum level for patient  Outcome: Ongoing     Problem: COMMUNICATION IMPAIRMENT  Goal: Ability to express needs and understand communication  Outcome: Ongoing

## 2022-01-07 NOTE — PROGRESS NOTES
Occupational Therapy  . Occupational Therapy Treatment Note      Name: Brii Jones MRN: [id number] :   1950   Date:  2022   Admission Date: 2022 Room:  [address] [place] [address]-A     Primary Problem:      Restrictions/Precautions:    General precautions, fall risk    Droplet plus    Communication with other providers:  cotx with PT Clarence Dolan for eval and safety. Updated nurse on patients IV being pulled out. Subjective:  Patient states:  Yes, No.  Patients  with expressive asphasia   Pain: none stated (location, type, intensity)    Objective:    Observation:  patient side lying. Patient not amenable to therapy. But encouraged to participate. Patient pulled IV out. Objective Measures:      Treatment, including education:    ADL activity training was instructed today. Cues were given for safety, sequence, UE/LE placement, visual cues, and balance. Activities performed today included dressing,     LB dressing- DEP. Patient asked to put socks on, but patient stating NO. When asked if patient could see socks in front of face, patient responded NO. While in bathroom standing in front of sink, HANNA offered all ADLs at this time and patient declined. Therapeutic activity training was instructed today. Cues were given for safety, sequence, UE/LE placement, awareness, and balance. Activities performed today included bed mobility training, sup-sit, sit-stand, SPT. Supine to EOB- CGAx2 with increased time and effort. Plus cues for sequencing. EOB- patient tolerated x5 min with good sitting balance. Cues to scoot hips forward, but patient did not initiate. stand to [address] x2 plus cues to push from bed, but patient only pulled on walker despite cues. Patient ambulated x 10 feet Min x2/ Mod x2 for safety, walker cues and negotiation. patient stood in front of sink Min A for safety and cues for walker safety and upright posture. Patient starting to fatigue and lean on walker.  Patient tolerated ambulating x3 feet to recliner Mod A plus walker negotiation and cues for sequencing. Once patient in front of recliner, cues to reach back for recliner for safe and slow descent. Patient unable to follow cues. Sat Mod x2 for safety d/t patient having B hands on walker. HHA to bring B hands off walker after vcs to place B hands on recliner and scoot hips back. Patient attempted to stand back up, cues to stay seated. Max x2 to scoot patient back in recliner in safe position. Patient educated on role of OT , benefits of OT and rationale for therapeutic intervention. All therapeutic intervention performed c emphasis on dynamic balance / standing tolerance to increase strength, endurance and activity tolerance for increased Ponce c ADL tasks and func transfers / mobility. Patient left safely in bedside chair at end of session, with call light in reach, alarm on and nursing aware. Gait belt was used for func transfers / mobility. Assessment / Impression:    Patient's tolerance of treatment: fair  Adverse Reaction: none  Significant change in status and impact:  none  Barriers to improvement: weakness, cognition, at times self limiting. Patient required Max cues for sequencing this date during ambulation. Patient states she typically uses cane at home. Patient needs extra cues for walker management and negotiation.        Plan for Next Session:    Continue with OT POC      Time in:  1137  Time out:  1202  Timed treatment minutes:  25  Total treatment time:  25      Electronically signed by:    ZEINAB Cifuentes COTA/L [id number]    1/7/2022, 12:55 PM

## 2022-01-08 LAB
BACTERIA: ABNORMAL /HPF
BILIRUBIN URINE: NEGATIVE MG/DL
BLOOD, URINE: ABNORMAL
CLARITY: ABNORMAL
COLOR: YELLOW
GLUCOSE, URINE: NEGATIVE MG/DL
HYALINE CASTS: 10 /LPF
KETONES, URINE: ABNORMAL MG/DL
LEUKOCYTE ESTERASE, URINE: ABNORMAL
MUCUS: ABNORMAL HPF
NITRITE URINE, QUANTITATIVE: NEGATIVE
PH, URINE: 6 (ref 5–8)
PROTEIN UA: 100 MG/DL
RBC URINE: 352 /HPF (ref 0–6)
SPECIFIC GRAVITY UA: 1.02 (ref 1–1.03)
SQUAMOUS EPITHELIAL: 1 /HPF
UROBILINOGEN, URINE: NORMAL MG/DL (ref 0.2–1)
WBC CLUMP: ABNORMAL /HPF
WBC UA: 173 /HPF (ref 0–5)

## 2022-01-08 PROCEDURE — 94761 N-INVAS EAR/PLS OXIMETRY MLT: CPT

## 2022-01-08 PROCEDURE — 6370000000 HC RX 637 (ALT 250 FOR IP): Performed by: STUDENT IN AN ORGANIZED HEALTH CARE EDUCATION/TRAINING PROGRAM

## 2022-01-08 PROCEDURE — 6370000000 HC RX 637 (ALT 250 FOR IP): Performed by: HOSPITALIST

## 2022-01-08 PROCEDURE — 6370000000 HC RX 637 (ALT 250 FOR IP): Performed by: NURSE PRACTITIONER

## 2022-01-08 PROCEDURE — 6370000000 HC RX 637 (ALT 250 FOR IP): Performed by: PSYCHIATRY & NEUROLOGY

## 2022-01-08 PROCEDURE — 81001 URINALYSIS AUTO W/SCOPE: CPT

## 2022-01-08 PROCEDURE — 6370000000 HC RX 637 (ALT 250 FOR IP): Performed by: INTERNAL MEDICINE

## 2022-01-08 PROCEDURE — 1200000000 HC SEMI PRIVATE

## 2022-01-08 RX ADMIN — DIBASIC SODIUM PHOSPHATE, MONOBASIC POTASSIUM PHOSPHATE AND MONOBASIC SODIUM PHOSPHATE 1 TABLET: 852; 155; 130 TABLET ORAL at 09:28

## 2022-01-08 RX ADMIN — DONEPEZIL HYDROCHLORIDE 5 MG: 5 TABLET, FILM COATED ORAL at 22:02

## 2022-01-08 RX ADMIN — DIBASIC SODIUM PHOSPHATE, MONOBASIC POTASSIUM PHOSPHATE AND MONOBASIC SODIUM PHOSPHATE 1 TABLET: 852; 155; 130 TABLET ORAL at 22:02

## 2022-01-08 RX ADMIN — ROSUVASTATIN 40 MG: 40 TABLET, FILM COATED ORAL at 09:28

## 2022-01-08 RX ADMIN — APIXABAN 5 MG: 5 TABLET, FILM COATED ORAL at 09:28

## 2022-01-08 RX ADMIN — LISINOPRIL 5 MG: 5 TABLET ORAL at 09:28

## 2022-01-08 RX ADMIN — CLOPIDOGREL BISULFATE 75 MG: 75 TABLET ORAL at 09:28

## 2022-01-08 RX ADMIN — APIXABAN 5 MG: 5 TABLET, FILM COATED ORAL at 22:01

## 2022-01-08 ASSESSMENT — PAIN SCALES - GENERAL: PAINLEVEL_OUTOF10: 0

## 2022-01-08 NOTE — PROGRESS NOTES
Nephrology Progress Note  1/8/2022 7:19 AM  Subjective: Interval History: Cash Shipley is a 66 y.o. female with follow-up with no acute distress resting with Dementia        Data:   Scheduled Meds:   phosphorus  250 mg Oral BID    apixaban  5 mg Oral BID    lisinopril  5 mg Oral Daily    donepezil  5 mg Oral Nightly    rosuvastatin  40 mg Oral Daily    clopidogrel  75 mg Oral Daily     Continuous Infusions:      CBC   Recent Labs     01/05/22  1111   WBC 7.4   HGB 13.3   HCT 41.3         BMP   Recent Labs     01/05/22  1111 01/06/22  0939 01/07/22  0550   * 144 145   K 3.9 4.1 4.2    109 110   CO2 22 23 24   PHOS  --   --  1.7*   BUN 30* 20 17   CREATININE 1.2* 1.0 0.9     Hepatic:   Recent Labs     01/06/22  0939   AST 23   ALT 7*   BILITOT 0.3   ALKPHOS 90     Troponin: No results for input(s): TROPONINI in the last 72 hours. BNP: No results for input(s): BNP in the last 72 hours. Lipids: No results for input(s): CHOL, HDL in the last 72 hours. Invalid input(s): LDLCALCU  ABGs: No results found for: PHART, PO2ART, UIS6UCE  INR: No results for input(s): INR in the last 72 hours.   Renal Labs  Albumin:    Lab Results   Component Value Date    LABALBU 4.0 01/06/2022     Calcium:    Lab Results   Component Value Date    CALCIUM 8.8 01/07/2022     Phosphorus:    Lab Results   Component Value Date    PHOS 1.7 01/07/2022     U/A:    Lab Results   Component Value Date    NITRU NEGATIVE 01/07/2022    COLORU BROWN 01/07/2022    WBCUA 54 01/07/2022    RBCUA 2,200 01/07/2022    MUCUS OCCASIONAL 01/07/2022    TRICHOMONAS NONE SEEN 01/07/2022    BACTERIA NEGATIVE 01/07/2022    CLARITYU CLOUDY 01/07/2022    SPECGRAV 1.030 01/07/2022    UROBILINOGEN 0.2 01/07/2022    BILIRUBINUR LARGE NUMBER OR AMOUNT OF  01/07/2022    BLOODU LARGE NUMBER OR AMOUNT OF  01/07/2022    KETUA SMALL 01/07/2022    AMORPHOUS RARE 01/03/2022     ABG:  No results found for: PHART, GDF1TZZ, PO2ART, EOE0SYD, BEART, THGBART, AUN4WNE, H0TLRAPU  HgBA1c:    Lab Results   Component Value Date    LABA1C 6.0 12/22/2021     Microalbumen/Creatinine ratio:  No components found for: RUCREAT  TSH:  No results found for: TSH  IRON:  No results found for: IRON  Iron Saturation:  No components found for: PERCENTFE  TIBC:  No results found for: TIBC  FERRITIN:    Lab Results   Component Value Date    FERRITIN 339 01/03/2022     RPR:  No results found for: RPR  AMI:    Lab Results   Component Value Date    AMI None Detected 11/15/2021     24 Hour Urine for Creatinine Clearance:  No components found for: CREAT4, UHRS10, UTV10      Objective:   I/O: 01/07 0701 - 01/08 0700  In: 240 [P.O.:240]  Out: 250 [Urine:250]  I/O last 3 completed shifts: In: 65 [P.O.:640; I.V.:20]  Out: 770 [Urine:770]  No intake/output data recorded. Vitals: /60   Pulse 82   Temp 96.6 °F (35.9 °C) (Oral)   Resp 18   Ht 4' 10\" (1.473 m)   Wt 143 lb 8.3 oz (65.1 kg)   SpO2 94%   BMI 30.00 kg/m²  {  General appearance: awake weak  HEENT: Head: Normal, normocephalic, atraumatic.   Neck: supple, symmetrical, trachea midline  Lungs: diminished breath sounds bilaterally  Heart: S1, S2 normal  Abdomen: abnormal findings:  soft nt  Extremities: edema trace  Neurologic: Mental status: alertness: Awake        Assessment and Plan:      IMP:  #1 acute renal failure from ATN  #2 hematuria  #3 dementia  #4 COVID-19 positive      Plan     #1 creatinine 0.9 renal function holding with good urine output, and repleted phosphorus yesterday  #2 possible traumatic repeat urine and prior urine culture was negative  #3 moderate affect: Stable  #4 maintain care in the above setting with COVID per protocol  We will monitor for now           Luke Mandujano MD, MD

## 2022-01-08 NOTE — CONSULTS
Consult completed. Nexiva 20g 1.75 inch catheter inserted via ultrasound in patient's JAILYN. Brisk blood return noted and catheter flushes with ease. Patient tolerated well. Consult IV/PICC team if patient's needs change.

## 2022-01-08 NOTE — PROGRESS NOTES
Hospitalist Progress Note      PCP: Suze Callahan MD    Date of Admission: 1/2/2022    LOS: 5    Subjective:   Overnight Events:   Uneventful overnight  Improving hematuria  Renal function at baseline        Active Hospital Problems    Diagnosis Date Noted    Recurrent stenosis of left carotid artery [I65.22]     Acute kidney injury due to COVID-19 ([facility] [address] [U07.1, N17.9] 01/03/2022    Expressive aphasia [R47.01]     Acute cerebrovascular accident (CVA) ([facility] [address] [I63.9] 12/21/2021    CAD (coronary artery disease) [I25.10]        Case Summary:   65-year-old lady with history of CAD, hypertension, dementia, history of present CVA with left frontal lobe infarct and left occipital lobe infarct worsening expressive aphasia who presented with generalized decline and generalized weakness. She was found to have acute kidney injury and acute metabolic encephalopathy in setting of dementia and COVID-19 infection without hypoxia. Admission course was complicated by gross hematuria. Principal Problem:    Acute kidney injury due to COVID-19 [facility]): Renal function improved with hydration. Initially lisinopril was on hold and not resumed. Nephrology following with recommendations. Gross hematuria: in the setting of Plavix use. Etiology unclear. H&H remained stable. Repeat UA tomorrow. Currently vitals and H&H stable. Will continue on antiplatelet therapy given improvement despite use. COVID-19 infection: With no hypoxia. Treatment not indicated. .      Active Problems:    Dementia: On donepezil    CAD (coronary artery disease): With no chest pains. ,  Continues on lisinopril, statin and clopidogrel. Acute cerebrovascular accident (CVA) with Expressive aphasia and recurrent stenosis of left carotid artery: Continue Plavix and statin for secondary prevention    Diarrhea: Slowly improving. Will monitor. C. difficile negative.   Continue as needed Imodium    Hyperlipidemia: On Crestor    Essential hypertension: On lisinopril. Resolved Problems:    * No resolved hospital problems. *          Medications:  Reviewed  Infusion Medications   Scheduled Medications    phosphorus  250 mg Oral BID    apixaban  5 mg Oral BID    lisinopril  5 mg Oral Daily    donepezil  5 mg Oral Nightly    rosuvastatin  40 mg Oral Daily    clopidogrel  75 mg Oral Daily     PRN Meds: loperamide, zinc oxide, polyethylene glycol, acetaminophen **OR** acetaminophen, prochlorperazine      DVT Prophylaxis: On Eliquis  Diet: ADULT DIET; Regular  ADULT ORAL NUTRITION SUPPLEMENT; Breakfast, Lunch; Renal Oral Supplement  Code Status: Full Code    Dispo: Anticipate discharge once hematuria improved    ____________________________________________________________________________    Physical Exam:  /76   Pulse 82   Temp 97.6 °F (36.4 °C) (Oral)   Resp 17   Ht 4' 10\" (1.473 m)   Wt 143 lb 8.3 oz (65.1 kg)   SpO2 95%   BMI 30.00 kg/m²   General appearance: No apparent distress, appears stated age and cooperative. Pleasantly confused. Noncombative. HEENT: Normocephalic, atraumatic, MMM, No sclera icterus/conjuctival palor  Neck: Supple, no thyromegally. No jugular venous distention. Respiratory:  Normal respiratory effort. Clear to auscultation, no Rales/Wheezes/Rhonchi. Cardiovascular: S1/S2 without murmurs, rubs or gallops. RRR  Abdomen: Soft, non-tender, non-distended, bowel sounds present. Musculoskeletal: No clubbing, cyanosis or edema bilaterally. Skin: Skin color, texture, turgor normal.  No rashes or lesions.   Neurologic:  Cranial nerves: II-XII intact, NIKOLAI, No focal sensory/motor deficits  Capillary Refill: Brisk,< 3 seconds   Peripheral Pulses: +2 palpable, equal bilaterally       Intake/Output Summary (Last 24 hours) at 1/8/2022 1252  Last data filed at 1/7/2022 2228  Gross per 24 hour   Intake 240 ml   Output 250 ml   Net -10 ml       Labs:   No results for input(s): WBC, HGB, HCT, PLT in the last 72 hours.    Invalid input(s):  INR   Recent Labs     01/06/22  0939 01/07/22  0550    145   K 4.1 4.2    110   CO2 23 24   BUN 20 17   CREATININE 1.0 0.9   CALCIUM 9.0 8.8   PHOS  --  1.7*   AST 23  --    ALT 7*  --    BILITOT 0.3  --    ALKPHOS 90  --      No results for input(s): [employer] in the last 72 hours. Urinalysis:    Lab Results   Component Value Date    NITRU NEGATIVE 01/08/2022    WBCUA 173 01/08/2022    BACTERIA FEW 01/08/2022    RBCUA 352 01/08/2022    BLOODU LARGE 01/08/2022    SPECGRAV 1.025 01/08/2022       Radiology:  MRI BRAIN WO CONTRAST   Final Result   1. New acute infarcts in the right middle cerebral and left posterior   cerebral artery territories. Evolving infarcts in the left middle cerebral   artery territory. No acute hemorrhage given artifact. 2. Other findings as described. RECOMMENDATIONS:   Unavailable         CT ABDOMEN PELVIS WO CONTRAST Additional Contrast? None   Final Result   1. No acute findings. No evidence of obstructive uropathy. 2. Colonic diverticulosis. RECOMMENDATIONS:   Unavailable         CT HEAD WO CONTRAST   Final Result   No acute hemorrhage or midline shift. Other findings as described. RECOMMENDATIONS:   Unavailable         XR CHEST PORTABLE   Final Result   No acute cardiopulmonary abnormality. Lobito Ross MD      Please excuse brevity and/or typos. This report was transcribed using voice recognition software. Every effort was made to ensure accuracy, however, inadvertent computerized transcription errors may be present.

## 2022-01-09 PROBLEM — U07.1 COVID-19 VIRUS INFECTION: Status: ACTIVE | Noted: 2022-01-09

## 2022-01-09 PROBLEM — R19.7 DIARRHEA: Status: ACTIVE | Noted: 2022-01-09

## 2022-01-09 PROBLEM — F03.90 DEMENTIA (HCC): Status: ACTIVE | Noted: 2022-01-09

## 2022-01-09 PROBLEM — R31.0 GROSS HEMATURIA: Status: ACTIVE | Noted: 2022-01-09

## 2022-01-09 PROBLEM — I10 HYPERTENSION: Status: ACTIVE | Noted: 2022-01-09

## 2022-01-09 PROCEDURE — 6370000000 HC RX 637 (ALT 250 FOR IP): Performed by: NURSE PRACTITIONER

## 2022-01-09 PROCEDURE — 6370000000 HC RX 637 (ALT 250 FOR IP): Performed by: INTERNAL MEDICINE

## 2022-01-09 PROCEDURE — 6370000000 HC RX 637 (ALT 250 FOR IP): Performed by: HOSPITALIST

## 2022-01-09 PROCEDURE — 97530 THERAPEUTIC ACTIVITIES: CPT

## 2022-01-09 PROCEDURE — 1200000000 HC SEMI PRIVATE

## 2022-01-09 PROCEDURE — 6370000000 HC RX 637 (ALT 250 FOR IP): Performed by: PSYCHIATRY & NEUROLOGY

## 2022-01-09 PROCEDURE — 97116 GAIT TRAINING THERAPY: CPT

## 2022-01-09 PROCEDURE — 6370000000 HC RX 637 (ALT 250 FOR IP): Performed by: STUDENT IN AN ORGANIZED HEALTH CARE EDUCATION/TRAINING PROGRAM

## 2022-01-09 PROCEDURE — 81001 URINALYSIS AUTO W/SCOPE: CPT

## 2022-01-09 RX ADMIN — APIXABAN 5 MG: 5 TABLET, FILM COATED ORAL at 11:04

## 2022-01-09 RX ADMIN — LOPERAMIDE HYDROCHLORIDE 2 MG: 2 CAPSULE ORAL at 21:08

## 2022-01-09 RX ADMIN — DIBASIC SODIUM PHOSPHATE, MONOBASIC POTASSIUM PHOSPHATE AND MONOBASIC SODIUM PHOSPHATE 1 TABLET: 852; 155; 130 TABLET ORAL at 21:06

## 2022-01-09 RX ADMIN — APIXABAN 5 MG: 5 TABLET, FILM COATED ORAL at 21:06

## 2022-01-09 RX ADMIN — ROSUVASTATIN 40 MG: 40 TABLET, FILM COATED ORAL at 11:00

## 2022-01-09 RX ADMIN — DIBASIC SODIUM PHOSPHATE, MONOBASIC POTASSIUM PHOSPHATE AND MONOBASIC SODIUM PHOSPHATE 1 TABLET: 852; 155; 130 TABLET ORAL at 11:04

## 2022-01-09 RX ADMIN — DONEPEZIL HYDROCHLORIDE 5 MG: 5 TABLET, FILM COATED ORAL at 21:06

## 2022-01-09 RX ADMIN — LISINOPRIL 5 MG: 5 TABLET ORAL at 11:04

## 2022-01-09 RX ADMIN — CLOPIDOGREL BISULFATE 75 MG: 75 TABLET ORAL at 11:04

## 2022-01-09 ASSESSMENT — PAIN SCALES - GENERAL
PAINLEVEL_OUTOF10: 0
PAINLEVEL_OUTOF10: 0

## 2022-01-09 NOTE — PROGRESS NOTES
Physical Therapy    Physical Therapy Treatment Note  Name: Brii Jones MRN: [id number] :   1950   Date:  2022   Admission Date: 2022 Room:  [address] [place] [address]   Restrictions/Precautions: fall risk; general precautions; droplet plus  Communication with other providers:  RN handoff provided  Subjective:  Patient states: \"Yeah\"  Pain:   Location, Type, Intensity (0/10 to 10/10): Denies  Objective:    Observation:  Pt presents supine in bed, alert, awake, agreeable to therapy. Tele, pulse ox, bed alarm in place. Expressive aphasia, impulsivity, neuro weakness/impairments noted. Treatment, including education/measures:  Therapeutic Activity Training:   Therapeutic activity training was instructed today. Cues were given for safety, sequence, UE/LE placement, awareness, and balance. Activities performed today included bed mobility training, sup-sit, sit-stand, SPT. Supine <-> sit: min A for completion  Seated balance: good  Sit <-> stand: min A progressing to SBA w/ time on task ; 2 x 5 sit to stands for functional strength off EOB  Standing balance: fair ; inc postural sway and reliance on single to BL UE support during OOB activity ; static standing against random pertubations and dynamic reaching tasks initiated in standing to challenge balance  Stand <-> sit: CGA  Pt becomes frustrated and SOB, requesting to end tx session w/ cueing from therapist ; left in bed, bed alarmed, call light in reach, gait belt t/o, all needs met, RN handoff provided    Gait Training:  Cues were given for safety, sequence, device management, balance, posture, awareness, path.     25 ft using FWW at min A to CGA ; min A for AD mgmt, CGA for steady ; dec salud, dec EL, poor AD mgmt, dec stride length/step height BL ; inc postural sway ; poor ability attending to cues, mod - max t/o to address quality of functional mobility    Assessment / Impression:    Initiated functional mobility, balance, transfer, functional strengthening, and gait training this session. Pt tolerating well w/o noted inc pain, slight SOB, but pt ambulating further distance this session. Will cont to benefit from skilled therapy services to address neuro deficits. Patient's tolerance of treatment:  Fair +   Barriers to improvement:  Cognition; aphasia  Plan for Next Session:    Cont w/ est POC and DC plan    Time in:  0025  Time out:  1705  Timed treatment minutes:  26  Total treatment time:  24 (TA, GT)    Previously filed items:  Social/Functional History  Lives With: Spouse  Type of Home: House  Home Layout: Two level,Bed/Bath upstairs  Home Access: Stairs to enter with rails  Entrance Stairs - Number of Steps: 2-3  Bathroom Shower/Tub: Walk-in shower  Bathroom Equipment: Grab bars in shower,Grab bars around [employer] chair  Home Equipment: Cane  ADL Assistance: [address] [place] [address] Avenue: [address] [place] [address] Responsibilities: Yes (shares with )  Ambulation Assistance: Independent (with cane)  Transfer Assistance: Independent  Active : No  Patient's  Info:  and dtr  Short term goals  Time Frame for Short term goals: 1 week  Short term goal 1: Pt will AMB x25 ft with RW, CGA-SBA. Short term goal 2: Pt will demonstrate stand-step transfer from EOB<>chair with RW and SBA. Short term goal 3: Pt will demonstrate STS from low surface to RW with CGA.   Short term goal 4: Pt will tolerate x5' standing dynamic balance activity with emphasis on visual tracking, CGA, UE support,       Electronically signed by:    Josiah Schaefer PT, DPT  1/9/2022, 5:08 PM

## 2022-01-09 NOTE — PROGRESS NOTES
Nephrology Progress Note  1/9/2022 10:49 AM  Subjective: Interval History: Ankush Mendes is a 66 y.o. female. Stable in bed      Data:   Scheduled Meds:   phosphorus  250 mg Oral BID    apixaban  5 mg Oral BID    lisinopril  5 mg Oral Daily    donepezil  5 mg Oral Nightly    rosuvastatin  40 mg Oral Daily    clopidogrel  75 mg Oral Daily     Continuous Infusions:      CBC   No results for input(s): WBC, HGB, HCT, PLT in the last 72 hours. BMP   Recent Labs     01/07/22  0550      K 4.2      CO2 24   PHOS 1.7*   BUN 17   CREATININE 0.9     Hepatic:   No results for input(s): AST, ALT, ALB, BILITOT, ALKPHOS in the last 72 hours. Troponin: No results for input(s): TROPONINI in the last 72 hours. BNP: No results for input(s): BNP in the last 72 hours. Lipids: No results for input(s): CHOL, HDL in the last 72 hours. Invalid input(s): LDLCALCU  ABGs: No results found for: PHART, PO2ART, KFM4HEW  INR: No results for input(s): INR in the last 72 hours.   Renal Labs  Albumin:    Lab Results   Component Value Date    LABALBU 4.0 01/06/2022     Calcium:    Lab Results   Component Value Date    CALCIUM 8.8 01/07/2022     Phosphorus:    Lab Results   Component Value Date    PHOS 1.7 01/07/2022     U/A:    Lab Results   Component Value Date    NITRU NEGATIVE 01/08/2022    COLORU YELLOW 01/08/2022    WBCUA 173 01/08/2022    RBCUA 352 01/08/2022    MUCUS OCCASIONAL 01/08/2022    TRICHOMONAS NONE SEEN 01/07/2022    BACTERIA FEW 01/08/2022    CLARITYU CLOUDY 01/08/2022    SPECGRAV 1.025 01/08/2022    UROBILINOGEN NORMAL 01/08/2022    BILIRUBINUR NEGATIVE 01/08/2022    BLOODU LARGE 01/08/2022    KETUA TRACE 01/08/2022    AMORPHOUS RARE 01/03/2022     ABG:  No results found for: PHART, VZD9XBX, PO2ART, DJC4FQJ, BEART, THGBART, ZZP3VBJ, O1JBBSJS  HgBA1c:    Lab Results   Component Value Date    LABA1C 6.0 12/22/2021     Microalbumen/Creatinine ratio:  No components found for: RUCREAT  TSH:  No results found for: TSH  IRON:  No results found for: IRON  Iron Saturation:  No components found for: PERCENTFE  TIBC:  No results found for: TIBC  FERRITIN:    Lab Results   Component Value Date    FERRITIN 339 01/03/2022     RPR:  No results found for: RPR  AMI:    Lab Results   Component Value Date    AMI None Detected 11/15/2021     24 Hour Urine for Creatinine Clearance:  No components found for: CREAT4, UHRS10, UTV10      Objective:   I/O: 01/08 0701 - 01/09 0700  In: -   Out: 400 [Urine:400]  I/O last 3 completed shifts: In: 240 [P.O.:240]  Out: 650 [Urine:650]  No intake/output data recorded. Vitals: /61   Pulse 73   Temp 97.4 °F (36.3 °C) (Oral)   Resp 15   Ht 4' 10\" (1.473 m)   Wt 143 lb 8.3 oz (65.1 kg)   SpO2 93%   BMI 30.00 kg/m²  {  General appearance: awake weak  HEENT: Head: Normal, normocephalic, atraumatic.   Neck: supple, symmetrical, trachea midline  Lungs: diminished breath sounds bilaterally  Heart: S1, S2 normal  Abdomen: abnormal findings:  soft nt  Extremities: edema trace  Neurologic: Mental status: alertness: Awake        Assessment and Plan:      IMP:  #1 acute renal failure from ATN  #2 hematuria  #3 dementia  #4 COVID-19 positive      Plan     #1 renal function only stable monitor  #2 still having hematuria recommend urology follow-up as negative urine culture and CT scan shows no evidence of other acute pathology will need work-up   #3 monitor affect in the above setting neurology and vascular surgery following  #4 treatment setting of COVID  For now supportive care and follow           Camelia Lan MD, MD

## 2022-01-09 NOTE — PROGRESS NOTES
Hospitalist Progress Note      PCP: Daina Curry MD    Date of Admission: 1/2/2022    LOS: 6    Subjective:   Overnight Events:   Uneventful overnight  Sleeping this morning same evaluation  Urine looks clear          Active Hospital Problems    Diagnosis Date Noted    Recurrent stenosis of left carotid artery [I65.22]     Acute kidney injury due to [facility]-19 ([facility] [U07.1, N17.9] 01/03/2022    Expressive aphasia [R47.01]     Acute cerebrovascular accident (CVA) ([facility] [I63.9] 12/21/2021    CAD (coronary artery disease) [I25.10]        Case Summary:   65-year-old lady with history of CAD, hypertension, dementia, history of present CVA with left frontal lobe infarct and left occipital lobe infarct worsening expressive aphasia who presented with generalized decline and generalized weakness. She was found to have acute kidney injury and acute metabolic encephalopathy in setting of dementia and COVID-19 infection without hypoxia. Admission course was complicated by gross hematuria. Principal Problem:    Gross hematuria: In the setting of Plavix and Eliquis use. Has since improved. Urine today looks gretchen with no evidence of clots. - Continue antiplatelet and anticoagulation  - We will check UA      COVID-19 virus infection: With no hypoxia. Treatment not indicated      Acute kidney injury due to COVID-19 [facility]): Renal function improved with IV hydration and OLIVER resolved. Patient was resumed on lisinopril      Diarrhea: Improved. C. difficile negative. Continue as needed antidiarrheal medication      Hypertension: Stable on lisinopril      Active Problems:    CAD (coronary artery disease): Asymptomatic no chest pain.   Continue lisinopril, clopidogrel and statin    Acute cerebrovascular accident and residual expressive aphasia with Recurrent stenosis of left carotid artery: Continue Plavix and statin    Dementia ([facility]): On donepezil          Medications:  Reviewed  Infusion Medications   Scheduled Medications    phosphorus  250 mg Oral BID    apixaban  5 mg Oral BID    lisinopril  5 mg Oral Daily    donepezil  5 mg Oral Nightly    rosuvastatin  40 mg Oral Daily    clopidogrel  75 mg Oral Daily     PRN Meds: loperamide, zinc oxide, polyethylene glycol, acetaminophen **OR** acetaminophen, prochlorperazine      DVT Prophylaxis: On Eliquis  Diet: ADULT DIET; Regular  ADULT ORAL NUTRITION SUPPLEMENT; Breakfast, Lunch; Renal Oral Supplement  Code Status: Full Code    Dispo: Anticipate discharge to SNF once bed secured. Patient ready for discharge as hematuria has resolved    ____________________________________________________________________________    Physical Exam:  /61   Pulse 73   Temp 97.4 °F (36.3 °C) (Oral)   Resp 15   Ht 4' 10\" (1.473 m)   Wt 143 lb 8.3 oz (65.1 kg)   SpO2 93%   BMI 30.00 kg/m²   General appearance: No apparent distress, appears stated age and cooperative. HEENT: Normocephalic, atraumatic, MMM, No sclera icterus/conjuctival palor  Neck: Supple, no thyromegally. No jugular venous distention. Respiratory:  Normal respiratory effort. Clear to auscultation, no Rales/Wheezes/Rhonchi. Cardiovascular: S1/S2 without murmurs, rubs or gallops. RRR  Abdomen: Soft, non-tender, non-distended, bowel sounds present. Musculoskeletal: No clubbing, cyanosis or edema bilaterally. Skin: Skin color, texture, turgor normal.  No rashes or lesions. Neurologic:  Cranial nerves: II-XII intact, NIKOLAI, No focal sensory/motor deficits        Intake/Output Summary (Last 24 hours) at 1/9/2022 1304  Last data filed at 1/9/2022 0626  Gross per 24 hour   Intake --   Output 400 ml   Net -400 ml       Labs:   No results for input(s): WBC, HGB, HCT, PLT in the last 72 hours.     Invalid input(s):  INR   Recent Labs     01/07/22  0550      K 4.2      CO2 24   BUN 17   CREATININE 0.9   CALCIUM 8.8   PHOS 1.7*     No results for input(s): Flavia Rodriguezine in the last 72 hours.    Urinalysis:    Lab Results   Component Value Date    NITRU NEGATIVE 01/08/2022    WBCUA 173 01/08/2022    BACTERIA FEW 01/08/2022    RBCUA 352 01/08/2022    BLOODU LARGE 01/08/2022    SPECGRAV 1.025 01/08/2022       Radiology:  MRI BRAIN WO CONTRAST   Final Result   1. New acute infarcts in the right middle cerebral and left posterior   cerebral artery territories. Evolving infarcts in the left middle cerebral   artery territory. No acute hemorrhage given artifact. 2. Other findings as described. RECOMMENDATIONS:   Unavailable         CT ABDOMEN PELVIS WO CONTRAST Additional Contrast? None   Final Result   1. No acute findings. No evidence of obstructive uropathy. 2. Colonic diverticulosis. RECOMMENDATIONS:   Unavailable         CT HEAD WO CONTRAST   Final Result   No acute hemorrhage or midline shift. Other findings as described. RECOMMENDATIONS:   Unavailable         XR CHEST PORTABLE   Final Result   No acute cardiopulmonary abnormality. Lobito Balke MD      Please excuse brevity and/or typos. This report was transcribed using voice recognition software. Every effort was made to ensure accuracy, however, inadvertent computerized transcription errors may be present.

## 2022-01-10 PROCEDURE — 97530 THERAPEUTIC ACTIVITIES: CPT

## 2022-01-10 PROCEDURE — 1200000000 HC SEMI PRIVATE

## 2022-01-10 PROCEDURE — 94761 N-INVAS EAR/PLS OXIMETRY MLT: CPT

## 2022-01-10 PROCEDURE — 6370000000 HC RX 637 (ALT 250 FOR IP): Performed by: INTERNAL MEDICINE

## 2022-01-10 PROCEDURE — 6370000000 HC RX 637 (ALT 250 FOR IP): Performed by: NURSE PRACTITIONER

## 2022-01-10 PROCEDURE — 97116 GAIT TRAINING THERAPY: CPT

## 2022-01-10 PROCEDURE — 6370000000 HC RX 637 (ALT 250 FOR IP): Performed by: PSYCHIATRY & NEUROLOGY

## 2022-01-10 PROCEDURE — 92526 ORAL FUNCTION THERAPY: CPT

## 2022-01-10 PROCEDURE — 6370000000 HC RX 637 (ALT 250 FOR IP): Performed by: STUDENT IN AN ORGANIZED HEALTH CARE EDUCATION/TRAINING PROGRAM

## 2022-01-10 PROCEDURE — 6370000000 HC RX 637 (ALT 250 FOR IP): Performed by: HOSPITALIST

## 2022-01-10 RX ADMIN — DIBASIC SODIUM PHOSPHATE, MONOBASIC POTASSIUM PHOSPHATE AND MONOBASIC SODIUM PHOSPHATE 1 TABLET: 852; 155; 130 TABLET ORAL at 20:49

## 2022-01-10 RX ADMIN — DONEPEZIL HYDROCHLORIDE 5 MG: 5 TABLET, FILM COATED ORAL at 20:49

## 2022-01-10 RX ADMIN — APIXABAN 5 MG: 5 TABLET, FILM COATED ORAL at 20:49

## 2022-01-10 RX ADMIN — CLOPIDOGREL BISULFATE 75 MG: 75 TABLET ORAL at 10:17

## 2022-01-10 RX ADMIN — DIBASIC SODIUM PHOSPHATE, MONOBASIC POTASSIUM PHOSPHATE AND MONOBASIC SODIUM PHOSPHATE 1 TABLET: 852; 155; 130 TABLET ORAL at 10:17

## 2022-01-10 RX ADMIN — APIXABAN 5 MG: 5 TABLET, FILM COATED ORAL at 10:17

## 2022-01-10 RX ADMIN — ROSUVASTATIN 40 MG: 40 TABLET, FILM COATED ORAL at 10:22

## 2022-01-10 RX ADMIN — LISINOPRIL 5 MG: 5 TABLET ORAL at 10:17

## 2022-01-10 ASSESSMENT — PAIN SCALES - GENERAL
PAINLEVEL_OUTOF10: 0

## 2022-01-10 NOTE — PROGRESS NOTES
Hospitalist Progress Note      PCP: Giovanna Suh MD    Date of Admission: 1/2/2022    LOS: 7    Subjective:   Overnight Events:   Uneventful overnight  No new issues this morning            Active Hospital Problems    Diagnosis Date Noted    Gross hematuria [R31.0] 01/09/2022    COVID-19 virus infection [U07.1] 01/09/2022    Dementia ([facility] [F03.90] 01/09/2022    Diarrhea [R19.7] 01/09/2022    Hypertension [I10] 01/09/2022    Recurrent stenosis of left carotid artery [I65.22]     Acute kidney injury due to COVID-19 ([facility] [U07.1, N17.9] 01/03/2022    Expressive aphasia [R47.01]     Acute cerebrovascular accident (CVA) ([address] [I63.9] 12/21/2021    CAD (coronary artery disease) [I25.10]        Case Summary:   69-year-old lady with history of CAD, hypertension, dementia, history of present CVA with left frontal lobe infarct and left occipital lobe infarct worsening expressive aphasia who presented with generalized decline and generalized weakness.  She was found to have acute kidney injury and acute metabolic encephalopathy in setting of dementia and COVID-19 infection without hypoxia. Admission course was complicated by gross hematuria.         Principal Problem:    Gross hematuria: In the setting of Plavix and Eliquis use. Thought to be due to trauma from Sherwood catheter placement. Urine looks gretchen today with no evidence of clots. Urinalysis not repeated yesterday was requested  -We will do UA  -Continue anticoagulation and antiplatelet therapy  -Monitor H&H      COVID-19 virus infection: With no hypoxia. Treatment not indicated.     Active Problems:    CAD (coronary artery disease)    Acute cerebrovascular accident (CVA) and Expressive aphasia with Recurrent stenosis of left carotid artery: Continue statin, antiplatelet    Dementia ([address]): On donepezil    Hypertension: Stable on lisinopril      Resolved Problems:    Acute kidney injury     Diarrhea      Medications:  Reviewed  Infusion Medications Scheduled Medications    phosphorus  250 mg Oral BID    apixaban  5 mg Oral BID    lisinopril  5 mg Oral Daily    donepezil  5 mg Oral Nightly    rosuvastatin  40 mg Oral Daily    clopidogrel  75 mg Oral Daily     PRN Meds: loperamide, zinc oxide, polyethylene glycol, acetaminophen **OR** acetaminophen, prochlorperazine      DVT Prophylaxis: On Eliquis  Diet: ADULT DIET; Regular  ADULT ORAL NUTRITION SUPPLEMENT; Breakfast, Lunch; Renal Oral Supplement  Code Status: Full Code    Dispo: Anticipate discharge once accepted to rehab    ____________________________________________________________________________    Physical Exam:  /62   Pulse 67   Temp 98.5 °F (36.9 °C) (Oral)   Resp 17   Ht 4' 10\" (1.473 m)   Wt 143 lb 8.3 oz (65.1 kg)   SpO2 92%   BMI 30.00 kg/m²   General appearance: No apparent distress, appears stated age and cooperative. HEENT: Normocephalic, atraumatic, MMM, No sclera icterus/conjuctival palor  Neck: Supple, no thyromegally. No jugular venous distention. Respiratory:  Normal respiratory effort. Clear to auscultation, no Rales/Wheezes/Rhonchi. Cardiovascular: S1/S2 without murmurs, rubs or gallops. RRR  Abdomen: Soft, non-tender, non-distended, bowel sounds present. Musculoskeletal: No clubbing, cyanosis or edema bilaterally. Skin: Skin color, texture, turgor normal.  No rashes or lesions. Neurologic:  Cranial nerves: II-XII intact, NIKOLAI, No focal sensory/motor deficits  Psychiatric: Alert and oriented, thought content appropriate  Capillary Refill: Brisk,< 3 seconds   Peripheral Pulses: +2 palpable, equal bilaterally       Urinalysis:    Lab Results   Component Value Date    NITRU NEGATIVE 01/08/2022    WBCUA 173 01/08/2022    BACTERIA FEW 01/08/2022    RBCUA 352 01/08/2022    BLOODU LARGE 01/08/2022    SPECGRAV 1.025 01/08/2022       Radiology:  MRI BRAIN WO CONTRAST   Final Result   1.  New acute infarcts in the right middle cerebral and left posterior   cerebral artery territories. Evolving infarcts in the left middle cerebral   artery territory. No acute hemorrhage given artifact. 2. Other findings as described. RECOMMENDATIONS:   Unavailable         CT ABDOMEN PELVIS WO CONTRAST Additional Contrast? None   Final Result   1. No acute findings. No evidence of obstructive uropathy. 2. Colonic diverticulosis. RECOMMENDATIONS:   Unavailable         CT HEAD WO CONTRAST   Final Result   No acute hemorrhage or midline shift. Other findings as described. RECOMMENDATIONS:   Unavailable         XR CHEST PORTABLE   Final Result   No acute cardiopulmonary abnormality. Lobito Lim MD      Please excuse brevity and/or typos. This report was transcribed using voice recognition software. Every effort was made to ensure accuracy, however, inadvertent computerized transcription errors may be present.

## 2022-01-10 NOTE — PROGRESS NOTES
[id number] [place] OF SPEECH/LANGUAGE PATHOLOGY  DAILY PROGRESS NOTE  Farideh Shepard  1/10/2022  [id number]  Encephalopathy [G93.40]  Bacteriuria [R82.71]  Acute kidney injury ([address] [N17.9]  Other fatigue [R53.83]  Acute kidney injury due to COVID-19 ([address] [U07.1, N17.9]  COVID-19 [U07.1]  Allergies   Allergen Reactions    Penicillins     Sulfa Antibiotics Swelling         Pt was seen this date for dysphagia treatment. IMPRESSION AND RECOMMENDATIONS: Farideh Shepard was seen for dysphagia follow-up. She was seated upright in bed, alert, fairly cooperative. She denies any symptoms of dysphagia. She was presented with PO trials of thin liquids via cup/straw and regular solids. Oral stage improved with intact labial seal, adequate mastication/bolus formation, intact AP transit/clearance without any evidence of pocketing. Pharyngeal stage WFL without s/s aspiration. Pt produced limited responses to SLP questions and exhibited reduced attention when speech/language assessment was attempted; assessment discontinued. Recommend continued regular diet/thin liquids. No further dysphagia tx indicated. Will reattempt speech/language evaluation as pt participation allows. GOALS (current status in bold):  Short-term Goals  Timeframe for Short-term Goals: length of admission  Goal 1: Pt will tolerate soft and bite-sized diet/thin liquids with adequate oral manipulation/clearance and no s/s aspiration. Goal 2: Pt will participate in speech/language assessment to determine additional goals of care. Attempted; reduced effort from pt, further assessment deferred until pt is more cooperative  Goal 3: Pt/caregivers will indicate understanding of all recommendations.  Partially meeting        EDUCATION: recommendations/plan    PAIN RATING (0-10 Scale): denies, reports she is cold  Time in/Time out: SLP Individual Minutes  Time In: 1330  Time Out: 1350  Minutes: 20    Visit number: [id number] [facility], [address] [place] [address] [place] CCC-SLP  1/10/2022  1:59 PM

## 2022-01-10 NOTE — CARE COORDINATION
Covid+ 1/2 on room air. CM call to Pt daughter Michele Goes to follow up on discharge planning. Plan remains home with support of family and [address] [place] [address] RdErika Michele Goes denies any needs at this time.   CM following

## 2022-01-10 NOTE — PROGRESS NOTES
Nephrology Progress Note  1/10/2022 11:28 AM        Subjective:   Admit Date: 1/2/2022  PCP: Karmen Riojas MD    Interval History: Weekend events briefly reviewed    Diet: Unsure whether she is eating any    ROS: She is alert and awake but not oriented  Urine output recorded only 400 cc for the last 24 hours  She still has some hematuria  No fever      Data:     Current meds:    phosphorus  250 mg Oral BID    apixaban  5 mg Oral BID    lisinopril  5 mg Oral Daily    donepezil  5 mg Oral Nightly    rosuvastatin  40 mg Oral Daily    clopidogrel  75 mg Oral Daily           I/O last 3 completed shifts:  In: -   Out: 400 [Urine:400]    CBC: No results for input(s): WBC, HGB, PLT in the last 72 hours. No results for input(s): NA, K, CL, CO2, BUN, CREATININE, GLUCOSE in the last 72 hours. Lab Results   Component Value Date    CALCIUM 8.8 01/07/2022    PHOS 1.7 (L) 01/07/2022       Objective:     Vitals: /62   Pulse 67   Temp 98.5 °F (36.9 °C) (Oral)   Resp 17   Ht 4' 10\" (1.473 m)   Wt 143 lb 8.3 oz (65.1 kg)   SpO2 92%   BMI 30.00 kg/m²     General appearance: Alert, awake but not oriented  HEENT: She is edentulous, no gross conjunctival pallor  Neck: Supple  Lungs: No crackles on auscultation  Heart: Seems regular rate and rhythm this morning  Abdomen: Soft, nontender on palpation this morning  Extremities: No edema  She has Sherwood      Problem List :         Impression :     1. Stage III acute kidney injury-urine output reported low-recovered by creatinine criteria-no labs since January 7, 2022  2. Hematuria-thought to be from Sherwood induced trauma of the bladder  3. Transient mental status changes with underlying dementia-she probably at her baseline now  4. Underlying hypertension and atherosclerotic cardiovascular disease  5. COVID-19-seems stable now    Recommendation/Plan  :     1. Will redo all the lab tomorrow  2. repeat plain UA showed less hematuria  3.  Likely will clear up-specially with underlying antiplatelet and anticoagulation therapy  4. Basically follow clinically  5.  Hopefully discharge soon      Anitra Rae MD MD

## 2022-01-11 VITALS
OXYGEN SATURATION: 95 % | BODY MASS INDEX: 30.13 KG/M2 | HEART RATE: 74 BPM | RESPIRATION RATE: 15 BRPM | SYSTOLIC BLOOD PRESSURE: 127 MMHG | TEMPERATURE: 98.2 F | WEIGHT: 143.52 LBS | DIASTOLIC BLOOD PRESSURE: 56 MMHG | HEIGHT: 58 IN

## 2022-01-11 LAB
ALBUMIN SERPL-MCNC: 3.3 GM/DL (ref 3.4–5)
ALP BLD-CCNC: 109 IU/L (ref 40–129)
ALT SERPL-CCNC: 9 U/L (ref 10–40)
ANION GAP SERPL CALCULATED.3IONS-SCNC: 17 MMOL/L (ref 4–16)
AST SERPL-CCNC: 22 IU/L (ref 15–37)
BILIRUB SERPL-MCNC: 0.3 MG/DL (ref 0–1)
BUN BLDV-MCNC: 21 MG/DL (ref 6–23)
CALCIUM SERPL-MCNC: 9.1 MG/DL (ref 8.3–10.6)
CHLORIDE BLD-SCNC: 104 MMOL/L (ref 99–110)
CO2: 22 MMOL/L (ref 21–32)
CREAT SERPL-MCNC: 0.8 MG/DL (ref 0.6–1.1)
GFR AFRICAN AMERICAN: >60 ML/MIN/1.73M2
GFR NON-AFRICAN AMERICAN: >60 ML/MIN/1.73M2
GLUCOSE BLD-MCNC: 125 MG/DL (ref 70–99)
MAGNESIUM: 1.9 MG/DL (ref 1.8–2.4)
PHOSPHORUS: 3.2 MG/DL (ref 2.5–4.9)
POTASSIUM SERPL-SCNC: 4 MMOL/L (ref 3.5–5.1)
SODIUM BLD-SCNC: 143 MMOL/L (ref 135–145)
TOTAL PROTEIN: 6.1 GM/DL (ref 6.4–8.2)

## 2022-01-11 PROCEDURE — 6370000000 HC RX 637 (ALT 250 FOR IP): Performed by: INTERNAL MEDICINE

## 2022-01-11 PROCEDURE — 80053 COMPREHEN METABOLIC PANEL: CPT

## 2022-01-11 PROCEDURE — 6370000000 HC RX 637 (ALT 250 FOR IP): Performed by: HOSPITALIST

## 2022-01-11 PROCEDURE — 6370000000 HC RX 637 (ALT 250 FOR IP): Performed by: STUDENT IN AN ORGANIZED HEALTH CARE EDUCATION/TRAINING PROGRAM

## 2022-01-11 PROCEDURE — 83735 ASSAY OF MAGNESIUM: CPT

## 2022-01-11 PROCEDURE — 36415 COLL VENOUS BLD VENIPUNCTURE: CPT

## 2022-01-11 PROCEDURE — 84100 ASSAY OF PHOSPHORUS: CPT

## 2022-01-11 PROCEDURE — 6370000000 HC RX 637 (ALT 250 FOR IP): Performed by: PSYCHIATRY & NEUROLOGY

## 2022-01-11 PROCEDURE — 97535 SELF CARE MNGMENT TRAINING: CPT

## 2022-01-11 PROCEDURE — 97530 THERAPEUTIC ACTIVITIES: CPT

## 2022-01-11 PROCEDURE — 6370000000 HC RX 637 (ALT 250 FOR IP): Performed by: NURSE PRACTITIONER

## 2022-01-11 PROCEDURE — 94761 N-INVAS EAR/PLS OXIMETRY MLT: CPT

## 2022-01-11 RX ORDER — CLOPIDOGREL BISULFATE 75 MG/1
75 TABLET ORAL DAILY
Qty: 30 TABLET | Refills: 3 | Status: SHIPPED | OUTPATIENT
Start: 2022-01-12 | End: 2022-10-12 | Stop reason: SDUPTHER

## 2022-01-11 RX ADMIN — LISINOPRIL 5 MG: 5 TABLET ORAL at 09:05

## 2022-01-11 RX ADMIN — DIBASIC SODIUM PHOSPHATE, MONOBASIC POTASSIUM PHOSPHATE AND MONOBASIC SODIUM PHOSPHATE 1 TABLET: 852; 155; 130 TABLET ORAL at 09:05

## 2022-01-11 RX ADMIN — APIXABAN 5 MG: 5 TABLET, FILM COATED ORAL at 09:05

## 2022-01-11 RX ADMIN — CLOPIDOGREL BISULFATE 75 MG: 75 TABLET ORAL at 09:05

## 2022-01-11 RX ADMIN — ROSUVASTATIN 40 MG: 40 TABLET, FILM COATED ORAL at 09:06

## 2022-01-11 ASSESSMENT — PAIN SCALES - GENERAL: PAINLEVEL_OUTOF10: 0

## 2022-01-11 NOTE — PROGRESS NOTES
Nephrology Progress Note  1/11/2022 11:01 AM        Subjective:   Admit Date: 1/2/2022  PCP: Tracie Chicas MD    Interval History: Patient seen in early morning, this is a late entry    Diet: She reported eating some    ROS: She is much more alert and awake and at least partially oriented  She knew she is in the hospital and answered all my questions appropriately  But she thought it was 1971  Urine output recorded only 400 cc  No fever        Data:     Current meds:    phosphorus  250 mg Oral BID    apixaban  5 mg Oral BID    lisinopril  5 mg Oral Daily    donepezil  5 mg Oral Nightly    rosuvastatin  40 mg Oral Daily    clopidogrel  75 mg Oral Daily           I/O last 3 completed shifts:  In: -   Out: 400 [Urine:400]    CBC: No results for input(s): WBC, HGB, PLT in the last 72 hours. No results for input(s): NA, K, CL, CO2, BUN, CREATININE, GLUCOSE in the last 72 hours. Lab Results   Component Value Date    CALCIUM 8.8 01/07/2022    PHOS 1.7 (L) 01/07/2022       Objective:     Vitals: BP (!) 127/56   Pulse 74   Temp 98.2 °F (36.8 °C) (Oral)   Resp 15   Ht 4' 10\" (1.473 m)   Wt 143 lb 8.3 oz (65.1 kg)   SpO2 95%   BMI 30.00 kg/m²     General appearance: She was alert, awake and at least partially oriented to place and person  HEENT: Mild conjunctival pallor  Neck: Supple  Lungs: Few rhonchi but no gross crackles  Heart: Seems regular rate and rhythm this morning  Abdomen: Soft, nontender  Extremities: No overt edema  Sherwood catheter      Problem List :         Impression :     1. Acute kidney injury-recovered by creatinine criteria  2. Hematuria clearing up likely from local Sherwood trauma with underlying anticoagulation and antiplatelet therapy  3. She does have atherosclerotic cardiovascular disease and recent COVID-19-also likely has underlying dementia    Recommendation/Plan  :     1. According to case management note patient is supposed to go home  2.  Of course she can be discharged from kidney standpoint  3. We will just follow clinically  4.  May discontinue Sherwood catheter      Ray Perez MD MD

## 2022-01-11 NOTE — DISCHARGE SUMMARY
Discharge Summary    Name:  Jenniffer JONES/Age/Sex: 1950  (66 y.o. female)   MRN & CSN:  [id number] & [id number] Admission Date/Time: 2022  9:49 PM   Attending:  Yeni Mckoy MD Discharging Physician: Yeni Mckoy MD     Hospital Course:     76-year-old lady with history of CAD, hypertension, dementia, history of present CVA with left frontal lobe infarct and left occipital lobe infarct worsening expressive aphasia who presented with generalized decline and generalized weakness.  She was found to have acute kidney injury and acute metabolic encephalopathy in setting of dementia and COVID-19 infection without hypoxia. Admission course was complicated by gross hematuria.     Gross hematuria  In the setting of Plavix and Eliquis use and Sherwood catheter placement. Hematuria resolved  UA negative  H&H stable  Continue anticoagulation and antiplatelet therapy  Remove Sherwood; trial of avoid; incontinent at baseline  Follow-up with urology as outpatient in 4 to 6 weeks for cystoscopy     COVID-19 virus infection  With no hypoxia  Stable.     CAD (coronary artery disease)  Acute cerebrovascular accident (CVA) and Expressive aphasia  Recurrent stenosis of left carotid artery  Hypertension  Continue lisinopril  Continue statin, antiplatelet  Follow-up with neurology as outpatient  Cardiology consulted for suspicions of acute cerebral infarct from a cardioembolic etiology. Follow-up with cardiology clinic for KELSEY and loop recorder within 2 weeks    Dementia ([facility]  On donepezil    The patient expressed appropriate understanding of and agreement with the discharge recommendations, medications, and plan.      Consults this admission:  IP CONSULT TO HOSPITALIST  IP CONSULT TO NEUROLOGY  IP CONSULT TO NEPHROLOGY  IP CONSULT TO VASCULAR SURGERY  IP CONSULT TO CARDIOLOGY  IP CONSULT TO DIETITIAN  IP CONSULT TO IV TEAM  IP CONSULT TO HOME CARE NEEDS    Discharge Instruction:   Follow up appointments: Cardiology, neurology, urology  Primary care physician: within 1 week    Diet:  cardiac diet   Activity: activity as tolerated  Disposition: Discharged to:   []Home, []HHC, []SNF, []Acute Rehab, []Hospice Home with home health care  Condition on discharge: Stable    Discharge Medications:        Medication List      START taking these medications    apixaban 5 MG Tabs tablet  Commonly known as: ELIQUIS  Take 1 tablet by mouth 2 times daily     clopidogrel 75 MG tablet  Commonly known as: PLAVIX  Take 1 tablet by mouth daily  Start taking on: January 12, 2022        Alek Garcia taking these medications    amLODIPine 5 MG tablet  Commonly known as: NORVASC     donepezil 5 MG tablet  Commonly known as: ARICEPT     lisinopril 20 MG tablet  Commonly known as: PRINIVIL;ZESTRIL     rosuvastatin 40 MG tablet  Commonly known as: CRESTOR        STOP taking these medications    buffered aspirin 325 MG Tabs     meloxicam 7.5 MG tablet  Commonly known as: Mobic           Where to Get Your Medications      These medications were sent to [address] [place] [address], [address] [facility]  [address], [address] [place] [address]    Phone: [phone]   apixaban 5 MG Tabs tablet  clopidogrel 75 MG tablet         Objective Findings at Discharge:   BP (!) 127/56   Pulse 74   Temp 98.2 °F (36.8 °C) (Oral)   Resp 15   Ht 4' 10\" (1.473 m)   Wt 143 lb 8.3 oz (65.1 kg)   SpO2 95%   BMI 30.00 kg/m²            PHYSICAL EXAM  GEN Awake female, sitting upright in bed in no apparent distress. Appears given age. EYES Pupils are equally round. No scleral erythema, discharge, or conjunctivitis. HENT Mucous membranes are moist. Oral pharynx without exudates, no evidence of thrush. NECK Supple, no apparent thyromegaly or masses. RESP Clear to auscultation, no wheezes, rales or rhonchi. Symmetric chest movement while on room air. CARDIO/VASC S1/S2 auscultated.  Regular rate without appreciable murmurs, rubs, or gallops. No JVD or carotid bruits. Peripheral pulses equal bilaterally and palpable. No peripheral edema. GI Abdomen is soft without significant tenderness, masses, or guarding. Bowel sounds are normoactive. Rectal exam deferred.  No costovertebral angle tenderness. Normal appearing external genitalia. Sherwood catheter is not present. HEME/LYMPH No palpable cervical lymphadenopathy and no hepatosplenomegaly. No petechiae or ecchymoses. MSK No gross joint deformities. SKIN Normal coloration, warm, dry. NEURO Cranial nerves appear grossly intact, normal speech, no lateralizing weakness. PSYCH Awake, alert, oriented x 4. Affect appropriate.     BMP/CBC  Recent Labs     01/11/22  1238      K 4.0      CO2 22   BUN 21   CREATININE 0.8       IMAGING:  As above    Discharge Time of 35 minutes    Electronically signed by Tania Ansari MD on 1/11/2022 at 3:49 PM

## 2022-01-11 NOTE — CARE COORDINATION
[facility] Liaison aware of discharge & will initiate [facility].

## 2022-01-11 NOTE — PROGRESS NOTES
Occupational Therapy      Occupational Therapy Treatment Note      Name: Margy Dutta MRN: [id number] :   1950   Date:  2022   Admission Date: 2022 Room:  [address] [place] [address]-A     Primary Problem: [place]:  The primary encounter diagnosis was Acute kidney injury ([facility] Diagnoses of Other fatigue, Encephalopathy, COVID-19, and Bacteriuria were also pertinent to this visit. Restrictions/Precautions:   General precautions, fall risk, Telemetry, Sherwood, bed/chair alarm, Droplet Plus     Communication with other providers: RN stated okay for tx     Subjective:  Patient states: Pat Pepper, okay\"  Pain: Pt denied pain this date     Objective:    Observation: Pt lying horizontal in bed with head against bed rail and LEs over opposite bed rail, Pt agreeable to therapy and position change. Objective Measures: All vitals stable throughout session, Pt oriented to self and place only    Treatment, including education:  Therapeutic Activity Training:   Therapeutic activity training was instructed today. Cues were given for safety, sequence, UE/LE placement, awareness, and balance. Activities performed today included bed mobility training, sup-sit, sit-stand, SPT. Self Care Training:   Cues were given for safety, sequence, UE/LE placement, visual cues, and balance. Activities performed today included LB dressing, toileting, and grooming task of brushing hair. Pt lying horizontal in bed with head against bed rail and LEs over opposite bed rail, Pt agreeable to therapy and position change. Pt performed bed mobility sup to sit EOB Mod A with boost to upright trunk. Pt performed LB dressing task of donning BL socks, Pt unable to coordinate task herself and then therapist performed Max A, Pt able to kick out BL LEs. Gait belt placed and Pt performed sit to stand from bed Min A with cues for safe hand placement with RW. Upon standing, noticed Pt was soiled from BM, Pt returned to sit at EOB.  Pt performed LB dressing

## 2022-01-11 NOTE — PROGRESS NOTES
Physical Therapy    Physical Therapy Treatment Note  Name: Brii Jones MRN: [id number] :   1950   Date:  2022   Admission Date: 2022 Room:  [address] [place] [address]   Restrictions/Precautions: fall risk; general precautions; droplet plus  Communication with other providers:  Chart review demo's this patient is appropriate for Rehab today  Subjective:  Patient states:  \"Want to lay down\" but easily participated in rehab today  Pain:   Location, Type, Intensity (0/10 to 10/10): Denies  Objective:    Observation:  Pt side lying in recliner today, with soiled undergarment  Treatment, including education/measures:  Therapeutic Activity Training:   Therapeutic activity training was instructed today. Cues were given for safety, sequence, UE/LE placement, awareness, and balance. Activities performed today included bed mobility training, sup-sit, sit-stand, SPT. Rolling: SBA to ea side x2 for pericare  Supine <-> sit: min A for completion  Scooting: Seated in recliner SBA, Supine in bed [facility] supervision with guidance on direction of bed. Seated balance: Fair +, CGA and patient would intermittently lop to the Lt for rest   Sit <-> stand: min A progressing to SBA w/ time on task   Standing balance: during ambulation  Stand <-> sit: CGA  Pt only cooperates with therapy activities if the end goal is to go back to her bed for rest    Gait Training:  Cues were given for safety, sequence, device management, balance, posture, awareness, path. 6 ft using FWW at min A to CGA ; mod A for AD mgmt, CGA for steady ; dec salud, dec EL, poor AD mgmt, dec stride length/step height BL ; inc postural sway ; poor ability attending to cues, mod - max t/o to address quality of functional mobility    Assessment / Impression:    Initiated functional mobility, balance, transfer, functional strengthening, and gait training this session. Fair tolerance with rehab today; low motivation for rehab acivities.  Cues for safe walker management hardly improved walker management, manual assistance was necessary. Will cont to benefit from skilled therapy services to address neuro deficits. Patient's tolerance of treatment:  Fair    Barriers to improvement:  Cognition; aphasia  Plan for Next Session:    Cont w/ est POC and DC plan    Time in:  1493  Time out:  1445  Timed treatment minutes:  23  Total treatment time:  23    Previously filed items:  Social/Functional History  Lives With: Spouse  Type of Home: House  Home Layout: Two level,Bed/Bath upstairs  Home Access: Stairs to enter with rails  Entrance Stairs - Number of Steps: 2-3  Bathroom Shower/Tub: Walk-in shower  Bathroom Equipment: Grab bars in shower,Grab bars around Consert chair  Home Equipment: Cane  ADL Assistance: [address] [place] [address] Avenue: [address] [place] [address] Responsibilities: Yes (shares with )  Ambulation Assistance: Independent (with cane)  Transfer Assistance: Independent  Active : No  Patient's  Info:  and dtr  Short term goals  Time Frame for Short term goals: 1 week  Short term goal 1: Pt will AMB x25 ft with RW, CGA-SBA. Short term goal 2: Pt will demonstrate stand-step transfer from EOB<>chair with RW and SBA. Short term goal 3: Pt will demonstrate STS from low surface to RW with CGA. Short term goal 4: Pt will tolerate x5' standing dynamic balance activity with emphasis on visual tracking, CGA, UE support,       Electronically signed by:     Deisy Liang, BRIGIDO  1/11/2022, 2:48 PM

## 2022-01-12 ENCOUNTER — CARE COORDINATION (OUTPATIENT)
Dept: CASE MANAGEMENT | Age: 72
End: 2022-01-12

## 2022-01-12 NOTE — CARE COORDINATION
[facility] Transitions Initial Follow Up Call    Call within 2 business days of discharge: Yes    Patient: Jimenez Corley Patient : 1950   MRN: <[id number]>  Reason for Admission: OLIVER d/t COVID  Discharge Date: 22 RARS: Readmission Risk Score: 12.7 ( )      Last Discharge [facility]       Complaint Diagnosis Description Type Department Provider    22 Aphasia; Fatigue Acute kidney injury ([facility] [address] . .. ED to Hosp-Admission (Discharged) (ADMITTED) Ann-Marie Esquivel MD; Davida Murphy DO. .. Spoke with: Patient's spouse and daughter Lindsey Hathaway: [facility] patient for initial COVID transition follow up. Spoke with patient's spouse initially. He states that Lit Sewell is doing good so far since returning home. States she is not getting around much. She is eating and drinking fluids. He then placed his daughter Dieter Lange on the phone. Dieter Lange states her mother is doing fine so far. She reports Lit Sewell is getting up to go to the BR with assistance. Denies noticing any blood in her urine. She confirmed she is drinking fluids and eating. She denies Carla having any c/o chest pain/discomfort, shortness of breath, fever, chills. Attempted to review medication list.  Dieter Lange states her sister takes care of their mother's medications and will be picking her medications up today. She does not have the AVS in front of her. Reviewed discontinued medications-Buffered ASA, Meloxicam.  Patient has follow up with PCP on 22, Cardiothoracic surgeon on 22 and cardiology on 22. Patient to follow up with urology and neurology as well. Dieter Lange states her mother has several appointments scheduled but unsure of which ones because her sister takes care of them. Dieter Lange does not have any questions or needs at this time. Transitions of Care Initial Call    Was this an external facility discharge?  No Discharge Facility: [facility]     Challenges to be reviewed by the provider   Additional needs identified to be addressed with provider: No  none             Method of communication with provider : none      Advance Care Planning:   Does patient have an Advance Directive: Not on file    Was this a readmission? Yes  Patient stated reason for admission: Increased weakness, difficulty with speech  Patients top risk factors for readmission: functional cognitive ability  lack of knowledge about disease  medical condition-CVA, OLIVER d/t COVID, CAD, COPD, Dementia, HTN  medication management    Care Transition Nurse (CTN) contacted the family by telephone to perform post hospital discharge assessment. Verified name and  with family as identifiers. Provided introduction to self, and explanation of the CTN role. CTN reviewed discharge instructions, medical action plan and red flags with family who verbalized understanding. Family given an opportunity to ask questions and does not have any further questions or concerns at this time. Were discharge instructions available to patient? Yes. Reviewed appropriate site of care based on symptoms and resources available to patient including: PCP and Specialist. The family agrees to contact the PCP office for questions related to their healthcare. Covid Risk Education     Educated patient about risk for severe COVID-19 due to risk factors according to CDC guidelines. CTN reviewed discharge instructions, medical action plan and red flag symptoms with the family who verbalized understanding. Discussed COVID vaccination status: No. Education provided on COVID-19 vaccination as appropriate. Discussed exposure protocols and quarantine with CDC Guidelines. Family was given an opportunity to verbalize any questions and concerns and agrees to contact CTN or health care provider for questions related to their healthcare.     Reviewed and educated family on any new and changed medications related to discharge diagnosis. Was patient discharged with a pulse oximeter? No Discussed and confirmed pulse oximeter discharge instructions and when to notify provider or seek emergency care. CTN provided contact information. Plan for follow-up call in 5-7 days based on severity of symptoms and risk factors.   Plan for next call: symptom management-any new or worsening symptoms (difficulty with speech, fatigue, urinary issues, SOB)  follow up appointment-ensure follow up appointments are scheduled (urology, neurology)  medication management-review medication list    Care Transitions 24 Hour Call    Do you have any ongoing symptoms?: Yes  Patient-reported symptoms: Fatigue  Do you have a copy of your discharge instructions?: Yes  Do you have all of your prescriptions and are they filled?: No  Have you been contacted by a [facility] Pharmacist?: No  Have you scheduled your follow up appointment?: Yes (Comment: PCP appt on 1/18/22; Cardiothoracic Surgeon on1/25/22 and Cardiology on 1/25/22)  How are you going to get to your appointment?: Car - family or friend to transport  Were you discharged with any Home Care or Post Acute Services: Yes  Post Acute Services: [address] (Comment: [id number])  Do you feel like you have everything you need to keep you well at home?: Yes  Care Transitions Interventions         Follow Up  Future Appointments   Date Time Provider Helena Landrum   1/25/2022  8:20 AM Oscar Cardoza MD [facility]   1/25/2022  2:00 PM Kenneth Deal MD [facility]  [facility]       Usha Huber RN

## 2022-01-19 ENCOUNTER — APPOINTMENT (OUTPATIENT)
Dept: CT IMAGING | Age: 72
DRG: 871 | End: 2022-01-19
Payer: MEDICARE

## 2022-01-19 ENCOUNTER — APPOINTMENT (OUTPATIENT)
Dept: GENERAL RADIOLOGY | Age: 72
DRG: 871 | End: 2022-01-19
Payer: MEDICARE

## 2022-01-19 ENCOUNTER — CARE COORDINATION (OUTPATIENT)
Dept: CASE MANAGEMENT | Age: 72
End: 2022-01-19

## 2022-01-19 ENCOUNTER — HOSPITAL ENCOUNTER (INPATIENT)
Age: 72
LOS: 10 days | Discharge: HOME | DRG: 871 | End: 2022-01-29
Attending: EMERGENCY MEDICINE | Admitting: INTERNAL MEDICINE
Payer: MEDICARE

## 2022-01-19 DIAGNOSIS — R77.8 TROPONIN LEVEL ELEVATED: ICD-10-CM

## 2022-01-19 DIAGNOSIS — K57.32 DIVERTICULITIS OF COLON: ICD-10-CM

## 2022-01-19 DIAGNOSIS — E87.6 HYPOKALEMIA: ICD-10-CM

## 2022-01-19 DIAGNOSIS — I95.9 HYPOTENSION, UNSPECIFIED HYPOTENSION TYPE: ICD-10-CM

## 2022-01-19 DIAGNOSIS — N17.9 ACUTE RENAL FAILURE, UNSPECIFIED ACUTE RENAL FAILURE TYPE (HCC): ICD-10-CM

## 2022-01-19 DIAGNOSIS — U07.1 COVID-19: Primary | ICD-10-CM

## 2022-01-19 PROBLEM — A41.9 SEPSIS (HCC): Status: ACTIVE | Noted: 2022-01-19

## 2022-01-19 LAB
ALBUMIN SERPL-MCNC: 3 GM/DL (ref 3.4–5)
ALCOHOL SCREEN SERUM: <0.01 %WT/VOL
ALP BLD-CCNC: 111 IU/L (ref 40–129)
ALT SERPL-CCNC: <5 U/L (ref 10–40)
AMMONIA: 17 UMOL/L (ref 11–51)
AMPHETAMINES: NEGATIVE
ANION GAP SERPL CALCULATED.3IONS-SCNC: 24 MMOL/L (ref 4–16)
AST SERPL-CCNC: 16 IU/L (ref 15–37)
BACTERIA: ABNORMAL /HPF
BARBITURATE SCREEN URINE: NEGATIVE
BASE EXCESS: 6 (ref 0–2.4)
BASOPHILS ABSOLUTE: 0.1 K/CU MM
BASOPHILS RELATIVE PERCENT: 0.4 % (ref 0–1)
BENZODIAZEPINE SCREEN, URINE: NEGATIVE
BILIRUB SERPL-MCNC: 0.2 MG/DL (ref 0–1)
BILIRUBIN URINE: NEGATIVE MG/DL
BLOOD, URINE: ABNORMAL
BUN BLDV-MCNC: 55 MG/DL (ref 6–23)
CALCIUM SERPL-MCNC: 9.1 MG/DL (ref 8.3–10.6)
CANNABINOID SCREEN URINE: NEGATIVE
CHLORIDE BLD-SCNC: 96 MMOL/L (ref 99–110)
CHLORIDE URINE RANDOM: 23 MMOL/L (ref 43–210)
CLARITY: ABNORMAL
CO2: 19 MMOL/L (ref 21–32)
COCAINE METABOLITE: NEGATIVE
COLOR: YELLOW
CREAT SERPL-MCNC: 10.3 MG/DL (ref 0.6–1.1)
CREATININE URINE: 467.3 MG/DL (ref 28–217)
DIFFERENTIAL TYPE: ABNORMAL
EOSINOPHILS ABSOLUTE: 0.1 K/CU MM
EOSINOPHILS RELATIVE PERCENT: 0.5 % (ref 0–3)
GFR AFRICAN AMERICAN: 4 ML/MIN/1.73M2
GFR NON-AFRICAN AMERICAN: 4 ML/MIN/1.73M2
GLUCOSE BLD-MCNC: 118 MG/DL (ref 70–99)
GLUCOSE, URINE: NEGATIVE MG/DL
HCO3 VENOUS: 21.9 MMOL/L (ref 19–25)
HCT VFR BLD CALC: 37.9 % (ref 37–47)
HEMOGLOBIN: 12.4 GM/DL (ref 12.5–16)
HYALINE CASTS: >20 /LPF
IMMATURE NEUTROPHIL %: 0.5 % (ref 0–0.43)
KETONES, URINE: NEGATIVE MG/DL
LACTATE: 3.1 MMOL/L (ref 0.4–2)
LEUKOCYTE ESTERASE, URINE: NEGATIVE
LIPASE: 26 IU/L (ref 13–60)
LYMPHOCYTES ABSOLUTE: 1.8 K/CU MM
LYMPHOCYTES RELATIVE PERCENT: 9.4 % (ref 24–44)
MCH RBC QN AUTO: 29.1 PG (ref 27–31)
MCHC RBC AUTO-ENTMCNC: 32.7 % (ref 32–36)
MCV RBC AUTO: 89 FL (ref 78–100)
MONOCYTES ABSOLUTE: 1.5 K/CU MM
MONOCYTES RELATIVE PERCENT: 8.2 % (ref 0–4)
MUCUS: ABNORMAL HPF
NITRITE URINE, QUANTITATIVE: NEGATIVE
NUCLEATED RBC %: 0 %
O2 SAT, VEN: 74.6 % (ref 50–70)
OPIATES, URINE: NEGATIVE
OXYCODONE: NEGATIVE
PCO2, VEN: 50 MMHG (ref 38–52)
PDW BLD-RTO: 14.3 % (ref 11.7–14.9)
PH VENOUS: 7.25 (ref 7.32–7.42)
PH, URINE: 5 (ref 5–8)
PHENCYCLIDINE, URINE: NEGATIVE
PLATELET # BLD: 534 K/CU MM (ref 140–440)
PMV BLD AUTO: 10.4 FL (ref 7.5–11.1)
PO2, VEN: 48 MMHG (ref 28–48)
POTASSIUM SERPL-SCNC: 2.7 MMOL/L (ref 3.5–5.1)
POTASSIUM, UR: 28.3 MMOL/L (ref 22–119)
PRO-BNP: ABNORMAL PG/ML
PROT/CREAT RATIO, UR: 0.5
PROTEIN UA: 30 MG/DL
RBC # BLD: 4.26 M/CU MM (ref 4.2–5.4)
RBC URINE: 10 /HPF (ref 0–6)
SARS-COV-2, NAAT: DETECTED
SEGMENTED NEUTROPHILS ABSOLUTE COUNT: 15.1 K/CU MM
SEGMENTED NEUTROPHILS RELATIVE PERCENT: 81 % (ref 36–66)
SODIUM BLD-SCNC: 139 MMOL/L (ref 135–145)
SODIUM URINE: 34 MMOL/L (ref 35–167)
SOURCE: ABNORMAL
SPECIFIC GRAVITY UA: 1.02 (ref 1–1.03)
SQUAMOUS EPITHELIAL: 18 /HPF
TOTAL CK: 34 IU/L (ref 26–140)
TOTAL IMMATURE NEUTOROPHIL: 0.1 K/CU MM
TOTAL NUCLEATED RBC: 0 K/CU MM
TOTAL PROTEIN: 6.9 GM/DL (ref 6.4–8.2)
TRICHOMONAS: ABNORMAL /HPF
TROPONIN T: 0.07 NG/ML
TSH HIGH SENSITIVITY: 1.68 UIU/ML (ref 0.27–4.2)
URINE TOTAL PROTEIN: 227.7 MG/DL
UROBILINOGEN, URINE: NEGATIVE MG/DL (ref 0.2–1)
WBC # BLD: 18.7 K/CU MM (ref 4–10.5)
WBC CLUMP: ABNORMAL /HPF
WBC UA: 39 /HPF (ref 0–5)

## 2022-01-19 PROCEDURE — 2060000000 HC ICU INTERMEDIATE R&B

## 2022-01-19 PROCEDURE — 71045 X-RAY EXAM CHEST 1 VIEW: CPT

## 2022-01-19 PROCEDURE — 84443 ASSAY THYROID STIM HORMONE: CPT

## 2022-01-19 PROCEDURE — 80053 COMPREHEN METABOLIC PANEL: CPT

## 2022-01-19 PROCEDURE — 71250 CT THORAX DX C-: CPT

## 2022-01-19 PROCEDURE — 87040 BLOOD CULTURE FOR BACTERIA: CPT

## 2022-01-19 PROCEDURE — 82805 BLOOD GASES W/O2 SATURATION: CPT

## 2022-01-19 PROCEDURE — 74176 CT ABD & PELVIS W/O CONTRAST: CPT

## 2022-01-19 PROCEDURE — 82140 ASSAY OF AMMONIA: CPT

## 2022-01-19 PROCEDURE — 83880 ASSAY OF NATRIURETIC PEPTIDE: CPT

## 2022-01-19 PROCEDURE — 83605 ASSAY OF LACTIC ACID: CPT

## 2022-01-19 PROCEDURE — 80307 DRUG TEST PRSMV CHEM ANLYZR: CPT

## 2022-01-19 PROCEDURE — 87635 SARS-COV-2 COVID-19 AMP PRB: CPT

## 2022-01-19 PROCEDURE — 6370000000 HC RX 637 (ALT 250 FOR IP): Performed by: INTERNAL MEDICINE

## 2022-01-19 PROCEDURE — 84300 ASSAY OF URINE SODIUM: CPT

## 2022-01-19 PROCEDURE — 70450 CT HEAD/BRAIN W/O DYE: CPT

## 2022-01-19 PROCEDURE — 82570 ASSAY OF URINE CREATININE: CPT

## 2022-01-19 PROCEDURE — 36415 COLL VENOUS BLD VENIPUNCTURE: CPT

## 2022-01-19 PROCEDURE — 83690 ASSAY OF LIPASE: CPT

## 2022-01-19 PROCEDURE — 87086 URINE CULTURE/COLONY COUNT: CPT

## 2022-01-19 PROCEDURE — 84133 ASSAY OF URINE POTASSIUM: CPT

## 2022-01-19 PROCEDURE — 96365 THER/PROPH/DIAG IV INF INIT: CPT

## 2022-01-19 PROCEDURE — 81001 URINALYSIS AUTO W/SCOPE: CPT

## 2022-01-19 PROCEDURE — 6360000002 HC RX W HCPCS: Performed by: EMERGENCY MEDICINE

## 2022-01-19 PROCEDURE — 84156 ASSAY OF PROTEIN URINE: CPT

## 2022-01-19 PROCEDURE — 84484 ASSAY OF TROPONIN QUANT: CPT

## 2022-01-19 PROCEDURE — 82436 ASSAY OF URINE CHLORIDE: CPT

## 2022-01-19 PROCEDURE — G0480 DRUG TEST DEF 1-7 CLASSES: HCPCS

## 2022-01-19 PROCEDURE — 80048 BASIC METABOLIC PNL TOTAL CA: CPT

## 2022-01-19 PROCEDURE — 85025 COMPLETE CBC W/AUTO DIFF WBC: CPT

## 2022-01-19 PROCEDURE — 2580000003 HC RX 258: Performed by: EMERGENCY MEDICINE

## 2022-01-19 PROCEDURE — 93005 ELECTROCARDIOGRAM TRACING: CPT | Performed by: EMERGENCY MEDICINE

## 2022-01-19 PROCEDURE — 82550 ASSAY OF CK (CPK): CPT

## 2022-01-19 PROCEDURE — 2500000003 HC RX 250 WO HCPCS: Performed by: INTERNAL MEDICINE

## 2022-01-19 PROCEDURE — 99284 EMERGENCY DEPT VISIT MOD MDM: CPT

## 2022-01-19 PROCEDURE — 87081 CULTURE SCREEN ONLY: CPT

## 2022-01-19 PROCEDURE — 2580000003 HC RX 258: Performed by: INTERNAL MEDICINE

## 2022-01-19 RX ORDER — ONDANSETRON 4 MG/1
4 TABLET, ORALLY DISINTEGRATING ORAL EVERY 8 HOURS PRN
Status: DISCONTINUED | OUTPATIENT
Start: 2022-01-19 | End: 2022-01-29 | Stop reason: HOSPADM

## 2022-01-19 RX ORDER — CLOPIDOGREL BISULFATE 75 MG/1
75 TABLET ORAL DAILY
Status: DISCONTINUED | OUTPATIENT
Start: 2022-01-20 | End: 2022-01-29 | Stop reason: HOSPADM

## 2022-01-19 RX ORDER — SODIUM CHLORIDE, SODIUM LACTATE, POTASSIUM CHLORIDE, CALCIUM CHLORIDE 600; 310; 30; 20 MG/100ML; MG/100ML; MG/100ML; MG/100ML
INJECTION, SOLUTION INTRAVENOUS CONTINUOUS
Status: DISCONTINUED | OUTPATIENT
Start: 2022-01-19 | End: 2022-01-20

## 2022-01-19 RX ORDER — 0.9 % SODIUM CHLORIDE 0.9 %
1000 INTRAVENOUS SOLUTION INTRAVENOUS ONCE
Status: COMPLETED | OUTPATIENT
Start: 2022-01-19 | End: 2022-01-19

## 2022-01-19 RX ORDER — POTASSIUM CHLORIDE 7.45 MG/ML
10 INJECTION INTRAVENOUS PRN
Status: DISCONTINUED | OUTPATIENT
Start: 2022-01-19 | End: 2022-01-29 | Stop reason: HOSPADM

## 2022-01-19 RX ORDER — ONDANSETRON 2 MG/ML
4 INJECTION INTRAMUSCULAR; INTRAVENOUS EVERY 6 HOURS PRN
Status: DISCONTINUED | OUTPATIENT
Start: 2022-01-19 | End: 2022-01-29 | Stop reason: HOSPADM

## 2022-01-19 RX ORDER — ACETAMINOPHEN 325 MG/1
650 TABLET ORAL EVERY 6 HOURS PRN
Status: DISCONTINUED | OUTPATIENT
Start: 2022-01-19 | End: 2022-01-29 | Stop reason: HOSPADM

## 2022-01-19 RX ORDER — ROSUVASTATIN CALCIUM 40 MG/1
40 TABLET, COATED ORAL NIGHTLY
Status: DISCONTINUED | OUTPATIENT
Start: 2022-01-20 | End: 2022-01-29 | Stop reason: HOSPADM

## 2022-01-19 RX ORDER — POLYETHYLENE GLYCOL 3350 17 G/17G
17 POWDER, FOR SOLUTION ORAL DAILY PRN
Status: DISCONTINUED | OUTPATIENT
Start: 2022-01-19 | End: 2022-01-29 | Stop reason: HOSPADM

## 2022-01-19 RX ORDER — SODIUM CHLORIDE 0.9 % (FLUSH) 0.9 %
5-40 SYRINGE (ML) INJECTION EVERY 12 HOURS SCHEDULED
Status: DISCONTINUED | OUTPATIENT
Start: 2022-01-19 | End: 2022-01-29 | Stop reason: HOSPADM

## 2022-01-19 RX ORDER — POTASSIUM CHLORIDE 20 MEQ/1
40 TABLET, EXTENDED RELEASE ORAL PRN
Status: DISCONTINUED | OUTPATIENT
Start: 2022-01-19 | End: 2022-01-29 | Stop reason: HOSPADM

## 2022-01-19 RX ORDER — DONEPEZIL HYDROCHLORIDE 5 MG/1
5 TABLET, FILM COATED ORAL NIGHTLY
Status: DISCONTINUED | OUTPATIENT
Start: 2022-01-19 | End: 2022-01-29 | Stop reason: HOSPADM

## 2022-01-19 RX ORDER — ACETAMINOPHEN 650 MG/1
650 SUPPOSITORY RECTAL EVERY 6 HOURS PRN
Status: DISCONTINUED | OUTPATIENT
Start: 2022-01-19 | End: 2022-01-29 | Stop reason: HOSPADM

## 2022-01-19 RX ORDER — SODIUM CHLORIDE 9 MG/ML
25 INJECTION, SOLUTION INTRAVENOUS PRN
Status: DISCONTINUED | OUTPATIENT
Start: 2022-01-19 | End: 2022-01-29 | Stop reason: HOSPADM

## 2022-01-19 RX ORDER — SODIUM CHLORIDE 0.9 % (FLUSH) 0.9 %
5-40 SYRINGE (ML) INJECTION PRN
Status: DISCONTINUED | OUTPATIENT
Start: 2022-01-19 | End: 2022-01-29 | Stop reason: HOSPADM

## 2022-01-19 RX ADMIN — SODIUM CHLORIDE 1000 ML: 9 INJECTION, SOLUTION INTRAVENOUS at 18:56

## 2022-01-19 RX ADMIN — DONEPEZIL HYDROCHLORIDE 5 MG: 5 TABLET ORAL at 23:24

## 2022-01-19 RX ADMIN — SODIUM CHLORIDE 1000 ML: 9 INJECTION, SOLUTION INTRAVENOUS at 19:13

## 2022-01-19 RX ADMIN — SODIUM CHLORIDE 1000 ML: 9 INJECTION, SOLUTION INTRAVENOUS at 22:00

## 2022-01-19 RX ADMIN — SODIUM CHLORIDE, POTASSIUM CHLORIDE, SODIUM LACTATE AND CALCIUM CHLORIDE: 600; 310; 30; 20 INJECTION, SOLUTION INTRAVENOUS at 21:59

## 2022-01-19 RX ADMIN — POTASSIUM CHLORIDE 10 MEQ: 7.46 INJECTION, SOLUTION INTRAVENOUS at 21:59

## 2022-01-19 RX ADMIN — POTASSIUM CHLORIDE 10 MEQ: 7.46 INJECTION, SOLUTION INTRAVENOUS at 23:15

## 2022-01-19 RX ADMIN — METRONIDAZOLE 500 MG: 500 INJECTION, SOLUTION INTRAVENOUS at 23:24

## 2022-01-19 RX ADMIN — MEROPENEM 1000 MG: 1 INJECTION INTRAVENOUS at 19:05

## 2022-01-19 RX ADMIN — APIXABAN 2.5 MG: 2.5 TABLET, FILM COATED ORAL at 23:24

## 2022-01-19 ASSESSMENT — PAIN SCALES - GENERAL: PAINLEVEL_OUTOF10: 0

## 2022-01-19 NOTE — ED NOTES
Yecenia Crabtree  [phone] (H)    Pt's daughter's contact info. .. please update on pt's condition. Daughter could not come back to room due to COVID rule out.      Danial Limon  01/19/22 [id number]

## 2022-01-19 NOTE — ED PROVIDER NOTES
[facility]      TRIAGE CHIEF COMPLAINT:   No chief complaint on file. [place]:  Areli Stanton is a 66 y.o. female that presents by EMS from home with complaint of confusion. Unknown last known well patient arrival was confused, hypotensive. She has no complaints she keeps saying that she does not know cannot give me of the president or the year she does states she was with her daughter. Denies any headache chest pain shortness of breath abdominal pain. Blood pressure on arrival was 90 systolic no other questions or concerns. REVIEW OF SYSTEMS:    Review of Systems   Unable to perform ROS: Mental status change   Constitutional: Positive for activity change. Psychiatric/Behavioral: Positive for confusion.        Past Medical History:   Diagnosis Date    CAD (coronary artery disease)     Hypertension      Past Surgical History:   Procedure Laterality Date    CAROTID ENDARTERECTOMY      CHOLECYSTECTOMY      CORONARY ARTERY BYPASS GRAFT      NECK SURGERY       Family History   Family history unknown: Yes     Social History     Socioeconomic History    Marital status:      Spouse name: Not on file    Number of children: Not on file    Years of education: Not on file    Highest education level: Not on file   Occupational History    Not on file   Tobacco Use    Smoking status: Current Every Day Smoker     Packs/day: 1.50     Types: Cigarettes    Smokeless tobacco: Never Used   Substance and Sexual Activity    Alcohol use: No    Drug use: No    Sexual activity: Not on file   Other Topics Concern    Not on file   Social History Narrative    Not on file     Social Determinants of Health     Financial Resource Strain:     Difficulty of Paying Living Expenses: Not on file   Food Insecurity:     Worried About Running Out of Food in the Last Year: Not on file    Patricia of Food in the Last Year: Not on file   Transportation Needs:     Lack of Transportation (Medical): Not on file    Lack of Transportation (Non-Medical):  Not on file   Physical Activity:     Days of Exercise per Week: Not on file    Minutes of Exercise per Session: Not on file   Stress:     Feeling of Stress : Not on file   Social Connections:     Frequency of Communication with Friends and Family: Not on file    Frequency of Social Gatherings with Friends and Family: Not on file    Attends Baptism Services: Not on file    Active Member of [address] [place] [address] [employer] or Organizations: Not on file    Attends Club or Organization Meetings: Not on file    Marital Status: Not on file   Intimate Partner Violence:     Fear of Current or Ex-Partner: Not on file    Emotionally Abused: Not on file    Physically Abused: Not on file    Sexually Abused: Not on file   Housing Stability:     Unable to Pay for Housing in the Last Year: Not on file    Number of [place] in the Last Year: Not on file    Unstable Housing in the Last Year: Not on file     Current Facility-Administered Medications   Medication Dose Route Frequency Provider Last Rate Last Admin    0.9 % sodium chloride bolus  1,000 mL IntraVENous Once Pinkey Freida, DO        0.9 % sodium chloride bolus  1,000 mL IntraVENous Once Pinkey Slocomb, DO 1,000 mL/hr at 01/19/22 1856 1,000 mL at 01/19/22 1856    vancomycin (VANCOCIN) 20 mg/kg in dextrose 5 % 250 mL IVPB  20 mg/kg IntraVENous Once Pinkey Slocomb, DO        meropenem (MERREM) 1,000 mg in sodium chloride 0.9 % 100 mL IVPB (mini-bag)  1,000 mg IntraVENous Q8H Xander Bello  mL/hr at 01/19/22 1905 1,000 mg at 01/19/22 1905    potassium chloride (KLOR-CON M) extended release tablet 40 mEq  40 mEq Oral PRN Pinkey Slocomb, DO        Or    potassium bicarb-citric acid (EFFER-K) effervescent tablet 40 mEq  40 mEq Oral PRN Pinkey Slocomb, DO        Or    potassium chloride 10 mEq/100 mL IVPB (Peripheral Line)  10 mEq IntraVENous PRN Pinkey Slocomb, DO        0.9 % sodium chloride bolus  1,000 mL IntraVENous Once [employer], DO 1,000 mL/hr at 01/19/22 1913 1,000 mL at 01/19/22 1913    lactated ringers infusion   IntraVENous Continuous Masood Yen Santos MD         Current Outpatient Medications   Medication Sig Dispense Refill    apixaban (ELIQUIS) 5 MG TABS tablet Take 1 tablet by mouth 2 times daily 60 tablet 0    clopidogrel (PLAVIX) 75 MG tablet Take 1 tablet by mouth daily 30 tablet 3    amLODIPine (NORVASC) 5 MG tablet Take 1 tablet by mouth daily      donepezil (ARICEPT) 5 MG tablet Take 1 tablet by mouth nightly      rosuvastatin (CRESTOR) 40 MG tablet Take 1 tablet by mouth daily      lisinopril (PRINIVIL;ZESTRIL) 20 MG tablet Take 1 tablet by mouth daily        Allergies   Allergen Reactions    Penicillins     Sulfa Antibiotics Swelling     Current Facility-Administered Medications   Medication Dose Route Frequency Provider Last Rate Last Admin    0.9 % sodium chloride bolus  1,000 mL IntraVENous Once [employer], DO        0.9 % sodium chloride bolus  1,000 mL IntraVENous Once [employer], DO 1,000 mL/hr at 01/19/22 1856 1,000 mL at 01/19/22 1856    vancomycin (VANCOCIN) 20 mg/kg in dextrose 5 % 250 mL IVPB  20 mg/kg IntraVENous Once [employer], DO        meropenem (MERREM) 1,000 mg in sodium chloride 0.9 % 100 mL IVPB (mini-bag)  1,000 mg IntraVENous Q8H Xander Bello,  mL/hr at 01/19/22 1905 1,000 mg at 01/19/22 1905    potassium chloride (KLOR-CON M) extended release tablet 40 mEq  40 mEq Oral PRN [employer], DO        Or    potassium bicarb-citric acid (EFFER-K) effervescent tablet 40 mEq  40 mEq Oral PRN [employer], DO        Or    potassium chloride 10 mEq/100 mL IVPB (Peripheral Line)  10 mEq IntraVENous PRN [employer], DO        0.9 % sodium chloride bolus  1,000 mL IntraVENous Once [employer], DO 1,000 mL/hr at 01/19/22 1913 1,000 mL at 01/19/22 1913    lactated ringers infusion   IntraVENous Continuous Masood [place] Susanne Mcguire MD         Current Outpatient Medications   Medication Sig Dispense Refill    apixaban (ELIQUIS) 5 MG TABS tablet Take 1 tablet by mouth 2 times daily 60 tablet 0    clopidogrel (PLAVIX) 75 MG tablet Take 1 tablet by mouth daily 30 tablet 3    amLODIPine (NORVASC) 5 MG tablet Take 1 tablet by mouth daily      donepezil (ARICEPT) 5 MG tablet Take 1 tablet by mouth nightly      rosuvastatin (CRESTOR) 40 MG tablet Take 1 tablet by mouth daily      lisinopril (PRINIVIL;ZESTRIL) 20 MG tablet Take 1 tablet by mouth daily         Nursing Notes Reviewed    VITAL SIGNS:  ED Triage Vitals   Enc Vitals Group      BP       Pulse       Resp       Temp       Temp src       SpO2       Weight       Height       Head Circumference       Peak Flow       Pain Score       Pain Loc       Pain Edu? Excl. in [address]? PHYSICAL EXAM:  Physical Exam  Vitals and nursing note reviewed. Constitutional:       General: She is not in acute distress. Appearance: Normal appearance. She is well-developed and well-groomed. She is ill-appearing. She is not toxic-appearing or diaphoretic. HENT:      Head: Normocephalic and atraumatic. Right Ear: External ear normal.      Left Ear: External ear normal.   Eyes:      General: No scleral icterus. Right eye: No discharge. Left eye: No discharge. Extraocular Movements: Extraocular movements intact. Conjunctiva/sclera: Conjunctivae normal.   Neck:      Vascular: No JVD. Trachea: Phonation normal.   Cardiovascular:      Rate and Rhythm: Normal rate and regular rhythm. Pulses: Normal pulses. Heart sounds: Normal heart sounds. No murmur heard. No friction rub. No gallop. Pulmonary:      Effort: Pulmonary effort is normal. No respiratory distress. Breath sounds: Normal breath sounds. No stridor. No wheezing, rhonchi or rales. Abdominal:      General: Bowel sounds are normal. There is no distension. There are no signs of injury. Palpations: Abdomen is soft. There is no mass or pulsatile mass. Tenderness: There is no abdominal tenderness. There is no guarding or rebound. Negative signs include Miller's sign, Rovsing's sign and McBurney's sign. Hernia: No hernia is present. Musculoskeletal:         General: No swelling, tenderness, deformity or signs of injury. Normal range of motion. Cervical back: Full passive range of motion without pain and normal range of motion. No edema, erythema, signs of trauma, rigidity, torticollis or crepitus. Normal range of motion. Right lower leg: No edema. Left lower leg: No edema. Skin:     General: Skin is warm. Coloration: Skin is not jaundiced or pale. Findings: No bruising, erythema, lesion or rash. Neurological:      General: No focal deficit present. Mental Status: She is alert. She is disoriented and confused. GCS: GCS eye subscore is 4. GCS verbal subscore is 4. GCS motor subscore is 6. Cranial Nerves: Cranial nerves are intact. No cranial nerve deficit, dysarthria or facial asymmetry. Sensory: Sensation is intact. No sensory deficit. Motor: Motor function is intact. No weakness, tremor, atrophy, abnormal muscle tone or seizure activity. Coordination: Coordination is intact. Coordination normal.   Psychiatric:         Mood and Affect: Mood normal.         Behavior: Behavior is cooperative.            I have reviewed andinterpreted all of the currently available lab results from this visit (if applicable):    Results for orders placed or performed during the hospital encounter of 01/19/22   COVID-19, Rapid    Specimen: Nasopharyngeal   Result Value Ref Range    Source UNKNOWN     SARS-CoV-2, NAAT DETECTED (A) NOT DETECTED   CBC Auto Differential   Result Value Ref Range    WBC 18.7 (H) 4.0 - 10.5 K/CU MM    RBC 4.26 4.2 - 5.4 M/CU MM    Hemoglobin 12.4 (L) 12.5 - 16.0 GM/DL    Hematocrit 37.9 37 - 47 %    MCV 89.0 78 - 100 FL MCH 29.1 27 - 31 PG    MCHC 32.7 32.0 - 36.0 %    RDW 14.3 11.7 - 14.9 %    Platelets 777 (H) 941 - 440 K/CU MM    MPV 10.4 7.5 - 11.1 FL    Differential Type AUTOMATED DIFFERENTIAL     Segs Relative 81.0 (H) 36 - 66 %    Lymphocytes % 9.4 (L) 24 - 44 %    Monocytes % 8.2 (H) 0 - 4 %    Eosinophils % 0.5 0 - 3 %    Basophils % 0.4 0 - 1 %    Segs Absolute 15.1 K/CU MM    Lymphocytes Absolute 1.8 K/CU MM    Monocytes Absolute 1.5 K/CU MM    Eosinophils Absolute 0.1 K/CU MM    Basophils Absolute 0.1 K/CU MM    Nucleated RBC % 0.0 %    Total Nucleated RBC 0.0 K/CU MM    Total Immature Neutrophil 0.10 K/CU MM    Immature Neutrophil % 0.5 (H) 0 - 0.43 %   CMP   Result Value Ref Range    Sodium 139 135 - 145 MMOL/L    Potassium 2.7 (LL) 3.5 - 5.1 MMOL/L    Chloride 96 (L) 99 - 110 mMol/L    CO2 19 (L) 21 - 32 MMOL/L    BUN 55 (H) 6 - 23 MG/DL    CREATININE 10.3 (H) 0.6 - 1.1 MG/DL    Glucose 118 (H) 70 - 99 MG/DL    Calcium 9.1 8.3 - 10.6 MG/DL    Albumin 3.0 (L) 3.4 - 5.0 GM/DL    Total Protein 6.9 6.4 - 8.2 GM/DL    Total Bilirubin 0.2 0.0 - 1.0 MG/DL    ALT <5 (L) 10 - 40 U/L    AST 16 15 - 37 IU/L    Alkaline Phosphatase 111 40 - 129 IU/L    GFR Non-African American 4 (L) >60 mL/min/1.73m2    GFR  4 (L) >60 mL/min/1.73m2    Anion Gap 24 (H) 4 - 16   Lipase   Result Value Ref Range    Lipase 26 13 - 60 IU/L   Troponin   Result Value Ref Range    Troponin T 0.072 (H) <0.01 NG/ML   Brain Natriuretic Peptide   Result Value Ref Range    Pro-BNP 11,466 (H) <300 PG/ML   Lactic Acid, Plasma   Result Value Ref Range    Lactate 3.1 (HH) 0.4 - 2.0 mMOL/L   TSH without Reflex   Result Value Ref Range    TSH, High Sensitivity 1.680 0.270 - 4.20 uIu/ml   Ammonia Level   Result Value Ref Range    Ammonia 17 11 - 51 UMOL/L   Blood Gas, Venous   Result Value Ref Range    pH, [place] 7.25 (L) 7.32 - 7.42    pCO2, Amado 50 38 - 52 mmHG    pO2, Amado 48 28 - 48 mmHG    Base Excess 6 (H) 0 - 2.4    HCO3, Venous 21.9 19 - 25 MMOL/L O2 Sat, Amado 74.6 (H) 50 - 70 %   ETOH Blood   Result Value Ref Range    Alcohol Scrn <0.01 <0.01 %WT/VOL   CK   Result Value Ref Range    Total CK 34 26 - 140 IU/L        Radiographs (if obtained):  [] The following radiograph was interpreted by myself in the absence of a radiologist:  [x] Radiologist's Report Reviewed:    CXR, CT Brain, CTA CAP    Echocardiogram complete 2D with doppler with color    Result Date: 12/22/2021  Transthoracic Echocardiography Report (TTE)  Demographics   Patient Name       Thelma Allan    Date of Study       12/22/2021   Date of Birth      1950         Gender              Female   Age                66 year(s)         Race                Unknown   Patient Number     [id number]         Room Number         3108   Visit Number       [id number]   Corporate ID       [id number]   Accession Number   [id number]         Jen Worrell RVT   Ordering Physician Maryam Villanueva MD                 Physician           Shell ADAMS  Procedure Type of Study   TTE procedure:ECHOCARDIOGRAM COMPLETE 2D W DOPPLER W COLOR. Procedure Date Date: 12/22/2021 Start: 08:36 AM Study Location: Portable Technical Quality: Adequate visualization Indications:CVA. Patient Status: Routine Height: 58 inches Weight: 155 pounds BSA: 1.63 m2 BMI: 32.39 kg/m2 HR: 86 bpm BP: 154/69 mmHg  Conclusions   Summary  Left ventricular systolic function is normal.  Ejection fraction is visually estimated at 55%. Mild left ventricular hypertrophy. Grade I diastolic dysfunction. Suboptimal bubble study; color Doppler does not suggest PFO or ASD. No evidence of any pericardial effusion.    Signature   ------------------------------------------------------------------  Electronically signed by Paula Pimentel MD  (Interpreting physician) on 12/22/2021 at 10:50 AM ------------------------------------------------------------------   Findings   Left Ventricle  Left ventricular systolic function is normal.  Ejection fraction is visually estimated at 55%. Mild left ventricular hypertrophy. Grade I diastolic dysfunction. Left ventricle size is normal.  No regional wall motion abnormalities. Left Atrium  Suboptimal bubble study; color Doppler does not suggest PFO or ASD. Right Atrium  Essentially normal right atrium. Right Ventricle  Essentially normal right ventricle. Aortic Valve  Structurally normal aortic valve. Mitral Valve  Structurally normal mitral valve. Tricuspid Valve  Structurally normal tricuspid valve. Pulmonic Valve  The pulmonic valve was not well visualized. Pericardial Effusion  No evidence of any pericardial effusion. Pleural Effusion  No evidence of pleural effusion. Miscellaneous  Aorta was not clearly visualized.   M-Mode/2D Measurements & Calculations   LV Diastolic Dimension:  LV Systolic Dimension:  LA Dimension: 4.1 cmAO Root  2.6 cm                   1.43 cm                 Dimension: 2.8 cmLA Area:  LV FS:45 %               LV Volume Diastolic: 50 29.9 cm2  LV PW Diastolic: 3.85 cm ml  LV PW Systolic: 2.17 cm  LV Volume Systolic: 26  Septum Diastolic: 5.23   ml  cm                       LV EDV/LV EDV Index: 50 RV Diastolic Dimension:  Septum Systolic: 3.00 cm IX/35 X4VM ESV/LV ESV   2.08 cm  CO: 5.6 l/min            Index: 26 ml/16 m2  CI: 3.44 l/m*m2          EF Calculated (A4C): 48 LA/Aorta: 1.46                           %  LV Area Diastolic: 71.7  EF Calculated (2D):     LA volume/Index: 55 ml  cm2                      78.3 %                  /31Z7  LV Area Systolic: 14 cm2                           LV Length: 6.96 cm                            LVOT: 2.1 cm  Doppler Measurements & Calculations   MV Peak E-Wave: 69.6    AV Peak Velocity: 138 cm/s   LVOT Peak Velocity:  cm/s                    AV Peak Gradient: 7.62 mmHg  97.8 cm/s  MV Peak A-Wave: 117     AV Mean Velocity: 105 cm/s   LVOT Mean Velocity:  cm/s                    AV Mean Gradient: 5 mmHg     73.6 cm/s  MV E/A Ratio: 0.59      AV VTI: 32.4 cm              LVOT Peak Gradient: 4  MV Peak Gradient: 1.94  AV Area (Continuity):2.01    mmHgLVOT Mean Gradient:  mmHg                    cm2                          2 mmHg   MV P1/2t: 62 msec       LVOT VTI: 18.8 cm  MVA by PHT:3.55 cm2   MV E' Septal Velocity:  3.62 cm/s  MV E' Lateral Velocity:  5.59 cm/s  MV E/E' septal: 19.23  MV E/E' lateral: 12.45      CT ABDOMEN PELVIS WO CONTRAST Additional Contrast? None    Result Date: 1/3/2022  EXAMINATION: CT OF THE ABDOMEN AND PELVIS WITHOUT CONTRAST 1/3/2022 3:12 am TECHNIQUE: CT of the abdomen and pelvis was performed without the administration of intravenous contrast. Multiplanar reformatted images are provided for review. Dose modulation, iterative reconstruction, and/or weight based adjustment of the mA/kV was utilized to reduce the radiation dose to as low as reasonably achievable. COMPARISON: CT abdomen and pelvis dated October 21, 2019. HISTORY: ORDERING SYSTEM PROVIDED HISTORY: Evaluate for obstructive uropathy TECHNOLOGIST PROVIDED HISTORY: Reason for exam:->Evaluate for obstructive uropathy Additional Contrast?->None Decision Support Exception - unselect if not a suspected or confirmed emergency medical condition->Emergency Medical Condition (MA) Reason for Exam: Aphasia; Fatigue FINDINGS: Lower Chest: The visualized lungs are clear. Organs: There is been a cholecystectomy. The liver, spleen, adrenal glands, pancreas, and kidneys are grossly unremarkable. There is no evidence of hydronephrosis. GI/Bowel: Colonic diverticulosis is seen. There is no bowel obstruction. The appendix is normal. Pelvis: The bladder is unremarkable. There has been a hysterectomy. There is no free fluid. Peritoneum/Retroperitoneum: There is no free air or lymphadenopathy.  Atherosclerotic disease of the aorta is seen. There is a small fat containing umbilical hernia. Bones/Soft Tissues: No destructive osseous lesions are identified. 1. No acute findings. No evidence of obstructive uropathy. 2. Colonic diverticulosis. RECOMMENDATIONS: Unavailable     CT HEAD WO CONTRAST    Result Date: 1/2/2022  EXAMINATION: CT OF THE HEAD WITHOUT CONTRAST  1/2/2022 10:12 pm TECHNIQUE: CT of the head was performed without the administration of intravenous contrast. Dose modulation, iterative reconstruction, and/or weight based adjustment of the mA/kV was utilized to reduce the radiation dose to as low as reasonably achievable. COMPARISON: CT head 12/21/2021. HISTORY: ORDERING SYSTEM PROVIDED HISTORY: Weakness TECHNOLOGIST PROVIDED HISTORY: Reason for exam:->Weakness Has a \"code stroke\" or \"stroke alert\" been called? ->No Decision Support Exception - unselect if not a suspected or confirmed emergency medical condition->Emergency Medical Condition (MA) Reason for Exam: Aphasia; Fatigue FINDINGS: BRAIN/VENTRICLES: No acute hemorrhage. Periventricular and subcortical hypoattenuation is nonspecific and may be related to microvascular disease. Encephalomalacia in the left cerebellum. Encephalomalacia in the left temporal occipital lobe. Encephalomalacia in the basal ganglia. Chronic lacunar infarcts in the basal ganglia and right thalamus. Artifact partially obscures the cindy. [place] Ilene white differentiation appears maintained given artifact near the skull base and through the posterior fossa. Mild prominence of the ventricles again visualized. There is no midline shift. Basal cisterns appear patent. ORBITS: Thinning of the left lens again visualized. Angel Awkward SINUSES: Mild mucosal thickening of the ethmoid sinuses. Partial opacification of the left ethmoid sinus. Partial opacification of the inferior mastoid air cells. SOFT TISSUES/SKULL: No depressed calvarial fracture. No acute hemorrhage or midline shift.  Other findings as described. RECOMMENDATIONS: Unavailable     CT HEAD WO CONTRAST    Result Date: 12/22/2021  EXAMINATION: CT OF THE HEAD WITHOUT CONTRAST  12/21/2021 1:02 pm TECHNIQUE: CT of the head was performed without the administration of intravenous contrast. Dose modulation, iterative reconstruction, and/or weight based adjustment of the mA/kV was utilized to reduce the radiation dose to as low as reasonably achievable. COMPARISON: 10/25/2021 HISTORY: ORDERING SYSTEM PROVIDED HISTORY: possible stroke 3 days ago TECHNOLOGIST PROVIDED HISTORY: Reason for exam:->possible stroke 3 days ago Has a \"code stroke\" or \"stroke alert\" been called? ->No Decision Support Exception - unselect if not a suspected or confirmed emergency medical condition->Emergency Medical Condition (MA) Reason for Exam: possible stroke 3 days ago Initial evaluation FINDINGS: BRAIN/VENTRICLES:  The ventricles and cisternal spaces are prominent consistent with cerebral atrophy. There is an area of previous injury or infarct in the left occipital lobe. There are chronic small vessel ischemic changes in the periventricular white matter. There are remote lacunar infarcts in the basal ganglia bilaterally. There is atherosclerotic calcification of the cavernous carotid arteries and vertebral arteries. There is hypoattenuation in the left frontal lobe which may represent a more acute or subacute area of infarct. MRI could further characterize. No hemorrhage is identified in the brain parenchyma. There is no midline shift or mass effect. ORBITS: The visualized portion of the orbits demonstrate no acute abnormality. SINUSES:  The visualized paranasal sinuses and mastoid air cells are for the most part clear. SOFT TISSUES/SKULL:  No acute abnormality of the visualized skull or soft tissues. Area of hypoattenuation involving the left frontal lobe which may represent an acute to subacute area of infarct. MRI could further characterize.  Previous areas of injury or infarct in the basal ganglia bilaterally and left occipital lobe. RECOMMENDATIONS: Unavailable     XR CHEST PORTABLE    Result Date: 1/2/2022  EXAMINATION: ONE XRAY VIEW OF THE CHEST 1/2/2022 10:17 pm COMPARISON: 12/21/2021 HISTORY: ORDERING SYSTEM PROVIDED HISTORY: weakness TECHNOLOGIST PROVIDED HISTORY: Reason for exam:->weakness Reason for Exam: aphasia, fatigue Additional signs and symptoms: weakness FINDINGS: Clear lungs other than a chronic calcified left upper lobe granuloma. No pneumothorax or pleural effusion. Cardiac and mediastinal contours stable. Stable changes of prior CABG. No acute osseous abnormality. No acute cardiopulmonary abnormality. XR CHEST PORTABLE    Result Date: 12/21/2021  EXAMINATION: ONE XRAY VIEW OF THE CHEST 12/21/2021 1:06 pm COMPARISON: 10/25/2021 HISTORY: ORDERING SYSTEM PROVIDED HISTORY: stroke symptoms TECHNOLOGIST PROVIDED HISTORY: Reason for exam:->stroke symptoms Reason for Exam: stroke systems FINDINGS: Bilateral hilar and perihilar infiltrative change/bronchial pulmonary marking prominence consistent with central bronchitis/pneumonitis. Findings may be accentuated by low lung volumes. No large areas of pulmonary consolidations or definite airspace infiltrates. No detectable pleural effusion, pneumothorax, pulmonary edema, cardiomegaly or mediastinal widening. Stable postoperative changes and granuloma calcifications noted. Findings consistent with central hilar/perihilar bronchitis/pneumonitis or atypical pneumonia. Otherwise, radiographically nonacute portable chest.     CTA HEAD NECK W CONTRAST    Result Date: 12/22/2021  EXAMINATION: CTA OF THE HEAD AND NECK WITH CONTRAST 12/21/2021 1:03 pm: TECHNIQUE: CTA of the head and neck was performed with the administration of intravenous contrast. Multiplanar reformatted images are provided for review. MIP images are provided for review.  Stenosis of the internal carotid arteries measured using NASCET criteria. Dose modulation, iterative reconstruction, and/or weight based adjustment of the mA/kV was utilized to reduce the radiation dose to as low as reasonably achievable. COMPARISON: 10/25/2021 HISTORY: ORDERING SYSTEM PROVIDED HISTORY: possible stroke 3 days ago TECHNOLOGIST PROVIDED HISTORY: Reason for exam:->possible stroke 3 days ago Decision Support Exception - unselect if not a suspected or confirmed emergency medical condition->Emergency Medical Condition (MA) Reason for Exam: possible stroke 3 days ago Relevant Medical/Surgical History: 80 ML ISOVUE 370 Initial evaluation FINDINGS: CTA NECK: AORTIC ARCH/ARCH VESSELS: There is atherosclerotic calcification of the aortic arch. No abnormality of the left common carotid artery or innominate is identified. CAROTID ARTERIES: The right common carotid artery is patent without any narrowing or stenosis. There is atherosclerotic disease in the right internal carotid artery bulb with approximately 65% stenosis using NASCET criteria. The right internal carotid artery is then patent through the skull base. The proximal left common carotid artery is patent. There is diffuse narrowing of the distal left common carotid artery with approximately 50% stenosis. There is sequela of a left carotid endarterectomy. There is atherosclerotic disease in the region of the enderectomy with a short segment high-grade stenosis of the left internal carotid artery bulb measuring greater than 80% using NASCET criteria. The left internal carotid artery is then patent through the skull base. VERTEBRAL ARTERIES: The right vertebral artery is dominant. There is moderate narrowing of the origin of the right vertebral artery. The right vertebral artery is otherwise patent throughout the skull base. The left vertebral artery is small in caliber but patent in its visualized course. SOFT TISSUES: There is no acute abnormality of the visualized soft tissues.  There are a few small lymph nodes scattered throughout the neck that are nonspecific. BONES: No acute osseous abnormality. CTA HEAD: ANTERIOR CIRCULATION: There is atherosclerotic disease of the cavernous carotid arteries. There is no significant narrowing of the right cavernous carotid artery. There are mild areas of narrowing in the left cavernous carotid. The anterior cerebral arteries are patent. The anterior communicating artery is tiny but patent. The middle cerebral arteries are patent bilaterally. POSTERIOR CIRCULATION: The right distal vertebral artery has minimal atherosclerotic disease without any significant narrowing. The distal left vertebral artery has a short segment severe stenosis just before forming the basilar. The basilar artery is patent. There is a short segment severe stenosis of the P2 segment of the left posterior cerebral artery. The right posterior cerebral artery is patent. OTHER: No dural venous sinus thrombosis on this non-dedicated study. BRAIN: Refer to the CT head of the same date. No large vessel intracranial occlusion. Short segment severe stenosis of the P2 segment of the left posterior cerebral artery. Mild areas of stenosis in the left internal carotid cavernous segment. No other abnormality of the intracranial circulation. Severe short segment stenosis of the distal V4 segment of the left vertebral artery. Severe stenosis of the left internal carotid artery at the junction of the endarterectomy graft and the left internal carotid artery measuring greater than 80% using NASCET criteria. Approximately 65% stenosis of the right internal carotid artery in the region of the bulb. Stenosis at the origin of the right vertebral artery.  RECOMMENDATIONS: Unavailable     MRI BRAIN WO CONTRAST    Result Date: 1/4/2022  EXAMINATION: MRI OF THE BRAIN WITHOUT CONTRAST  1/4/2022 3:19 pm TECHNIQUE: Multiplanar multisequence MRI of the brain was performed without the administration of intravenous contrast. COMPARISON: MR brain 12/23/2021. CTA head and neck 12/21/2021. HISTORY: ORDERING SYSTEM PROVIDED HISTORY: Dysarthria TECHNOLOGIST PROVIDED HISTORY: Reason for exam:->Dysarthria Reason for Exam: Acute kidney injury due to COVID-19 ( FINDINGS: INTRACRANIAL STRUCTURES/VENTRICLES: New restricted diffusion in the right parietal temporal and left occipital lobes. Residual restricted diffusion in the left frontal and temporal lobes. No acute hemorrhage given artifact. Encephalomalacia in the left cerebellum, left parietal temporal occipital lobe, and right basal ganglia. Chronic lacunar infarct in the left basal ganglia. Residual susceptibility in right basal ganglia suggestive of chronic hemorrhage. Scattered periventricular and subcortical foci of T2 prolongation are nonspecific and may be related to microvascular disease. Pituitary gland is within normal limits in size. No cerebellar tonsillar herniation. No midline shift. Cerebral volume loss and minimal prominence of the ventricles. Basal cisterns appear patent. ORBITS: Slight thinning of the left lens again visualized. SINUSES: Mild mucosal thickening in the paranasal sinuses. Partial opacification of the mastoid air cells. BONES/SOFT TISSUES: The bones demonstrate no acute abnormality given limitations of modality. Soft tissues demonstrate no acute process. 1. New acute infarcts in the right middle cerebral and left posterior cerebral artery territories. Evolving infarcts in the left middle cerebral artery territory. No acute hemorrhage given artifact. 2. Other findings as described. RECOMMENDATIONS: Unavailable     MRI BRAIN WO CONTRAST    Result Date: 12/23/2021  EXAMINATION: MRI OF THE BRAIN WITHOUT CONTRAST  12/23/2021 10:45 am TECHNIQUE: Multiplanar multisequence MRI of the brain was performed without the administration of intravenous contrast. COMPARISON: None.  HISTORY: ORDERING SYSTEM PROVIDED HISTORY: aphasia TECHNOLOGIST PROVIDED HISTORY: Reason for exam:->aphasia Reason for Exam: aphasia Relevant Medical/Surgical History: none Initial evaluation FINDINGS: INTRACRANIAL STRUCTURES/VENTRICLES: There are several areas of restricted diffusion in the posterior and left frontal lobe consistent with acute areas of infarct. There are a few subtle areas of restricted diffusion in the left occipital lobe suggestive of areas of subacute infarct. Given the vascular distributions, an embolic source should be considered. There is a remote left occipital lobe infarct with encephalomalacia and gliosis in the left occipital lobe. There are remote infarcts in the basal ganglia bilaterally and the right thalamus. The ventricles and cisternal spaces are prominent consistent with cerebral atrophy. There are numerous punctate and confluent areas of high signal in the periventricular white matter and centrum semiovale that are likely related to chronic small vessel ischemic disease. There is no midline shift or mass effect. ORBITS: The visualized portion of the orbits demonstrate no acute abnormality. SINUSES: There is mild mucoperiosteal thickening of the ethmoid air cells. The remainder of the sinuses are clear. There is mild opacification of the mastoid air cells bilaterally. BONES/SOFT TISSUES: The bone marrow signal intensity appears normal. The soft tissues demonstrate no acute abnormality. Small areas of acute infarct involving the left frontal lobe from an acute left middle cerebral artery territory infarct. Subacute areas of infarct involving the left occipital lobe. An embolic source should be considered. Cerebral atrophy. Severe chronic small vessel ischemic changes. Remote left occipital lobe infarct. Remote lacunar infarcts in the basal ganglia bilaterally and right thalamus.  RECOMMENDATIONS: Unavailable       EKG (if obtained): (All EKG's are interpreted by myself in the absence of a cardiologist)      12 lead EKG per my interpretation:  Normal Sinus Rhythm 74  Axis is   Left axis deviation  QTc is  448  There is no specific T wave changes appreciated. There is no specific ST wave changes appreciated. Prior EKG to compare with was not available       Total critical care time today provided was at least 60 minutes. This excludes seperately billable procedure. Critical care time provided for reviewing labs, images giving IV fluids, antibiotics, consulting medicine, consulting nephrology that required close evaluation and/or intervention with concern for patient decompensation. MDM:    Patient here by EMS from home with complaint of confusion, hypotension. Again blood pressure reportedly 70 on EMS arrival it is 90 here she is confused unknown baseline, unknown last known well. She has no complaints she keeps saying that she does not know when I asked her questions but she does states she was with her daughter. She denies any headache chest pain shortness of breath abdominal pain. Otherwise patient stable she is awake and conversing moving all extremities. Will check labs, imaging and give her IV fluids. Work-up performed patient found to have COVID-19 also found to have diverticulitis on imaging. Has known history of stroke. Blood gas shows metabolic acidosis. Awaiting urinalysis. Imaging also shows diverticulitis uncomplicated. Patient given broad-spectrum antibiotics. She was sent to be in acute renal failure I did talk to her nephrologist agrees with antibiotics, Sherwood catheter which I ordered patient be admitted to hospital medicine she does have a family doctor who admits that she is positive for COVID-19 so holding off on family doctor admitting will consult hospitalist otherwise patient has stable vital signs improved with IV fluids will be admitted.     CLINICAL IMPRESSION:  Final diagnoses:   Hypotension, unspecified hypotension type   Acute renal failure, unspecified acute renal failure type ([address] Troponin level elevated   Hypokalemia   COVID-19   Diverticulitis of colon       (Please note that portions of this note may have been completed with a voice recognition program. Efforts were made to edit the dictations but occasionally words aremis-transcribed.)    DISPOSITION REFERRAL (if applicable):  No follow-up provider specified.     DISPOSITION MEDICATIONS (if applicable):  New Prescriptions    No medications on file          Michael Jordan, [facility],   01/19/22 8318

## 2022-01-19 NOTE — ED NOTES
Walked into pt room with xray tech and pt had iv ripped out of arm, bed soaking wet. Xray tech and I removed wet bed sheets/chucks/and pt clothes. Clothes placed in pt belongings bag. New dry sheets and chucks placed on bed. Xray done in room and pt taken and returned from cat scan.

## 2022-01-19 NOTE — ED NOTES
IV attempt x 4 w/o success. 2 to left lower arm and hand an 2 to right lower arm and hand. Linda Mazariegos.  TANNER Curtis  01/19/22 7312

## 2022-01-19 NOTE — CARE COORDINATION
[facility] Transitions Follow Up Call    2022    Patient: Nigel Dominguez  Patient : 1950   MRN: <[id number]>  Reason for Admission: OLIVER d/t COVID  Discharge Date: 22 RARS: Readmission Risk Score: 12.7 ( )         Spoke with: Donna Soto, patient's daughter    Contacted patient for COVID transition follow up. Spoke with patient's daughter Donna Soto. She states \"nothing has changed\". Reports her mother is not eating and only taking sips of protein shakes and 7-Up. Donna Soto is not getting up much. PT did make a visit today and patient did get up to walk. Daughter reports she has been incontinent. Denies shortness of breath, chest pain/discomfort. She does have a cough. Home health nurse made a visit today. BP was low and nurse made a call to PCP office. Daughter waiting on call back from office. No questions or needs at this time. Follow Up Call    Challenges to be reviewed by the provider   Additional needs identified to be addressed with provider: No  none                 Encounter was not routed to provider for escalation. Method of communication with provider:  none. Contacted the family by telephone to follow up after hospital visit. Status: not changed  Interventions to address identified needs: Obtained and reviewed discharge summary and/or continuity of care documents    [facility] follow up appointment(s):   Future Appointments   Date Time Provider Helena Landrum   2022  8:20 AM Bartolome Tripathi MD [facility]   2022  2:00 PM Gary Quinones MD [facility]  [facility]        Provided contact information for future needs. Plan for follow-up call in 5-7 days based on severity of symptoms and risk factors. Plan for next call:   Follow up on any new or worsening symptoms (cough, weakness)    Jhony Form, RN                    Care Transitions Subsequent and Final Call    Subsequent and Final Calls  Do you have any ongoing symptoms?: Yes  Patient-reported symptoms: Weakness, Cough, Fatigue  Do you currently have any active services?: Yes  Are you currently active with any services?: Home Health  Do you have any needs or concerns that I can assist you with?: No  Care Transitions Interventions  Other Interventions:            Follow Up  Future Appointments   Date Time Provider Helena Landrum   1/25/2022  8:20 AM Jesusita Smyth MD [facility]   1/25/2022  2:00 PM Rachelle Song MD [facility]  [facility]       Denisha Graham RN

## 2022-01-19 NOTE — PROGRESS NOTES
Pt recent dc with new cva with dementia. Was to urology with hematuria and had covid 19. Was on lisinopril and started anticoagulation. Now present with confusion with arf and low K. And hypotension. Had [facility] last admit and saw dr Bautista Andrade with arf that resolved. Now present again with arf. Will review chart and fu. No acute need dilaysis and will try improve bp and replete K.  May need repeat ct abdomen with above work up infection and if neuro worse rec head ct

## 2022-01-20 ENCOUNTER — APPOINTMENT (OUTPATIENT)
Dept: INTERVENTIONAL RADIOLOGY/VASCULAR | Age: 72
DRG: 871 | End: 2022-01-20
Payer: MEDICARE

## 2022-01-20 ENCOUNTER — APPOINTMENT (OUTPATIENT)
Dept: GENERAL RADIOLOGY | Age: 72
DRG: 871 | End: 2022-01-20
Payer: MEDICARE

## 2022-01-20 LAB
ALBUMIN SERPL-MCNC: 3.5 GM/DL (ref 3.4–5)
ANION GAP SERPL CALCULATED.3IONS-SCNC: 12 MMOL/L (ref 4–16)
BASOPHILS ABSOLUTE: 0 K/CU MM
BASOPHILS RELATIVE PERCENT: 0.4 % (ref 0–1)
BUN BLDV-MCNC: 40 MG/DL (ref 6–23)
CALCIUM SERPL-MCNC: 8 MG/DL (ref 8.3–10.6)
CHLORIDE BLD-SCNC: 114 MMOL/L (ref 99–110)
CO2: 22 MMOL/L (ref 21–32)
CREAT SERPL-MCNC: 4.4 MG/DL (ref 0.6–1.1)
CULTURE: NORMAL
DIFFERENTIAL TYPE: ABNORMAL
EKG ATRIAL RATE: 74 BPM
EKG DIAGNOSIS: NORMAL
EKG P AXIS: 95 DEGREES
EKG P-R INTERVAL: 152 MS
EKG Q-T INTERVAL: 404 MS
EKG QRS DURATION: 82 MS
EKG QTC CALCULATION (BAZETT): 448 MS
EKG R AXIS: -53 DEGREES
EKG T AXIS: 98 DEGREES
EKG VENTRICULAR RATE: 74 BPM
EOSINOPHILS ABSOLUTE: 0.2 K/CU MM
EOSINOPHILS RELATIVE PERCENT: 1.7 % (ref 0–3)
GFR AFRICAN AMERICAN: 12 ML/MIN/1.73M2
GFR NON-AFRICAN AMERICAN: 10 ML/MIN/1.73M2
GLUCOSE BLD-MCNC: 100 MG/DL (ref 70–99)
HCT VFR BLD CALC: 31.3 % (ref 37–47)
HEMOGLOBIN: 10.3 GM/DL (ref 12.5–16)
IMMATURE NEUTROPHIL %: 0.4 % (ref 0–0.43)
LYMPHOCYTES ABSOLUTE: 0.9 K/CU MM
LYMPHOCYTES RELATIVE PERCENT: 8.3 % (ref 24–44)
Lab: NORMAL
MCH RBC QN AUTO: 28.7 PG (ref 27–31)
MCHC RBC AUTO-ENTMCNC: 32.9 % (ref 32–36)
MCV RBC AUTO: 87.2 FL (ref 78–100)
MONOCYTES ABSOLUTE: 0.9 K/CU MM
MONOCYTES RELATIVE PERCENT: 8 % (ref 0–4)
NUCLEATED RBC %: 0 %
PDW BLD-RTO: 14.4 % (ref 11.7–14.9)
PHOSPHORUS: 3.8 MG/DL (ref 2.5–4.9)
PLATELET # BLD: 400 K/CU MM (ref 140–440)
PMV BLD AUTO: 10.3 FL (ref 7.5–11.1)
POTASSIUM SERPL-SCNC: 3.5 MMOL/L (ref 3.5–5.1)
RBC # BLD: 3.59 M/CU MM (ref 4.2–5.4)
SEGMENTED NEUTROPHILS ABSOLUTE COUNT: 8.8 K/CU MM
SEGMENTED NEUTROPHILS RELATIVE PERCENT: 81.2 % (ref 36–66)
SODIUM BLD-SCNC: 148 MMOL/L (ref 135–145)
SPECIMEN: NORMAL
TOTAL IMMATURE NEUTOROPHIL: 0.04 K/CU MM
TOTAL NUCLEATED RBC: 0 K/CU MM
TROPONIN T: 0.03 NG/ML
WBC # BLD: 10.9 K/CU MM (ref 4–10.5)

## 2022-01-20 PROCEDURE — 2580000003 HC RX 258: Performed by: INTERNAL MEDICINE

## 2022-01-20 PROCEDURE — C1769 GUIDE WIRE: HCPCS

## 2022-01-20 PROCEDURE — 76937 US GUIDE VASCULAR ACCESS: CPT

## 2022-01-20 PROCEDURE — B548ZZA ULTRASONOGRAPHY OF SUPERIOR VENA CAVA, GUIDANCE: ICD-10-PCS | Performed by: RADIOLOGY

## 2022-01-20 PROCEDURE — 84484 ASSAY OF TROPONIN QUANT: CPT

## 2022-01-20 PROCEDURE — 84100 ASSAY OF PHOSPHORUS: CPT

## 2022-01-20 PROCEDURE — 2580000003 HC RX 258: Performed by: EMERGENCY MEDICINE

## 2022-01-20 PROCEDURE — 2060000000 HC ICU INTERMEDIATE R&B

## 2022-01-20 PROCEDURE — 94761 N-INVAS EAR/PLS OXIMETRY MLT: CPT

## 2022-01-20 PROCEDURE — 6360000002 HC RX W HCPCS: Performed by: EMERGENCY MEDICINE

## 2022-01-20 PROCEDURE — 82040 ASSAY OF SERUM ALBUMIN: CPT

## 2022-01-20 PROCEDURE — P9047 ALBUMIN (HUMAN), 25%, 50ML: HCPCS | Performed by: INTERNAL MEDICINE

## 2022-01-20 PROCEDURE — 6360000002 HC RX W HCPCS: Performed by: INTERNAL MEDICINE

## 2022-01-20 PROCEDURE — 36556 INSERT NON-TUNNEL CV CATH: CPT

## 2022-01-20 PROCEDURE — 93010 ELECTROCARDIOGRAM REPORT: CPT | Performed by: INTERNAL MEDICINE

## 2022-01-20 PROCEDURE — C1752 CATH,HEMODIALYSIS,SHORT-TERM: HCPCS

## 2022-01-20 PROCEDURE — 80069 RENAL FUNCTION PANEL: CPT

## 2022-01-20 PROCEDURE — 83605 ASSAY OF LACTIC ACID: CPT

## 2022-01-20 PROCEDURE — 80048 BASIC METABOLIC PNL TOTAL CA: CPT

## 2022-01-20 PROCEDURE — 6370000000 HC RX 637 (ALT 250 FOR IP): Performed by: INTERNAL MEDICINE

## 2022-01-20 PROCEDURE — C1894 INTRO/SHEATH, NON-LASER: HCPCS

## 2022-01-20 PROCEDURE — 71045 X-RAY EXAM CHEST 1 VIEW: CPT

## 2022-01-20 PROCEDURE — 02HV33Z INSERTION OF INFUSION DEVICE INTO SUPERIOR VENA CAVA, PERCUTANEOUS APPROACH: ICD-10-PCS | Performed by: RADIOLOGY

## 2022-01-20 PROCEDURE — 6360000002 HC RX W HCPCS: Performed by: NURSE PRACTITIONER

## 2022-01-20 PROCEDURE — 2709999900 HC NON-CHARGEABLE SUPPLY

## 2022-01-20 PROCEDURE — 2500000003 HC RX 250 WO HCPCS: Performed by: INTERNAL MEDICINE

## 2022-01-20 PROCEDURE — 85025 COMPLETE CBC W/AUTO DIFF WBC: CPT

## 2022-01-20 RX ORDER — ALBUMIN (HUMAN) 12.5 G/50ML
25 SOLUTION INTRAVENOUS ONCE
Status: COMPLETED | OUTPATIENT
Start: 2022-01-20 | End: 2022-01-20

## 2022-01-20 RX ORDER — ZIPRASIDONE MESYLATE 20 MG/ML
20 INJECTION, POWDER, LYOPHILIZED, FOR SOLUTION INTRAMUSCULAR ONCE
Status: COMPLETED | OUTPATIENT
Start: 2022-01-20 | End: 2022-01-20

## 2022-01-20 RX ORDER — SODIUM CHLORIDE 9 MG/ML
INJECTION, SOLUTION INTRAVENOUS CONTINUOUS
Status: DISCONTINUED | OUTPATIENT
Start: 2022-01-20 | End: 2022-01-22

## 2022-01-20 RX ORDER — LORAZEPAM 2 MG/ML
0.5 INJECTION INTRAMUSCULAR ONCE
Status: COMPLETED | OUTPATIENT
Start: 2022-01-20 | End: 2022-01-20

## 2022-01-20 RX ORDER — FUROSEMIDE 10 MG/ML
60 INJECTION INTRAMUSCULAR; INTRAVENOUS ONCE
Status: COMPLETED | OUTPATIENT
Start: 2022-01-20 | End: 2022-01-20

## 2022-01-20 RX ADMIN — MEROPENEM 1000 MG: 1 INJECTION INTRAVENOUS at 02:02

## 2022-01-20 RX ADMIN — METRONIDAZOLE 500 MG: 500 INJECTION, SOLUTION INTRAVENOUS at 06:59

## 2022-01-20 RX ADMIN — ROSUVASTATIN 40 MG: 40 TABLET, FILM COATED ORAL at 21:32

## 2022-01-20 RX ADMIN — POTASSIUM CHLORIDE 10 MEQ: 7.46 INJECTION, SOLUTION INTRAVENOUS at 02:26

## 2022-01-20 RX ADMIN — ALBUMIN (HUMAN) 25 G: 0.25 INJECTION, SOLUTION INTRAVENOUS at 18:24

## 2022-01-20 RX ADMIN — CLOPIDOGREL 75 MG: 75 TABLET, FILM COATED ORAL at 10:21

## 2022-01-20 RX ADMIN — ZIPRASIDONE MESYLATE 20 MG: 20 INJECTION, POWDER, LYOPHILIZED, FOR SOLUTION INTRAMUSCULAR at 02:04

## 2022-01-20 RX ADMIN — METRONIDAZOLE 500 MG: 500 INJECTION, SOLUTION INTRAVENOUS at 14:25

## 2022-01-20 RX ADMIN — SODIUM CHLORIDE, PRESERVATIVE FREE 10 ML: 5 INJECTION INTRAVENOUS at 21:32

## 2022-01-20 RX ADMIN — MEROPENEM 1000 MG: 1 INJECTION INTRAVENOUS at 17:12

## 2022-01-20 RX ADMIN — POTASSIUM CHLORIDE 10 MEQ: 7.46 INJECTION, SOLUTION INTRAVENOUS at 01:25

## 2022-01-20 RX ADMIN — DONEPEZIL HYDROCHLORIDE 5 MG: 5 TABLET ORAL at 21:32

## 2022-01-20 RX ADMIN — SODIUM CHLORIDE, POTASSIUM CHLORIDE, SODIUM LACTATE AND CALCIUM CHLORIDE: 600; 310; 30; 20 INJECTION, SOLUTION INTRAVENOUS at 06:58

## 2022-01-20 RX ADMIN — POTASSIUM CHLORIDE 10 MEQ: 7.46 INJECTION, SOLUTION INTRAVENOUS at 03:25

## 2022-01-20 RX ADMIN — METRONIDAZOLE 500 MG: 500 INJECTION, SOLUTION INTRAVENOUS at 21:36

## 2022-01-20 RX ADMIN — APIXABAN 2.5 MG: 2.5 TABLET, FILM COATED ORAL at 10:21

## 2022-01-20 RX ADMIN — SODIUM CHLORIDE, PRESERVATIVE FREE 10 ML: 5 INJECTION INTRAVENOUS at 10:21

## 2022-01-20 RX ADMIN — VANCOMYCIN HYDROCHLORIDE 1250 MG: 5 INJECTION, POWDER, LYOPHILIZED, FOR SOLUTION INTRAVENOUS at 00:05

## 2022-01-20 RX ADMIN — MEROPENEM 1000 MG: 1 INJECTION INTRAVENOUS at 10:29

## 2022-01-20 RX ADMIN — APIXABAN 2.5 MG: 2.5 TABLET, FILM COATED ORAL at 21:32

## 2022-01-20 RX ADMIN — SODIUM CHLORIDE: 9 INJECTION, SOLUTION INTRAVENOUS at 18:16

## 2022-01-20 RX ADMIN — FUROSEMIDE 60 MG: 10 INJECTION, SOLUTION INTRAVENOUS at 18:15

## 2022-01-20 RX ADMIN — LORAZEPAM 0.5 MG: 2 INJECTION INTRAMUSCULAR; INTRAVENOUS at 15:18

## 2022-01-20 RX ADMIN — POTASSIUM CHLORIDE 10 MEQ: 7.46 INJECTION, SOLUTION INTRAVENOUS at 00:25

## 2022-01-20 ASSESSMENT — PAIN SCALES - GENERAL: PAINLEVEL_OUTOF10: 0

## 2022-01-20 NOTE — PROGRESS NOTES
Patient arrived to unit from er per stretcher. Alert to name only. Bilat arms with purple bruising. Raphine to room, call light, staff, tv, phone, and lights and no questions at this time.

## 2022-01-20 NOTE — PROGRESS NOTES
Hospitalist Progress Note      Name:  Jade Chou /Age/Sex: 1950  (66 y.o. female)   MRN & CSN:  [id number] & [id number] Admission Date/Time: 2022  3:39 PM   Location:  -A PCP: Rhianna Rodriguez MD         Hospital Day: 2    Assessment and Plan:        Sepsis:  -Patient was hypotensive at presentation, leukocytosis, lactic acidosis. -Blood cultures sent from ER, still pending  -Urine culture pending  -CT chest-no acute infiltrate  -CT abdomen/pelvis-acute sigmoid diverticulitis  -Patient received broad-spectrum antibiotics vancomycin, meropenem (patient allergic to penicillin)  - continue meropenem, Flagyl for diverticulitis. Did not order fluoroquinolones due to OLIVER  Follow lactic acid     COVID-19 infection: Patient was diagnosed with COVID few weeks ago and did not improve  Patient had a stroke about 2 months ago since then the patient had a decline in health not eating well. Patient currently saturating well on room air     #. Acute renal failure:  -Creatinine 10.3, was 0.8 at discharge-2022  -Patient was evaluated by nephrology in ED  -Continue IV fluids  -s/p Sherwood's catheter insertion.  -Strict input/output. -Monitor with repeat BMP. Today labs still pending  Nephrologist on board discussed with the family regarding possible temporal dialysis catheter placement     #. Hypokalemia  -Replace  -Monitor with repeat BMP. Lab Still pending     #. Anion gap metabolic acidosis secondary to OLIVER.     #.  Leukocytosis     #. Elevated troponin:  -Patient denied any chest pain, shortness of breath, EKG with no acute ST-T wave changes. -Monitor with repeat troponin.     #. Hypertension:  -Patient hypotensive at presentation  -Holding antihypertensives-amlodipine, lisinopril.     #. coronary artery disease status post CABG  -Continue Plavix, rosuvastatin     #. Hyperlipidemia-rosuvastatin     #.  CVA status post tPA (10/2021), recurrent CVA 2021, 2022  -MRI brain-small acute infarct subacute left frontal lobe and possibly left corpus callosum-10/2021  -MRI-12/2021-small areas of acute infarct involving the left frontal lobe from an acute left MCA territory infarct, subacute areas of infarct involving the left occipital lobe. -MRI-1/4/2022-new acute infarcts in the right middle cerebral and left posterior cerebral artery territories.  -Embolic source was suspected and patient was started on Eliquis in the recent admission.  -Continue Eliquis 2.5 mg twice daily due to OLIVER.     #. recent admission for COVID-19 (1/2-1/11/2022)   [facility] admission complicated by hematuria, OLIVER-resolved  -Patient was not hypoxic and did not receive any treatment for COVID.     DVT Prophylaxis: Eliquis  GI Prophylaxis: Not indicated  Code Status: FULL. Discussed with patient's daughter.          Diet ADULT DIET; Regular; Low Sodium (2 gm); Low Potassium (Less than 3000 mg/day); Low Phosphorus (Less than 1000 mg)   DVT Prophylaxis [] Lovenox, []  Heparin, [] SCDs, [] Ambulation   GI Prophylaxis [] PPI,  [] H2 Blocker,  [] Carafate,  [] Diet/Tube Feeds   Code Status Full Code   Disposition Patient requires continued admission due to    MDM [] Low, [] Moderate,[]  High  Patient's risk as above due to      History of Present Illness: The patient was seen and examined at the bedside  Patient still has some abdominal pain but no vomiting no diarrhea  Patient denies shortness of breath or chest pain  No fever  Objective: Intake/Output Summary (Last 24 hours) at 1/20/2022 1221  Last data filed at 1/20/2022 0037  Gross per 24 hour   Intake --   Output 350 ml   Net -350 ml      Vitals:   Vitals:    01/20/22 1201   BP: (!) 155/54   Pulse: 70   Resp: 17   Temp: 97.7 °F (36.5 °C)   SpO2: 100%     Physical Exam:   GEN Awake.  Alert , not in respiratory distress, not in pain  HEENT: PEERLA, , supple neck,   Chest: air entry equal bilaterally, no wheezing or crepitation, decreased breathing bilaterally  Heart: S1 and S2 heard, no murmur, no gallop or rub, regular rate  Abdomen: soft, ND , lower abdominal tenderness without rigidity, +BS  Extremities: no cyanosis, tenderness or erythema, peripheral pulses audible  Neurology: alert, oriented x3, able to move 4 limbs    Medications:   Medications:    meropenem  1,000 mg IntraVENous Q8H    sodium chloride flush  5-40 mL IntraVENous 2 times per day    apixaban  2.5 mg Oral BID    clopidogrel  75 mg Oral Daily    donepezil  5 mg Oral Nightly    rosuvastatin  40 mg Oral Nightly    metroNIDAZOLE  500 mg IntraVENous Q8H      Infusions:    lactated ringers 100 mL/hr at 01/20/22 0658    sodium chloride       PRN Meds: potassium chloride, 40 mEq, PRN   Or  potassium alternative oral replacement, 40 mEq, PRN   Or  potassium chloride, 10 mEq, PRN  sodium chloride flush, 5-40 mL, PRN  sodium chloride, 25 mL, PRN  ondansetron, 4 mg, Q8H PRN   Or  ondansetron, 4 mg, Q6H PRN  polyethylene glycol, 17 g, Daily PRN  acetaminophen, 650 mg, Q6H PRN   Or  acetaminophen, 650 mg, Q6H PRN          Electronically signed by Gaye Weiner MD on 1/20/2022 at 12:21 PM

## 2022-01-20 NOTE — PROGRESS NOTES
Seen and examined full note to follow I discussed with patient's daughter Radha Charles. Patient was diagnosed with COVID a few weeks ago but did not improve and apparently had a stroke about 2 months ago and at that time patient had a decline in health not eating well was on ACE inhibitor's as well with poor intake and nutrition probably get dehydrated and above setting. Patient does live at home with her  but lately her daughters been helping take care of them. She had issues with urination prior to this but now she is not urinating as much. Does have a Sherwood catheter with only 350 cc of urine overnight. With above patient is being hydrated and concern for possible needed dialysis. I discussed that with the daughter she is agreeable if needed will place a temporary dialysis catheter today which would also help with IV access for the patient. We will monitor closely and possible need of dialysis if decline.   Note to follow

## 2022-01-20 NOTE — CONSULTS
Nephrology Service Consultation    Patient:  Stephenie Blanton  MRN: [id number]  Consulting physician:  Eugenio Harrell MD  Reason for Consult: Acute renal failure    History Obtained From:  daughter, electronic medical record  PCP: Aldon Hashimoto, MD    HISTORY OF PRESENT ILLNESS:   The patient is a 66 y.o. female who presents with weakness hypotension with history of coronary disease prior CABG hypertension hyperlipidemia recent prior CVA in October and then again in December 2021. Then developed COVID-19 pneumonia in the beginning of this month. Over the course of that time per daughter patient has overall decline in health renal very well weak. Noted to be hypotensive and although lives at home with  decreased overall status and information is provided by daughter. Now it appears patient has some evidence of diverticulitis and is somewhat disoriented and confused and unable to give history.   On antibiotics and with acute renal failure despite a [place] with low urine output concern for possible need of dialysis    Past Medical History:        Diagnosis Date    CAD (coronary artery disease)     Hypertension        Past Surgical History:        Procedure Laterality Date    CAROTID ENDARTERECTOMY      CHOLECYSTECTOMY      CORONARY ARTERY BYPASS GRAFT      NECK SURGERY         Medications:   Scheduled Meds:   meropenem  1,000 mg IntraVENous Q8H    sodium chloride flush  5-40 mL IntraVENous 2 times per day    apixaban  2.5 mg Oral BID    clopidogrel  75 mg Oral Daily    donepezil  5 mg Oral Nightly    rosuvastatin  40 mg Oral Nightly    metroNIDAZOLE  500 mg IntraVENous Q8H     Continuous Infusions:   lactated ringers 100 mL/hr at 01/20/22 0658    sodium chloride       PRN Meds:.potassium chloride **OR** potassium alternative oral replacement **OR** potassium chloride, sodium chloride flush, sodium chloride, ondansetron **OR** ondansetron, polyethylene glycol, acetaminophen **OR** acetaminophen    Allergies:  Penicillins and Sulfa antibiotics    Social History:   TOBACCO:   reports that she has been smoking cigarettes. She has been smoking about 1.50 packs per day. She has never used smokeless tobacco.  ETOH:   reports no history of alcohol use. OCCUPATION:      Family History:       Family history unknown: Yes       REVIEW OF SYSTEMS:  Negative except for weak confused acute renal failure. Physical Exam:    I/O: No intake/output data recorded. Vitals: BP (!) 148/48   Pulse 78   Temp 97.8 °F (36.6 °C) (Oral)   Resp 14   Wt 144 lb (65.3 kg)   SpO2 100%   BMI 30.10 kg/m²   General appearance: awake confused  HEENT: Head: Normal, normocephalic, atraumatic. Neck: supple, symmetrical, trachea midline  Lungs: diminished breath sounds bilaterally  Heart: S1, S2 normal  Abdomen: abnormal findings:  soft tender  Extremities: edema trace  Neurologic: Mental status: alertness: Confused      CBC:   Recent Labs     01/19/22  1622   WBC 18.7*   HGB 12.4*   *     BMP:    Recent Labs     01/19/22  1622      K 2.7*   CL 96*   CO2 19*   BUN 55*   CREATININE 10.3*   GLUCOSE 118*     Hepatic:   Recent Labs     01/19/22  1622   AST 16   ALT <5*   BILITOT 0.2   ALKPHOS 111     Troponin: No results for input(s): TROPONINI in the last 72 hours. BNP: No results for input(s): BNP in the last 72 hours. Lipids: No results for input(s): CHOL, HDL in the last 72 hours. Invalid input(s): LDLCALCU  ABGs: No results found for: PHART, PO2ART, [id number]  INR: No results for input(s): INR in the last 72 hours.   Renal Labs  Albumin:    Lab Results   Component Value Date    LABALBU 3.0 01/19/2022     Calcium:    Lab Results   Component Value Date    CALCIUM 9.1 01/19/2022     Phosphorus:    Lab Results   Component Value Date    PHOS 3.2 01/11/2022     U/A:    Lab Results   Component Value Date    NITRU NEGATIVE 01/19/2022    COLORU YELLOW 01/19/2022    WBCUA 39 01/19/2022    RBCUA 10 01/19/2022 MUCUS OCCASIONAL 01/19/2022    TRICHOMONAS NONE SEEN 01/19/2022    BACTERIA OCCASIONAL 01/19/2022    CLARITYU CLOUDY 01/19/2022    SPECGRAV 1.025 01/19/2022    UROBILINOGEN NEGATIVE 01/19/2022    BILIRUBINUR NEGATIVE 01/19/2022    BLOODU MODERATE 01/19/2022    KETUA NEGATIVE 01/19/2022    AMORPHOUS RARE 01/03/2022     ABG:  No results found for: PHART, BUR9HKL, PO2ART, FNK7ZUC, BEART, THGBART, IDX3ZGZ, X7DLHIEO  HgBA1c:    Lab Results   Component Value Date    LABA1C 6.0 12/22/2021     Microalbumen/Creatinine ratio:  No components found for: RUCREAT  TSH:  No results found for: TSH  IRON:  No results found for: IRON  Iron Saturation:  No components found for: PERCENTFE  TIBC:  No results found for: TIBC  FERRITIN:    Lab Results   Component Value Date    FERRITIN 339 01/03/2022     RPR:  No results found for: RPR  AMI:    Lab Results   Component Value Date    AMI None Detected 11/15/2021     24 Hour Urine for Creatinine Clearance:  No components found for: CREAT4, UHRS10, UTV10  -----------------------------------------------------------------      Assessment and Recommendations     Patient Active Problem List   Diagnosis Code    Hypertensive urgency I16.0    CAD (coronary artery disease) I25.10    Diverticulosis K57.90    Hyperlipidemia with target low density lipoprotein (LDL) cholesterol less than 70 mg/dL E78.5    Cerebrovascular accident ([facility] [address] I63.9    Acute cerebrovascular accident (CVA) ([facility] [address] I63.9    Expressive aphasia R47.01    Acute kidney injury due to COVID-19 ([facility] [address] U07.1, N17.9    Recurrent stenosis of left carotid artery I65.22    Gross hematuria R31.0    COVID-19 virus infection U07.1    Dementia ([facility] [address] F03.90    Diarrhea R19.7    Hypertension I10    Sepsis (HCC) A41.9     Impression plan  #1 sepsis with colitis  #2 acute renal failure from ATN  #3 hypokalemia  #4 increased troponin  #5 hypertension  #6 coronary disease prior CABG  #7 history of CVA  #8 positive COVID    Plan  #1 [place] start antibiotics  #2 Place temporary dialysis catheter monitor with gentle hydration of volume and if not improved in the next 24 hours may need to try dialysis  #3 replete potassium  #4 prior cardiac disease monitor troponin trend  #5 monitor neuro status information per Dr. Sarah José prior diagnosis of COVID but still positive will monitor    Electronically signed by Cooper Martini MD on 1/20/2022 at 5:15 PM

## 2022-01-20 NOTE — CARE COORDINATION
Patient identified as potential readmission. Last admission 1/2-1/11 for acute kidney injury. Patient here today for confusion. CM reviewed previous patient documentation. Patient from home with her . Patient has a PCP and insurance which assists with medication affordability. Patient active with [address] [place] [address] Rd. Patient found today to have potassium 2.7, BUN 55, creat 10.3, lactic 3.1, BNP 67895, troponin 0.072, WBC 18.7. Patient is requiring readmission and there were no alternatives to admission to explore at this time.

## 2022-01-20 NOTE — H&P
HISTORY AND PHYSICAL  (Hospitalist, Internal Medicine)  IDENTIFYING INFORMATION   PATIENT:  Luma Rogers  MRN:  [id number]  ADMIT DATE: 1/19/2022      CHIEF COMPLAINT   Low blood pressure    HISTORY OF PRESENT ILLNESS   Luma Rogers is a 66 y.o. female with coronary artery disease status post CABG, hypertension, hyperlipidemia, CVA status post tPA (10/2021), recurrent CVA (12/2021), recent admission for COVID-19 (1/2-1/11/2022) was brought to ED due to low blood pressure at home. Patient is currently awake, alert, answering questions appropriately. Patient denied any complaints, was not sure why she was in the hospital.  Denied any headache, visual disturbance, nausea, vomiting, chest pain, shortness of breath, denies any abdomen pain, denied any urinary complaints, denies any constipation or diarrhea. Discussed with patient's daughter-(Gerri Aguirre-[phone]) who stated that since discharge patient has been sleeping mostly, not getting out of bed, decreased activity, poor oral intake, incontinent of urine and bowels. She denied patient having any neurological deficit. She denied patient having any fever, chills, chest pain or shortness of breath. Patient had an evaluation by home care nurse and was noted to be hypotensive which prompted ER visit. Daughter reported that patient was taking her medications as prescribed. At presentation patient was noted to have BP 91/61. Repeat blood pressure was 70/47, HR 71, RR 18, saturating 95% on room air. Patient afebrile-temp 97.9. Lab work was significant for potassium 2.7, CO2 19, BUN 55, creatinine 10.3, lactic acid 3.1, troponin 0.072, WBC 18.7, hemoglobin 12.4. VBG-pH 7.25, PCO2 50, PO2 48, HCO3 21.9. Rapid COVID detected. UA-pending. Chest x-ray-no acute process.   CT head-known subacute infarctions within laminar necrosis, versus cortical Determinants of Health     Financial Resource Strain:     Difficulty of Paying Living Expenses: Not on file   Food Insecurity:     Worried About Running Out of Food in the Last Year: Not on file    Patricia of Food in the Last Year: Not on file   Transportation Needs:     Lack of Transportation (Medical): Not on file    Lack of Transportation (Non-Medical): Not on file   Physical Activity:     Days of Exercise per Week: Not on file    Minutes of Exercise per Session: Not on file   Stress:     Feeling of Stress : Not on file   Social Connections:     Frequency of Communication with Friends and Family: Not on file    Frequency of Social Gatherings with Friends and Family: Not on file    Attends [employer] Services: Not on file    Active Member of [address] [place] [address] [employer] or Organizations: Not on file    Attends Club or Organization Meetings: Not on file    Marital Status: Not on file   Intimate Partner Violence:     Fear of Current or Ex-Partner: Not on file    Emotionally Abused: Not on file    Physically Abused: Not on file    Sexually Abused: Not on file   Housing Stability:     Unable to Pay for Housing in the Last Year: Not on file    Number of [place] in the Last Year: Not on file    Unstable Housing in the Last Year: Not on file       MEDICATIONS   Medications Prior to Admission  Not in a hospital admission.     Current Medications  Current Facility-Administered Medications   Medication Dose Route Frequency Provider Last Rate Last Admin    0.9 % sodium chloride bolus  1,000 mL IntraVENous Once Umberto Rios, DO        vancomycin (VANCOCIN) 1250 mg in dextrose 5 % 250 mL IVPB  20 mg/kg IntraVENous Once Umberto Rios, DO        meropenem (MERREM) 1,000 mg in sodium chloride 0.9 % 100 mL IVPB (mini-bag)  1,000 mg IntraVENous Q8H Xander Bello  mL/hr at 01/19/22 1905 1,000 mg at 01/19/22 1905    potassium chloride (KLOR-CON M) extended release tablet 40 mEq  40 mEq Oral PRN Mauriceia Gabriel, DO Or    potassium bicarb-citric acid (EFFER-K) effervescent tablet 40 mEq  40 mEq Oral PRN Elvin Timoteo, DO        Or    potassium chloride 10 mEq/100 mL IVPB (Peripheral Line)  10 mEq IntraVENous PRN Elvin Timoteo, DO        0.9 % sodium chloride bolus  1,000 mL IntraVENous Once Elvin Timoteo, DO 1,000 mL/hr at 01/19/22 1913 1,000 mL at 01/19/22 1913    lactated ringers infusion   IntraVENous Continuous [facility] Maikel Kahn MD         Current Outpatient Medications   Medication Sig Dispense Refill    apixaban (ELIQUIS) 5 MG TABS tablet Take 1 tablet by mouth 2 times daily 60 tablet 0    clopidogrel (PLAVIX) 75 MG tablet Take 1 tablet by mouth daily 30 tablet 3    amLODIPine (NORVASC) 5 MG tablet Take 1 tablet by mouth daily      donepezil (ARICEPT) 5 MG tablet Take 1 tablet by mouth nightly      rosuvastatin (CRESTOR) 40 MG tablet Take 1 tablet by mouth daily      lisinopril (PRINIVIL;ZESTRIL) 20 MG tablet Take 1 tablet by mouth daily           Allergies  Allergies   Allergen Reactions    Penicillins     Sulfa Antibiotics Swelling       REVIEW OF SYSTEMS   10 point review of systems conducted) positives and negatives as per HPI. PHYSICAL EXAM     Wt Readings from Last 3 Encounters:   01/19/22 144 lb (65.3 kg)   01/06/22 143 lb 8.3 oz (65.1 kg)   12/22/21 151 lb 0.2 oz (68.5 kg)       Blood pressure (!) 100/47, pulse 70, temperature 97.9 °F (36.6 °C), temperature source Oral, resp. rate 17, weight 144 lb (65.3 kg), SpO2 94 %. GEN  -Awake, alert, NAD.   EYES   -PERRL. HENT  -MM are dry. RESP  -LS CTA equal bilat, no wheezes, rales or rhonchi. Symmetric chest movement. No respiratory distress noted. C/V  -S1/S2 auscultated. RRR without appreciable M/R/G. No JVD or carotid bruits. Peripheral pulses equal bilaterally and palpable. No peripheral edema. No reproducible chest wall tenderness. GI  -Abdomen is soft, non-distended, no significant tenderness.  No masses or guarding. + BS in all quadrants. Rectal exam deferred.   -No CVA tenderness. Sherwood catheter is not present. MS  -B/L extremities strong muscles strength. Full movements. No gross joint deformities. No swelling, intact sensation symmetrical.   SKIN  -Normal coloration, warm, dry. NEURO  -patient is oriented to self (knows her name, date of birth), speech clear, no focal deficits   PSYC  - Appropriate affect. LABS AND IMAGING     Results for Santiago Da Silva (MRN [id number]) as of 1/19/2022 20:49   Ref.  Range 1/19/2022 16:22   Sodium Latest Ref Range: 135 - 145 MMOL/L 139   Potassium Latest Ref Range: 3.5 - 5.1 MMOL/L 2.7 (LL)   Chloride Latest Ref Range: 99 - 110 mMol/L 96 (L)   CO2 Latest Ref Range: 21 - 32 MMOL/L 19 (L)   BUN Latest Ref Range: 6 - 23 MG/DL 55 (H)   Creatinine Latest Ref Range: 0.6 - 1.1 MG/DL 10.3 (H)   Anion Gap Latest Ref Range: 4 - 16  24 (H)   GFR Non- Latest Ref Range: >60 mL/min/1.73m2 4 (L)   GFR  Latest Ref Range: >60 mL/min/1.73m2 4 (L)   Lactate, ser/plas Latest Ref Range: 0.4 - 2.0 mMOL/L 3.1 (HH)   Glucose Latest Ref Range: 70 - 99 MG/ (H)   CALCIUM, SERUM, [id number] Latest Ref Range: 8.3 - 10.6 MG/DL 9.1   Total Protein Latest Ref Range: 6.4 - 8.2 GM/DL 6.9   Total CK Latest Ref Range: 26 - 140 IU/L 34   Pro-BNP Latest Ref Range: <300 PG/ML 11,466 (H)   Troponin T Latest Ref Range: <0.01 NG/ML 0.072 (H)   Albumin Latest Ref Range: 3.4 - 5.0 GM/DL 3.0 (L)   Alk Phos Latest Ref Range: 40 - 129 IU/L 111   ALT Latest Ref Range: 10 - 40 U/L <5 (L)   Ammonia Latest Ref Range: 11 - 51 UMOL/L 17   AST Latest Ref Range: 15 - 37 IU/L 16   Bilirubin Latest Ref Range: 0.0 - 1.0 MG/DL 0.2   Lipase Latest Ref Range: 13 - 60 IU/L 26   TSH, High Sensitivity Latest Ref Range: 0.270 - 4.20 uIu/ml 1.680   Alcohol Scrn Latest Ref Range: <0.01 %WT/VOL <0.01   WBC Latest Ref Range: 4.0 - 10.5 K/CU MM 18.7 (H)   RBC Latest Ref Range: 4.2 - 5.4 M/CU MM 4.26   Hemoglobin Quant Latest Ref Range: 12.5 - 16.0 GM/DL 12.4 (L)   Hematocrit Latest Ref Range: 37 - 47 % 37.9   MCV Latest Ref Range: 78 - 100 FL 89.0   MCH Latest Ref Range: 27 - 31 PG 29.1   MCHC Latest Ref Range: 32.0 - 36.0 % 32.7   MPV Latest Ref Range: 7.5 - 11.1 FL 10.4   RDW Latest Ref Range: 11.7 - 14.9 % 14.3   Platelet Count Latest Ref Range: 140 - 440 K/CU  (H)   Lymphocyte % Latest Ref Range: 24 - 44 % 9.4 (L)   Monocytes % Latest Ref Range: 0 - 4 % 8.2 (H)   Eosinophils % Latest Ref Range: 0 - 3 % 0.5   Basophils % Latest Ref Range: 0 - 1 % 0.4   Lymphocytes Absolute Latest Units: K/CU MM 1.8   Monocytes Absolute Latest Units: K/CU MM 1.5   Eosinophils Absolute Latest Units: K/CU MM 0.1   Basophils Absolute Latest Units: K/CU MM 0.1   Differential Type Unknown AUTOMATED DIFFERENTIAL   Segs Relative Latest Ref Range: 36 - 66 % 81.0 (H)   Segs Absolute Latest Units: K/CU MM 15.1   Nucleated RBC % Latest Units: % 0.0   Immature Neutrophil % Latest Ref Range: 0 - 0.43 % 0.5 (H)   Total Immature Neutrophil Latest Units: K/CU MM 0.10   Total Nucleated RBC Latest Units: K/CU MM 0.0     Results for Ki Razo (MRN [id number]) as of 1/19/2022 20:49   Ref. Range 1/19/2022 16:00   Base Excess Latest Ref Range: 0 - 2.4  6 (H)   pH, Amado Latest Ref Range: 7.32 - 7.42  7.25 (L)   pCO2, Amado Latest Ref Range: 38 - 52 mmHG 50   pO2, Amado Latest Ref Range: 28 - 48 mmHG 48   HCO3, Venous Latest Ref Range: 19 - 25 MMOL/L 21.9   O2 Sat, Amado Latest Ref Range: 50 - 70 % 74.6 (H)     Results for Ki Razo (MRN [id number]) as of 1/19/2022 20:49   Ref.  Range 1/19/2022 17:25   SARS-CoV-2, NAAT Latest Ref Range: NOT DETECTED  DETECTED (A)     Recent Imaging    CT CHEST WO CONTRAST [[id number]] Collected: 01/19/22 1909      Order Status: Completed Updated: 01/19/22 1918     Narrative:       EXAMINATION:   CT OF THE ABDOMEN AND PELVIS WITHOUT CONTRAST; CT OF THE CHEST WITHOUT   CONTRAST 1/19/2022 6:07 pm     TECHNIQUE:   CT of the abdomen and pelvis was performed without the administration of   intravenous contrast. Multiplanar reformatted images are provided for review. Dose modulation, iterative reconstruction, and/or weight based adjustment of   the mA/kV was utilized to reduce the radiation dose to as low as reasonably   achievable.; CT of the chest was performed without the administration of   intravenous contrast. Multiplanar reformatted images are provided for review. Dose modulation, iterative reconstruction, and/or weight based adjustment of   the mA/kV was utilized to reduce the radiation dose to as low as reasonably   achievable. COMPARISON:   Abdomen pelvis 01/03/2022     HISTORY:   ORDERING SYSTEM PROVIDED HISTORY: AMS/Acute renal failure   TECHNOLOGIST PROVIDED HISTORY:   Reason for exam:->AMS/Acute renal failure   Additional Contrast?->None   Decision Support Exception - unselect if not a suspected or confirmed   emergency medical condition->Emergency Medical Condition (MA)   Reason for Exam: AMS/ACUTE RENAL FAILURE; ORDERING SYSTEM PROVIDED HISTORY:   ams   TECHNOLOGIST PROVIDED HISTORY:   Reason for exam:->ams   Decision Support Exception - unselect if not a suspected or confirmed   emergency medical condition->Emergency Medical Condition (MA)   Reason for Exam: AMS     FINDINGS:     Chest:     Mediastinum: Cardiomegaly.  Coronary calcifications.  Lipomatous infiltration   the interatrial septum no effusion.  No bulky adenopathy this noncontrast   exam.  Aortic vascular calcifications. Lungs/pleura: Hypoventilatory.  Scattered head subpleural interstitial   thickening and scarring.  No effusion.  No pneumothorax.  Left upper lung   granuloma. Soft Tissues/Bones: Degenerate changes of the thoracic spine.  No displaced   rib fracture.        Abdomen/Pelvis:     Organs: Evaluation challenge due lack contrast.  Gallbladder absent.  Liver,   spleen, adrenal glands, kidneys, and pancreas unremarkable on this   noncontrast examination.  No hydronephrosis.  No obstructive uropathy. Bilateral hilar calcifications likely vascular. GI/Bowel: Scattered air-fluid levels could be related to partial obstruction   or nonspecific ileus.  Colonic diverticulosis identified.  Colonic wall   thickening and surrounding inflammatory changes along the sigmoid colon most   compatible with diverticulitis.  Stable appendix.  No findings of acute   appendicitis. Pelvis: Hysterectomy.  Bladder is collapsed.  No suspicious adnexal mass. Peritoneum/Retroperitoneum: No free air or free fluid.  Aortic vascular   calcifications.  Aorta is nonaneurysmal     Bones/Soft Tissues: Degenerate changes lumbar spine and bilateral hips.      Impression:       Acute uncomplicated diverticulitis.  Follow-up may beneficial document   resolution of changes wall thickening. Cardiomegaly/coronary artery disease and post CABG changes.  No acute   pleural-parenchymal disease within the chest.      CT ABDOMEN PELVIS WO CONTRAST Additional Contrast? None [[id number]] Collected: 01/19/22 1909     Order Status: Completed Updated: 01/19/22 1918     Narrative:       EXAMINATION:   CT OF THE ABDOMEN AND PELVIS WITHOUT CONTRAST; CT OF THE CHEST WITHOUT   CONTRAST 1/19/2022 6:07 pm     TECHNIQUE:   CT of the abdomen and pelvis was performed without the administration of   intravenous contrast. Multiplanar reformatted images are provided for review. Dose modulation, iterative reconstruction, and/or weight based adjustment of   the mA/kV was utilized to reduce the radiation dose to as low as reasonably   achievable.; CT of the chest was performed without the administration of   intravenous contrast. Multiplanar reformatted images are provided for review. Dose modulation, iterative reconstruction, and/or weight based adjustment of   the mA/kV was utilized to reduce the radiation dose to as low as reasonably   achievable.      COMPARISON:   Abdomen pelvis 01/03/2022 HISTORY:   ORDERING SYSTEM PROVIDED HISTORY: AMS/Acute renal failure   TECHNOLOGIST PROVIDED HISTORY:   Reason for exam:->AMS/Acute renal failure   Additional Contrast?->None   Decision Support Exception - unselect if not a suspected or confirmed   emergency medical condition->Emergency Medical Condition (MA)   Reason for Exam: AMS/ACUTE RENAL FAILURE; ORDERING SYSTEM PROVIDED HISTORY:   ams   TECHNOLOGIST PROVIDED HISTORY:   Reason for exam:->ams   Decision Support Exception - unselect if not a suspected or confirmed   emergency medical condition->Emergency Medical Condition (MA)   Reason for Exam: AMS     FINDINGS:     Chest:     Mediastinum: Cardiomegaly.  Coronary calcifications.  Lipomatous infiltration   the interatrial septum no effusion.  No bulky adenopathy this noncontrast   exam.  Aortic vascular calcifications. Lungs/pleura: Hypoventilatory.  Scattered head subpleural interstitial   thickening and scarring.  No effusion.  No pneumothorax.  Left upper lung   granuloma. Soft Tissues/Bones: Degenerate changes of the thoracic spine.  No displaced   rib fracture. Abdomen/Pelvis:     Organs: Evaluation challenge due lack contrast.  Gallbladder absent.  Liver,   spleen, adrenal glands, kidneys, and pancreas unremarkable on this   noncontrast examination.  No hydronephrosis.  No obstructive uropathy. Bilateral hilar calcifications likely vascular. GI/Bowel: Scattered air-fluid levels could be related to partial obstruction   or nonspecific ileus.  Colonic diverticulosis identified.  Colonic wall   thickening and surrounding inflammatory changes along the sigmoid colon most   compatible with diverticulitis.  Stable appendix.  No findings of acute   appendicitis. Pelvis: Hysterectomy.  Bladder is collapsed.  No suspicious adnexal mass.      Peritoneum/Retroperitoneum: No free air or free fluid.  Aortic vascular   calcifications. Ya Acron is nonaneurysmal     Bones/Soft Tissues: Degenerate changes lumbar spine and bilateral hips.      Impression:       Acute uncomplicated diverticulitis.  Follow-up may beneficial document   resolution of changes wall thickening. Cardiomegaly/coronary artery disease and post CABG changes.  No acute   pleural-parenchymal disease within the chest.      CT HEAD WO CONTRAST [[id number]] Collected: 01/19/22 1905     Order Status: Completed Updated: 01/19/22 1917     Narrative:       EXAMINATION:   CT OF THE HEAD WITHOUT CONTRAST  1/19/2022 6:04 pm     TECHNIQUE:   CT of the head was performed without the administration of intravenous   contrast. Dose modulation, iterative reconstruction, and/or weight based   adjustment of the mA/kV was utilized to reduce the radiation dose to as low   as reasonably achievable. COMPARISON:   MRI brain January 4, 2022, CT head January 2, 2022     HISTORY:   ORDERING SYSTEM PROVIDED HISTORY: HEAD TRAUMA, CLOSED, MILD, GCS >= 13, NO   RISK FACTORS, NEURO EXAM NORMAL   TECHNOLOGIST PROVIDED HISTORY:   Has a \"code stroke\" or \"stroke alert\" been called? ->No   Reason for exam:->ams   Decision Support Exception - unselect if not a suspected or confirmed   emergency medical condition->Emergency Medical Condition (MA)   Reason for Exam: ams, Head trauma, closed, mild, GCS >= 13, no risk factors,   neuro exam normal     FINDINGS:   BRAIN/VENTRICLES: There is curvilinear high attenuation along cortex of the   right parietooccipital lobes, likely related to known subacute infarctions   with laminar necrosis, versus cortical enhancement or less likely hemorrhagic   transformation. There are known subacute infarctions in the left frontal parietooccipital   lobes. There are scattered old infarctions in the left frontal parietooccipital   lobes, bilateral heads of caudate nucleus, right thalamus and left cerebellar   hemisphere.      There is mild parenchymal volume loss.  There is periventricular white matter   low attenuation, likely related to mild chronic microvascular disease. Vania Finders   is no mass effect or midline shift. There is no hydrocephalus. ORBITS: The visualized portion of the orbits demonstrate no acute abnormality. SINUSES: There are air-fluid levels in the bilateral sphenoid sinuses.  The   remainder of the paranasal sinuses and bilateral mastoid air cells are clear. SOFT TISSUES/SKULL:  No acute abnormality of the visualized skull or soft   tissues.      Impression:       Curvilinear high attenuation along cortex of the right parietooccipital   lobes, likely related to known subacute infarctions with laminar necrosis,   versus cortical enhancement or less likely hemorrhagic transformation. Known subacute infarctions in the left frontal parietooccipital lobes. Scattered old infarctions in the left frontal parietooccipital lobes,   bilateral heads of caudate nucleus, right thalamus and left cerebellar   hemisphere.      XR CHEST PORTABLE [[id number]] Collected: 01/19/22 1805     Order Status: Completed Updated: 01/19/22 1830     Narrative:       EXAMINATION:   ONE X-RAY VIEW OF THE CHEST     1/19/2022 6:00 pm     COMPARISON:   January 19, 2022, 4:06 p.m. HISTORY:   ORDERING SYSTEM PROVIDED HISTORY:  SOB   TECHNOLOGIST PROVIDED HISTORY:   Reason for Exam:  SOB     Ongoing evaluation, acute shortness of breath. FINDINGS:   The cardiomediastinal silhouette is stable in size and contour.  Stable post   sternotomy changes are present. There is a calcified granuloma involving the left upper lobe.      The lungs are clear.  No pleural effusion or pneumothorax is present.      Impression:       No acute cardiopulmonary process.      XR CHEST PORTABLE [[id number]] Collected: 01/19/22 1629     Order Status: Completed Updated: 01/19/22 1641     Narrative:       EXAMINATION:   ONE X-RAY VIEW OF THE CHEST     1/19/2022 3:56 pm     COMPARISON:   January 2, 2022     HISTORY:   ORDERING SYSTEM PROVIDED HISTORY: left frontal lobe and possibly left corpus callosum-10/2021  -MRI-12/2021-small areas of acute infarct involving the left frontal lobe from an acute left MCA territory infarct, subacute areas of infarct involving the left occipital lobe. -MRI-1/4/2022-new acute infarcts in the right middle cerebral and left posterior cerebral artery territories.  -Embolic source was suspected and patient was started on Eliquis in the recent admission.  -Continue Eliquis 2.5 mg twice daily due to OLIVER. #. recent admission for COVID-19 (1/2-1/11/2022)   [facility] admission complicated by hematuria, OLIVER-resolved  -Patient was not hypoxic and did not receive any treatment for COVID. DVT Prophylaxis: Eliquis  GI Prophylaxis: Not indicated  Code Status: FULL. Discussed with patient's daughter. Case d/w ED physician    Patient is critical with high probability of clinical deterioration. 42 minutes of critical care time spent. This time includes time spent at bedside interviewing and examining patient, coordinating care, review of records, discussing with family.     Lucille Velez MD  Hospitalist, Internal Medicine  1/19/2022 at 8:08 PM

## 2022-01-20 NOTE — ED NOTES
Per Dr. Imelda Richardson at bedside BMP and Lactic Acid to to be drawn one hour post chinedu Dennison, RN  01/19/22 8850

## 2022-01-20 NOTE — PROGRESS NOTES
Hospitalist Progress Note      Name:  Harmony Siegel /Age/Sex: 1950  (66 y.o. female)   MRN & CSN:  [id number] & [id number] Admission Date/Time: 2022  3:39 PM   Location:  -A PCP: Tati Crouch MD         Hospital Day: 2    Assessment and Plan:   Harmony Siegel is a 66 y.o.  female  who presents with Sepsis, Acute renal failure, Recent COVID positive, Recent Stroke. Hxx of CAD  Presented with low blood pressure. Ct abdomen showed Diverticulitis. Severe Sepsis:  -Patient was hypotensive at presentation, leukocytosis, lactic acidosis. -CT abdomen/pelvis-acute sigmoid diverticulitis  -lcontinue meropenem, Flagyl for diverticulitis. -Blood cultures pending.         Acute renal failure with ATN  -Creatinine 10.3, was 0.8 at discharge-2022  -Continue IV fluids Nephrology onboard.   -Strict input/output. -TDC per nephrology.      Hypokalemia  -Replace  -Monitor with repeat BMP.     #. Anion gap metabolic acidosis secondary to OLIVER.     #.  Elevated troponin:  -Patient denied any chest pain, shortness of breath, EKG with no acute ST-T wave changes. -Monitor with repeat troponin.     #. Hypertension:  -Patient hypotensive at presentation  -Holding antihypertensives-amlodipine, lisinopril.     #. coronary artery disease status post CABG  -Continue Plavix, rosuvastatin     #. Hyperlipidemia-rosuvastatin      Diet ADULT DIET; Regular; Low Sodium (2 gm); Low Potassium (Less than 3000 mg/day); Low Phosphorus (Less than 1000 mg)   DVT Prophylaxis [x] Lovenox, []  Heparin, [] SCDs, [] Ambulation   GI Prophylaxis [] PPI,  [] H2 Blocker,  [] Carafate,  [x] Diet/Tube Feeds   Code Status Full Code   Disposition Patient requires continued admission due to Severe sepsis. Renal failure   MDM [] Low, [] Moderate,[x]  High  Patient's risk as above due to Severe sepsis     History of Present Illness:     Chief Complaint:   The pt with some abdominal pain. Denies any other complaints.      Ten point ROS reviewed negative, unless as noted above    Objective: Intake/Output Summary (Last 24 hours) at 1/20/2022 1818  Last data filed at 1/20/2022 2857  Gross per 24 hour   Intake --   Output 350 ml   Net -350 ml      Vitals:   Vitals:    01/20/22 1701   BP: (!) 148/48   Pulse: 78   Resp: 14   Temp: 97.8 °F (36.6 °C)   SpO2: 100%     Physical Exam:   GEN  -Awake, alert, NAD.   EYES   -PERRL. HENT  -MM are dry. RESP  -LS CTA equal bilat, no wheezes, rales or rhonchi. Symmetric chest movement. No respiratory distress noted. C/V  -S1/S2 auscultated. RRR without appreciable M/R/G. No JVD or carotid bruits. Peripheral pulses equal bilaterally and palpable. No peripheral edema. No reproducible chest wall tenderness. GI  -Abdomen is soft, non-distended, no significant tenderness. No masses or guarding. + BS in all quadrants. Rectal exam deferred.   -No CVA tenderness. Sherwood catheter is not present. MS  -B/L extremities strong muscles strength. Full movements. No gross joint deformities. No swelling, intact sensation symmetrical.   SKIN  -Normal coloration, warm, dry.    NEURO  -patient is oriented to self (knows her name, date of birth), speech clear, no focal deficits     Medications:   Medications:    albumin human  25 g IntraVENous Once    meropenem  1,000 mg IntraVENous Q8H    sodium chloride flush  5-40 mL IntraVENous 2 times per day    apixaban  2.5 mg Oral BID    clopidogrel  75 mg Oral Daily    donepezil  5 mg Oral Nightly    rosuvastatin  40 mg Oral Nightly    metroNIDAZOLE  500 mg IntraVENous Q8H      Infusions:    sodium chloride 100 mL/hr at 01/20/22 1816    sodium chloride       PRN Meds: potassium chloride, 40 mEq, PRN   Or  potassium alternative oral replacement, 40 mEq, PRN   Or  potassium chloride, 10 mEq, PRN  sodium chloride flush, 5-40 mL, PRN  sodium chloride, 25 mL, PRN  ondansetron, 4 mg, Q8H PRN   Or  ondansetron, 4 mg, Q6H PRN  polyethylene glycol, 17 g, Daily PRN  acetaminophen, 650 mg, Q6H PRN   Or  acetaminophen, 650 mg, Q6H PRN          Patient is still admitted because Sepsis renal failure. The anticipated discharge is in greater than 24 hours.      Electronically signed by Ping Ohara MD on 1/20/2022 at 6:18 PM

## 2022-01-20 NOTE — PROGRESS NOTES
PROCEDURE PERFORMED: Insert Temporary Catheter    PRIMARY INDICATION FOR PROCEDURE: Dialysis    INFORMED CONSENT:  Obtained prior to procedure with Lisandra Lacy daughter, Aziza HART and Tejal Shore. Consent placed in chart. MISSY SCORE PRE PROCEDURE:    10    IR TEAM IN THE ROOM AT WHAT TIME:    1510    ASSESSMENT:  Pt is alert to self and place    TIME OUT COMPLETE: 1501 [facility] & STERILE TECHNIQUE:               Pt remains in bed and positioned supine for procedure. Warm blankets given. Pt placed on Cardiac Monitor. Pt prepped and draped in a sterile fashion with chlorhexadine.     PAIN/LOCAL ANESTHESIA/SEDATION MANAGEMENT:           Local: Lidocaine 1% given by Dr Kriss Winter:           ACCESS TIME: 3823          US/FLUORO:  4 US images          WIRE USED:           SHEATH USED:           CATHETER USED: 12.5 f x 16 cm triple lumen          FINAL IMAGE TAKEN TO CONFIRM PLACEMENT OF:           CONTRAST/CC:    Catheter inserted and brisk blood return noted  Catheter sutured in place  Lumens flushed with heparin      STERILE DRESSINGS: bio patch and tegaderm      EBL:  Less than 1 cc    FOLLOW- UP X-RAY:  ordered    COMPLICATIONS: None    STAFF PRESENT DURING PROCEDURE: Dr Gigi Chacko RN, Daniel Bynum RN    MISSY SCORE POST PROCEDURE:    10     REPORT CALLED TO: Florina Cid RN on Unit    PT LEFT ROOM AT WHAT TIME:    1600

## 2022-01-21 ENCOUNTER — APPOINTMENT (OUTPATIENT)
Dept: ULTRASOUND IMAGING | Age: 72
DRG: 871 | End: 2022-01-21
Payer: MEDICARE

## 2022-01-21 LAB
ANION GAP SERPL CALCULATED.3IONS-SCNC: 13 MMOL/L (ref 4–16)
BUN BLDV-MCNC: 27 MG/DL (ref 6–23)
CALCIUM SERPL-MCNC: 7.9 MG/DL (ref 8.3–10.6)
CHLORIDE BLD-SCNC: 109 MMOL/L (ref 99–110)
CO2: 25 MMOL/L (ref 21–32)
CREAT SERPL-MCNC: 1.7 MG/DL (ref 0.6–1.1)
GFR AFRICAN AMERICAN: 36 ML/MIN/1.73M2
GFR NON-AFRICAN AMERICAN: 30 ML/MIN/1.73M2
GLUCOSE BLD-MCNC: 89 MG/DL (ref 70–99)
POTASSIUM SERPL-SCNC: 2.9 MMOL/L (ref 3.5–5.1)
SODIUM BLD-SCNC: 147 MMOL/L (ref 135–145)

## 2022-01-21 PROCEDURE — 2500000003 HC RX 250 WO HCPCS: Performed by: INTERNAL MEDICINE

## 2022-01-21 PROCEDURE — 2580000003 HC RX 258: Performed by: EMERGENCY MEDICINE

## 2022-01-21 PROCEDURE — 80053 COMPREHEN METABOLIC PANEL: CPT

## 2022-01-21 PROCEDURE — 2060000000 HC ICU INTERMEDIATE R&B

## 2022-01-21 PROCEDURE — 6370000000 HC RX 637 (ALT 250 FOR IP): Performed by: INTERNAL MEDICINE

## 2022-01-21 PROCEDURE — 2580000003 HC RX 258: Performed by: INTERNAL MEDICINE

## 2022-01-21 PROCEDURE — 6360000002 HC RX W HCPCS: Performed by: EMERGENCY MEDICINE

## 2022-01-21 PROCEDURE — 76775 US EXAM ABDO BACK WALL LIM: CPT

## 2022-01-21 PROCEDURE — 85027 COMPLETE CBC AUTOMATED: CPT

## 2022-01-21 PROCEDURE — 80048 BASIC METABOLIC PNL TOTAL CA: CPT

## 2022-01-21 PROCEDURE — 99223 1ST HOSP IP/OBS HIGH 75: CPT | Performed by: PSYCHIATRY & NEUROLOGY

## 2022-01-21 RX ADMIN — SODIUM CHLORIDE: 9 INJECTION, SOLUTION INTRAVENOUS at 21:47

## 2022-01-21 RX ADMIN — MEROPENEM 1000 MG: 1 INJECTION INTRAVENOUS at 09:37

## 2022-01-21 RX ADMIN — CLOPIDOGREL 75 MG: 75 TABLET, FILM COATED ORAL at 09:33

## 2022-01-21 RX ADMIN — METRONIDAZOLE 500 MG: 500 INJECTION, SOLUTION INTRAVENOUS at 21:59

## 2022-01-21 RX ADMIN — DONEPEZIL HYDROCHLORIDE 5 MG: 5 TABLET ORAL at 20:15

## 2022-01-21 RX ADMIN — SODIUM CHLORIDE, PRESERVATIVE FREE 10 ML: 5 INJECTION INTRAVENOUS at 20:26

## 2022-01-21 RX ADMIN — MEROPENEM 1000 MG: 1 INJECTION INTRAVENOUS at 01:15

## 2022-01-21 RX ADMIN — SODIUM CHLORIDE 1000 ML: 9 INJECTION, SOLUTION INTRAVENOUS at 06:57

## 2022-01-21 RX ADMIN — POTASSIUM CHLORIDE 10 MEQ: 7.46 INJECTION, SOLUTION INTRAVENOUS at 21:42

## 2022-01-21 RX ADMIN — POTASSIUM CHLORIDE 10 MEQ: 7.46 INJECTION, SOLUTION INTRAVENOUS at 20:25

## 2022-01-21 RX ADMIN — SODIUM CHLORIDE 25 ML: 9 INJECTION, SOLUTION INTRAVENOUS at 09:36

## 2022-01-21 RX ADMIN — MEROPENEM 1000 MG: 1 INJECTION INTRAVENOUS at 17:56

## 2022-01-21 RX ADMIN — POTASSIUM CHLORIDE 10 MEQ: 7.46 INJECTION, SOLUTION INTRAVENOUS at 19:20

## 2022-01-21 RX ADMIN — POTASSIUM CHLORIDE 10 MEQ: 7.46 INJECTION, SOLUTION INTRAVENOUS at 22:39

## 2022-01-21 RX ADMIN — SODIUM CHLORIDE, PRESERVATIVE FREE 10 ML: 5 INJECTION INTRAVENOUS at 09:34

## 2022-01-21 RX ADMIN — APIXABAN 2.5 MG: 2.5 TABLET, FILM COATED ORAL at 09:33

## 2022-01-21 RX ADMIN — METRONIDAZOLE 500 MG: 500 INJECTION, SOLUTION INTRAVENOUS at 13:52

## 2022-01-21 RX ADMIN — METRONIDAZOLE 500 MG: 500 INJECTION, SOLUTION INTRAVENOUS at 05:47

## 2022-01-21 RX ADMIN — POTASSIUM CHLORIDE 10 MEQ: 7.46 INJECTION, SOLUTION INTRAVENOUS at 23:58

## 2022-01-21 RX ADMIN — APIXABAN 2.5 MG: 2.5 TABLET, FILM COATED ORAL at 20:15

## 2022-01-21 ASSESSMENT — PAIN SCALES - GENERAL
PAINLEVEL_OUTOF10: 0
PAINLEVEL_OUTOF10: 0

## 2022-01-21 NOTE — CONSULTS
Psychiatric Consult  Nighat Yan  [phone]  1/19/2022 01/21/22      ID: Patient is a 66 y.o. female    CC: I am fine    Pt was offered medication for depression however refused    HPI:  Pt is a 67 yo  female who presents for exacerbation of covid with depressed mood. Pt noted recent exacarbation of mood but feels safe on her current medications. Pt noted she currently feels safe and comfortable on the unit. Pt was in agreement with treatment team.  Pt was polite and cordial during the interview process. Pt noted she is doing [place] today. \"  Pt noted she is sleeping \"pretty good last night. \"  Pt noted her apptetite is okay. Pt rated her depresssion a \"0,\" on a scale of zero to ten with ten being the worst and zero being none. Pt rated her anxiety a \"0,\" on the same scale. Pt denied any thoughts to harm herself or anyone else. Pt denied any auditory or visiual hallucintations. Pt denied any hx of seizures, TBIs, Hep C or HIV  No TD noted, AIMS=0,     Pt denied any hx of previous inpt psychiatric admissions  Pt denied any previous suicide attempts  Pt denied any family hx of suicides  Pt denied any family mental health hx    Pt denied any hx of abuse trauma or neglect, physical sexual or emotional.      Alcohol: denies any current  Street drugs: denies any current  Tobacco: denies any current  Caffeine: 2-3 per day      Past Psychiatric History:   See note above       Family Psychiatric History:   Family History   Family history unknown: Yes        Allergies:   Allergies   Allergen Reactions    Penicillins     Sulfa Antibiotics Swelling        OBJECTIVE  Vital Signs:  Vitals:    01/20/22 1930   BP: 131/85   Pulse:    Resp:    Temp: 97.3 °F (36.3 °C)   SpO2: 100%       Labs:  Recent Results (from the past 48 hour(s))   EKG 12 Lead    Collection Time: 01/19/22  3:45 PM   Result Value Ref Range    Ventricular Rate 74 BPM    Atrial Rate 74 BPM    P-R Interval 152 ms    QRS Duration 82 ms Q-T Interval 404 ms    QTc Calculation (Bazett) 448 ms    P Axis 95 degrees    R Axis -53 degrees    T Axis 98 degrees    Diagnosis       Normal sinus rhythm  Left axis deviation  Pulmonary disease pattern  Cannot rule out Inferior infarct (cited on or before 29-OCT-2019)  T wave abnormality, consider lateral ischemia  Abnormal ECG  When compared with ECG of 02-JAN-2022 22:49,  Significant changes have occurred  Confirmed by [facility] Freddy ADAMS ([id number]) on 1/20/2022 12:41:03 PM     Blood Gas, Venous    Collection Time: 01/19/22  4:00 PM   Result Value Ref Range    pH, Amado 7.25 (L) 7.32 - 7.42    pCO2, Amado 50 38 - 52 mmHG    pO2, Amado 48 28 - 48 mmHG    Base Excess 6 (H) 0 - 2.4    HCO3, Venous 21.9 19 - 25 MMOL/L    O2 Sat, Amado 74.6 (H) 50 - 70 %   CBC Auto Differential    Collection Time: 01/19/22  4:22 PM   Result Value Ref Range    WBC 18.7 (H) 4.0 - 10.5 K/CU MM    RBC 4.26 4.2 - 5.4 M/CU MM    Hemoglobin 12.4 (L) 12.5 - 16.0 GM/DL    Hematocrit 37.9 37 - 47 %    MCV 89.0 78 - 100 FL    MCH 29.1 27 - 31 PG    MCHC 32.7 32.0 - 36.0 %    RDW 14.3 11.7 - 14.9 %    Platelets 729 (H) 823 - 440 K/CU MM    MPV 10.4 7.5 - 11.1 FL    Differential Type AUTOMATED DIFFERENTIAL     Segs Relative 81.0 (H) 36 - 66 %    Lymphocytes % 9.4 (L) 24 - 44 %    Monocytes % 8.2 (H) 0 - 4 %    Eosinophils % 0.5 0 - 3 %    Basophils % 0.4 0 - 1 %    Segs Absolute 15.1 K/CU MM    Lymphocytes Absolute 1.8 K/CU MM    Monocytes Absolute 1.5 K/CU MM    Eosinophils Absolute 0.1 K/CU MM    Basophils Absolute 0.1 K/CU MM    Nucleated RBC % 0.0 %    Total Nucleated RBC 0.0 K/CU MM    Total Immature Neutrophil 0.10 K/CU MM    Immature Neutrophil % 0.5 (H) 0 - 0.43 %   CMP    Collection Time: 01/19/22  4:22 PM   Result Value Ref Range    Sodium 139 135 - 145 MMOL/L    Potassium 2.7 (LL) 3.5 - 5.1 MMOL/L    Chloride 96 (L) 99 - 110 mMol/L    CO2 19 (L) 21 - 32 MMOL/L    BUN 55 (H) 6 - 23 MG/DL    CREATININE 10.3 (H) 0.6 - 1.1 MG/DL    Glucose 118 (H) 70 - 99 MG/DL    Calcium 9.1 8.3 - 10.6 MG/DL    Albumin 3.0 (L) 3.4 - 5.0 GM/DL    Total Protein 6.9 6.4 - 8.2 GM/DL    Total Bilirubin 0.2 0.0 - 1.0 MG/DL    ALT <5 (L) 10 - 40 U/L    AST 16 15 - 37 IU/L    Alkaline Phosphatase 111 40 - 129 IU/L    GFR Non-African American 4 (L) >60 mL/min/1.73m2    GFR  4 (L) >60 mL/min/1.73m2    Anion Gap 24 (H) 4 - 16   Lipase    Collection Time: 01/19/22  4:22 PM   Result Value Ref Range    Lipase 26 13 - 60 IU/L   Troponin    Collection Time: 01/19/22  4:22 PM   Result Value Ref Range    Troponin T 0.072 (H) <0.01 NG/ML   Brain Natriuretic Peptide    Collection Time: 01/19/22  4:22 PM   Result Value Ref Range    Pro-BNP 11,466 (H) <300 PG/ML   Lactic Acid, Plasma    Collection Time: 01/19/22  4:22 PM   Result Value Ref Range    Lactate 3.1 (HH) 0.4 - 2.0 mMOL/L   TSH without Reflex    Collection Time: 01/19/22  4:22 PM   Result Value Ref Range    TSH, High Sensitivity 1.680 0.270 - 4.20 uIu/ml   Ammonia Level    Collection Time: 01/19/22  4:22 PM   Result Value Ref Range    Ammonia 17 11 - 51 UMOL/L   ETOH Blood    Collection Time: 01/19/22  4:22 PM   Result Value Ref Range    Alcohol Scrn <0.01 <0.01 %WT/VOL   CK    Collection Time: 01/19/22  4:22 PM   Result Value Ref Range    Total CK 34 26 - 140 IU/L   COVID-19, Rapid    Collection Time: 01/19/22  5:25 PM    Specimen: Nasopharyngeal   Result Value Ref Range    Source UNKNOWN     SARS-CoV-2, NAAT DETECTED (A) NOT DETECTED   Culture, Urine    Collection Time: 01/19/22 10:00 PM    Specimen: Urine, clean catch   Result Value Ref Range    Specimen URINE CLEAN CATCH     Special Requests NONE     Culture Final Report No growth at 18 to 36 hours    Urine Drug Screen    Collection Time: 01/19/22 10:00 PM   Result Value Ref Range    Cannabinoid Scrn, Ur NEGATIVE NEGATIVE    Amphetamines NEGATIVE NEGATIVE    Cocaine Metabolite NEGATIVE NEGATIVE    Benzodiazepine Screen, Urine NEGATIVE NEGATIVE    Barbiturate Screen, Ur NEGATIVE NEGATIVE    Opiates, Urine NEGATIVE NEGATIVE    Phencyclidine, Urine NEGATIVE NEGATIVE    Oxycodone NEGATIVE NEGATIVE   Electrolytes urine random    Collection Time: 01/19/22 10:00 PM   Result Value Ref Range    Sodium, Ur 34 (L) 35 - 167 MMOL/L    Potassium, Ur 28.3 22 - 119 MMOL/L    Chloride 23 (L) 43 - 210 MMOL/L   Protein / creatinine ratio, urine    Collection Time: 01/19/22 10:00 PM   Result Value Ref Range    Urine Total Protein 227.7 (H) <12 MG/DL    Creatinine, Ur 467.3 (H) 28 - 217 MG/DL    Prot/Creat Ratio, Ur 0.5 (H) <0.2   Urinalysis    Collection Time: 01/19/22 10:00 PM   Result Value Ref Range    Color, UA YELLOW YELLOW    Clarity, UA CLOUDY (A) CLEAR    Glucose, Urine NEGATIVE NEGATIVE MG/DL    Bilirubin Urine NEGATIVE NEGATIVE MG/DL    Ketones, Urine NEGATIVE NEGATIVE MG/DL    Specific Gravity, UA 1.025 1.001 - 1.035    Blood, Urine MODERATE (A) NEGATIVE    pH, Urine 5.0 5.0 - 8.0    Protein, UA 30 (A) NEGATIVE MG/DL    Urobilinogen, Urine NEGATIVE 0.2 - 1.0 MG/DL    Nitrite Urine, Quantitative NEGATIVE NEGATIVE    Leukocyte Esterase, Urine NEGATIVE NEGATIVE    RBC, UA 10 (H) 0 - 6 /HPF    WBC, UA 39 (H) 0 - 5 /HPF    Bacteria, UA OCCASIONAL (A) NEGATIVE /HPF    WBC Clumps, UA FEW /HPF    Squam Epithel, UA 18 /HPF    Mucus, UA OCCASIONAL (A) NEGATIVE HPF    Trichomonas, UA NONE SEEN NONE SEEN /HPF    Hyaline Casts, UA >20 /LPF   Culture, MRSA, Screening    Collection Time: 01/19/22 10:05 PM    Specimen: Nasal   Result Value Ref Range    Specimen NASAL     Special Requests NONE     Culture Prelim Report Further report to follow    Basic metabolic panel    Collection Time: 01/20/22  6:00 PM   Result Value Ref Range    Sodium 148 (H) 135 - 145 MMOL/L    Potassium 3.5 3.5 - 5.1 MMOL/L    Chloride 114 (H) 99 - 110 mMol/L    CO2 22 21 - 32 MMOL/L    Anion Gap 12 4 - 16    BUN 40 (H) 6 - 23 MG/DL    CREATININE 4.4 (H) 0.6 - 1.1 MG/DL    Glucose 100 (H) 70 - 99 MG/DL    Calcium 8.0 (L) 8.3 - recalls 3/3 objects at 5 minutes. PSYCHIATRIC EXAMINATION / MENTAL STATUS EXAM         General appearance: [x] appears age, []  appears older than stated age,               [x]  adequately dressed and groomed, [] disheveled,               [x]  in no acute distress, [] appears mildly distressed, [] other           MUSCULOSKELETAL:   Gait:   [] normal, [] antalgic, [] unsteady, [x] gait not evaluated   Station:             [] erect, [] sitting, [x] recumbent, [] other        Strength/tone:  [x] muscle strength and tone appear consistent with age and                                        condition     [] atrophy      [] abnormal movements  PSYCHIATRIC:    Appearance: appears stated age. alert and oriented to person, place, time & situation. no acute distress. Adequate grooming and hygeine. Good eye contact. No prominent physical abnormalities. Attitude: Manner is cooperative and pleasant  Motor: No psychomotor agitation, retardation or abnormal movements noted  Speech: Clearly articulated; normal rate, volume, tone & amount. Language: intact understanding and production  Mood: okay  Affect: euthymic, full range, non-labile, congruent with mood and content of speech  Thought Production: Spontaneous. Thought Form: Coherent, linear, logical & goal-directed. No tangentiality or circumstantiality. No flight of ideas or loosening of associations. Thought Content/Perceptions: No DEEPA, no AVH, no delusion  Insight: fair  Judgment fair  Memory: Immediate, recent, and remote appear intact, though not formally tested. Attention: maintained throughout interview  Fund of knowledge: Average  Gait/Balance: WNL/WNL           Impression:   Adjustment disorder with depressed mood    Problem List:   <principal problem not specified>    Pt does not require inpt psychiatric hospitalization at this time, pt to follow up with out pt mental health upon discharge once medically cleared. Plan:  1.  Reviewed treatment plan with patient including medication risks, benefits, side effects. Obtained informed consent for treatment. 2. Psychiatric management:medication initiation and titration, recommend outpt mental health follow up, safe and theraputic environment. 3. Status of problem/condition: ?Improving  4. Medical co-morbidities: Management per [facility] Hospitalist group, appreciate assistance  5. Legal Status: voluntary  6. The treatment team reviewed with the patient the diagnosis and treatment recommendations to include the risks, benefits, and side effects of chosen medications. 7. The patient verbalized understanding and agreed with the treatment regimen as outlined above. 8. Medical records, Labs, Diagnotic tests reviewed  9. Interval History. 10. Review current labs  11. Continue current medications  12. Supportive Therapy Provided  13. Pt had an opportunity to ask questions and address concerns  14. Pt encouraged to continue outpt  Therapy. 15. Pt was in agreement with treatment plan. 16. The risks benefits and side effects of medications were discussed with the patient, including alternatives and no treatment.

## 2022-01-21 NOTE — PROGRESS NOTES
Hospitalist Progress Note      Name:  Elan Rogers /Age/Sex: 1950  (66 y.o. female)   MRN & CSN:  [id number] & [id number] Admission Date/Time: 2022  3:39 PM   Location:  -A PCP: Kamla Viveros MD         Hospital Day: 3    Assessment and Plan:        Sepsis:  -Patient was hypotensive at presentation, leukocytosis, lactic acidosis. -Blood cultures sent from ER, still pending  -Urine culture pending  -CT chest-no acute infiltrate  -CT abdomen/pelvis-acute sigmoid diverticulitis  -Patient received broad-spectrum antibiotics vancomycin, meropenem (patient allergic to penicillin)  - continue meropenem, Flagyl for diverticulitis. Did not order fluoroquinolones due to OLIVER  Follow lactic acid     COVID-19 infection: Patient was diagnosed with COVID few weeks ago and did not improve  Patient had a stroke about 2 months ago since then the patient had a decline in health not eating well. Patient currently saturating well on room air     #. Acute renal failure:  -Creatinine 10.3, was 0.8 at discharge-2022  -Patient was evaluated by nephrology in ED  -Continue IV fluids  -s/p Sherwood's catheter insertion.  -Strict input/output. -Monitor with repeat BMP. Creatinine trending down to 4.4 from 10.8  Nephrologist on board no plan for hemodialysis currently.     #. Hypokalemia  -Replace  -Monitor with repeat BMP. Serum potassium 3.5     #. Anion gap metabolic acidosis secondary to OLIVER. CO2 22     #.  Leukocytosis resolving, acute sepsis     #. Elevated troponin:  -Patient denied any chest pain, shortness of breath, EKG with no acute ST-T wave changes. -Monitor with repeat troponin. Serial troponin trending down     #. Hypertension:  -Patient hypotensive at presentation  -Holding antihypertensives-amlodipine, lisinopril.     #. coronary artery disease status post CABG  -Continue Plavix, rosuvastatin     #. Hyperlipidemia-rosuvastatin     #.  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 -MRI-1/4/2022-new acute infarcts in the right middle cerebral and left posterior cerebral artery territories.  -Embolic source was suspected and patient was started on Eliquis in the recent admission.  -Continue Eliquis 2.5 mg twice daily due to OLIVER.     #. recent admission for COVID-19 (1/2-1/11/2022)   [facility] admission complicated by hematuria, OLIVER-resolved  -Patient was not hypoxic and did not receive any treatment for COVID.     DVT Prophylaxis: Eliquis  GI Prophylaxis: Not indicated  Code Status: FULL. Discussed with patient's daughter.          Diet ADULT DIET; Regular; Low Sodium (2 gm); Low Potassium (Less than 3000 mg/day); Low Phosphorus (Less than 1000 mg)   DVT Prophylaxis [] Lovenox, []  Heparin, [] SCDs, [] Ambulation   GI Prophylaxis [] PPI,  [] H2 Blocker,  [] Carafate,  [] Diet/Tube Feeds   Code Status Full Code   Disposition Patient requires continued admission due to    MDM [] Low, [] Moderate,[]  High  Patient's risk as above due to      History of Present Illness: The patient was seen and examined at the bedside  Patient she denies any abdominal pain denies diarrhea or vomiting no fever  Patient exhibits some behavioral abnormality with depression and anxiety  Please consult psychiatrist  Patient denies shortness of breath or chest pain  No plan for hemodialysis currently    Objective: Intake/Output Summary (Last 24 hours) at 1/21/2022 1054  Last data filed at 1/21/2022 0647  Gross per 24 hour   Intake 3659.11 ml   Output 5400 ml   Net -1740.89 ml      Vitals:   Vitals:    01/20/22 1930   BP: 131/85   Pulse:    Resp:    Temp: 97.3 °F (36.3 °C)   SpO2: 100%     Physical Exam:   GEN Awake.  Alert , not in respiratory distress, not in pain  HEENT: PEERLA, , supple neck,   Chest: air entry equal bilaterally, no wheezing or crepitation, decreased breathing bilaterally  Heart: S1 and S2 heard, no murmur, no gallop or rub, regular rate  Abdomen: soft, ND , lower abdominal tenderness without rigidity, +BS  Extremities: no cyanosis, tenderness or erythema, peripheral pulses audible  Neurology: alert, oriented x3, able to move 4 limbs    Medications:   Medications:    meropenem  1,000 mg IntraVENous Q8H    sodium chloride flush  5-40 mL IntraVENous 2 times per day    apixaban  2.5 mg Oral BID    clopidogrel  75 mg Oral Daily    donepezil  5 mg Oral Nightly    rosuvastatin  40 mg Oral Nightly    metroNIDAZOLE  500 mg IntraVENous Q8H      Infusions:    sodium chloride 1,000 mL (01/21/22 0657)    sodium chloride 25 mL (01/21/22 0936)     PRN Meds: potassium chloride, 40 mEq, PRN   Or  potassium alternative oral replacement, 40 mEq, PRN   Or  potassium chloride, 10 mEq, PRN  sodium chloride flush, 5-40 mL, PRN  sodium chloride, 25 mL, PRN  ondansetron, 4 mg, Q8H PRN   Or  ondansetron, 4 mg, Q6H PRN  polyethylene glycol, 17 g, Daily PRN  acetaminophen, 650 mg, Q6H PRN   Or  acetaminophen, 650 mg, Q6H PRN          Electronically signed by Juan C Ray MD on 1/21/2022 at 10:54 AM

## 2022-01-21 NOTE — PROGRESS NOTES
Nephrology Progress Note  1/21/2022 1:31 PM  Subjective: Interval History: Ngoc Kern is a 66 y.o. female with week has a sitter more arousable today        Data:   Scheduled Meds:   meropenem  1,000 mg IntraVENous Q8H    sodium chloride flush  5-40 mL IntraVENous 2 times per day    apixaban  2.5 mg Oral BID    clopidogrel  75 mg Oral Daily    donepezil  5 mg Oral Nightly    rosuvastatin  40 mg Oral Nightly    metroNIDAZOLE  500 mg IntraVENous Q8H     Continuous Infusions:   sodium chloride 1,000 mL (01/21/22 0657)    sodium chloride 25 mL (01/21/22 0936)         CBC   Recent Labs     01/19/22  1622 01/20/22  1800   WBC 18.7* 10.9*   HGB 12.4* 10.3*   HCT 37.9 31.3*   * 400      BMP   Recent Labs     01/19/22  1622 01/20/22  1800    148*   K 2.7* 3.5   CL 96* 114*   CO2 19* 22   PHOS  --  3.8   BUN 55* 40*   CREATININE 10.3* 4.4*     Hepatic:   Recent Labs     01/19/22  1622   AST 16   ALT <5*   BILITOT 0.2   ALKPHOS 111     Troponin: No results for input(s): TROPONINI in the last 72 hours. BNP: No results for input(s): BNP in the last 72 hours. Lipids: No results for input(s): CHOL, HDL in the last 72 hours. Invalid input(s): LDLCALCU  ABGs: No results found for: PHART, PO2ART, KBP9MRO  INR: No results for input(s): INR in the last 72 hours.   Renal Labs  Albumin:    Lab Results   Component Value Date    LABALBU 3.5 01/20/2022     Calcium:    Lab Results   Component Value Date    CALCIUM 8.0 01/20/2022     Phosphorus:    Lab Results   Component Value Date    PHOS 3.8 01/20/2022     U/A:    Lab Results   Component Value Date    NITRU NEGATIVE 01/19/2022    COLORU YELLOW 01/19/2022    WBCUA 39 01/19/2022    RBCUA 10 01/19/2022    MUCUS OCCASIONAL 01/19/2022    TRICHOMONAS NONE SEEN 01/19/2022    BACTERIA OCCASIONAL 01/19/2022    CLARITYU CLOUDY 01/19/2022    SPECGRAV 1.025 01/19/2022    UROBILINOGEN NEGATIVE 01/19/2022    BILIRUBINUR NEGATIVE 01/19/2022    BLOODU MODERATE 01/19/2022    KETUA NEGATIVE 01/19/2022    AMORPHOUS RARE 01/03/2022     ABG:  No results found for: PHART, TZU0OIA, PO2ART, ZOW2JWK, BEART, THGBART, HNU2KNT, M0UXRRWL  HgBA1c:    Lab Results   Component Value Date    LABA1C 6.0 12/22/2021     Microalbumen/Creatinine ratio:  No components found for: RUCREAT  TSH:  No results found for: TSH  IRON:  No results found for: IRON  Iron Saturation:  No components found for: PERCENTFE  TIBC:  No results found for: TIBC  FERRITIN:    Lab Results   Component Value Date    FERRITIN 339 01/03/2022     RPR:  No results found for: RPR  AMI:    Lab Results   Component Value Date    AMI None Detected 11/15/2021     24 Hour Urine for Creatinine Clearance:  No components found for: CREAT4, UHRS10, UTV10      Objective:   I/O: 01/20 0701 - 01/21 0700  In: 3659.1 [P.O.:270; I.V.:1368.5]  Out: 5750 [Urine:5750]  I/O last 3 completed shifts: In: 3659.1 [P.O.:270; I.V.:1368.5; IV Piggyback:2020.6]  Out: 5750 [Urine:5750]  No intake/output data recorded. Vitals: /85   Pulse 76   Temp 97.3 °F (36.3 °C) (Oral)   Resp 14   Wt 142 lb 6.7 oz (64.6 kg)   SpO2 100%   BMI 29.77 kg/m²  {  General appearance: awake weak  HEENT: Head: Normal, normocephalic, atraumatic.   Neck: supple, symmetrical, trachea midline  Lungs: diminished breath sounds bilaterally  Heart: S1, S2 normal  Abdomen: abnormal findings:  soft nt  Extremities: edema trace  Neurologic: Mental status: alertness: Awake confused        Assessment and Plan:      IMP:  #1 sepsis with colitis  #2 acute renal failure from ATN  #3 hypokalemia  #4 increased troponin  #5 hypertension  #6 coronary disease prior CABG  #7 history of CVA  #8 positive COVID    Plan     #1 cultures are still pending but so far negative maintain antibiotics  #2 creatinine down to 4.4 good urine output not appear uremic has temporary dialysis catheter just in case but no dialysis needs for now  #3 potassium low stable monitor  #4 monitor cardiac status on telemetry  #5 blood pressure holding stable  #6 monitor neuro status and has a sitter  #7 treat in the valve setting for COVID  For now supportive care renal ultrasound shows small kidneys unsure etiology behind acute renal failure with a multitude of factors from above  We will follow           Erinn Foreman MD, MD

## 2022-01-22 LAB
ALBUMIN SERPL-MCNC: 3.2 GM/DL (ref 3.4–5)
ANION GAP SERPL CALCULATED.3IONS-SCNC: 11 MMOL/L (ref 4–16)
ANION GAP SERPL CALCULATED.3IONS-SCNC: 13 MMOL/L (ref 4–16)
ANION GAP SERPL CALCULATED.3IONS-SCNC: 9 MMOL/L (ref 4–16)
BUN BLDV-MCNC: 17 MG/DL (ref 6–23)
BUN BLDV-MCNC: 20 MG/DL (ref 6–23)
BUN BLDV-MCNC: 21 MG/DL (ref 6–23)
CALCIUM SERPL-MCNC: 7.2 MG/DL (ref 8.3–10.6)
CALCIUM SERPL-MCNC: 7.3 MG/DL (ref 8.3–10.6)
CALCIUM SERPL-MCNC: 7.8 MG/DL (ref 8.3–10.6)
CHLORIDE BLD-SCNC: 111 MMOL/L (ref 99–110)
CHLORIDE BLD-SCNC: 112 MMOL/L (ref 99–110)
CHLORIDE BLD-SCNC: 113 MMOL/L (ref 99–110)
CO2: 22 MMOL/L (ref 21–32)
CO2: 24 MMOL/L (ref 21–32)
CO2: 27 MMOL/L (ref 21–32)
CREAT SERPL-MCNC: 1 MG/DL (ref 0.6–1.1)
CREAT SERPL-MCNC: 1.2 MG/DL (ref 0.6–1.1)
CREAT SERPL-MCNC: 1.3 MG/DL (ref 0.6–1.1)
CULTURE: NORMAL
GFR AFRICAN AMERICAN: 49 ML/MIN/1.73M2
GFR AFRICAN AMERICAN: 53 ML/MIN/1.73M2
GFR AFRICAN AMERICAN: >60 ML/MIN/1.73M2
GFR NON-AFRICAN AMERICAN: 40 ML/MIN/1.73M2
GFR NON-AFRICAN AMERICAN: 44 ML/MIN/1.73M2
GFR NON-AFRICAN AMERICAN: 55 ML/MIN/1.73M2
GLUCOSE BLD-MCNC: 105 MG/DL (ref 70–99)
GLUCOSE BLD-MCNC: 98 MG/DL (ref 70–99)
GLUCOSE BLD-MCNC: 99 MG/DL (ref 70–99)
HCT VFR BLD CALC: 34.1 % (ref 37–47)
HEMOGLOBIN: 11.3 GM/DL (ref 12.5–16)
Lab: NORMAL
MCH RBC QN AUTO: 28.9 PG (ref 27–31)
MCHC RBC AUTO-ENTMCNC: 33.1 % (ref 32–36)
MCV RBC AUTO: 87.2 FL (ref 78–100)
PDW BLD-RTO: 14.9 % (ref 11.7–14.9)
PHOSPHORUS: 1.3 MG/DL (ref 2.5–4.9)
PLATELET # BLD: 378 K/CU MM (ref 140–440)
PMV BLD AUTO: 10.3 FL (ref 7.5–11.1)
POTASSIUM SERPL-SCNC: 3.5 MMOL/L (ref 3.5–5.1)
POTASSIUM SERPL-SCNC: 3.6 MMOL/L (ref 3.5–5.1)
POTASSIUM SERPL-SCNC: 3.8 MMOL/L (ref 3.5–5.1)
RBC # BLD: 3.91 M/CU MM (ref 4.2–5.4)
SODIUM BLD-SCNC: 146 MMOL/L (ref 135–145)
SODIUM BLD-SCNC: 147 MMOL/L (ref 135–145)
SODIUM BLD-SCNC: 149 MMOL/L (ref 135–145)
SPECIMEN: NORMAL
WBC # BLD: 8.9 K/CU MM (ref 4–10.5)

## 2022-01-22 PROCEDURE — 6360000002 HC RX W HCPCS: Performed by: NURSE PRACTITIONER

## 2022-01-22 PROCEDURE — 2500000003 HC RX 250 WO HCPCS: Performed by: INTERNAL MEDICINE

## 2022-01-22 PROCEDURE — 2580000003 HC RX 258: Performed by: EMERGENCY MEDICINE

## 2022-01-22 PROCEDURE — 80069 RENAL FUNCTION PANEL: CPT

## 2022-01-22 PROCEDURE — 2060000000 HC ICU INTERMEDIATE R&B

## 2022-01-22 PROCEDURE — 2580000003 HC RX 258

## 2022-01-22 PROCEDURE — 80048 BASIC METABOLIC PNL TOTAL CA: CPT

## 2022-01-22 PROCEDURE — 6360000002 HC RX W HCPCS: Performed by: EMERGENCY MEDICINE

## 2022-01-22 PROCEDURE — 85027 COMPLETE CBC AUTOMATED: CPT

## 2022-01-22 PROCEDURE — 2580000003 HC RX 258: Performed by: NURSE PRACTITIONER

## 2022-01-22 PROCEDURE — 6370000000 HC RX 637 (ALT 250 FOR IP): Performed by: INTERNAL MEDICINE

## 2022-01-22 PROCEDURE — 94761 N-INVAS EAR/PLS OXIMETRY MLT: CPT

## 2022-01-22 PROCEDURE — 80053 COMPREHEN METABOLIC PANEL: CPT

## 2022-01-22 PROCEDURE — 2580000003 HC RX 258: Performed by: INTERNAL MEDICINE

## 2022-01-22 RX ORDER — SODIUM CHLORIDE, SODIUM LACTATE, POTASSIUM CHLORIDE, AND CALCIUM CHLORIDE .6; .31; .03; .02 G/100ML; G/100ML; G/100ML; G/100ML
500 INJECTION, SOLUTION INTRAVENOUS ONCE
Status: COMPLETED | OUTPATIENT
Start: 2022-01-22 | End: 2022-01-22

## 2022-01-22 RX ORDER — ZIPRASIDONE MESYLATE 20 MG/ML
20 INJECTION, POWDER, LYOPHILIZED, FOR SOLUTION INTRAMUSCULAR ONCE
Status: COMPLETED | OUTPATIENT
Start: 2022-01-22 | End: 2022-01-22

## 2022-01-22 RX ORDER — DIPHENHYDRAMINE HYDROCHLORIDE 50 MG/ML
12.5 INJECTION INTRAMUSCULAR; INTRAVENOUS ONCE
Status: COMPLETED | OUTPATIENT
Start: 2022-01-22 | End: 2022-01-22

## 2022-01-22 RX ADMIN — DIPHENHYDRAMINE HYDROCHLORIDE 12.5 MG: 50 INJECTION INTRAMUSCULAR; INTRAVENOUS at 00:42

## 2022-01-22 RX ADMIN — CLOPIDOGREL 75 MG: 75 TABLET, FILM COATED ORAL at 08:25

## 2022-01-22 RX ADMIN — DONEPEZIL HYDROCHLORIDE 5 MG: 5 TABLET ORAL at 21:07

## 2022-01-22 RX ADMIN — ZIPRASIDONE MESYLATE 20 MG: 20 INJECTION, POWDER, LYOPHILIZED, FOR SOLUTION INTRAMUSCULAR at 00:44

## 2022-01-22 RX ADMIN — WATER 10 ML: 1 INJECTION INTRAMUSCULAR; INTRAVENOUS; SUBCUTANEOUS at 00:45

## 2022-01-22 RX ADMIN — METRONIDAZOLE 500 MG: 500 INJECTION, SOLUTION INTRAVENOUS at 06:04

## 2022-01-22 RX ADMIN — MEROPENEM 1000 MG: 1 INJECTION INTRAVENOUS at 08:35

## 2022-01-22 RX ADMIN — SODIUM CHLORIDE, POTASSIUM CHLORIDE, SODIUM LACTATE AND CALCIUM CHLORIDE 500 ML: 600; 310; 30; 20 INJECTION, SOLUTION INTRAVENOUS at 03:34

## 2022-01-22 RX ADMIN — SODIUM CHLORIDE, PRESERVATIVE FREE 10 ML: 5 INJECTION INTRAVENOUS at 08:25

## 2022-01-22 RX ADMIN — SODIUM CHLORIDE, PRESERVATIVE FREE 10 ML: 5 INJECTION INTRAVENOUS at 21:08

## 2022-01-22 RX ADMIN — ACETAMINOPHEN 650 MG: 325 TABLET ORAL at 21:07

## 2022-01-22 RX ADMIN — METRONIDAZOLE 500 MG: 500 INJECTION, SOLUTION INTRAVENOUS at 12:59

## 2022-01-22 RX ADMIN — MEROPENEM 1000 MG: 1 INJECTION INTRAVENOUS at 00:50

## 2022-01-22 RX ADMIN — METRONIDAZOLE 500 MG: 500 INJECTION, SOLUTION INTRAVENOUS at 21:17

## 2022-01-22 RX ADMIN — APIXABAN 2.5 MG: 2.5 TABLET, FILM COATED ORAL at 21:07

## 2022-01-22 RX ADMIN — ROSUVASTATIN 40 MG: 40 TABLET, FILM COATED ORAL at 21:07

## 2022-01-22 RX ADMIN — POTASSIUM CHLORIDE 10 MEQ: 7.46 INJECTION, SOLUTION INTRAVENOUS at 01:11

## 2022-01-22 RX ADMIN — MEROPENEM 1000 MG: 1 INJECTION INTRAVENOUS at 15:33

## 2022-01-22 RX ADMIN — APIXABAN 2.5 MG: 2.5 TABLET, FILM COATED ORAL at 08:25

## 2022-01-22 ASSESSMENT — PAIN SCALES - GENERAL
PAINLEVEL_OUTOF10: 0
PAINLEVEL_OUTOF10: 4
PAINLEVEL_OUTOF10: 0
PAINLEVEL_OUTOF10: 0

## 2022-01-22 ASSESSMENT — PAIN DESCRIPTION - PAIN TYPE: TYPE: ACUTE PAIN

## 2022-01-22 NOTE — PROGRESS NOTES
Nephrology Progress Note  1/22/2022 1:05 PM        Subjective:   Admit Date: 1/19/2022  PCP: Aldon Hashimoto, MD    Interval History: Patient seen in early morning, this is a late entry    Diet: She reported eating some-wanted to have a juice    ROS: She was alert and awake but not completely oriented  Slight confusion  But is able to answer some questions  Urine output recorded 1200 cc for the last 24  No fever and acceptable blood    Data:     Current meds:    meropenem  1,000 mg IntraVENous Q8H    sodium chloride flush  5-40 mL IntraVENous 2 times per day    apixaban  2.5 mg Oral BID    clopidogrel  75 mg Oral Daily    donepezil  5 mg Oral Nightly    rosuvastatin  40 mg Oral Nightly    metroNIDAZOLE  500 mg IntraVENous Q8H      sodium chloride 25 mL (01/21/22 0936)         I/O last 3 completed shifts: In: 3659.1 [P.O.:270; I.V.:1368.5; IV Piggyback:2020.6]  Out: 5600 [Urine:5600]    CBC:   Recent Labs     01/19/22  1622 01/20/22  1800 01/22/22  0610   WBC 18.7* 10.9* 8.9   HGB 12.4* 10.3* 11.3*   * 400 378          Recent Labs     01/21/22  1800 01/22/22  0305 01/22/22  0610   * 146* 147*   K 2.9* 3.6 3.8    111* 112*   CO2 25 22 24   BUN 27* 21 20   CREATININE 1.7* 1.3* 1.2*   GLUCOSE 89 99 98       Lab Results   Component Value Date    CALCIUM 7.8 (L) 01/22/2022    PHOS 1.3 (L) 01/22/2022       Objective:     Vitals: BP (!) 112/98   Pulse 81   Temp 97.8 °F (36.6 °C) (Axillary)   Resp 17   Wt 144 lb 6.4 oz (65.5 kg)   SpO2 96%   BMI 30.18 kg/m²     General appearance: Thin, alert awake but not completely oriented  HEENT: No gross conjunctival pallor-she seems edentulous  Neck: Supple-right IJ temporary dialysis catheter  Lungs: Positive adventitious breath sound    Heart: Tachycardia during my exam-well-healed incision from previous sternotomy  Abdomen: Soft nontender  Extremities: No edema      Problem List :         Impression :     1.  Acute kidney injury-nonoliguric-peak creatinine was 10.3 on January 19, 2022-UA showed significant hyaline cast-and about half gram of proteinuria-likely had severe ischemic and toxic acute tubular injury-but fortunately recovering rather quickly-  2. Mild hyper natremia now-could be from free water deficit  3. Presumed diverticulitis-with empirical antimicrobial agent  4. Underlying atherosclerotic cardiovascular disease status post CABG-relatively preserved left ventricular ejection fraction by resting echo parameter  5. COVID-19 with minimal organ involvement  6. Recent CVA-and potential dysrhythmia    Recommendation/Plan  :     1. P.o. food and fluid  2. Watch for iatrogenic nosocomial complication  3. Watch for antibiotic toxicity  4.  Follow clinically and biochemically      Nelida Bo MD MD

## 2022-01-22 NOTE — PROGRESS NOTES
Hospitalist Progress Note      Name:  Luma Rogers /Age/Sex: 1950  (66 y.o. female)   MRN & CSN:  [id number] & [id number] Admission Date/Time: 2022  3:39 PM   Location:  -A PCP: Jose M Hernandez MD         Hospital Day: 4    Assessment and Plan:        Sepsis:  -Patient was hypotensive at presentation, leukocytosis, lactic acidosis. -Blood cultures sent from ER, still pending  -Urine culture pending  -CT chest-no acute infiltrate  -CT abdomen/pelvis-acute sigmoid diverticulitis  -Patient received broad-spectrum antibiotics vancomycin, meropenem (patient allergic to penicillin)  - continue meropenem, Flagyl for diverticulitis. Did not order fluoroquinolones due to OLIVER  Follow lactic acid     COVID-19 infection: Patient was diagnosed with COVID few weeks ago and did not improve  Patient had a stroke about 2 months ago since then the patient had a decline in health not eating well. Patient currently saturating well on room air     #. Acute renal failure:  -Creatinine 10.3, was 0.8 at discharge-2022  -Patient was evaluated by nephrology in ED  -Started on IV fluids  -s/p Sherwood's catheter insertion.  -Strict input/output. -Monitor with repeat BMP. Excellent response to conservative management with trending down of serum creatinine to 1.2, serum potassium 3.8 and CO2 24  Nephrologist on board      #. Hypokalemia  -Replace  -Monitor with repeat BMP. Serum potassium 3.8     #. Anion gap metabolic acidosis secondary to OLIVER. CO2 24     #.  Leukocytosis resolving, acute sepsis     #. Elevated troponin:  -Patient denied any chest pain, shortness of breath, EKG with no acute ST-T wave changes. -Monitor with repeat troponin. Serial troponin trending down     #. Hypertension:  -Patient hypotensive at presentation  -Holding antihypertensives-amlodipine, lisinopril.     #. coronary artery disease status post CABG  -Continue Plavix, rosuvastatin     #. Hyperlipidemia-rosuvastatin     #. 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 -MRI-1/4/2022-new acute infarcts in the right middle cerebral and left posterior cerebral artery territories.  -Embolic source was suspected and patient was started on Eliquis in the recent admission.  -Continue Eliquis 2.5 mg twice daily due to OLIVER.     #. recent admission for COVID-19 (1/2-1/11/2022)   [facility] admission complicated by hematuria, OLIVER-resolved  -Patient was not hypoxic and did not receive any treatment for COVID.     DVT Prophylaxis: Eliquis  GI Prophylaxis: Not indicated  Code Status: FULL. Discussed with patient's daughter.          Diet ADULT DIET; Regular; Low Sodium (2 gm); Low Potassium (Less than 3000 mg/day); Low Phosphorus (Less than 1000 mg)   DVT Prophylaxis [] Lovenox, []  Heparin, [] SCDs, [] Ambulation   GI Prophylaxis [] PPI,  [] H2 Blocker,  [] Carafate,  [] Diet/Tube Feeds   Code Status Full Code   Disposition Patient requires continued admission due to    MDM [] Low, [] Moderate,[]  High  Patient's risk as above due to      History of Present Illness: The patient was seen and examined at the bedside  Patient she denies any abdominal pain denies diarrhea or vomiting no fever  Patient exhibits some behavioral abnormality with depression and anxiety  Please consult psychiatrist  Patient had agitation last night , pulled her lines  Was getting Geodon and Benadryl    Objective:        Intake/Output Summary (Last 24 hours) at 1/22/2022 1023  Last data filed at 1/22/2022 0753  Gross per 24 hour   Intake 236 ml   Output 1200 ml   Net -964 ml      Vitals:   Vitals:    01/22/22 0753   BP:    Pulse: 81   Resp: 15 Temp:    SpO2: 97%     Physical Exam:   GEN Awake.  Alert , not in respiratory distress, not in pain  HEENT: PEERLA, , supple neck,   Chest: air entry equal bilaterally, no wheezing or crepitation, decreased breathing bilaterally  Heart: S1 and S2 heard, no murmur, no gallop or rub, regular rate  Abdomen: soft, ND , lower abdominal tenderness without rigidity, +BS  Extremities: no cyanosis, tenderness or erythema, peripheral pulses audible  Neurology: alert, oriented x3, able to move 4 limbs    Medications:   Medications:    meropenem  1,000 mg IntraVENous Q8H    sodium chloride flush  5-40 mL IntraVENous 2 times per day    apixaban  2.5 mg Oral BID    clopidogrel  75 mg Oral Daily    donepezil  5 mg Oral Nightly    rosuvastatin  40 mg Oral Nightly    metroNIDAZOLE  500 mg IntraVENous Q8H      Infusions:    sodium chloride 25 mL (01/21/22 0936)     PRN Meds: potassium chloride, 40 mEq, PRN   Or  potassium alternative oral replacement, 40 mEq, PRN   Or  potassium chloride, 10 mEq, PRN  sodium chloride flush, 5-40 mL, PRN  sodium chloride, 25 mL, PRN  ondansetron, 4 mg, Q8H PRN   Or  ondansetron, 4 mg, Q6H PRN  polyethylene glycol, 17 g, Daily PRN  acetaminophen, 650 mg, Q6H PRN   Or  acetaminophen, 650 mg, Q6H PRN          Electronically signed by Karen Chacon MD on 1/22/2022 at 10:23 AM

## 2022-01-22 NOTE — PROGRESS NOTES
Pt is agitated and pulling her Vascular cath to her right neck on two occasions. Sitter at bedside is non hands on. Charge nurse aware. Dressing changed twice. She has very loose greenish stool x2 during night shift and x4 day shift. Her sacrum/buttocks are very red high risk for skin breakdown. NITHYA Paniagua notified.

## 2022-01-23 LAB
ALBUMIN SERPL-MCNC: 3.1 GM/DL (ref 3.4–5)
ANION GAP SERPL CALCULATED.3IONS-SCNC: 10 MMOL/L (ref 4–16)
BUN BLDV-MCNC: 13 MG/DL (ref 6–23)
CALCIUM SERPL-MCNC: 7.4 MG/DL (ref 8.3–10.6)
CHLORIDE BLD-SCNC: 109 MMOL/L (ref 99–110)
CO2: 27 MMOL/L (ref 21–32)
CREAT SERPL-MCNC: 0.9 MG/DL (ref 0.6–1.1)
GFR AFRICAN AMERICAN: >60 ML/MIN/1.73M2
GFR NON-AFRICAN AMERICAN: >60 ML/MIN/1.73M2
GLUCOSE BLD-MCNC: 100 MG/DL (ref 70–99)
HCT VFR BLD CALC: 35 % (ref 37–47)
HEMOGLOBIN: 11.2 GM/DL (ref 12.5–16)
LACTATE: 0.8 MMOL/L (ref 0.4–2)
MAGNESIUM: 1.1 MG/DL (ref 1.8–2.4)
MCH RBC QN AUTO: 28.6 PG (ref 27–31)
MCHC RBC AUTO-ENTMCNC: 32 % (ref 32–36)
MCV RBC AUTO: 89.5 FL (ref 78–100)
PDW BLD-RTO: 15.1 % (ref 11.7–14.9)
PHOSPHORUS: 1.5 MG/DL (ref 2.5–4.9)
PLATELET # BLD: 355 K/CU MM (ref 140–440)
PMV BLD AUTO: 10.2 FL (ref 7.5–11.1)
POTASSIUM SERPL-SCNC: 3.5 MMOL/L (ref 3.5–5.1)
RBC # BLD: 3.91 M/CU MM (ref 4.2–5.4)
SODIUM BLD-SCNC: 146 MMOL/L (ref 135–145)
WBC # BLD: 7.5 K/CU MM (ref 4–10.5)

## 2022-01-23 PROCEDURE — 6370000000 HC RX 637 (ALT 250 FOR IP): Performed by: INTERNAL MEDICINE

## 2022-01-23 PROCEDURE — 87449 NOS EACH ORGANISM AG IA: CPT

## 2022-01-23 PROCEDURE — 1200000000 HC SEMI PRIVATE

## 2022-01-23 PROCEDURE — 6360000002 HC RX W HCPCS: Performed by: EMERGENCY MEDICINE

## 2022-01-23 PROCEDURE — 2580000003 HC RX 258: Performed by: INTERNAL MEDICINE

## 2022-01-23 PROCEDURE — 2580000003 HC RX 258: Performed by: EMERGENCY MEDICINE

## 2022-01-23 PROCEDURE — 80069 RENAL FUNCTION PANEL: CPT

## 2022-01-23 PROCEDURE — 6360000002 HC RX W HCPCS: Performed by: HOSPITALIST

## 2022-01-23 PROCEDURE — 2500000003 HC RX 250 WO HCPCS: Performed by: INTERNAL MEDICINE

## 2022-01-23 PROCEDURE — 87324 CLOSTRIDIUM AG IA: CPT

## 2022-01-23 PROCEDURE — 83735 ASSAY OF MAGNESIUM: CPT

## 2022-01-23 PROCEDURE — 94761 N-INVAS EAR/PLS OXIMETRY MLT: CPT

## 2022-01-23 PROCEDURE — 85027 COMPLETE CBC AUTOMATED: CPT

## 2022-01-23 PROCEDURE — 80053 COMPREHEN METABOLIC PANEL: CPT

## 2022-01-23 PROCEDURE — 6370000000 HC RX 637 (ALT 250 FOR IP): Performed by: EMERGENCY MEDICINE

## 2022-01-23 PROCEDURE — 83605 ASSAY OF LACTIC ACID: CPT

## 2022-01-23 PROCEDURE — 36556 INSERT NON-TUNNEL CV CATH: CPT

## 2022-01-23 RX ORDER — MAGNESIUM SULFATE IN WATER 40 MG/ML
2000 INJECTION, SOLUTION INTRAVENOUS ONCE
Status: COMPLETED | OUTPATIENT
Start: 2022-01-23 | End: 2022-01-23

## 2022-01-23 RX ADMIN — SODIUM CHLORIDE, PRESERVATIVE FREE 10 ML: 5 INJECTION INTRAVENOUS at 08:32

## 2022-01-23 RX ADMIN — DONEPEZIL HYDROCHLORIDE 5 MG: 5 TABLET ORAL at 20:39

## 2022-01-23 RX ADMIN — DIBASIC SODIUM PHOSPHATE, MONOBASIC POTASSIUM PHOSPHATE AND MONOBASIC SODIUM PHOSPHATE 1 TABLET: 852; 155; 130 TABLET ORAL at 20:40

## 2022-01-23 RX ADMIN — ROSUVASTATIN 40 MG: 40 TABLET, FILM COATED ORAL at 20:42

## 2022-01-23 RX ADMIN — MEROPENEM 1000 MG: 1 INJECTION INTRAVENOUS at 16:56

## 2022-01-23 RX ADMIN — MEROPENEM 1000 MG: 1 INJECTION INTRAVENOUS at 02:35

## 2022-01-23 RX ADMIN — APIXABAN 2.5 MG: 2.5 TABLET, FILM COATED ORAL at 20:39

## 2022-01-23 RX ADMIN — MEROPENEM 1000 MG: 1 INJECTION INTRAVENOUS at 08:40

## 2022-01-23 RX ADMIN — METRONIDAZOLE 500 MG: 500 INJECTION, SOLUTION INTRAVENOUS at 20:46

## 2022-01-23 RX ADMIN — APIXABAN 2.5 MG: 2.5 TABLET, FILM COATED ORAL at 08:32

## 2022-01-23 RX ADMIN — CLOPIDOGREL 75 MG: 75 TABLET, FILM COATED ORAL at 08:32

## 2022-01-23 RX ADMIN — MAGNESIUM SULFATE HEPTAHYDRATE 2000 MG: 40 INJECTION, SOLUTION INTRAVENOUS at 12:02

## 2022-01-23 RX ADMIN — METRONIDAZOLE 500 MG: 500 INJECTION, SOLUTION INTRAVENOUS at 15:29

## 2022-01-23 RX ADMIN — ACETAMINOPHEN 650 MG: 325 TABLET ORAL at 15:25

## 2022-01-23 RX ADMIN — METRONIDAZOLE 500 MG: 500 INJECTION, SOLUTION INTRAVENOUS at 06:26

## 2022-01-23 RX ADMIN — SODIUM CHLORIDE, PRESERVATIVE FREE 10 ML: 5 INJECTION INTRAVENOUS at 20:41

## 2022-01-23 RX ADMIN — POTASSIUM BICARBONATE 40 MEQ: 782 TABLET, EFFERVESCENT ORAL at 11:57

## 2022-01-23 ASSESSMENT — PAIN SCALES - GENERAL
PAINLEVEL_OUTOF10: 0
PAINLEVEL_OUTOF10: 6
PAINLEVEL_OUTOF10: 0

## 2022-01-23 NOTE — PROGRESS NOTES
Nephrology Progress Note  1/23/2022 12:36 PM        Subjective:   Admit Date: 1/19/2022  PCP: Jackson Wyatt MD    Interval History: Patient seen in early morning, this is a late entry    Diet: Some    ROS: Alert awake and partially oriented  Urine output only 1.2 L for the last 24 hours  Afebrile    Data:     Current meds:    magnesium sulfate  2,000 mg IntraVENous Once    meropenem  1,000 mg IntraVENous Q8H    sodium chloride flush  5-40 mL IntraVENous 2 times per day    apixaban  2.5 mg Oral BID    clopidogrel  75 mg Oral Daily    donepezil  5 mg Oral Nightly    rosuvastatin  40 mg Oral Nightly    metroNIDAZOLE  500 mg IntraVENous Q8H      sodium chloride 25 mL (01/21/22 0936)         I/O last 3 completed shifts: In: 356 [P.O.:356]  Out: 925 [Urine:925]    CBC:   Recent Labs     01/20/22  1800 01/22/22  0610 01/23/22  0645   WBC 10.9* 8.9 7.5   HGB 10.3* 11.3* 11.2*    378 355          Recent Labs     01/22/22  0610 01/22/22  1238 01/23/22  0645   * 149* 146*   K 3.8 3.5 3.5   * 113* 109   CO2 24 27 27   BUN 20 17 13   CREATININE 1.2* 1.0 0.9   GLUCOSE 98 105* 100*       Lab Results   Component Value Date    CALCIUM 7.4 (L) 01/23/2022    PHOS 1.5 (L) 01/23/2022       Objective:     Vitals: /62   Pulse 85   Temp 98.2 °F (36.8 °C) (Oral)   Resp 19   Wt 144 lb 6.4 oz (65.5 kg)   SpO2 98%   BMI 30.18 kg/m²     General appearance: Alert awake and partially oriented with a sitter  HEENT: 1+ conjunctival pallor  Neck: Right IJ temporary dialysis catheter  Lungs: No gross crackles  Heart: Seems regular rate and rhythm-well-healed incision from previous sternotomy  Abdomen: Soft, nontender on palpation  Extremities: No edema  She has Sherwood catheter as well as rectal tube      Problem List :         Impression :     1. Acute kidney injury-nonoliguric-recovering-peak creatinine was 10.3 in January 19, 2022-likely had toxic and ischemic  2.  Hypernatremia-sodium level is dropping  3. Presumed diverticulitis and treated with antibiotic  4. COVID-19-as well as recent CVA  5. Underlying dysrhythmia, atherosclerotic cardiovascular disease    Recommendation/Plan  :     1. Remove hemodialysis catheter tomorrow  2. Need better nutrition  3. Replete all electrolytes  4. Watch for antibiotic toxicity  5.  Follow clinically and biochemically      Pembinasienna Flowers MD MD

## 2022-01-23 NOTE — PROGRESS NOTES
Hospitalist Progress Note      Name:  Elan Rogers /Age/Sex: 1950  (66 y.o. female)   MRN & CSN:  [id number] & [id number] Admission Date/Time: 2022  3:39 PM   Location:  -A PCP: Kamla Viveros MD         Hospital Day: 5    Assessment and Plan:        Sepsis: Improving  Blood pressure stable leukocytosis resolved  No fever  Transferred to the [facility] with telemetry  -Patient presented with hypotensive , leukocytosis, lactic acidosis. -Blood cultures sent from ER, showed no growth  -Urine culture no growth so far  -CT chest-no acute infiltrate  -CT abdomen/pelvis-acute sigmoid diverticulitis  -Patient received broad-spectrum antibiotics vancomycin, meropenem (patient allergic to penicillin)  - continue meropenem, Flagyl for diverticulitis. Did not order fluoroquinolones due to OLIVER  Follow lactic acid     COVID-19 infection: Patient was diagnosed with COVID few weeks ago and did not improve  Patient had a stroke about 2 months ago since then the patient had a decline in health not eating well. Patient currently saturating well on room air     #. Acute renal failure:  -Creatinine 10.3, was 0.8 at discharge-2022  -Patient was evaluated by nephrology in ED  -Started on IV fluids  -s/p Sherwood's catheter insertion.  -Strict input/output. -Monitor with repeat BMP. Excellent response to conservative management with trending down of serum creatinine to 0.9 serum potassium 3.8 and CO2 24  Nephrologist on board      #. Hypokalemia  -Replace  -Monitor with repeat BMP. Serum potassium 3.5     #. Anion gap metabolic acidosis secondary to OLIVER. CO2 27     #.  Leukocytosis resolved     #. Elevated troponin:  -Patient denied any chest pain, shortness of breath, EKG with no acute ST-T wave changes. -Monitor with repeat troponin. Serial troponin trending down     #.   Hypertension:  -Patient hypotensive at presentation  -Holding antihypertensives-amlodipine, lisinopril.     #. coronary GEN Awake.  Alert , not in respiratory distress, not in pain  HEENT: PEERLA, , supple neck,   Chest: air entry equal bilaterally, no wheezing or crepitation, decreased breathing bilaterally  Heart: S1 and S2 heard, no murmur, no gallop or rub, regular rate  Abdomen: soft, ND , lower abdominal tenderness without rigidity, +BS  Extremities: no cyanosis, tenderness or erythema, peripheral pulses audible  Neurology: alert, oriented x3, able to move 4 limbs    Medications:   Medications:    meropenem  1,000 mg IntraVENous Q8H    sodium chloride flush  5-40 mL IntraVENous 2 times per day    apixaban  2.5 mg Oral BID    clopidogrel  75 mg Oral Daily    donepezil  5 mg Oral Nightly    rosuvastatin  40 mg Oral Nightly    metroNIDAZOLE  500 mg IntraVENous Q8H      Infusions:    sodium chloride 25 mL (01/21/22 0936)     PRN Meds: potassium chloride, 40 mEq, PRN   Or  potassium alternative oral replacement, 40 mEq, PRN   Or  potassium chloride, 10 mEq, PRN  sodium chloride flush, 5-40 mL, PRN  sodium chloride, 25 mL, PRN  ondansetron, 4 mg, Q8H PRN   Or  ondansetron, 4 mg, Q6H PRN  polyethylene glycol, 17 g, Daily PRN  acetaminophen, 650 mg, Q6H PRN   Or  acetaminophen, 650 mg, Q6H PRN          Electronically signed by Donny Chand MD on 1/23/2022 at 10:10 AM

## 2022-01-23 NOTE — CARE COORDINATION
Per chart review, pt is confused. CM contacted pt's  Eulogio Garcia [phone],  Eulogio Garcia requested CM to contact pt's daughter, Shanika Espinosa [phone]. Pt's daughter stated the pt is from home and the family  would like the pt to return home; however, pt's daughter stated she wants to make sure the pt is able to care for herself before returning home. Pt's daughter stated the pt's  and children are discussing SNF if this is recommended by the pt's doctor. Pt's daughter request CM to provide the family with updates. SHADI Pride RN, will submit PS to the doctor and order to PT/OT. SHADI to continue to follow.       Darin Carpenter, TYRESEW, LSW

## 2022-01-23 NOTE — PROGRESS NOTES
Report received from 2E nurse. Patient transferred to 4E room 14. Monitors placed on patient. Patient with sitter at bedside at this time d/t confusion.

## 2022-01-24 LAB
ALBUMIN SERPL-MCNC: 2.7 GM/DL (ref 3.4–5)
ANION GAP SERPL CALCULATED.3IONS-SCNC: 7 MMOL/L (ref 4–16)
BUN BLDV-MCNC: 9 MG/DL (ref 6–23)
C DIFF AG + TOXIN: NORMAL
CALCIUM SERPL-MCNC: 7.1 MG/DL (ref 8.3–10.6)
CHLORIDE BLD-SCNC: 109 MMOL/L (ref 99–110)
CO2: 27 MMOL/L (ref 21–32)
CREAT SERPL-MCNC: 0.7 MG/DL (ref 0.6–1.1)
CULTURE: NORMAL
CULTURE: NORMAL
GFR AFRICAN AMERICAN: >60 ML/MIN/1.73M2
GFR NON-AFRICAN AMERICAN: >60 ML/MIN/1.73M2
GLUCOSE BLD-MCNC: 102 MG/DL (ref 70–99)
HCT VFR BLD CALC: 33.8 % (ref 37–47)
HEMOGLOBIN: 10.7 GM/DL (ref 12.5–16)
Lab: NORMAL
Lab: NORMAL
MCH RBC QN AUTO: 28.9 PG (ref 27–31)
MCHC RBC AUTO-ENTMCNC: 31.7 % (ref 32–36)
MCV RBC AUTO: 91.4 FL (ref 78–100)
PDW BLD-RTO: 15.2 % (ref 11.7–14.9)
PHOSPHORUS: 1 MG/DL (ref 2.5–4.9)
PLATELET # BLD: 316 K/CU MM (ref 140–440)
PMV BLD AUTO: 10.2 FL (ref 7.5–11.1)
POTASSIUM SERPL-SCNC: 3.8 MMOL/L (ref 3.5–5.1)
RBC # BLD: 3.7 M/CU MM (ref 4.2–5.4)
SODIUM BLD-SCNC: 143 MMOL/L (ref 135–145)
SOURCE: NORMAL
SPECIMEN: NORMAL
SPECIMEN: NORMAL
WBC # BLD: 9 K/CU MM (ref 4–10.5)

## 2022-01-24 PROCEDURE — 2500000003 HC RX 250 WO HCPCS: Performed by: INTERNAL MEDICINE

## 2022-01-24 PROCEDURE — 85027 COMPLETE CBC AUTOMATED: CPT

## 2022-01-24 PROCEDURE — 6370000000 HC RX 637 (ALT 250 FOR IP): Performed by: INTERNAL MEDICINE

## 2022-01-24 PROCEDURE — 2580000003 HC RX 258: Performed by: INTERNAL MEDICINE

## 2022-01-24 PROCEDURE — 6360000002 HC RX W HCPCS: Performed by: EMERGENCY MEDICINE

## 2022-01-24 PROCEDURE — 2580000003 HC RX 258: Performed by: EMERGENCY MEDICINE

## 2022-01-24 PROCEDURE — 94761 N-INVAS EAR/PLS OXIMETRY MLT: CPT

## 2022-01-24 PROCEDURE — 80069 RENAL FUNCTION PANEL: CPT

## 2022-01-24 PROCEDURE — 1200000000 HC SEMI PRIVATE

## 2022-01-24 PROCEDURE — 2709999900 HC NON-CHARGEABLE SUPPLY

## 2022-01-24 RX ADMIN — METRONIDAZOLE 500 MG: 500 INJECTION, SOLUTION INTRAVENOUS at 23:05

## 2022-01-24 RX ADMIN — MEROPENEM 1000 MG: 1 INJECTION INTRAVENOUS at 10:47

## 2022-01-24 RX ADMIN — METRONIDAZOLE 500 MG: 500 INJECTION, SOLUTION INTRAVENOUS at 14:48

## 2022-01-24 RX ADMIN — ROSUVASTATIN 40 MG: 40 TABLET, FILM COATED ORAL at 22:50

## 2022-01-24 RX ADMIN — APIXABAN 2.5 MG: 2.5 TABLET, FILM COATED ORAL at 22:50

## 2022-01-24 RX ADMIN — MEROPENEM 1000 MG: 1 INJECTION INTRAVENOUS at 01:23

## 2022-01-24 RX ADMIN — APIXABAN 2.5 MG: 2.5 TABLET, FILM COATED ORAL at 10:26

## 2022-01-24 RX ADMIN — DIBASIC SODIUM PHOSPHATE, MONOBASIC POTASSIUM PHOSPHATE AND MONOBASIC SODIUM PHOSPHATE 1 TABLET: 852; 155; 130 TABLET ORAL at 22:49

## 2022-01-24 RX ADMIN — DIBASIC SODIUM PHOSPHATE, MONOBASIC POTASSIUM PHOSPHATE AND MONOBASIC SODIUM PHOSPHATE 1 TABLET: 852; 155; 130 TABLET ORAL at 12:34

## 2022-01-24 RX ADMIN — DONEPEZIL HYDROCHLORIDE 5 MG: 5 TABLET ORAL at 22:50

## 2022-01-24 RX ADMIN — SODIUM CHLORIDE, PRESERVATIVE FREE 10 ML: 5 INJECTION INTRAVENOUS at 10:26

## 2022-01-24 RX ADMIN — SODIUM CHLORIDE, PRESERVATIVE FREE 10 ML: 5 INJECTION INTRAVENOUS at 22:49

## 2022-01-24 RX ADMIN — METRONIDAZOLE 500 MG: 500 INJECTION, SOLUTION INTRAVENOUS at 05:51

## 2022-01-24 RX ADMIN — MEROPENEM 1000 MG: 1 INJECTION INTRAVENOUS at 17:18

## 2022-01-24 RX ADMIN — CLOPIDOGREL 75 MG: 75 TABLET, FILM COATED ORAL at 10:27

## 2022-01-24 ASSESSMENT — PAIN SCALES - GENERAL: PAINLEVEL_OUTOF10: 0

## 2022-01-24 NOTE — PROGRESS NOTES
Arrived at pt bedside to remove temporary dialysis catheter. Removed dressing, cleaned with chloraprep, removed stitches and removed catheter. Held pressure until no evidence of bleeding, applied pressure dressing.  Gave report to Rosa Martínez

## 2022-01-24 NOTE — PROGRESS NOTES
Physical Therapy    Physical Therapy Treatment Note  Name: Thelma Thornton MRN: [id number] :   1950   Date:  2022   Admission Date: 2022 Room:  [address] [place] [address]      PT/OT attempt to see pt at this time. PT has chart reviewed pt and discussed pt status with RN, Dexter Ash. At this time pt is refusing to get up OOB stating: \"I want to sleep\". PT attempt to encourage participation, pt agreeable to eval tomorrow. PT will return tomorrow AM for attempt.    Electronically signed by:    Marcelino Spring, PT  2022, 2:11 PM

## 2022-01-24 NOTE — PROGRESS NOTES
Nephrology Progress Note  1/24/2022 4:31 PM  Subjective: Interval History: Carolin Mosley is a 66 y.o. female  Weak confused resolved arf      Data:   Scheduled Meds:   phosphorus  250 mg Oral BID    meropenem  1,000 mg IntraVENous Q8H    sodium chloride flush  5-40 mL IntraVENous 2 times per day    apixaban  2.5 mg Oral BID    clopidogrel  75 mg Oral Daily    donepezil  5 mg Oral Nightly    rosuvastatin  40 mg Oral Nightly    metroNIDAZOLE  500 mg IntraVENous Q8H     Continuous Infusions:   sodium chloride 25 mL (01/21/22 0936)         CBC   Recent Labs     01/22/22  0610 01/23/22  0645 01/24/22  0555   WBC 8.9 7.5 9.0   HGB 11.3* 11.2* 10.7*   HCT 34.1* 35.0* 33.8*    355 316      BMP   Recent Labs     01/22/22  0610 01/22/22  0610 01/22/22  1238 01/23/22  0645 01/24/22  0555   *   < > 149* 146* 143   K 3.8   < > 3.5 3.5 3.8   *   < > 113* 109 109   CO2 24   < > 27 27 27   PHOS 1.3*  --   --  1.5* 1.0*   BUN 20   < > 17 13 9   CREATININE 1.2*   < > 1.0 0.9 0.7    < > = values in this interval not displayed. Hepatic:   No results for input(s): AST, ALT, ALB, BILITOT, ALKPHOS in the last 72 hours. Troponin: No results for input(s): TROPONINI in the last 72 hours. BNP: No results for input(s): BNP in the last 72 hours. Lipids: No results for input(s): CHOL, HDL in the last 72 hours. Invalid input(s): LDLCALCU  ABGs: No results found for: PHART, PO2ART, CEA5VTC  INR: No results for input(s): INR in the last 72 hours.   Renal Labs  Albumin:    Lab Results   Component Value Date    LABALBU 2.7 01/24/2022     Calcium:    Lab Results   Component Value Date    CALCIUM 7.1 01/24/2022     Phosphorus:    Lab Results   Component Value Date    PHOS 1.0 01/24/2022     U/A:    Lab Results   Component Value Date    NITRU NEGATIVE 01/19/2022    COLORU YELLOW 01/19/2022    WBCUA 39 01/19/2022    RBCUA 10 01/19/2022    MUCUS OCCASIONAL 01/19/2022    TRICHOMONAS NONE SEEN 01/19/2022 BACTERIA OCCASIONAL 01/19/2022    CLARITYU CLOUDY 01/19/2022    SPECGRAV 1.025 01/19/2022    UROBILINOGEN NEGATIVE 01/19/2022    BILIRUBINUR NEGATIVE 01/19/2022    BLOODU MODERATE 01/19/2022    KETUA NEGATIVE 01/19/2022    AMORPHOUS RARE 01/03/2022     ABG:  No results found for: PHART, OLI3RVH, PO2ART, KSP2MHY, BEART, THGBART, MJF3SXX, D0QAPHXA  HgBA1c:    Lab Results   Component Value Date    LABA1C 6.0 12/22/2021     Microalbumen/Creatinine ratio:  No components found for: RUCREAT  TSH:  No results found for: TSH  IRON:  No results found for: IRON  Iron Saturation:  No components found for: PERCENTFE  TIBC:  No results found for: TIBC  FERRITIN:    Lab Results   Component Value Date    FERRITIN 339 01/03/2022     RPR:  No results found for: RPR  AMI:    Lab Results   Component Value Date    AMI None Detected 11/15/2021     24 Hour Urine for Creatinine Clearance:  No components found for: CREAT4, UHRS10, UTV10      Objective:   I/O: 01/23 0701 - 01/24 0700  In: -   Out: [address] [Urine:920]  I/O last 3 completed shifts:  In: -   Out: 5327 [Urine:920; Stool:1600]  No intake/output data recorded. Vitals: BP (!) 143/57   Pulse 68   Temp 98 °F (36.7 °C) (Axillary)   Resp 18   Wt 144 lb 6.4 oz (65.5 kg)   SpO2 93%   BMI 30.18 kg/m²  {  General appearance: awake weak  HEENT: Head: Normal, normocephalic, atraumatic.   Neck: supple, symmetrical, trachea midline  Lungs: diminished breath sounds bilaterally  Heart: S1, S2 normal  Abdomen: abnormal findings:  soft nt  Extremities: edema trace  Neurologic: Mental status: alertness: Awake confused        Assessment and Plan:      IMP:  #1 sepsis with colitis  #2 acute renal failure from ATN  #3 hypokalemia  #4 increased troponin  #5 hypertension  #6 coronary disease prior CABG  #7 history of CVA  #8 positive COVID    Plan     1 treat covid fu culture negative  2 resovled arf dc hd line  3 K stable  4 cardiac monitor  5 bp stbale  6 cardiac monitor  Will follow and maintain care as work on neuro status         Luke Mandujano MD, MD

## 2022-01-24 NOTE — PROGRESS NOTES
Hospitalist Progress Note      Name:  Lillian Mast /Age/Sex: 1950  (66 y.o. female)   MRN & CSN:  [id number] & [id number] Admission Date/Time: 2022  3:39 PM   Location:  [facility] PCP: Charlie Lennox, MD         Hospital Day: 6    Assessment and Plan:        Sepsis: Improving  Blood pressure stable leukocytosis resolved  No fever  Transferred to the [facility] with telemetry  -Patient presented with hypotensive , leukocytosis, lactic acidosis. -Blood cultures sent from ER, showed no growth  -Urine culture no growth so far  -CT chest-no acute infiltrate  -CT abdomen/pelvis-acute sigmoid diverticulitis  -Patient received broad-spectrum antibiotics vancomycin, meropenem (patient allergic to penicillin)  - continue meropenem, Flagyl for diverticulitis. Did not order fluoroquinolones due to OLIVER  Follow lactic acid   De-escalate antibiotic DC IV Flagyl  Patient has formed bowel movement has rectal tube    COVID-19 infection: Patient was diagnosed with COVID few weeks ago and did not improve  Patient had a stroke about 2 months ago since then the patient had a decline in health not eating well. Patient currently saturating well on room air     #. Acute renal failure:  -Creatinine 10.3, was 0.8 at discharge-2022  -Patient was evaluated by nephrology in ED  -Started on IV fluids  -s/p Sherwood's catheter insertion.  -Strict input/output. -Monitor with repeat BMP. Excellent response to conservative management with trending down of serum creatinine to 0.7 serum potassium 3.8 and CO2 24  Nephrologist on board       #. Hypokalemia  -Replace  -Monitor with repeat BMP. Serum potassium 3.5     #. Anion gap metabolic acidosis secondary to OLIVER. CO2 27     #.  Leukocytosis resolved     #. Elevated troponin:  -Patient denied any chest pain, shortness of breath, EKG with no acute ST-T wave changes. -Monitor with repeat troponin. Serial troponin trending down     #.   Hypertension:  -Patient hypotensive at presentation  -Holding antihypertensives-amlodipine, lisinopril. May resume on both medication with improvement of blood pressure     #. coronary artery disease status post CABG  -Continue Plavix, rosuvastatin     #. Hyperlipidemia-rosuvastatin     #. 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 -MRI-1/4/2022-new acute infarcts in the right middle cerebral and left posterior cerebral artery territories.  -Embolic source was suspected and patient was started on Eliquis in the recent admission.  -Continue Eliquis 2.5 mg twice daily due to OLIVER.     #. recent admission for COVID-19 (1/2-1/11/2022)   [facility] admission complicated by hematuria, OLIVER-resolved  -Patient was not hypoxic and did not receive any treatment for COVID.     DVT Prophylaxis: Eliquis  GI Prophylaxis: Not indicated  Code Status: FULL. Discussed with patient's daughter.          Diet ADULT DIET; Regular; Low Sodium (2 gm); Low Potassium (Less than 3000 mg/day); Low Phosphorus (Less than 1000 mg)   DVT Prophylaxis [] Lovenox, []  Heparin, [] SCDs, [] Ambulation   GI Prophylaxis [] PPI,  [] H2 Blocker,  [] Carafate,  [] Diet/Tube Feeds   Code Status Full Code   Disposition Patient requires continued admission due to    MDM [] Low, [] Moderate,[]  High  Patient's risk as above due to      History of Present Illness: The patient was seen and examined at the bedside  Patient denies abdominal pain denies chest pain or shortness of breath  Has formed bowel movement has rectal tube  No further agitation reported  Escalate IV antibiotic DC IV Flagyl    Objective:        Intake/Output Summary (Last 24 hours) at 1/24/2022 1012  Last data filed at 1/24/2022 0261  Gross per 24 hour   Intake --   Output 2520 ml   Net -2520 ml      Vitals:   Vitals:    01/24/22 0546   BP: (!) 139/46   Pulse:    Resp: 16   Temp: 98.4 °F (36.9 °C)   SpO2:      Physical Exam:   GEN Awake.  Alert , not in respiratory distress, not in pain  HEENT: PEERLA, , supple neck,   Chest: air entry equal bilaterally, no wheezing or crepitation, decreased breathing bilaterally  Heart: S1 and S2 heard, no murmur, no gallop or rub, regular rate  Abdomen: soft, ND , lower abdominal tenderness without rigidity, +BS  Extremities: no cyanosis, tenderness or erythema, peripheral pulses audible  Neurology: alert, oriented x3, able to move 4 limbs    Medications:   Medications:    phosphorus  250 mg Oral BID    meropenem  1,000 mg IntraVENous Q8H    sodium chloride flush  5-40 mL IntraVENous 2 times per day    apixaban  2.5 mg Oral BID    clopidogrel  75 mg Oral Daily    donepezil  5 mg Oral Nightly    rosuvastatin  40 mg Oral Nightly    metroNIDAZOLE  500 mg IntraVENous Q8H      Infusions:    sodium chloride 25 mL (01/21/22 0936)     PRN Meds: potassium chloride, 40 mEq, PRN   Or  potassium alternative oral replacement, 40 mEq, PRN   Or  potassium chloride, 10 mEq, PRN  sodium chloride flush, 5-40 mL, PRN  sodium chloride, 25 mL, PRN  ondansetron, 4 mg, Q8H PRN   Or  ondansetron, 4 mg, Q6H PRN  polyethylene glycol, 17 g, Daily PRN  acetaminophen, 650 mg, Q6H PRN   Or  acetaminophen, 650 mg, Q6H PRN          Electronically signed by Brandie Jones MD on 1/24/2022 at 10:12 AM

## 2022-01-25 PROCEDURE — 2500000003 HC RX 250 WO HCPCS: Performed by: INTERNAL MEDICINE

## 2022-01-25 PROCEDURE — 2580000003 HC RX 258: Performed by: EMERGENCY MEDICINE

## 2022-01-25 PROCEDURE — 6360000002 HC RX W HCPCS: Performed by: EMERGENCY MEDICINE

## 2022-01-25 PROCEDURE — 97530 THERAPEUTIC ACTIVITIES: CPT

## 2022-01-25 PROCEDURE — 6370000000 HC RX 637 (ALT 250 FOR IP): Performed by: INTERNAL MEDICINE

## 2022-01-25 PROCEDURE — 2580000003 HC RX 258: Performed by: INTERNAL MEDICINE

## 2022-01-25 PROCEDURE — 97166 OT EVAL MOD COMPLEX 45 MIN: CPT

## 2022-01-25 PROCEDURE — 97535 SELF CARE MNGMENT TRAINING: CPT

## 2022-01-25 PROCEDURE — 85027 COMPLETE CBC AUTOMATED: CPT

## 2022-01-25 PROCEDURE — 1200000000 HC SEMI PRIVATE

## 2022-01-25 RX ADMIN — MEROPENEM 1000 MG: 1 INJECTION INTRAVENOUS at 00:52

## 2022-01-25 RX ADMIN — APIXABAN 2.5 MG: 2.5 TABLET, FILM COATED ORAL at 09:23

## 2022-01-25 RX ADMIN — SODIUM CHLORIDE, PRESERVATIVE FREE 10 ML: 5 INJECTION INTRAVENOUS at 09:23

## 2022-01-25 RX ADMIN — MEROPENEM 1000 MG: 1 INJECTION INTRAVENOUS at 09:30

## 2022-01-25 RX ADMIN — DONEPEZIL HYDROCHLORIDE 5 MG: 5 TABLET ORAL at 22:31

## 2022-01-25 RX ADMIN — METRONIDAZOLE 500 MG: 500 INJECTION, SOLUTION INTRAVENOUS at 06:14

## 2022-01-25 RX ADMIN — APIXABAN 2.5 MG: 2.5 TABLET, FILM COATED ORAL at 22:31

## 2022-01-25 RX ADMIN — DIBASIC SODIUM PHOSPHATE, MONOBASIC POTASSIUM PHOSPHATE AND MONOBASIC SODIUM PHOSPHATE 1 TABLET: 852; 155; 130 TABLET ORAL at 10:50

## 2022-01-25 RX ADMIN — MEROPENEM 1000 MG: 1 INJECTION INTRAVENOUS at 16:20

## 2022-01-25 RX ADMIN — METRONIDAZOLE 500 MG: 500 INJECTION, SOLUTION INTRAVENOUS at 14:35

## 2022-01-25 RX ADMIN — CLOPIDOGREL 75 MG: 75 TABLET, FILM COATED ORAL at 09:23

## 2022-01-25 ASSESSMENT — PAIN SCALES - PAIN ASSESSMENT IN ADVANCED DEMENTIA (PAINAD)
NEGVOCALIZATION: 0
FACIALEXPRESSION: 0
TOTALSCORE: 0
BODYLANGUAGE: 0
BREATHING: 0
CONSOLABILITY: 0

## 2022-01-25 ASSESSMENT — PAIN SCALES - GENERAL: PAINLEVEL_OUTOF10: 0

## 2022-01-25 NOTE — PROGRESS NOTES
Hospitalist Progress Note      Name:  Jenniffer Laguna /Age/Sex: 1950  (66 y.o. female)   MRN & CSN:  [id number] & [id number] Admission Date/Time: 2022  3:39 PM   Location:  [address] [place] [address]-[place] PCP: Stefania Carlson MD         Hospital Day: 7    Assessment and Plan:       Sepsis: Improving  Blood pressure stable leukocytosis resolved  No fever  Transferred to the [facility] with telemetry  -Patient presented with hypotension , leukocytosis, lactic acidosis. -Blood cultures sent from ER, showed no growth  -Urine culture no growth so far  -CT chest-no acute infiltrate  -CT abdomen/pelvis-acute sigmoid diverticulitis  -Patient received broad-spectrum antibiotics vancomycin, meropenem (patient allergic to penicillin)  -Continue meropenem for diverticulitis, but DC Flagyl  Follow lactic acid   Patient has a rectal tube    COVID-19 infection: Patient was diagnosed with COVID few weeks ago and did not improve per notes  Patient had a stroke about 2 months ago since then the patient had a decline in health, not eating well. Patient currently saturating well on room air     Acute renal failure:  -Creatinine was 10.3, was 0.8 at discharge-2022  -Patient was evaluated by nephrology in ED  -Started on IV fluids  -s/p Sherwood's catheter insertion.  -Strict input/output. -Monitor with repeat BMP. Excellent response to conservative management with trending down of serum creatinine to 0.7 serum potassium 3.8 and CO2 24  Nephrologist on board       Hypokalemia  -Replaced  -Monitor BMP       Anion gap metabolic acidosis secondary to OLIVER.   Improved     Leukocytosis resolved     Elevated troponin:  -Patient denied any chest pain, shortness of breath, EKG with no acute ST-T wave changes.  -Troponin trended down     Hypertension:  -Patient hypotensive at presentation  -Holding antihypertensives-amlodipine, lisinopril.  -May resume on both medication with improvement of blood pressure     Coronary artery disease status post CABG  -Continue Plavix, rosuvastatin     Hyperlipidemia-rosuvastatin     #. 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 -MRI-1/4/2022-new acute infarcts in the right middle cerebral and left posterior cerebral artery territories.  -Embolic source was suspected and patient was started on Eliquis in the recent admission.  -Continue Eliquis 2.5 mg twice daily due to OLIVER.     Recent admission for COVID-19 (1/2-1/11/2022)   [facility] admission complicated by hematuria, OLIVER-resolved  -Patient was not hypoxic and did not receive any treatment for COVID.     DVT Prophylaxis: Eliquis  GI Prophylaxis: Not indicated  Code Status: FULL. Discussed with patient's daughter.          History of Present Illness:     When I saw her she said she was thirsty. I asked her if she wanted some water, she stated \"yes, please\"  I got her a cup of water and she started to drink. Objective: Intake/Output Summary (Last 24 hours) at 1/25/2022 1853  Last data filed at 1/25/2022 1057  Gross per 24 hour   Intake 240 ml   Output 2400 ml   Net -2160 ml      Vitals:   Vitals:    01/25/22 1540   BP: (!) 162/65   Pulse: 75   Resp: 16   Temp: 97.8 °F (36.6 °C)   SpO2: 93%     Physical Exam:     Physical Exam  Constitutional:       Appearance: She is not toxic-appearing. HENT:      Nose: No rhinorrhea. Eyes:      General:         Right eye: No discharge. Left eye: No discharge. Pulmonary:      Effort: Pulmonary effort is normal.   Skin:     Coloration: Skin is not jaundiced.          Medications:   Medications:    phosphorus  250 mg Oral BID    meropenem  1,000 mg IntraVENous Q8H    sodium chloride flush  5-40 mL IntraVENous 2 times per

## 2022-01-25 NOTE — CONSULTS
[facility] PHYSICAL THERAPY EVALUATION  Marty Watters, 1950, [id number], 1/25/2022    History  [place]:  The primary encounter diagnosis was COVID-19. Diagnoses of Hypotension, unspecified hypotension type, Acute renal failure, unspecified acute renal failure type ([address] Troponin level elevated, Hypokalemia, and Diverticulitis of colon were also pertinent to this visit. Patient  has a past medical history of CAD (coronary artery disease) and Hypertension. Patient  has a past surgical history that includes Coronary artery bypass graft; Neck surgery; Carotid endarterectomy; Cholecystectomy; and IR NONTUNNELED VASCULAR CATHETER > 5 YEARS (1/20/2022). Subjective:  Patient states: \"Yeah, yeah, yeah\", \"No I won't- I don't know\". Pt is confused and speaks in brief sentences- often makes statement then looses train of thought. Pain:  No c/o. Communication with other providers:  Handoff to RN, co-eval with OT for safety. Restrictions: Fall risk, BEBE, decreased visual field, confusion, bed alarm    Home Setup/Prior level of function   *Below information taken from previous admission. Pt is poor historian d/t cognitive deficits, need to confirm information.    Social/Functional History  Lives With: Spouse  Type of Home: House  Home Layout: Two level,Bed/Bath upstairs  Home Access: Stairs to enter with rails  Entrance Stairs - Number of Steps: 2-3  Bathroom Shower/Tub: Walk-in shower  Bathroom Equipment: Grab bars in shower,Grab bars around ID8-Mobile chair  Home Equipment: Cane  ADL Assistance: [address] [facility]: Independent  Homemaking Responsibilities: Yes (shares with )  Ambulation Assistance: Independent (with cane)  Transfer Assistance: Independent  Active : No  Patient's  Info:  and dtr    Examination of body systems (includes body structures/functions, activity/participation limitations):  · Observation:  Pt is resting in semi-fowlers with BEBE on upon arrival  Vision:  Pt vision remains impaired as was presented in this PT's last eval (1/7/22). - Pt appears to demonstrate loss of vision in both R/L inferior fields. PT performs smooth pursuits, pt states \"yes\" to confirm she sees the marker, but is not able to track well, intermittently staring in opposite direction. Pt with mild tracking/hitching with center of visual field, poor peripheral vision. · Hearing:  [facility]  · Cardiopulmonary:  On room air, stable. · Cognition: Impaired, flat affect, poor insight, see OT/SLP note for further evaluation. Musculoskeletal  · ROM R/L:  WFL BLE. · Strength R/L:  Unable to fully assess d/t pt poor attending to task and agitation. At least 4-/5 observed in function, decreased in function and endurance. · Neuro:  H/o CVA , multiple CVA see medical history for dates. Visual and cognitive deficits. · Gait pattern: N/t this date for safety and pt poor tolerance of standing. Mobility:  · Supine to sit:  Mod x2  · Transfers: CGA x2  · Sitting balance:  Min-SBA. · Standing balance:  CGA. [facility] 6 Clicks Inpatient Mobility:  AM-PAC Inpatient Mobility Raw Score : 14    Treatment:    Bed mobility: PT encourages sup>sit, provides v/c for sequencing. Pt demonstrates poor ability to advance LE, requires increased cues and Mod A for LE/ hips to EOB and Mod A x2 for trunk to upright. PT v/c for scooting to EOB, pt requires Mod A. End of session return to supine with Mod A x2 for advancement of BLE to EOB and control of trunk. Max x2 scooting to [facility]. Sitting balance: Seated EOB pt demonstrates initial decreased balance with increased postural sway, initial Min A required. Light BUE support required for maintaining upright, pt progresses to CGA. Pt denies dizziness in seated, required increased encouragement to stay up at EOB. Sit<>Stand: Pt performed STS from EOB to RW x2 reps with min A for stabilization, v/c for proper sequencing.  Pt demonstrates increased effort and time to upright. Return to seated ea trial pt demonstrates fair- control, v/c for safe sequencing. Pt requires increased encouragement to perform STS and max encouragement to remain standing. Pt standing time ~20 sec ea trial. Pt with decreased standing balance, increased forward-lean posture. Cues for correction unsucessful d/t pt agitation. Education: PT explained role and benefit of PT, importance of OOB for general health. End of session pt left in supine with HOB raised, with lines managed, call light, phone, exit alarm, tray, all needs, RN aware. Assessment:    Pt is a 67 y/o female admitted 1/19 from home with c/o confusion. Patient with significant h/o coronary artery disease status post CABG, hypertension, hyperlipidemia, CVA status post tPA (10/2021), recurrent CVA (12/2021), recent admission for COVID-19 (1/2-1/11/2022), see chart. Per chart pt has been performing ADLs/IADLs with assist from family and [place] [id number] services- per chart concern for pt ability to thrive at home. At this time pt appears to be functioning below baseline. Pt is now presenting with impairments in LE strength, functional endurance, safety awareness, balance, visual impairments, cognitive impairment, gait and mobility deficits. Pt would benefit from skilled PT services in order to address impairments and promote return to PLOF. PT to recommend d/c to SNF, recommend consider [facility] for long term solution. Complexity: Moderate  Prognosis: Good, no significant barriers to participation at this time. Plan Times per week: 3+/week, 1 week,   Discharge Recommendations: Subacute/Skilled Nursing Facility  Equipment: Defer to next LOC    Goals:  Short term goals  Time Frame for Short term goals: 1 week  Short term goal 1: Pt will AMB x10 ft with RW, chair follow, Mod A. Short term goal 2: Pt will perform all bed mobility with Min A.   Short term goal 3: Pt will perform stand-step transfer to/from chair with RW and Fabiola. Short term goal 4: Pt will tolerate standing dynamic balance activity x10 min with UE support and CGA       Treatment plan:  Bed mobility, transfers, balance, gait, TA, TX    Recommendations for NURSING mobility: STS at EOB with RW and Min A.     Time:   Time in: 09:50  Time out: 10:10  Timed treatment minutes: 10  Total time: 20    Electronically signed by:    Ericka Evans, PT  9/90/3410, 0:78 PM  PT Lic #: [id number]

## 2022-01-25 NOTE — PROGRESS NOTES
Occupational Therapy    [facility] ACUTE CARE OCCUPATIONAL THERAPY EVALUATION  Farideh Shepard, 1950, 4009/[id number], 1/25/2022    History  [place]:  The primary encounter diagnosis was COVID-19. Diagnoses of Hypotension, unspecified hypotension type, Acute renal failure, unspecified acute renal failure type ([address] Troponin level elevated, Hypokalemia, and Diverticulitis of colon were also pertinent to this visit. Patient  has a past medical history of CAD (coronary artery disease) and Hypertension. Patient  has a past surgical history that includes Coronary artery bypass graft; Neck surgery; Carotid endarterectomy; Cholecystectomy; and IR NONTUNNELED VASCULAR CATHETER > 5 YEARS (1/20/2022). Subjective:  Patient states:  Pt agitated throughout session, responding to questions w/ agitated responses  Pain:  No.    Communication with other providers:  Handoff to RN, co-eval with PT. Restrictions: General Precautions, Fall Risk    Home Setup/Prior level of function   Pt is a poor historian. Below information taken from 01/04/2022:    Social/Functional History  Lives With: Spouse  Type of Home: House  Home Layout: Two level,Bed/Bath upstairs  Home Access: Stairs to enter with rails  Entrance Stairs - Number of Steps: 2-3  Bathroom Shower/Tub: Walk-in shower  Bathroom Equipment: Grab bars in shower,Grab bars around Hitch chair  Home Equipment: Cane  ADL Assistance: [address]: Independent  Homemaking Responsibilities: Yes (shares with )  Ambulation Assistance: Independent (with cane)  Transfer Assistance: Independent  Active : No  Patient's  Info:  and dtr       Examination of body systems (includes body structures/functions, activity/participation limitations):  · Observation:  Supine in bed upon arrival, agreeable to therapy w/ encouragement despite agitation,   · Vision:  Difficulty tracking pt's finger during testing.  (See PT notes for details)  · Hearing:  [facility]  · Cardiopulmonary:  No 02 needs      Body Systems and functions:  · ROM R/L:  WFL. · Strength R/L:  4+/5,   · Sensation: Denies numbness  · Tone: Normal  · Coordination: Decreased gross/fine motor coordination, hand over hand to overcome  · Perception: Decreased initiation/sequencing     Activities of Daily Living (ADLs):  · Feeding: Setup/SBA  · Grooming: CGA (washing hands/face w/ warm washcloth)  · UB bathing: CGA  · LB bathing: maxA  · UB dressing: CGA  · LB dressing: maxA (donning socks)  · Toileting: maxA    Cognitive and Psychosocial Functioning:  · Overall cognitive status: AxO to self only, decreased short term/long term memory, decreased problem solving, decreased attention, frequent re-direction to task  · Affect: Emotionally labile       Mobility:  · Supine to sit:  modA x 2  · Transfers: Hansel STS from EOB up to RW w/ 2 trials  · Sitting balance:  Hansel moving to SBA w/ proper hand placement and positioning. · Standing balance:  Hansel w/ RW ~1 mintue each stand. · Toilet/Shower Transfers: DNT  · Sit to supine: modA x 2  · Scooting to HOB: modA x 2             AM-PAC Daily Activity Inpatient   How much help for putting on and taking off regular lower body clothing?: Total  How much help for Bathing?: A Lot  How much help for Toileting?: Total  How much help for putting on and taking off regular upper body clothing?: A Little  How much help for taking care of personal grooming?: A Little  How much help for eating meals?: A Little  AM-[facility] Inpatient Daily Activity Raw Score: 13  AM-PAC Inpatient ADL T-Scale Score : 32.03  ADL Inpatient CMS 0-100% Score: 63.03  ADL Inpatient CMS G-Code Modifier : CL    Treatment:  Therapeutic Activity Training:   Therapeutic activity training was instructed today. Cues were given for safety, sequence, UE/LE placement, awareness, and balance.     Activities performed today included bed mobility training, sup-sit, sit-stand, stand to sit, sit to supine, scooting to [address] Training:   Cues were given for safety, sequence, UE/LE placement, visual cues, and balance. Activities performed today included grooming, LB dressing      Safety: patient left in bed with bed alarm, , call light within reach, RN notified, gait belt used. Assessment:  Pt is a 65 yo female admitted from home for COVID-19. Pt currently presents w/ deficits in ADL and high level IADL independence, functional activity tolerance, dynamic sitting and standing balance and tolerance and functional transfers, BUE coordination, initiation/sequencing and cognition. Pt would benefit from continued acute care OT services w/ discharge to SNF  Complexity: Moderate  Prognosis: Good, no significant barriers to participation at this time.    Plan  Times per week: 3x+  Times per day: Daily  Current Treatment Recommendations: Endurance Training,Strengthening,Patient/Caregiver Education & Training,Equipment Evaluation, Education, & procurement,Self-Care / ADL,Cognitive Reorientation,Balance Training,Neuromuscular Re-education,Pain Management,Home Management Training,Functional Mobility Training,Safety Education & Training,Positioning,Cognitive/Perceptual Training,ROM     Equipment: defer    Goals:  Pt goal: go home  Time Frame for STGs: discharge  Goal 1: Pt will perform UE ADLs Supervision  Goal 2: Pt will perform LE ADLs Supervision  Goal 3: Pt will perform toileting Supervision  Goal 4: Pt will perform functional transfer w/ AD Supervision  Goal 5: Pt will perform functional mobility w/ AD Supervision  Goal 6: Pt will perform therex/theract in order to increase functional activity tolerance and dynamic standing balance    Treatment plan:  Pt will perform functional task in stand reaching in all 3 planes in order to increase dynamic standing balance and functional activity tolerance    Recommendations for NURSING activity: Up to chair for all 3 meals and up to [address] for all toileting

## 2022-01-26 LAB
ALBUMIN SERPL-MCNC: 2.6 GM/DL (ref 3.4–5)
ANION GAP SERPL CALCULATED.3IONS-SCNC: 9 MMOL/L (ref 4–16)
BUN BLDV-MCNC: 7 MG/DL (ref 6–23)
CALCIUM SERPL-MCNC: 7.4 MG/DL (ref 8.3–10.6)
CHLORIDE BLD-SCNC: 107 MMOL/L (ref 99–110)
CO2: 26 MMOL/L (ref 21–32)
CREAT SERPL-MCNC: 0.5 MG/DL (ref 0.6–1.1)
GFR AFRICAN AMERICAN: >60 ML/MIN/1.73M2
GFR NON-AFRICAN AMERICAN: >60 ML/MIN/1.73M2
GLUCOSE BLD-MCNC: 100 MG/DL (ref 70–99)
HCT VFR BLD CALC: 34.5 % (ref 37–47)
HEMOGLOBIN: 11 GM/DL (ref 12.5–16)
MCH RBC QN AUTO: 28.8 PG (ref 27–31)
MCHC RBC AUTO-ENTMCNC: 31.9 % (ref 32–36)
MCV RBC AUTO: 90.3 FL (ref 78–100)
PDW BLD-RTO: 14.9 % (ref 11.7–14.9)
PHOSPHORUS: 1.7 MG/DL (ref 2.5–4.9)
PLATELET # BLD: 308 K/CU MM (ref 140–440)
PMV BLD AUTO: 10.1 FL (ref 7.5–11.1)
POTASSIUM SERPL-SCNC: 3.9 MMOL/L (ref 3.5–5.1)
RBC # BLD: 3.82 M/CU MM (ref 4.2–5.4)
SODIUM BLD-SCNC: 142 MMOL/L (ref 135–145)
WBC # BLD: 9.2 K/CU MM (ref 4–10.5)

## 2022-01-26 PROCEDURE — 2700000000 HC OXYGEN THERAPY PER DAY

## 2022-01-26 PROCEDURE — 6370000000 HC RX 637 (ALT 250 FOR IP): Performed by: INTERNAL MEDICINE

## 2022-01-26 PROCEDURE — 2580000003 HC RX 258: Performed by: EMERGENCY MEDICINE

## 2022-01-26 PROCEDURE — 80069 RENAL FUNCTION PANEL: CPT

## 2022-01-26 PROCEDURE — 2580000003 HC RX 258: Performed by: INTERNAL MEDICINE

## 2022-01-26 PROCEDURE — 85027 COMPLETE CBC AUTOMATED: CPT

## 2022-01-26 PROCEDURE — 36415 COLL VENOUS BLD VENIPUNCTURE: CPT

## 2022-01-26 PROCEDURE — 6360000002 HC RX W HCPCS: Performed by: EMERGENCY MEDICINE

## 2022-01-26 PROCEDURE — 1200000000 HC SEMI PRIVATE

## 2022-01-26 PROCEDURE — 94761 N-INVAS EAR/PLS OXIMETRY MLT: CPT

## 2022-01-26 RX ADMIN — ACETAMINOPHEN 650 MG: 325 TABLET ORAL at 12:51

## 2022-01-26 RX ADMIN — CLOPIDOGREL 75 MG: 75 TABLET, FILM COATED ORAL at 09:48

## 2022-01-26 RX ADMIN — MEROPENEM 1000 MG: 1 INJECTION INTRAVENOUS at 09:52

## 2022-01-26 RX ADMIN — ROSUVASTATIN 40 MG: 40 TABLET, FILM COATED ORAL at 00:33

## 2022-01-26 RX ADMIN — SODIUM CHLORIDE, PRESERVATIVE FREE 10 ML: 5 INJECTION INTRAVENOUS at 21:22

## 2022-01-26 RX ADMIN — ROSUVASTATIN 40 MG: 40 TABLET, FILM COATED ORAL at 21:22

## 2022-01-26 RX ADMIN — DIBASIC SODIUM PHOSPHATE, MONOBASIC POTASSIUM PHOSPHATE AND MONOBASIC SODIUM PHOSPHATE 1 TABLET: 852; 155; 130 TABLET ORAL at 00:33

## 2022-01-26 RX ADMIN — ACETAMINOPHEN 650 MG: 325 TABLET ORAL at 21:22

## 2022-01-26 RX ADMIN — SODIUM CHLORIDE, PRESERVATIVE FREE 10 ML: 5 INJECTION INTRAVENOUS at 00:37

## 2022-01-26 RX ADMIN — MEROPENEM 1000 MG: 1 INJECTION INTRAVENOUS at 00:37

## 2022-01-26 RX ADMIN — APIXABAN 2.5 MG: 2.5 TABLET, FILM COATED ORAL at 21:22

## 2022-01-26 RX ADMIN — DIBASIC SODIUM PHOSPHATE, MONOBASIC POTASSIUM PHOSPHATE AND MONOBASIC SODIUM PHOSPHATE 1 TABLET: 852; 155; 130 TABLET ORAL at 21:22

## 2022-01-26 RX ADMIN — APIXABAN 2.5 MG: 2.5 TABLET, FILM COATED ORAL at 09:48

## 2022-01-26 RX ADMIN — DONEPEZIL HYDROCHLORIDE 5 MG: 5 TABLET ORAL at 21:22

## 2022-01-26 RX ADMIN — SODIUM CHLORIDE, PRESERVATIVE FREE 10 ML: 5 INJECTION INTRAVENOUS at 09:48

## 2022-01-26 RX ADMIN — MEROPENEM 1000 MG: 1 INJECTION INTRAVENOUS at 16:33

## 2022-01-26 ASSESSMENT — PAIN SCALES - GENERAL
PAINLEVEL_OUTOF10: 8
PAINLEVEL_OUTOF10: 3
PAINLEVEL_OUTOF10: 0

## 2022-01-26 ASSESSMENT — PAIN SCALES - PAIN ASSESSMENT IN ADVANCED DEMENTIA (PAINAD)
BREATHING: 0
NEGVOCALIZATION: 0
FACIALEXPRESSION: 0
CONSOLABILITY: 0
TOTALSCORE: 0
BODYLANGUAGE: 0

## 2022-01-26 NOTE — PROGRESS NOTES
Nephrology Progress Note  1/26/2022 2:52 PM  Subjective: Interval History: Thelma Thornton is a 66 y.o. female arousable but weak resting in bed    Data:   Scheduled Meds:   phosphorus  250 mg Oral BID    meropenem  1,000 mg IntraVENous Q8H    sodium chloride flush  5-40 mL IntraVENous 2 times per day    apixaban  2.5 mg Oral BID    clopidogrel  75 mg Oral Daily    donepezil  5 mg Oral Nightly    rosuvastatin  40 mg Oral Nightly     Continuous Infusions:   sodium chloride 25 mL (01/21/22 0936)         CBC   Recent Labs     01/24/22  0555   WBC 9.0   HGB 10.7*   HCT 33.8*         BMP   Recent Labs     01/24/22  0555      K 3.8      CO2 27   PHOS 1.0*   BUN 9   CREATININE 0.7     Hepatic:   No results for input(s): AST, ALT, ALB, BILITOT, ALKPHOS in the last 72 hours. Troponin: No results for input(s): TROPONINI in the last 72 hours. BNP: No results for input(s): BNP in the last 72 hours. Lipids: No results for input(s): CHOL, HDL in the last 72 hours. Invalid input(s): LDLCALCU  ABGs: No results found for: PHART, PO2ART, HMX1PIB  INR: No results for input(s): INR in the last 72 hours.   Renal Labs  Albumin:    Lab Results   Component Value Date    LABALBU 2.7 01/24/2022     Calcium:    Lab Results   Component Value Date    CALCIUM 7.1 01/24/2022     Phosphorus:    Lab Results   Component Value Date    PHOS 1.0 01/24/2022     U/A:    Lab Results   Component Value Date    NITRU NEGATIVE 01/19/2022    COLORU YELLOW 01/19/2022    WBCUA 39 01/19/2022    RBCUA 10 01/19/2022    MUCUS OCCASIONAL 01/19/2022    TRICHOMONAS NONE SEEN 01/19/2022    BACTERIA OCCASIONAL 01/19/2022    CLARITYU CLOUDY 01/19/2022    SPECGRAV 1.025 01/19/2022    UROBILINOGEN NEGATIVE 01/19/2022    BILIRUBINUR NEGATIVE 01/19/2022    BLOODU MODERATE 01/19/2022    KETUA NEGATIVE 01/19/2022    AMORPHOUS RARE 01/03/2022     ABG:  No results found for: PHART, IZP6EXE, PO2ART, QGY8DXM, BEART, THGBART, TMQ0GIH,

## 2022-01-26 NOTE — PROGRESS NOTES
ABG:  No results found for: PHART, IGP1TJL, PO2ART, NSZ1KOE, BEART, THGBART, KDC0QHL, H1FCIAYH  HgBA1c:    Lab Results   Component Value Date    LABA1C 6.0 12/22/2021     Microalbumen/Creatinine ratio:  No components found for: RUCREAT  TSH:  No results found for: TSH  IRON:  No results found for: IRON  Iron Saturation:  No components found for: PERCENTFE  TIBC:  No results found for: TIBC  FERRITIN:    Lab Results   Component Value Date    FERRITIN 339 01/03/2022     RPR:  No results found for: RPR  AMI:    Lab Results   Component Value Date    AMI None Detected 11/15/2021     24 Hour Urine for Creatinine Clearance:  No components found for: CREAT4, UHRS10, UTV10      Objective:   I/O: 01/24 0701 - 01/25 0700  In: 240 [P.O.:240]  Out: 2000 [Urine:500]  I/O last 3 completed shifts: In: 240 [P.O.:240]  Out: 2400 [Urine:700; Stool:1700]  No intake/output data recorded. Vitals: BP (!) 162/65   Pulse 75   Temp 97.8 °F (36.6 °C)   Resp 16   Wt 145 lb (65.8 kg)   SpO2 93%   BMI 30.31 kg/m²  {  General appearance: awake weak  HEENT: Head: Normal, normocephalic, atraumatic.   Neck: supple, symmetrical, trachea midline  Lungs: diminished breath sounds bilaterally  Heart: S1, S2 normal  Abdomen: abnormal findings:  soft nt  Extremities: edema trace  Neurologic: Mental status: alertness: Awake confused        Assessment and Plan:      IMP:  #1 sepsis with colitis  #2 acute renal failure from ATN  #3 hypokalemia  #4 increased troponin  #5 hypertension  #6 coronary disease prior CABG  #7 history of CVA  #8 positive COVID    Plan     #1 maintain antibiotic therapy  #2 resolved acute renal failure  #3 potassium stable  #4 cardiac monitor on telemetry  #5 blood pressure increased to somewhat agitated will monitor  #6 monitor neuro status  #7 treat with recent Covid supportive care for now         Esau Boyer MD, MD

## 2022-01-27 LAB
ALBUMIN SERPL-MCNC: 2.8 GM/DL (ref 3.4–5)
ANION GAP SERPL CALCULATED.3IONS-SCNC: 7 MMOL/L (ref 4–16)
BUN BLDV-MCNC: 6 MG/DL (ref 6–23)
CALCIUM SERPL-MCNC: 7.6 MG/DL (ref 8.3–10.6)
CHLORIDE BLD-SCNC: 109 MMOL/L (ref 99–110)
CO2: 29 MMOL/L (ref 21–32)
CREAT SERPL-MCNC: 0.4 MG/DL (ref 0.6–1.1)
GFR AFRICAN AMERICAN: >60 ML/MIN/1.73M2
GFR NON-AFRICAN AMERICAN: >60 ML/MIN/1.73M2
GLUCOSE BLD-MCNC: 99 MG/DL (ref 70–99)
HCT VFR BLD CALC: 35.3 % (ref 37–47)
HEMOGLOBIN: 11.2 GM/DL (ref 12.5–16)
MCH RBC QN AUTO: 28.8 PG (ref 27–31)
MCHC RBC AUTO-ENTMCNC: 31.7 % (ref 32–36)
MCV RBC AUTO: 90.7 FL (ref 78–100)
PDW BLD-RTO: 14.7 % (ref 11.7–14.9)
PHOSPHORUS: 1.4 MG/DL (ref 2.5–4.9)
PLATELET # BLD: 308 K/CU MM (ref 140–440)
PMV BLD AUTO: 9.9 FL (ref 7.5–11.1)
POTASSIUM SERPL-SCNC: 3.7 MMOL/L (ref 3.5–5.1)
RBC # BLD: 3.89 M/CU MM (ref 4.2–5.4)
SODIUM BLD-SCNC: 145 MMOL/L (ref 135–145)
WBC # BLD: 7.7 K/CU MM (ref 4–10.5)

## 2022-01-27 PROCEDURE — 1200000000 HC SEMI PRIVATE

## 2022-01-27 PROCEDURE — 36415 COLL VENOUS BLD VENIPUNCTURE: CPT

## 2022-01-27 PROCEDURE — 6370000000 HC RX 637 (ALT 250 FOR IP): Performed by: NURSE PRACTITIONER

## 2022-01-27 PROCEDURE — 2580000003 HC RX 258: Performed by: EMERGENCY MEDICINE

## 2022-01-27 PROCEDURE — 94761 N-INVAS EAR/PLS OXIMETRY MLT: CPT

## 2022-01-27 PROCEDURE — 97535 SELF CARE MNGMENT TRAINING: CPT

## 2022-01-27 PROCEDURE — 97530 THERAPEUTIC ACTIVITIES: CPT

## 2022-01-27 PROCEDURE — 6370000000 HC RX 637 (ALT 250 FOR IP): Performed by: INTERNAL MEDICINE

## 2022-01-27 PROCEDURE — 85027 COMPLETE CBC AUTOMATED: CPT

## 2022-01-27 PROCEDURE — 6360000002 HC RX W HCPCS: Performed by: EMERGENCY MEDICINE

## 2022-01-27 PROCEDURE — 80069 RENAL FUNCTION PANEL: CPT

## 2022-01-27 PROCEDURE — 2580000003 HC RX 258: Performed by: INTERNAL MEDICINE

## 2022-01-27 RX ORDER — LANOLIN ALCOHOL/MO/W.PET/CERES
3 CREAM (GRAM) TOPICAL NIGHTLY PRN
Status: DISCONTINUED | OUTPATIENT
Start: 2022-01-27 | End: 2022-01-29 | Stop reason: HOSPADM

## 2022-01-27 RX ADMIN — ROSUVASTATIN 40 MG: 40 TABLET, FILM COATED ORAL at 21:09

## 2022-01-27 RX ADMIN — MEROPENEM 1000 MG: 1 INJECTION INTRAVENOUS at 16:25

## 2022-01-27 RX ADMIN — Medication 3 MG: at 22:24

## 2022-01-27 RX ADMIN — MEROPENEM 1000 MG: 1 INJECTION INTRAVENOUS at 09:28

## 2022-01-27 RX ADMIN — CLOPIDOGREL 75 MG: 75 TABLET, FILM COATED ORAL at 09:26

## 2022-01-27 RX ADMIN — ACETAMINOPHEN 650 MG: 325 TABLET ORAL at 16:55

## 2022-01-27 RX ADMIN — DONEPEZIL HYDROCHLORIDE 5 MG: 5 TABLET ORAL at 21:09

## 2022-01-27 RX ADMIN — MEROPENEM 1000 MG: 1 INJECTION INTRAVENOUS at 03:29

## 2022-01-27 RX ADMIN — SODIUM CHLORIDE, PRESERVATIVE FREE 10 ML: 5 INJECTION INTRAVENOUS at 23:10

## 2022-01-27 RX ADMIN — DIBASIC SODIUM PHOSPHATE, MONOBASIC POTASSIUM PHOSPHATE AND MONOBASIC SODIUM PHOSPHATE 1 TABLET: 852; 155; 130 TABLET ORAL at 09:27

## 2022-01-27 RX ADMIN — APIXABAN 2.5 MG: 2.5 TABLET, FILM COATED ORAL at 21:09

## 2022-01-27 RX ADMIN — SODIUM CHLORIDE, PRESERVATIVE FREE 10 ML: 5 INJECTION INTRAVENOUS at 09:27

## 2022-01-27 RX ADMIN — DIBASIC SODIUM PHOSPHATE, MONOBASIC POTASSIUM PHOSPHATE AND MONOBASIC SODIUM PHOSPHATE 1 TABLET: 852; 155; 130 TABLET ORAL at 21:10

## 2022-01-27 RX ADMIN — APIXABAN 2.5 MG: 2.5 TABLET, FILM COATED ORAL at 09:26

## 2022-01-27 ASSESSMENT — PAIN SCALES - GENERAL
PAINLEVEL_OUTOF10: 3
PAINLEVEL_OUTOF10: 0

## 2022-01-27 NOTE — PROGRESS NOTES
Nephrology Progress Note  1/27/2022 2:08 PM  Subjective: Interval History: Marty Watters is a 66 y.o. female   Weak hard to arouse in bed    Data:   Scheduled Meds:   phosphorus  250 mg Oral BID    meropenem  1,000 mg IntraVENous Q8H    sodium chloride flush  5-40 mL IntraVENous 2 times per day    apixaban  2.5 mg Oral BID    clopidogrel  75 mg Oral Daily    donepezil  5 mg Oral Nightly    rosuvastatin  40 mg Oral Nightly     Continuous Infusions:   sodium chloride 25 mL (01/21/22 0936)         CBC   Recent Labs     01/26/22  1651 01/27/22  0659   WBC 9.2 7.7   HGB 11.0* 11.2*   HCT 34.5* 35.3*    308      BMP   Recent Labs     01/26/22  1651 01/27/22  0659    145   K 3.9 3.7    109   CO2 26 29   PHOS 1.7* 1.4*   BUN 7 6   CREATININE 0.5* 0.4*     Hepatic:   No results for input(s): AST, ALT, ALB, BILITOT, ALKPHOS in the last 72 hours. Troponin: No results for input(s): TROPONINI in the last 72 hours. BNP: No results for input(s): BNP in the last 72 hours. Lipids: No results for input(s): CHOL, HDL in the last 72 hours. Invalid input(s): LDLCALCU  ABGs: No results found for: PHART, PO2ART, KUR6ERN  INR: No results for input(s): INR in the last 72 hours.   Renal Labs  Albumin:    Lab Results   Component Value Date    LABALBU 2.8 01/27/2022     Calcium:    Lab Results   Component Value Date    CALCIUM 7.6 01/27/2022     Phosphorus:    Lab Results   Component Value Date    PHOS 1.4 01/27/2022     U/A:    Lab Results   Component Value Date    NITRU NEGATIVE 01/19/2022    COLORU YELLOW 01/19/2022    WBCUA 39 01/19/2022    RBCUA 10 01/19/2022    MUCUS OCCASIONAL 01/19/2022    TRICHOMONAS NONE SEEN 01/19/2022    BACTERIA OCCASIONAL 01/19/2022    CLARITYU CLOUDY 01/19/2022    SPECGRAV 1.025 01/19/2022    UROBILINOGEN NEGATIVE 01/19/2022    BILIRUBINUR NEGATIVE 01/19/2022    BLOODU MODERATE 01/19/2022    KETUA NEGATIVE 01/19/2022    AMORPHOUS RARE 01/03/2022     ABG:  No results found for: PHART, EAL8TBR, PO2ART, PBU3EYL, BEART, THGBART, VZK4CNC, Z6NRLISV  HgBA1c:    Lab Results   Component Value Date    LABA1C 6.0 12/22/2021     Microalbumen/Creatinine ratio:  No components found for: RUCREAT  TSH:  No results found for: TSH  IRON:  No results found for: IRON  Iron Saturation:  No components found for: PERCENTFE  TIBC:  No results found for: TIBC  FERRITIN:    Lab Results   Component Value Date    FERRITIN 339 01/03/2022     RPR:  No results found for: RPR  AMI:    Lab Results   Component Value Date    AMI None Detected 11/15/2021     24 Hour Urine for Creatinine Clearance:  No components found for: CREAT4, UHRS10, UTV10      Objective:   I/O: 01/26 0701 - 01/27 0700  In: -   Out: 950 [Urine:700]  I/O last 3 completed shifts:  In: -   Out: 950 [Urine:700; Stool:250]  I/O this shift:  In: 120 [P.O.:120]  Out: 1150 [Urine:700; Stool:450]  Vitals: /76   Pulse 91   Temp 97.5 °F (36.4 °C)   Resp 16   Ht 4' 10\" (1.473 m)   Wt 145 lb (65.8 kg)   SpO2 96%   BMI 30.31 kg/m²  {  General appearance: awake weak  HEENT: Head: Normal, normocephalic, atraumatic.   Neck: supple, symmetrical, trachea midline  Lungs: diminished breath sounds bilaterally  Heart: S1, S2 normal  Abdomen: abnormal findings:  soft nt  Extremities: edema trace  Neurologic: Mental status: alertness: Awake         Assessment and Plan:      IMP:  #1 sepsis with colitis  #2 acute renal failure from ATN  #3 hypokalemia  #4 increased troponin  #5 hypertension  #6 coronary disease prior CABG  #7 history of CVA  #8 positive COVID    Plan     #1 stable antibiotics  #2 resolved acute renal failure  3 K stable  #4 monitor on telemetry  #5 blood pressure stable  #6 treat in setting of Covid  We will follow with supportive care             Esau Boyer MD, MD

## 2022-01-27 NOTE — PROGRESS NOTES
Occupational Therapy      Occupational Therapy Treatment Note      Name: Carolin Mosley MRN: [id number] :   1950   Date:  2022   Admission Date: 2022 Room:  [address] [place] [address]-A     Primary Problem:  COVID-19    Restrictions/Precautions:    General Precautions, Fall Risk, Droplet Plus, rectal catheter, woods catheter    Communication with other providers:  Nursing handoff, co-tx w/ PT    Subjective:  Patient states:  Pt requiring frequent re-direction to task  Pain: Denies at rest, some pain in buttocks due to rectal woods (location, type, intensity)    Objective:    Observation:  Pt received supine in bed, confused,  on  Objective Measures:  Vitals stable    Treatment, including education:  Therapeutic Activity Training:   Therapeutic activity training was instructed today. Cues were given for safety, sequence, UE/LE placement, awareness, and balance. Activities performed today included bed mobility training, sup-sit, sit-stand, functional mobility, stand to sit    Self Care Training:   Cues were given for safety, sequence, UE/LE placement, visual cues, and balance. Activities performed today included grooming, feeding, LB dressing    Supine to sit maxA, sitting EOB SBA, LB dressing donning socks sitting EOB maxA, STS from EOB up to stand modA, functional mobility ~4 feet modA, stand to sit modA. Grooming washing face/hands w/ warm washcloth Hansel due to decreased initiation/sequencing.  Feeding Setup/SBA    Pt sitting upright in chair, chair alarm on, call light at side, nursing notified        Assessment / Impression:    Patient's tolerance of treatment: Pt tolerated treatment well  Adverse Reaction: none  Significant change in status and impact:  none  Barriers to improvement: cognition      Plan for Next Session:    Continue OT POC    Time in:  1305  Time out:  1330  Timed treatment minutes:  25 minutes  Total treatment time:  25 minutes      Electronically signed by:    Frederick Rodrigues OTR/L [id number]  1:54 PM,1/27/2022

## 2022-01-27 NOTE — PLAN OF CARE
Nutrition Problem #1: Inadequate protein-energy intake  Intervention: Food and/or Nutrient Delivery: Modify Current Diet,Start Oral Nutrition Supplement  Nutritional Goals: Pt will tolerate 50% intake of meals and supplements during los

## 2022-01-27 NOTE — PROGRESS NOTES
medication with improvement of blood pressure     Coronary artery disease status post CABG  -Continue Plavix, rosuvastatin     Hyperlipidemia-rosuvastatin     CVA status post tPA (10/2021), recurrent CVA 12/2021, 1/2022  -MRI brain-small acute infarct subacute left frontal lobe and possibly left corpus callosum-10/2021  -MRI-12/2021-small areas of acute infarct involving the left frontal lobe from an acute left MCA territory infarct, subacute areas of infarct involving the left occipital lobe. -MRI-1/4/2022-new acute infarcts in the right middle cerebral and left posterior cerebral artery territories.  -Embolic source was suspected and patient was started on Eliquis in the recent admission.  -Continue Eliquis 2.5 mg twice daily due to OLIVER.     Recent admission for COVID-19 (1/2-1/11/2022)   [facility] admission complicated by hematuria, OLIVER-resolved  -Patient was not hypoxic and did not receive any treatment for COVID.     DVT Prophylaxis: Eliquis  GI Prophylaxis: Not indicated  Code Status: FULL. Discussed with patient's daughter.          History of Present Illness:     January 26: She did not state her age correctly. She appeared comfortable when I saw her.  -She is on room air  -She said that Pérez Moore is the president    January 25: When I saw her she said she was thirsty. I asked her if she wanted some water, she stated \"yes, please\"  I got her a cup of water and she started to drink. History  -She stated that she has 5 children, 3 sons, and 2 daughters    Objective: Intake/Output Summary (Last 24 hours) at 1/26/2022 1905  Last data filed at 1/26/2022 1637  Gross per 24 hour   Intake --   Output 950 ml   Net -950 ml      Vitals:   Vitals:    01/26/22 1558   BP: (!) 167/75   Pulse: 69   Resp: 17   Temp:    SpO2: 94%     Physical Exam:     Physical Exam  Constitutional:       Appearance: She is not toxic-appearing. HENT:      Nose: No rhinorrhea.    Eyes:      General:         Right eye: No discharge. Left eye: No discharge. Pulmonary:      Effort: Pulmonary effort is normal.   Skin:     Coloration: Skin is not jaundiced.          Medications:   Medications:    phosphorus  250 mg Oral BID    meropenem  1,000 mg IntraVENous Q8H    sodium chloride flush  5-40 mL IntraVENous 2 times per day    apixaban  2.5 mg Oral BID    clopidogrel  75 mg Oral Daily    donepezil  5 mg Oral Nightly    rosuvastatin  40 mg Oral Nightly      Infusions:    sodium chloride 25 mL (01/21/22 0936)     PRN Meds: potassium chloride, 40 mEq, PRN   Or  potassium alternative oral replacement, 40 mEq, PRN   Or  potassium chloride, 10 mEq, PRN  sodium chloride flush, 5-40 mL, PRN  sodium chloride, 25 mL, PRN  ondansetron, 4 mg, Q8H PRN   Or  ondansetron, 4 mg, Q6H PRN  polyethylene glycol, 17 g, Daily PRN  acetaminophen, 650 mg, Q6H PRN   Or  acetaminophen, 650 mg, Q6H PRN          Electronically signed by Miranda Brown MD on 1/26/2022 at 7:05 PM

## 2022-01-27 NOTE — PROGRESS NOTES
Comprehensive Nutrition Assessment    Type and Reason for Visit:  Initial,RD Nutrition Re-Screen/LOS    Nutrition Recommendations/Plan:     Liberalized diet   Ordered frozen oral supplements TID     Nutrition Assessment:  Pt admitted with sepsis/colitis. With recent medical hx including CVA 3 months ago with decline in PO intake and overall health since. Tested COVID+ 2 weeks ago. On Na, K+ and phos restrictions at current but pt not eating so will liberalize. Will also add oral nutrition supplements. Pt at high nutrition risk will monitor per protocol. Malnutrition Assessment:  Malnutrition Status: Moderate malnutrition    Context:  Chronic Illness     Findings of the 6 clinical characteristics of malnutrition:  Energy Intake:  7 - 75% or less estimated energy requirements for 1 month or longer  Weight Loss:  1 - Mild weight loss (specify amount and time period) (8% over 6 months)     Body Fat Loss:  Unable to assess     Muscle Mass Loss:  Unable to assess    Fluid Accumulation:  Unable to assess     Strength:  Not Performed    Estimated Daily Nutrient Needs:  Energy (kcal):  6829-4355; Weight Used for Energy Requirements:  Current     Protein (g):  40-60 (1-1.5 g/kg); Weight Used for Protein Requirements:  Ideal        Fluid (ml/day):  6491-4244; Method Used for Fluid Requirements:  1 ml/kcal      Nutrition Related Findings:  Phos 1.7      Wounds:  None       Current Nutrition Therapies:    ADULT DIET; Regular; Low Sodium (2 gm); Low Potassium (Less than 3000 mg/day); Low Phosphorus (Less than 1000 mg)    Anthropometric Measures:  · Height: 4' 10\" (147.3 cm)  · Current Body Weight: 145 lb (65.8 kg)   · Admission Body Weight: 142 lb 6.7 oz (64.6 kg)    · Usual Body Weight: 158 lb (71.7 kg) (July)     · Ideal Body Weight: 90 lbs; % Ideal Body Weight 161.1 %   · BMI: 30.3  · BMI Categories: Obese Class 1 (BMI 30.0-34. 9)       Nutrition Diagnosis:   · Inadequate protein-energy intake related to cognitive or neurological impairment as evidenced by intake 26-50%,weight loss      Nutrition Interventions:   Food and/or Nutrient Delivery:  Modify Current Diet,Start Oral Nutrition Supplement  Nutrition Education/Counseling:  No recommendation at this time   Coordination of Nutrition Care:  No recommendation at this time    Goals:  Pt will tolerate 50% intake of meals and supplements during los       Nutrition Monitoring and Evaluation:   Behavioral-Environmental Outcomes:  None Identified   Food/Nutrient Intake Outcomes:  Food and Nutrient Intake,Supplement Intake  Physical Signs/Symptoms Outcomes:  Chewing or Swallowing     Discharge Planning:     Too soon to determine     Electronically signed by Kvng Ibarra RD, LD on 1/26/22 at 10:35 PM EST    Contact: [phone]

## 2022-01-27 NOTE — CARE COORDINATION
Chart reviewed and call to pt daughter, [facility], to discuss therapy recommendations of SNF. Carla stated agreement for SNF placement. List of SNF choices discussed. Carla would like [facility], [facility] and then [facility]. Referral called to Cristy greco. Whiteboard placed for PT/OT to see pt for updated notes for insurance.

## 2022-01-27 NOTE — DISCHARGE INSTR - COC
Continuity of Care Form    Patient Name: Lindsey Jaquez   :  1950  MRN:  [id number]    Admit date:  2022  Discharge date:  ***    Code Status Order: Full Code   Advance Directives:      Admitting Physician:  Riley Gregorio MD  PCP: Raquel Vazquez MD    Discharging Nurse: [facility] Unit/Room#: 2778/1632-D  Discharging Unit Phone Number: ***    Emergency Contact:   Extended Emergency Contact Information  Primary Emergency Contact: Ariel Pederson  [facility], [address] [place] [address] Phone: [phone]  Mobile Phone: [phone]  Relation: Spouse   needed? No  Secondary Emergency Contact: boswelljh gimenez  [place] Phone: [phone]  Relation: Child    Past Surgical History:  Past Surgical History:   Procedure Laterality Date    CAROTID ENDARTERECTOMY      CHOLECYSTECTOMY      CORONARY ARTERY BYPASS GRAFT      IR NONTUNNELED VASCULAR CATHETER  2022    IR NONTUNNELED VASCULAR CATHETER 2022 [facility] SPECIAL PROCEDURES    NECK SURGERY         Immunization History: There is no immunization history on file for this patient.     Active Problems:  Patient Active Problem List   Diagnosis Code    Hypertensive urgency I16.0    CAD (coronary artery disease) I25.10    Diverticulosis K57.90    Hyperlipidemia with target low density lipoprotein (LDL) cholesterol less than 70 mg/dL E78.5    Cerebrovascular accident [facility]) I63.9    Acute cerebrovascular accident (CVA) ([facility] [address] I63.9    Expressive aphasia R47.01    Acute kidney injury due to COVID-19 ([facility] [address] U07.1, N17.9    Recurrent stenosis of left carotid artery I65.22    Gross hematuria R31.0    COVID-19 virus infection U07.1    Dementia ([address] F03.90    Diarrhea R19.7    Hypertension I10    Sepsis ([facility] [address] A41.9       Isolation/Infection:   Isolation            Droplet Plus          Patient Infection Status       Infection Onset Added Last Indicated Last Indicated By Review Planned Expiration Resolved Resolved By    COVID-19 22 COVID-19, Rapid 22      Resolved    C-diff Rule Out 22 Clostridium Difficile Toxin/Antigen (Ordered)   22 Rule-Out Test Resulted    COVID-19 (Rule Out) 22 COVID-19, Rapid (Ordered)   22 Rule-Out Test Resulted    C-diff Rule Out 22 Clostridium Difficile Toxin/Antigen (Ordered)   22 Rule-Out Test Resulted    COVID-19 22 COVID-19, Rapid   22     COVID-19 (Rule Out) 22 COVID-19, Rapid (Ordered)   22 Rule-Out Test Resulted    COVID-19 (Rule Out) 21 COVID-19, Rapid (Ordered)   21 Rule-Out Test Resulted            Nurse Assessment:  Last Vital Signs: BP (!) 159/72   Pulse 77   Temp 97 °F (36.1 °C) (Oral)   Resp 16   Ht 4' 10\" (1.473 m)   Wt 145 lb (65.8 kg)   SpO2 91%   BMI 30.31 kg/m²     Last documented pain score (0-10 scale): Pain Level: 8  Last Weight:   Wt Readings from Last 1 Encounters:   22 145 lb (65.8 kg)     Mental Status:  {IP PT MENTAL STATUS:37812}    IV Access:  { CHUCKY IV ACCESS:043587045}    Nursing Mobility/ADLs:  Walking   {[facility] DME BWII:078954153}  Transfer  {[facility] DME MNAA:797111143}  Bathing  {[facility] DME IRPC:066012203}  Dressing  {[facility] DME UVGB:227103379}  Toileting  {[facility] DME DJHA:940080459}  Feeding  {[facility] DME BMON:201107819}  Med Admin  {[facility] DME PQPE:995878543}  Med Delivery   { [facility] MED Delivery:589511697}    Wound Care Documentation and Therapy:        Elimination:  Continence:    Bowel: {YES / ID:99126}  Bladder: {YES / TP:86555}  Urinary Catheter: {Urinary Catheter:605638306}   Colostomy/Ileostomy/Ileal Conduit: {YES / WD:75337}  Rectal Tube-Stool Appearance: Loose,Watery  Rectal Tube-Stool Color: Black,Brown  Rectal Tube-Stool Amount: Large    Date of Last BM: ***    Intake/Output Summary (Last 24 hours) at 1/27/2022 1141  Last data filed at 1/26/2022 1637  Gross per 24 hour   Intake --   Output 950 ml   Net -950 ml     I/O last 3 completed shifts:  In: -   Out: 950 [Urine:700; Stool:250]    Safety Concerns:     [employer] Safety Concerns:259552105}    Impairments/Disabilities:      [address] [employer] Impairments/Disabilities:831388240}    Patient's personal belongings (please select all that are sent with patient):  {[facility] DME Belongings:280795733}    RN SIGNATURE:  {Esignature:665081667}    CASE MANAGEMENT/SOCIAL WORK SECTION    Inpatient Status Date: ***    Readmission Risk Assessment Score:  Readmission Risk              Risk of Unplanned Readmission:  24           Discharging to Facility/ Agency   Name:   Address:  Phone:  Fax:    Dialysis Facility (if applicable)   Name:  Address:  Dialysis Schedule:  Phone:  Fax:    / signature: {Esignature:285633567}    PHYSICIAN SECTION  Nutrition Therapy:  Current Nutrition Therapy:   - Oral Diet:  General    Routes of Feeding: Oral  Liquids: Thin Liquids  Daily Fluid Restriction: no  Last Modified Barium Swallow with Video (Video Swallowing Test): not done    Treatments at the Time of Hospital Discharge:   Respiratory Treatments: none  Oxygen Therapy:  is not on home oxygen therapy. Ventilator:    - No ventilator support    Rehab Therapies: Physical Therapy, Occupational Therapy, and speech therapy for cognition  Weight Bearing Status/Restrictions: No weight bearing restirctions  Other Medical Equipment (for information only, NOT a DME order):  per PT/OT  Other Treatments:     Supportive care for dementia    Prognosis: Fair    Condition at Discharge: Stable    Rehab Potential (if transferring to Rehab):  Fair    Recommended Labs or Other Treatments After Discharge: CBC and BMP in 1 week, and phosphorous level in 1 week    Also check BMP twice a week for 2 weeks as sodium level is high, indicative of POOR ORAL INTAKE    ENCOURAGE ATE LEAST 1 L PO FLUID INTAKE PER day    Please note that the patient had a Sherwood catheter from January 19 until January 28. The Sherwood catheter is to be removed on January 28 prior to discharge. Please make sure the patient voids. Physician Certification: I certify the above information and transfer of Fariba Espinal  is necessary for the continuing treatment of the diagnosis listed and that she requires [place] for less 30 days.      Update Admission H&P: No change in H&P    PHYSICIAN SIGNATURE:  Electronically signed by Lupis Stephenson MD on 1/28/22 at 9:19 PM EST

## 2022-01-27 NOTE — PROGRESS NOTES
Physical Therapy    Physical Therapy Treatment Note  Name: Thelma Thortnon MRN: [id number] :   1950   Date:  2022   Admission Date: 2022 Room:  [address] [place] [address]-A   Restrictions/Precautions:        (+)COVID, decreased vision, , General Precautions, Fall Risk, rectal catheter, woods catheter  Chart reviewed, patient cleared for activity. Co-tx with OT Noreen for pt tolerance, CM note  PT discussed pt transfer and up in chair with RNRyna Lute end of session. Subjective:  Patient states:  Amenable to therapy visit. \"Oh I don't know\", \"I just need that, just leave me alone! \"  Pain:   Location, Type, Intensity (0/10 to 10/10): Pt cries out which light touch to pt LUE and with movement of IV tubing, movement of catheter tubing. C/o rectal pain-unable to rate. Objective: Therapeutic Activity Training:   Cues given for safety, sequence, UE/LE placement, awareness, and balance. Bed mobility: PT encourages sup>sit, provides v/c for sequencing. Pt demonstrates fair- ability to advance LE, requires increased encouragement and cues as pt is reluctant, Mod A for LE/ hips to EOB and Mod A for trunk to upright. Pt yells out d/t bottom pain. PT v/c for scooting to EOB, pt requires increased cues and time. Sitting balance: Seated EOB pt demonstrates fair- balance, BUE support required for maintaining upright, CGA to Mod required for encouraging staying upright as pt with several attempts to lean to L shoulder and return to supine. Sit<>Stand: Pt performed STS from EOB toRW  with ModA, v/c for proper sequencing. Pt demonstrates increased time to upright, demonstrates fear, pain with catheter tubing movement. Pt requires hand-over-hand for UE placement on RW . Return to seated in recliner end of trial pt demonstrates fair control, pt reaches back for recliner, v/c for safe sequencing.     Stand-step transfer to recliner with RW, Fabiola 2 for stabilization of pt trunk and advancement of RW, increased cues for directional stepping. With assist for RW turning pt able to sequence the LE in correct direction. Increased time required. Once in recliner PT instructs pt in partial rolling to L side with Mod A, pillow placed under R hip to alleviate rectal pain. LE propped on pillows for comfort, tray overtop, , chair alarm. OT encourages pt to eat    Education: PT encourages OOB for prevention of PNA    End of session pt left in recliner  with lines managed, call light, phone, exit alarm, tray, all needs, RN aware. Assessment / Impression:    Pt more willing to perform mobility this session, continues to require increased encouragement. Tolerance of treatment:  Fair   Adverse Reaction: pain    Plan for Next Session:    Train and educate using established plan to improve functional mobility, safety, and independence.   Time in:  13:05  Time out:  13:30  Timed treatment minutes:  25  Total treatment time:  25  Electronically signed by:    Lisa Shepherd PT  1/27/2022, 2:02 PM

## 2022-01-28 VITALS
HEART RATE: 86 BPM | OXYGEN SATURATION: 94 % | RESPIRATION RATE: 18 BRPM | HEIGHT: 58 IN | WEIGHT: 145.6 LBS | SYSTOLIC BLOOD PRESSURE: 112 MMHG | TEMPERATURE: 98 F | BODY MASS INDEX: 30.56 KG/M2 | DIASTOLIC BLOOD PRESSURE: 77 MMHG

## 2022-01-28 LAB
ALBUMIN SERPL-MCNC: 2.6 GM/DL (ref 3.4–5)
ANION GAP SERPL CALCULATED.3IONS-SCNC: 8 MMOL/L (ref 4–16)
BUN BLDV-MCNC: 5 MG/DL (ref 6–23)
CALCIUM SERPL-MCNC: 7.5 MG/DL (ref 8.3–10.6)
CHLORIDE BLD-SCNC: 107 MMOL/L (ref 99–110)
CO2: 30 MMOL/L (ref 21–32)
CREAT SERPL-MCNC: 0.4 MG/DL (ref 0.6–1.1)
GFR AFRICAN AMERICAN: >60 ML/MIN/1.73M2
GFR NON-AFRICAN AMERICAN: >60 ML/MIN/1.73M2
GLUCOSE BLD-MCNC: 108 MG/DL (ref 70–99)
HCT VFR BLD CALC: 35.4 % (ref 37–47)
HEMOGLOBIN: 11.1 GM/DL (ref 12.5–16)
MCH RBC QN AUTO: 28.4 PG (ref 27–31)
MCHC RBC AUTO-ENTMCNC: 31.4 % (ref 32–36)
MCV RBC AUTO: 90.5 FL (ref 78–100)
PDW BLD-RTO: 14.7 % (ref 11.7–14.9)
PHOSPHORUS: 1.6 MG/DL (ref 2.5–4.9)
PLATELET # BLD: 337 K/CU MM (ref 140–440)
PMV BLD AUTO: 10.3 FL (ref 7.5–11.1)
POTASSIUM SERPL-SCNC: 3.8 MMOL/L (ref 3.5–5.1)
RBC # BLD: 3.91 M/CU MM (ref 4.2–5.4)
SODIUM BLD-SCNC: 145 MMOL/L (ref 135–145)
WBC # BLD: 8.6 K/CU MM (ref 4–10.5)

## 2022-01-28 PROCEDURE — 80069 RENAL FUNCTION PANEL: CPT

## 2022-01-28 PROCEDURE — 6370000000 HC RX 637 (ALT 250 FOR IP): Performed by: INTERNAL MEDICINE

## 2022-01-28 PROCEDURE — 6360000002 HC RX W HCPCS: Performed by: EMERGENCY MEDICINE

## 2022-01-28 PROCEDURE — 94761 N-INVAS EAR/PLS OXIMETRY MLT: CPT

## 2022-01-28 PROCEDURE — 1200000000 HC SEMI PRIVATE

## 2022-01-28 PROCEDURE — 36415 COLL VENOUS BLD VENIPUNCTURE: CPT

## 2022-01-28 PROCEDURE — 6370000000 HC RX 637 (ALT 250 FOR IP): Performed by: NURSE PRACTITIONER

## 2022-01-28 PROCEDURE — 85027 COMPLETE CBC AUTOMATED: CPT

## 2022-01-28 PROCEDURE — 2580000003 HC RX 258: Performed by: INTERNAL MEDICINE

## 2022-01-28 PROCEDURE — 2580000003 HC RX 258: Performed by: EMERGENCY MEDICINE

## 2022-01-28 RX ORDER — AMLODIPINE BESYLATE 2.5 MG/1
2.5 TABLET ORAL DAILY
Qty: 1 TABLET | Refills: 0
Start: 2022-01-28 | End: 2022-01-28 | Stop reason: SDUPTHER

## 2022-01-28 RX ORDER — POLYETHYLENE GLYCOL 3350 17 G/17G
17 POWDER, FOR SOLUTION ORAL DAILY PRN
Qty: 30 EACH | Refills: 0
Start: 2022-01-28 | End: 2022-02-27

## 2022-01-28 RX ORDER — LACTOBACILLUS RHAMNOSUS GG 10B CELL
1 CAPSULE ORAL DAILY
Qty: 30 CAPSULE | Refills: 0
Start: 2022-01-28 | End: 2022-02-27

## 2022-01-28 RX ORDER — LANOLIN ALCOHOL/MO/W.PET/CERES
3 CREAM (GRAM) TOPICAL NIGHTLY PRN
Qty: 1 TABLET | Refills: 0
Start: 2022-01-28 | End: 2022-07-13

## 2022-01-28 RX ORDER — CLONIDINE HYDROCHLORIDE 0.1 MG/1
0.1 TABLET ORAL 2 TIMES DAILY
Status: DISCONTINUED | OUTPATIENT
Start: 2022-01-28 | End: 2022-01-29 | Stop reason: HOSPADM

## 2022-01-28 RX ORDER — AMLODIPINE BESYLATE 10 MG/1
10 TABLET ORAL DAILY
Qty: 1 TABLET | Refills: 0
Start: 2022-01-28 | End: 2022-08-10 | Stop reason: SDUPTHER

## 2022-01-28 RX ADMIN — MEROPENEM 1000 MG: 1 INJECTION INTRAVENOUS at 09:22

## 2022-01-28 RX ADMIN — ROSUVASTATIN 40 MG: 40 TABLET, FILM COATED ORAL at 20:56

## 2022-01-28 RX ADMIN — MEROPENEM 1000 MG: 1 INJECTION INTRAVENOUS at 01:20

## 2022-01-28 RX ADMIN — DIBASIC SODIUM PHOSPHATE, MONOBASIC POTASSIUM PHOSPHATE AND MONOBASIC SODIUM PHOSPHATE 1 TABLET: 852; 155; 130 TABLET ORAL at 20:56

## 2022-01-28 RX ADMIN — SODIUM CHLORIDE, PRESERVATIVE FREE 10 ML: 5 INJECTION INTRAVENOUS at 09:20

## 2022-01-28 RX ADMIN — Medication 3 MG: at 20:56

## 2022-01-28 RX ADMIN — APIXABAN 2.5 MG: 2.5 TABLET, FILM COATED ORAL at 20:56

## 2022-01-28 RX ADMIN — SODIUM CHLORIDE, PRESERVATIVE FREE 10 ML: 5 INJECTION INTRAVENOUS at 20:56

## 2022-01-28 RX ADMIN — MEROPENEM 1000 MG: 1 INJECTION INTRAVENOUS at 16:33

## 2022-01-28 RX ADMIN — DONEPEZIL HYDROCHLORIDE 5 MG: 5 TABLET ORAL at 20:56

## 2022-01-28 RX ADMIN — APIXABAN 2.5 MG: 2.5 TABLET, FILM COATED ORAL at 09:19

## 2022-01-28 RX ADMIN — CLONIDINE HYDROCHLORIDE 0.1 MG: 0.1 TABLET ORAL at 09:20

## 2022-01-28 RX ADMIN — CLOPIDOGREL 75 MG: 75 TABLET, FILM COATED ORAL at 09:20

## 2022-01-28 RX ADMIN — DIBASIC SODIUM PHOSPHATE, MONOBASIC POTASSIUM PHOSPHATE AND MONOBASIC SODIUM PHOSPHATE 1 TABLET: 852; 155; 130 TABLET ORAL at 09:19

## 2022-01-28 ASSESSMENT — PAIN SCALES - GENERAL: PAINLEVEL_OUTOF10: 0

## 2022-01-28 NOTE — PROGRESS NOTES
Hospitalist Progress Note      Name:  Thelma Thornton /Age/Sex: 1950  (66 y.o. female)   MRN & CSN:  [id number] & [id number] Admission Date/Time: 2022  3:39 PM   Location:  [facility] PCP: Angelia Moore, [address] Day: 9    Assessment and Plan:       Sepsis: CT showed acute sigmoid diverticulitis  Blood pressure stable, leukocytosis resolved  No fever  Transferred to the [facility] with telemetry, was in the ICU stepdown unit from , the day of admission, until   -Patient presented with hypotension , leukocytosis, lactic acidosis. -Blood cultures sent from ER, showed no growth  -Urine culture no growth so far  -CT chest-no acute infiltrate  -CT abdomen/pelvis-acute sigmoid diverticulitis  -Patient received broad-spectrum antibiotics vancomycin, meropenem (patient allergic to penicillin)  -Continue meropenem for diverticulitis  -Lactic acidosis resolved  -Rectal tube was placed on     COVID-19 infection: Patient was diagnosed with COVID few weeks ago and did not improve per notes  -Patient had a stroke about 2 months ago since then the patient had a decline in health, not eating well. --was placed on 1 L oxygen at 8 AM  --she is on room air     Acute renal failure:  -Creatinine was 10.3 on admission on , was 0.8 at discharge-2022  -Patient was evaluated by nephrology in ED  -Started on IV fluids  -s/p Sherwood's catheter insertion day of admission.  -Monitor BMP  Excellent response to conservative management        Hypokalemia  -Replaced  -Monitor BMP     Anion gap metabolic acidosis secondary to OLIVER.   Resolved     Leukocytosis resolved     Elevated troponin:  -Patient denied any chest pain, shortness of breath, EKG with no acute ST-T wave changes.  -Troponin trended down from 0.07-0.03, it was a minimal elevation on admission     Hypertension:  -Patient hypotensive at presentation  -Holding antihypertensives-amlodipine, lisinopril.  -May resume on both medication with improvement of blood pressure     Coronary artery disease status post CABG  -Continue Plavix, rosuvastatin     Hyperlipidemia-rosuvastatin     CVA status post tPA (10/2021), recurrent CVA 12/2021, 1/2022  -MRI brain-small acute infarct subacute left frontal lobe and possibly left corpus callosum-10/2021  -MRI-12/2021-small areas of acute infarct involving the left frontal lobe from an acute left MCA territory infarct, subacute areas of infarct involving the left occipital lobe. -MRI-1/4/2022-new acute infarcts in the right middle cerebral and left posterior cerebral artery territories.  -Embolic source was suspected and patient was started on Eliquis in the recent admission.  -Continue Eliquis 2.5 mg twice daily due to OLIVER.     Recent admission for COVID-19 (1/2-1/11/2022)   [facility] admission complicated by hematuria, OLIVER-resolved  -Patient was not hypoxic and did not receive any treatment for COVID.     DVT Prophylaxis: Eliquis  GI Prophylaxis: Not indicated  Code Status: FULL. Discussed with patient's daughter.          History of Present Illness:     January 27: No dyspnea at rest.  She was sitting up in bed when I saw her. The video sitter was in the room. January 26: She did not state her age correctly. She appeared comfortable when I saw her.  -She is on room air  -She said that Reji Jeff is the president    January 25: When I saw her she said she was thirsty. I asked her if she wanted some water, she stated \"yes, please\"  I got her a cup of water and she started to drink. History  -She stated that she has 5 children, 3 sons, and 2 daughters    Objective:        Intake/Output Summary (Last 24 hours) at 1/27/2022 2000  Last data filed at 1/27/2022 1623  Gross per 24 hour   Intake 120 ml   Output 1350 ml   Net -1230 ml      Vitals:   Vitals:    01/27/22 1406   BP: 128/76   Pulse: 91   Resp: 16   Temp: 97.5 °F (36.4 °C)   SpO2: 96%     Physical Exam: Physical Exam  Constitutional:       Appearance: She is not toxic-appearing. HENT:      Nose: No rhinorrhea. Eyes:      General:         Right eye: No discharge. Left eye: No discharge. Pulmonary:      Effort: Pulmonary effort is normal.   Skin:     Coloration: Skin is not jaundiced.          Medications:   Medications:    phosphorus  250 mg Oral BID    meropenem  1,000 mg IntraVENous Q8H    sodium chloride flush  5-40 mL IntraVENous 2 times per day    apixaban  2.5 mg Oral BID    clopidogrel  75 mg Oral Daily    donepezil  5 mg Oral Nightly    rosuvastatin  40 mg Oral Nightly      Infusions:    sodium chloride 25 mL (01/21/22 0936)     PRN Meds: potassium chloride, 40 mEq, PRN   Or  potassium alternative oral replacement, 40 mEq, PRN   Or  potassium chloride, 10 mEq, PRN  sodium chloride flush, 5-40 mL, PRN  sodium chloride, 25 mL, PRN  ondansetron, 4 mg, Q8H PRN   Or  ondansetron, 4 mg, Q6H PRN  polyethylene glycol, 17 g, Daily PRN  acetaminophen, 650 mg, Q6H PRN   Or  acetaminophen, 650 mg, Q6H PRN          Electronically signed by Genesis Schultz MD on 1/27/2022 at 8:00 PM

## 2022-01-28 NOTE — PROGRESS NOTES
Rectal tube is still present      Was at [facility] from October 25 until October 26    Treated for an acute ischemic left frontal stroke  NIH stroke scale was 12  Via telestroke IV TPA was given  CTA did not show LVO  She was transferred to ICU  She had small infarcts in the left frontal lobe and possibly left frontal corpus callosal  See the discharge summary    November 16orders from Dr. Aminata Anand  PCP is Dr. Aniket Santa in the computer    Dr. Aniket Santa admitted him for from December 21 until December 23  Neurology saw her on December 23  They thought she had expressive aphasia likely due to acute/subacute left frontal lobe infarct  Secondary to left ICA stenosis    She had severe stenosis of the left ICA at the junction of the R endarterectomy graft at 80%  Per neurology consult December 23 she had hallucinations likely secondary to dementia  On December 23 Dr. Zuñiga saw her and she recognized him immediately from her previous cardiac and peripheral carotid surgery    Due to extensive intracranial involvement of blood vessels and acute stroke recommend antiplatelet therapy and conservative management  Advised smoking cessation and aggressive management of hypertension    After acute stroke consideration could be given to interventional options for recurrent left ICA stenosis as well as intracranial disease per vascular note on December 23    So basically she had a stroke in October and another one in December and she has severe intracranial disease    She had the [facility] admission in October for a stroke  She had a stroke admission in December here    January 2 to January 11 she was here for 9 days  She came in with generalized weakness  She had COVID-19  She also had an acute kidney injury  She had the gross hematuria in the setting of Plavix Eliquis and the Sherwood  Follow-up urology with 4 to 6 weeks  She had no hypoxia with the COVID-19 -she went home with home health care    She is now readmitted 8 days later      For this admission she has been here 9 days  She presented to the ED on January 19  No chief complaint on file. History of present illness  From home with confusion  Blood pressure on arrival was 90 systolic  Blood pressure was reportedly 70 per EMS    Found to have diverticulitisacute renal failureIV antibiotic Cipro spectrumSherwood placed  Dr. Arnulfo Hernandez diagnosis in the ED  Hypotension  Acute kidney injury  Troponin elevated  Hypokalemia  History of COVID-19  Diverticulitis  See H&P    On the day after admission on January 20 nephrology saw her for OLIVER from [facility]  On January 21, 2 days after admission, psychiatry saw her    She probably has vascular dementiacurrent CT scan shows multiple strokes R likely old    CT abdomen shows acute on complicated diverticulitisfollow-up may be beneficial to document resolutionhe is also status post CABG    She had a tunneled vascular catheter ordered    Dialysis catheter was placed on January 29 removed on January 24it does not look like she had any dialysis    The biggest abnormal lab was creatinine 10.3 on arrival 4.4 the next day and 1.7 the next day.   She had hypokalemia    There are no heart house visits    The other admission in the system was in October 2019 with a hypertensive urgency

## 2022-01-28 NOTE — CARE COORDINATION
Chart reviewed and call to [facility] to check status of referral. Per Isabel, pt has been accepted to facility but no bed available today. Pt placed on weekend follow up to check for bed availability at [facility].     3:22 PM Rec'd return call from [facility], stating that pt is able to come this evening. Transport arranged with [employer] for 10pm. CM updated primary RN Dr. Rhianna Smiley and voicemail left for pt daughter. Electronic PASR completed.

## 2022-01-29 NOTE — PROGRESS NOTES
Gave a detailed report to the transportation service, including call back number. Attempted to call report to the receiving nurse at the [facility] x 3. One attempt I was able to speak with the tech. Again, I informed the transportation staff to have the nurse call [facility] should and questions and/or concerns arise.

## 2022-01-31 ENCOUNTER — HOSPITAL ENCOUNTER (OUTPATIENT)
Age: 72
Setting detail: SPECIMEN
Discharge: HOME | End: 2022-01-31

## 2022-01-31 LAB
ALBUMIN SERPL-MCNC: 2.9 GM/DL (ref 3.4–5)
ALP BLD-CCNC: 138 IU/L (ref 40–128)
ALT SERPL-CCNC: 10 U/L (ref 10–40)
ANION GAP SERPL CALCULATED.3IONS-SCNC: 7 MMOL/L (ref 4–16)
AST SERPL-CCNC: 39 IU/L (ref 15–37)
BILIRUB SERPL-MCNC: 0.2 MG/DL (ref 0–1)
BUN BLDV-MCNC: 5 MG/DL (ref 6–23)
CALCIUM SERPL-MCNC: 8.1 MG/DL (ref 8.3–10.6)
CHLORIDE BLD-SCNC: 104 MMOL/L (ref 99–110)
CO2: 33 MMOL/L (ref 21–32)
CREAT SERPL-MCNC: 0.4 MG/DL (ref 0.6–1.1)
GFR AFRICAN AMERICAN: >60 ML/MIN/1.73M2
GFR NON-AFRICAN AMERICAN: >60 ML/MIN/1.73M2
GLUCOSE BLD-MCNC: 93 MG/DL (ref 70–99)
HCT VFR BLD CALC: 33.8 % (ref 37–47)
HEMOGLOBIN: 10.6 GM/DL (ref 12.5–16)
MCH RBC QN AUTO: 28.7 PG (ref 27–31)
MCHC RBC AUTO-ENTMCNC: 31.4 % (ref 32–36)
MCV RBC AUTO: 91.6 FL (ref 78–100)
PDW BLD-RTO: 15 % (ref 11.7–14.9)
PLATELET # BLD: 402 K/CU MM (ref 140–440)
PMV BLD AUTO: 9.9 FL (ref 7.5–11.1)
POTASSIUM SERPL-SCNC: 3.6 MMOL/L (ref 3.5–5.1)
RBC # BLD: 3.69 M/CU MM (ref 4.2–5.4)
SODIUM BLD-SCNC: 144 MMOL/L (ref 135–145)
TOTAL PROTEIN: 5 GM/DL (ref 6.4–8.2)
WBC # BLD: 8.5 K/CU MM (ref 4–10.5)

## 2022-01-31 PROCEDURE — 85027 COMPLETE CBC AUTOMATED: CPT

## 2022-01-31 PROCEDURE — 80053 COMPREHEN METABOLIC PANEL: CPT

## 2022-01-31 PROCEDURE — 36415 COLL VENOUS BLD VENIPUNCTURE: CPT

## 2022-02-03 ENCOUNTER — HOSPITAL ENCOUNTER (OUTPATIENT)
Age: 72
Setting detail: SPECIMEN
Discharge: HOME | End: 2022-02-03

## 2022-02-03 LAB
ANION GAP SERPL CALCULATED.3IONS-SCNC: 10 MMOL/L (ref 4–16)
BUN BLDV-MCNC: 7 MG/DL (ref 6–23)
CALCIUM SERPL-MCNC: 8.5 MG/DL (ref 8.3–10.6)
CHLORIDE BLD-SCNC: 102 MMOL/L (ref 99–110)
CO2: 33 MMOL/L (ref 21–32)
CREAT SERPL-MCNC: 0.5 MG/DL (ref 0.6–1.1)
GFR AFRICAN AMERICAN: >60 ML/MIN/1.73M2
GFR NON-AFRICAN AMERICAN: >60 ML/MIN/1.73M2
GLUCOSE BLD-MCNC: 98 MG/DL (ref 70–99)
POTASSIUM SERPL-SCNC: 3.8 MMOL/L (ref 3.5–5.1)
SODIUM BLD-SCNC: 145 MMOL/L (ref 135–145)

## 2022-02-03 PROCEDURE — 80048 BASIC METABOLIC PNL TOTAL CA: CPT

## 2022-02-03 PROCEDURE — 36415 COLL VENOUS BLD VENIPUNCTURE: CPT

## 2022-02-05 NOTE — DISCHARGE SUMMARY
Discharge Summary    Name:  Nigel Dominguez /Age/Sex: 1950 (66 y.o. female)   Admit Date: 2022  Discharge Date: 22    MRN & CSN:  [id number] & [id number] Encounter Date and Time 22 3:30 PM EST    Attending:  Dr. Martine Bright Discharging Provider: Danni Larson MD       Hospital Course:     Brief HPI: Nigel Dominguez is a 66 y.o. female who presented to ED on  via EMS from her home with confusion. Her systolic blood pressure was 90 upon arrival to the ED. In the ED a CT scan showed acute uncomplicated diverticulitis. Her white count was 18.7. She was admitted. Brief Problem Based Course:     Donna Soto remained here in the hospital for 10 days from  until . She was treated for acute diverticulitis while here. Her stay was prolonged as we were trying to arrange for placement. He was also Covid positive during her recent admission on . She has now been transferred to [address] [place] [address] for acute rehab. Problem list    Sepsis due to acute sigmoid diverticulitis  -She was initially treated on the stepdown unit and then was transferred to [place] [address] after 4 days  -She had 10 days of meropenem while here, no further antibiotics upon discharge  -Meropenem was used as she is allergic to penicillins and sulfa. Apparently sulfa caused swelling per chart. Unknown reaction to penicillin.  -She needs a follow-up CT scan to ensure that the changes of wall thickening in the sigmoid have resolved, as recommended by radiologist. This can be done as clinically appropriate as she does have confusion. Acute kidney injury  -Her creatinine was as high as 10.3 upon admission  -She had a dialysis catheter placed while here, but did not require dialysis.   Her catheter has been removed  -She had associated hypokalemia and anion gap metabolic acidosis  -Fortunately, she recovered well, and her creatinine is 0.4 upon discharge        Other problems    History of strokes  -She had TPA in October 2021 and was treated at [address] [place] [address]  -She had a recurrent stroke in December  -She had yet another stroke in January (was Covid positive at that time)  -Embolic strokes were suspected and she was started on Eliquis during the most recent admission in January  -Eliquis dose: Because of an OLIVER her Eliquis dose was initially 2.5 twice a day while here, upon discharge I increased it back to 5 mg twice daily because her renal failure had resolved-verify this  -She is also on Plavix, continue    Cognitive disorder  -She had a significant change in her cognition while here. 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 Follow-up with PCP and neurology for further evaluation.  -Continue with donepezil 5 mg nightly    Recent COVID-19 infection  -She was here for Covid from January 2 until January 11 -she had presented with aphasia, was found to have a stroke, and was found to be Covid positive  -She was not hypoxic during that admission-verify this    Minimal troponin elevation  -May have been due to demand ischemia    CAD s/p CABG  -Continue Plavix and rosuvastatin    History of left carotid endarterectomy with recurrent left internal carotid artery stenosis  -CTA done 12/21/2021 showed severe stenosis of the left internal carotid artery at the junction of the endarterectomy graft and the left internal carotid artery measuring greater than 80% using NASCET criteria   --She was seen by vascular surgery for this during a previous admission in December 23-and consideration for intervention could be given for recurrent left internal carotid artery stenosis. Recommend follow-up with vascular surgery    History of intracranial stenoses  -These were also seen on recent CTA-please refer to the report on the chart. History of hypertension  -Initially she was hypotensive. However, upon discharge she was restarted on her amlodipine.  Her outpatient dose was 5 mg a day. It was increased to 10 mg a day upon discharge. She had been on lisinopril 20 mg daily at home as well, this was not resumed for now given her severe OLIVER    Hyperlipidemia  -Continue rosuvastatin    Body mass index is 30.43 kg/m².     Before the recent 3 admissions for stroke her last admission was in October 2019 for hypertensive urgency    Consults this admission:  IP CONSULT TO NEPHROLOGY  IP CONSULT TO HOSPITALIST  IP CONSULT TO INTERNAL MEDICINE  IP CONSULT TO INTERVENTIONAL RADIOLOGY  IP CONSULT TO PSYCHIATRY  IP CONSULT TO CASE MANAGEMENT  IP CONSULT TO [facility]    Discharge Instruction:     Follow up appointments: Recommend psychiatry consult while at facility  Primary care physician: Jose M Hernandez MD within 2 weeks  Diet: regular diet   Activity: activity as tolerated  Disposition: Discharged to:   SNF  Condition on discharge: Stable     Labs and Tests to be Followed up as an outpatient by PCP or Specialist: Follow-up CT scan to check on resolution of the changes in the sigmoid colon    Discharge Medications:        Medication List      START taking these medications    Culturelle Adult Ult Balance Caps  Take 1 capsule by mouth daily     melatonin 3 MG Tabs tablet  Take 1 tablet by mouth nightly as needed (sleep)     phosphorus 155-852-130 MG tablet  Commonly known as: K PHOS NEUTRAL  Take 1 tablet by mouth 2 times daily for 7 days     polyethylene glycol 17 g packet  Commonly known as: GLYCOLAX  Take 17 g by mouth daily as needed for Constipation        CHANGE how you take these medications    amLODIPine 10 MG tablet  Commonly known as: NORVASC  Take 1 tablet by mouth daily  What changed:   medication strength  how much to take        CONTINUE taking these medications    apixaban 5 MG Tabs tablet  Commonly known as: ELIQUIS  Take 1 tablet by mouth 2 times daily     clopidogrel 75 MG tablet  Commonly known as: PLAVIX  Take 1 tablet by mouth daily     donepezil 5 MG tablet  Commonly known as: ARICEPT     rosuvastatin 40 MG tablet  Commonly known as: CRESTOR        STOP taking these medications    lisinopril 20 MG tablet  Commonly known as: PRINIVIL;ZESTRIL           Where to Get Your Medications      Information about where to get these medications is not yet available    Ask your nurse or doctor about these medications  amLODIPine 10 MG tablet  Culturelle Adult Ult Balance Caps  melatonin 3 MG Tabs tablet  phosphorus 155-852-130 MG tablet  polyethylene glycol 17 g packet        Objective Findings at Discharge:   /77   Pulse 86   Temp 98 °F (36.7 °C) (Oral)   Resp 18   Ht 4' 10\" (1.473 m)   Wt 145 lb 9.6 oz (66 kg)   SpO2 94%   BMI 30.43 kg/m²     Physical Exam:     General: NAD  Eyes: EOMI  Respiratory: Respiratory effort nonlabored the chest        Labs and Imaging      US RETROPERITONEAL LIMITED [[id number]] Collected: 01/21/22 1057     Order Status: Completed Updated: 01/21/22 1100     Narrative:       EXAMINATION:   ULTRASOUND OF THE KIDNEYS     1/21/2022 10:06 am     COMPARISON:   CT abdomen pelvis dated 01/19/2022     HISTORY:   ORDERING SYSTEM PROVIDED HISTORY: arf   TECHNOLOGIST PROVIDED HISTORY:   Reason for exam:->arf     FINDINGS:   The right kidney measures 8.1 cm in length and the left kidney measures 8.1   cm in length. Kidneys demonstrate normal cortical echogenicity. No hydronephrosis or   intrarenal stones. Impression:       Atrophic appearing of the kidneys without sonographic evidence of   hydronephrosis seen. RECOMMENDATIONS:   Unavailable      IR NONTUNNELED VASCULAR CATHETER > 5 YEARS [[id number]] Collected: 01/21/22 0815     Order Status: Completed Updated: 01/21/22 0819     Narrative:       PROCEDURE:   ULTRASOUND GUIDED VASCULAR ACCESS. PLACEMENT OF A NON-TUNNELED CATHETER.     1/20/2022.          HISTORY:   ORDERING SYSTEM PROVIDED HISTORY: Temporary HD Catheter Insertion, remove   temp cath   TECHNOLOGIST PROVIDED HISTORY:   Reason for exam:->Temporary HD Catheter Insertion, remove temp cath     SEDATION:   None     TECHNIQUE:   Ultrasound guidance required for procedure to confirm target vessel patency,   puncture site selection, real-time intra procedural guidance. Images made   for patient's medical file. Following informed consent, pause a confirm/time-out ultrasound-guided   puncture site selection, skin and ultrasound probe were prepped and draped in   sterile fashion. 10 mL 1% lidocaine without epinephrine for local   anesthesia. Ultrasound-guided access right internal jugular vein is   achieved. Guidewires advanced over which track was dilated and a 16 cm   temporary dialysis access catheter was placed. Guidewires removed. Catheter   ports aspirated, flushed, capped and affixed to the patient's skin. Dressing   applied. Patient tolerated procedure well. Postprocedure chest radiograph ordered. FINDINGS:   Pre and intraprocedural images demonstrate patent right internal jugular vein   with access needle seen within it. Postprocedure chest radiograph   demonstrates temporary dialysis access catheter entering via right lower   cervical/internal jugular venous approach with tip in the mid right atrium. No complication suggested. Impression:       Successful ultrasound guided non-tunneled temporary dialysis access catheter   placement via right internal jugular venous approach.       XR CHEST PORTABLE [[id number]] Collected: 01/20/22 1733     Order Status: Completed Updated: 01/20/22 1743     Narrative:       EXAMINATION:   ONE XRAY VIEW OF THE CHEST     1/20/2022 4:44 pm     COMPARISON:   01/19/2022     HISTORY:   ORDERING SYSTEM PROVIDED HISTORY: post right IJ Vascath insertion   TECHNOLOGIST PROVIDED HISTORY:   Reason for exam:->post right IJ Vascath insertion   Reason for Exam: post right IJ Vascath insertion   Additional signs and symptoms: COVID-19   Relevant Medical/Surgical History: CAD     FINDINGS:   The lungs are without acute focal process. There is no effusion or   pneumothorax. The cardiomediastinal silhouette is without acute process. The   osseous structures are without acute process. Right central venous catheter   tip projects at the mid right atrium. Impression:       Right central venous catheter tip projects at the mid right atrium. CT CHEST WO CONTRAST [[id number]] Collected: 01/19/22 1909     Order Status: Completed Updated: 01/19/22 1918     Narrative:       EXAMINATION:   CT OF THE ABDOMEN AND PELVIS WITHOUT CONTRAST; CT OF THE CHEST WITHOUT   CONTRAST 1/19/2022 6:07 pm     TECHNIQUE:   CT of the abdomen and pelvis was performed without the administration of   intravenous contrast. Multiplanar reformatted images are provided for review. Dose modulation, iterative reconstruction, and/or weight based adjustment of   the mA/kV was utilized to reduce the radiation dose to as low as reasonably   achievable.; CT of the chest was performed without the administration of   intravenous contrast. Multiplanar reformatted images are provided for review. Dose modulation, iterative reconstruction, and/or weight based adjustment of   the mA/kV was utilized to reduce the radiation dose to as low as reasonably   achievable.      COMPARISON:   Abdomen pelvis 01/03/2022     HISTORY:   ORDERING SYSTEM PROVIDED HISTORY: AMS/Acute renal failure   TECHNOLOGIST PROVIDED HISTORY:   Reason for exam:->AMS/Acute renal failure   Additional Contrast?->None   Decision Support Exception - unselect if not a suspected or confirmed   emergency medical condition->Emergency Medical Condition (MA)   Reason for Exam: AMS/ACUTE RENAL FAILURE; ORDERING SYSTEM PROVIDED HISTORY:   ams   TECHNOLOGIST PROVIDED HISTORY:   Reason for exam:->ams   Decision Support Exception - unselect if not a suspected or confirmed   emergency medical condition->Emergency Medical Condition (MA)   Reason for Exam: AMS     FINDINGS:     Chest: Mediastinum: Cardiomegaly. Coronary calcifications. Lipomatous infiltration   the interatrial septum no effusion. No bulky adenopathy this noncontrast   exam.  Aortic vascular calcifications. Lungs/pleura: Hypoventilatory. Scattered head subpleural interstitial   thickening and scarring. No effusion. No pneumothorax. Left upper lung   granuloma. Soft Tissues/Bones: Degenerate changes of the thoracic spine. No displaced   rib fracture. Abdomen/Pelvis:     Organs: Evaluation challenge due lack contrast.  Gallbladder absent. Liver,   spleen, adrenal glands, kidneys, and pancreas unremarkable on this   noncontrast examination. No hydronephrosis. No obstructive uropathy. Bilateral hilar calcifications likely vascular. GI/Bowel: Scattered air-fluid levels could be related to partial obstruction   or nonspecific ileus. Colonic diverticulosis identified. Colonic wall   thickening and surrounding inflammatory changes along the sigmoid colon most   compatible with diverticulitis. Stable appendix. No findings of acute   appendicitis. Pelvis: Hysterectomy. Bladder is collapsed. No suspicious adnexal mass. Peritoneum/Retroperitoneum: No free air or free fluid. Aortic vascular   calcifications. Aorta is nonaneurysmal     Bones/Soft Tissues: Degenerate changes lumbar spine and bilateral hips. Impression:       Acute uncomplicated diverticulitis. Follow-up may beneficial document   resolution of changes wall thickening. Cardiomegaly/coronary artery disease and post CABG changes.   No acute   pleural-parenchymal disease within the chest.      CT ABDOMEN PELVIS WO CONTRAST Additional Contrast? None [[id number]] Collected: 01/19/22 1909     Order Status: Completed Updated: 01/19/22 1918     Narrative:       EXAMINATION:   CT OF THE ABDOMEN AND PELVIS WITHOUT CONTRAST; CT OF THE CHEST WITHOUT   CONTRAST 1/19/2022 6:07 pm     TECHNIQUE:   CT of the abdomen and pelvis was performed without the administration of   intravenous contrast. Multiplanar reformatted images are provided for review. Dose modulation, iterative reconstruction, and/or weight based adjustment of   the mA/kV was utilized to reduce the radiation dose to as low as reasonably   achievable.; CT of the chest was performed without the administration of   intravenous contrast. Multiplanar reformatted images are provided for review. Dose modulation, iterative reconstruction, and/or weight based adjustment of   the mA/kV was utilized to reduce the radiation dose to as low as reasonably   achievable. COMPARISON:   Abdomen pelvis 01/03/2022     HISTORY:   ORDERING SYSTEM PROVIDED HISTORY: AMS/Acute renal failure   TECHNOLOGIST PROVIDED HISTORY:   Reason for exam:->AMS/Acute renal failure   Additional Contrast?->None   Decision Support Exception - unselect if not a suspected or confirmed   emergency medical condition->Emergency Medical Condition (MA)   Reason for Exam: AMS/ACUTE RENAL FAILURE; ORDERING SYSTEM PROVIDED HISTORY:   ams   TECHNOLOGIST PROVIDED HISTORY:   Reason for exam:->ams   Decision Support Exception - unselect if not a suspected or confirmed   emergency medical condition->Emergency Medical Condition (MA)   Reason for Exam: AMS     FINDINGS:     Chest:     Mediastinum: Cardiomegaly. Coronary calcifications. Lipomatous infiltration   the interatrial septum no effusion. No bulky adenopathy this noncontrast   exam.  Aortic vascular calcifications. Lungs/pleura: Hypoventilatory. Scattered head subpleural interstitial   thickening and scarring. No effusion. No pneumothorax. Left upper lung   granuloma. Soft Tissues/Bones: Degenerate changes of the thoracic spine. No displaced   rib fracture. Abdomen/Pelvis:     Organs: Evaluation challenge due lack contrast.  Gallbladder absent. Liver,   spleen, adrenal glands, kidneys, and pancreas unremarkable on this   noncontrast examination.   No hydronephrosis. No obstructive uropathy. Bilateral hilar calcifications likely vascular. GI/Bowel: Scattered air-fluid levels could be related to partial obstruction   or nonspecific ileus. Colonic diverticulosis identified. Colonic wall   thickening and surrounding inflammatory changes along the sigmoid colon most   compatible with diverticulitis. Stable appendix. No findings of acute   appendicitis. Pelvis: Hysterectomy. Bladder is collapsed. No suspicious adnexal mass. Peritoneum/Retroperitoneum: No free air or free fluid. Aortic vascular   calcifications. Aorta is nonaneurysmal     Bones/Soft Tissues: Degenerate changes lumbar spine and bilateral hips. Impression:       Acute uncomplicated diverticulitis. Follow-up may beneficial document   resolution of changes wall thickening. Cardiomegaly/coronary artery disease and post CABG changes. No acute   pleural-parenchymal disease within the chest.      CT HEAD WO CONTRAST [[id number]] Collected: 01/19/22 1905     Order Status: Completed Updated: 01/19/22 1917     Narrative:       EXAMINATION:   CT OF THE HEAD WITHOUT CONTRAST  1/19/2022 6:04 pm     TECHNIQUE:   CT of the head was performed without the administration of intravenous   contrast. Dose modulation, iterative reconstruction, and/or weight based   adjustment of the mA/kV was utilized to reduce the radiation dose to as low   as reasonably achievable. COMPARISON:   MRI brain January 4, 2022, CT head January 2, 2022     HISTORY:   ORDERING SYSTEM PROVIDED HISTORY: HEAD TRAUMA, CLOSED, MILD, GCS >= 13, NO   RISK FACTORS, NEURO EXAM NORMAL   TECHNOLOGIST PROVIDED HISTORY:   Has a \"code stroke\" or \"stroke alert\" been called? ->No   Reason for exam:->ams   Decision Support Exception - unselect if not a suspected or confirmed   emergency medical condition->Emergency Medical Condition (MA)   Reason for Exam: ams, Head trauma, closed, mild, GCS >= 13, no risk factors,   neuro exam normal     FINDINGS:   BRAIN/VENTRICLES: There is curvilinear high attenuation along cortex of the   right parietooccipital lobes, likely related to known subacute infarctions   with laminar necrosis, versus cortical enhancement or less likely hemorrhagic   transformation. There are known subacute infarctions in the left frontal parietooccipital   lobes. There are scattered old infarctions in the left frontal parietooccipital   lobes, bilateral heads of caudate nucleus, right thalamus and left cerebellar   hemisphere. There is mild parenchymal volume loss. There is periventricular white matter   low attenuation, likely related to mild chronic microvascular disease. There   is no mass effect or midline shift. There is no hydrocephalus. ORBITS: The visualized portion of the orbits demonstrate no acute abnormality. SINUSES: There are air-fluid levels in the bilateral sphenoid sinuses. The   remainder of the paranasal sinuses and bilateral mastoid air cells are clear. SOFT TISSUES/SKULL:  No acute abnormality of the visualized skull or soft   tissues. Impression:       Curvilinear high attenuation along cortex of the right parietooccipital   lobes, likely related to known subacute infarctions with laminar necrosis,   versus cortical enhancement or less likely hemorrhagic transformation. Known subacute infarctions in the left frontal parietooccipital lobes. Scattered old infarctions in the left frontal parietooccipital lobes,   bilateral heads of caudate nucleus, right thalamus and left cerebellar   hemisphere. XR CHEST PORTABLE [[id number]] Collected: 01/19/22 1805     Order Status: Completed Updated: 01/19/22 1830     Narrative:       EXAMINATION:   ONE X-RAY VIEW OF THE CHEST     1/19/2022 6:00 pm     COMPARISON:   January 19, 2022, 4:06 p.m.      HISTORY:   ORDERING SYSTEM PROVIDED HISTORY:  SOB   TECHNOLOGIST PROVIDED HISTORY:   Reason for Exam:  SOB     Ongoing evaluation, acute shortness of breath. FINDINGS:   The cardiomediastinal silhouette is stable in size and contour. Stable post   sternotomy changes are present. There is a calcified granuloma involving the left upper lobe. The lungs are clear. No pleural effusion or pneumothorax is present. Impression:       No acute cardiopulmonary process. XR CHEST PORTABLE [[id number]] Collected: 01/19/22 1629     Order Status: Completed Updated: 01/19/22 1641     Narrative:       EXAMINATION:   ONE X-RAY VIEW OF THE CHEST     1/19/2022 3:56 pm     COMPARISON:   January 2, 2022     HISTORY:   ORDERING SYSTEM PROVIDED HISTORY:  AMS/hypotension   TECHNOLOGIST PROVIDED HISTORY:   Reason for Exam:  AMS/hypotension   Reason for Exam:  AMS/hypotension   Additional signs and symptoms:  NA   Relevant Medical/Surgical History:  NA     FINDINGS:   The cardiomediastinal silhouette is normal in size. Stable post sternotomy   changes are present. The lungs are clear without evidence of acute infiltrate. No pleural   effusion or pneumothorax is identified. No evidence of subdiaphragmatic free   air. Impression:       No acute cardiopulmonary process.       CTA CHEST ABDOMEN PELVIS W CONTRAST [[id number]]      Order Status: Canceled          Time Spent Discharging patient 50 minutes    Electronically signed by Anabell Hernadez MD on 2/5/2022 at 11:26 AM

## 2022-02-07 ENCOUNTER — HOSPITAL ENCOUNTER (OUTPATIENT)
Age: 72
Setting detail: SPECIMEN
Discharge: HOME | End: 2022-02-07

## 2022-02-07 LAB
ANION GAP SERPL CALCULATED.3IONS-SCNC: 10 MMOL/L (ref 4–16)
BUN BLDV-MCNC: 6 MG/DL (ref 6–23)
CALCIUM SERPL-MCNC: 8.6 MG/DL (ref 8.3–10.6)
CHLORIDE BLD-SCNC: 101 MMOL/L (ref 99–110)
CO2: 29 MMOL/L (ref 21–32)
CREAT SERPL-MCNC: 0.5 MG/DL (ref 0.6–1.1)
GFR AFRICAN AMERICAN: >60 ML/MIN/1.73M2
GFR NON-AFRICAN AMERICAN: >60 ML/MIN/1.73M2
GLUCOSE BLD-MCNC: 87 MG/DL (ref 70–99)
POTASSIUM SERPL-SCNC: 3.7 MMOL/L (ref 3.5–5.1)
SODIUM BLD-SCNC: 140 MMOL/L (ref 135–145)

## 2022-02-07 PROCEDURE — 87449 NOS EACH ORGANISM AG IA: CPT

## 2022-02-07 PROCEDURE — 80048 BASIC METABOLIC PNL TOTAL CA: CPT

## 2022-02-07 PROCEDURE — 36415 COLL VENOUS BLD VENIPUNCTURE: CPT

## 2022-02-07 PROCEDURE — 87324 CLOSTRIDIUM AG IA: CPT

## 2022-02-08 LAB
C DIFF AG + TOXIN: ABNORMAL
SOURCE: ABNORMAL

## 2022-02-10 ENCOUNTER — HOSPITAL ENCOUNTER (OUTPATIENT)
Age: 72
Setting detail: SPECIMEN
Discharge: HOME | End: 2022-02-10

## 2022-02-10 LAB
ANION GAP SERPL CALCULATED.3IONS-SCNC: 8 MMOL/L (ref 4–16)
BUN BLDV-MCNC: 6 MG/DL (ref 6–23)
CALCIUM SERPL-MCNC: 9.1 MG/DL (ref 8.3–10.6)
CHLORIDE BLD-SCNC: 102 MMOL/L (ref 99–110)
CO2: 35 MMOL/L (ref 21–32)
CREAT SERPL-MCNC: 0.6 MG/DL (ref 0.6–1.1)
GFR AFRICAN AMERICAN: >60 ML/MIN/1.73M2
GFR NON-AFRICAN AMERICAN: >60 ML/MIN/1.73M2
GLUCOSE BLD-MCNC: 91 MG/DL (ref 70–99)
POTASSIUM SERPL-SCNC: 3.9 MMOL/L (ref 3.5–5.1)
SODIUM BLD-SCNC: 145 MMOL/L (ref 135–145)

## 2022-02-10 PROCEDURE — 80048 BASIC METABOLIC PNL TOTAL CA: CPT

## 2022-02-10 PROCEDURE — 36415 COLL VENOUS BLD VENIPUNCTURE: CPT

## 2022-03-14 ENCOUNTER — HOSPITAL ENCOUNTER (INPATIENT)
Age: 72
LOS: 4 days | Discharge: HOME | DRG: 872 | End: 2022-03-19
Attending: STUDENT IN AN ORGANIZED HEALTH CARE EDUCATION/TRAINING PROGRAM | Admitting: FAMILY MEDICINE
Payer: MEDICARE

## 2022-03-14 ENCOUNTER — APPOINTMENT (OUTPATIENT)
Dept: CT IMAGING | Age: 72
DRG: 872 | End: 2022-03-14
Payer: MEDICARE

## 2022-03-14 ENCOUNTER — APPOINTMENT (OUTPATIENT)
Dept: GENERAL RADIOLOGY | Age: 72
DRG: 872 | End: 2022-03-14
Payer: MEDICARE

## 2022-03-14 DIAGNOSIS — R10.84 GENERALIZED ABDOMINAL PAIN: Primary | ICD-10-CM

## 2022-03-14 DIAGNOSIS — R19.7 NAUSEA VOMITING AND DIARRHEA: ICD-10-CM

## 2022-03-14 DIAGNOSIS — I95.89 HYPOTENSION DUE TO HYPOVOLEMIA: ICD-10-CM

## 2022-03-14 DIAGNOSIS — R11.2 NAUSEA VOMITING AND DIARRHEA: ICD-10-CM

## 2022-03-14 DIAGNOSIS — E86.0 DEHYDRATION: ICD-10-CM

## 2022-03-14 DIAGNOSIS — E86.1 HYPOTENSION DUE TO HYPOVOLEMIA: ICD-10-CM

## 2022-03-14 LAB
ALBUMIN SERPL-MCNC: 3.4 GM/DL (ref 3.4–5)
ALP BLD-CCNC: 127 IU/L (ref 40–129)
ALT SERPL-CCNC: <5 U/L (ref 10–40)
ANION GAP SERPL CALCULATED.3IONS-SCNC: 18 MMOL/L (ref 4–16)
AST SERPL-CCNC: 14 IU/L (ref 15–37)
BASOPHILS ABSOLUTE: 0.1 K/CU MM
BASOPHILS RELATIVE PERCENT: 0.4 % (ref 0–1)
BILIRUB SERPL-MCNC: 0.4 MG/DL (ref 0–1)
BUN BLDV-MCNC: 14 MG/DL (ref 6–23)
CALCIUM SERPL-MCNC: 9.3 MG/DL (ref 8.3–10.6)
CHLORIDE BLD-SCNC: 100 MMOL/L (ref 99–110)
CO2: 21 MMOL/L (ref 21–32)
CREAT SERPL-MCNC: 1.4 MG/DL (ref 0.6–1.1)
DIFFERENTIAL TYPE: ABNORMAL
EOSINOPHILS ABSOLUTE: 0.1 K/CU MM
EOSINOPHILS RELATIVE PERCENT: 0.5 % (ref 0–3)
GFR AFRICAN AMERICAN: 45 ML/MIN/1.73M2
GFR NON-AFRICAN AMERICAN: 37 ML/MIN/1.73M2
GLUCOSE BLD-MCNC: 149 MG/DL (ref 70–99)
HCT VFR BLD CALC: 39.8 % (ref 37–47)
HEMOGLOBIN: 12.2 GM/DL (ref 12.5–16)
IMMATURE NEUTROPHIL %: 0.5 % (ref 0–0.43)
LACTATE: 2.1 MMOL/L (ref 0.4–2)
LIPASE: 7 IU/L (ref 13–60)
LYMPHOCYTES ABSOLUTE: 1.8 K/CU MM
LYMPHOCYTES RELATIVE PERCENT: 8.6 % (ref 24–44)
MAGNESIUM: 1.7 MG/DL (ref 1.8–2.4)
MCH RBC QN AUTO: 28.4 PG (ref 27–31)
MCHC RBC AUTO-ENTMCNC: 30.7 % (ref 32–36)
MCV RBC AUTO: 92.8 FL (ref 78–100)
MONOCYTES ABSOLUTE: 1.7 K/CU MM
MONOCYTES RELATIVE PERCENT: 8.3 % (ref 0–4)
NUCLEATED RBC %: 0 %
PDW BLD-RTO: 15.4 % (ref 11.7–14.9)
PLATELET # BLD: 591 K/CU MM (ref 140–440)
PMV BLD AUTO: 10.1 FL (ref 7.5–11.1)
POTASSIUM SERPL-SCNC: 3.2 MMOL/L (ref 3.5–5.1)
RBC # BLD: 4.29 M/CU MM (ref 4.2–5.4)
SEGMENTED NEUTROPHILS ABSOLUTE COUNT: 17 K/CU MM
SEGMENTED NEUTROPHILS RELATIVE PERCENT: 81.7 % (ref 36–66)
SODIUM BLD-SCNC: 139 MMOL/L (ref 135–145)
TOTAL IMMATURE NEUTOROPHIL: 0.11 K/CU MM
TOTAL NUCLEATED RBC: 0 K/CU MM
TOTAL PROTEIN: 6.1 GM/DL (ref 6.4–8.2)
TROPONIN T: <0.01 NG/ML
WBC # BLD: 20.8 K/CU MM (ref 4–10.5)

## 2022-03-14 PROCEDURE — 6360000002 HC RX W HCPCS: Performed by: STUDENT IN AN ORGANIZED HEALTH CARE EDUCATION/TRAINING PROGRAM

## 2022-03-14 PROCEDURE — 2580000003 HC RX 258: Performed by: STUDENT IN AN ORGANIZED HEALTH CARE EDUCATION/TRAINING PROGRAM

## 2022-03-14 PROCEDURE — 6360000002 HC RX W HCPCS: Performed by: PHYSICIAN ASSISTANT

## 2022-03-14 PROCEDURE — 93005 ELECTROCARDIOGRAM TRACING: CPT | Performed by: PHYSICIAN ASSISTANT

## 2022-03-14 PROCEDURE — 2500000003 HC RX 250 WO HCPCS: Performed by: STUDENT IN AN ORGANIZED HEALTH CARE EDUCATION/TRAINING PROGRAM

## 2022-03-14 PROCEDURE — 85025 COMPLETE CBC W/AUTO DIFF WBC: CPT

## 2022-03-14 PROCEDURE — 83690 ASSAY OF LIPASE: CPT

## 2022-03-14 PROCEDURE — 83605 ASSAY OF LACTIC ACID: CPT

## 2022-03-14 PROCEDURE — 99285 EMERGENCY DEPT VISIT HI MDM: CPT

## 2022-03-14 PROCEDURE — 96361 HYDRATE IV INFUSION ADD-ON: CPT

## 2022-03-14 PROCEDURE — 74176 CT ABD & PELVIS W/O CONTRAST: CPT

## 2022-03-14 PROCEDURE — 84484 ASSAY OF TROPONIN QUANT: CPT

## 2022-03-14 PROCEDURE — 96375 TX/PRO/DX INJ NEW DRUG ADDON: CPT

## 2022-03-14 PROCEDURE — 80053 COMPREHEN METABOLIC PANEL: CPT

## 2022-03-14 PROCEDURE — 2580000003 HC RX 258: Performed by: PHYSICIAN ASSISTANT

## 2022-03-14 PROCEDURE — 96365 THER/PROPH/DIAG IV INF INIT: CPT

## 2022-03-14 PROCEDURE — 71045 X-RAY EXAM CHEST 1 VIEW: CPT

## 2022-03-14 PROCEDURE — 83735 ASSAY OF MAGNESIUM: CPT

## 2022-03-14 RX ORDER — ONDANSETRON 2 MG/ML
4 INJECTION INTRAMUSCULAR; INTRAVENOUS EVERY 30 MIN PRN
Status: DISCONTINUED | OUTPATIENT
Start: 2022-03-14 | End: 2022-03-15

## 2022-03-14 RX ORDER — 0.9 % SODIUM CHLORIDE 0.9 %
1000 INTRAVENOUS SOLUTION INTRAVENOUS ONCE
Status: COMPLETED | OUTPATIENT
Start: 2022-03-14 | End: 2022-03-14

## 2022-03-14 RX ADMIN — SODIUM CHLORIDE 1000 ML: 9 INJECTION, SOLUTION INTRAVENOUS at 18:25

## 2022-03-14 RX ADMIN — SODIUM CHLORIDE 1000 ML: 9 INJECTION, SOLUTION INTRAVENOUS at 17:13

## 2022-03-14 RX ADMIN — CEFTRIAXONE SODIUM 1000 MG: 1 INJECTION, POWDER, FOR SOLUTION INTRAMUSCULAR; INTRAVENOUS at 20:46

## 2022-03-14 RX ADMIN — ONDANSETRON 4 MG: 2 INJECTION INTRAMUSCULAR; INTRAVENOUS at 17:40

## 2022-03-14 RX ADMIN — METRONIDAZOLE 500 MG: 500 INJECTION, SOLUTION INTRAVENOUS at 22:05

## 2022-03-14 NOTE — ED PROVIDER NOTES
As PA-in-triage, I performed a medical screening history and physical exam on this patient. HISTORY OF PRESENT ILLNESS  Charlotte Grajeda is a 67 y.o. female presents with daughter for generalized abdominal pain nausea vomiting diarrhea. Daughter states patient had C. difficile approximately a month ago, complete antibiotics but has had return of similar foul-smelling frequent loose stools over the last week. She has had decreased appetite and increased fatigue and lethargy at home. No fever, cough. Patient daughter had endorsed to triage worsening altered mental status however patient has been confused since recent stroke and Covid infection at end of 2021 and does not report that her confusion is any worse today than her baseline. No fever, new foods or recent other antibiotics. .      PHYSICAL EXAM  BP (!) 166/143   Pulse 64   Resp 18   SpO2 93%     On exam, the patient appears well-hydrated, well-nourished, and in no acute distress. Mucous membranes are moist. Speech is clear. Breathing is unlabored. Skin is dry. Mental status is normal.      Labs, medications, imaging ordered at triage. Please see ED provider's note for patient complete ED evaluation, labs/imaging interpretation and final disposition/clinical impression.          Rosa Isela Flanagan PA-C  03/14/22 [id number]

## 2022-03-14 NOTE — ED NOTES
Asked pt if she would walk to bathroom to give urine sample. Pt states \"Gisele been here all day and Im tired of walking. I walk every where and Im tired of it. If I had to go I would have went. \"  Pt upset that her daughters left.       Melodie Evansven  03/14/22 1929

## 2022-03-14 NOTE — ED PROVIDER NOTES
14 tablet 0    apixaban (ELIQUIS) 5 MG TABS tablet Take 1 tablet by mouth 2 times daily 60 tablet 0    clopidogrel (PLAVIX) 75 MG tablet Take 1 tablet by mouth daily 30 tablet 3    donepezil (ARICEPT) 5 MG tablet Take 1 tablet by mouth nightly      rosuvastatin (CRESTOR) 40 MG tablet Take 1 tablet by mouth daily       Allergies   Allergen Reactions    Penicillins     Sulfa Antibiotics Swelling       Nursing Notes Reviewed    Physical Exam:  Triage VS:    ED Triage Vitals   Enc Vitals Group      BP 03/14/22 1609 (!) 166/143      Pulse 03/14/22 1608 64      Resp 03/14/22 1608 18      Temp 03/14/22 1623 98 °F (36.7 °C)      Temp Source 03/14/22 1623 Oral      SpO2 03/14/22 1608 93 %      Weight 03/14/22 1638 133 lb (60.3 kg)      Height 03/14/22 1638 4' 9\" (1.448 m)      Head Circumference --       Peak Flow --       Pain Score --       Pain Loc --       Pain Edu? --       Excl. in [address]? --        My pulse ox interpretation is - normal    General appearance:  Comfortable. No acute distress. Eye:  Extraocular movements intact. Pupils equal and reactive bilaterally. Ears, nose, mouth and throat:  Normal voice. Neck:  Symmetric. No obvious swelling on external exam.   Heart:  Regular rate and rhythm, no extra heart sounds. Normal peripheral perfusion. Hypotensive blood pressures. Respiratory:  Lungs clear to auscultation bilaterally. Respirations nonlabored. Abdominal:  Soft. Nontender. Non distended. Extremity:  No swelling. Normal ROM. Skin:  Warm. Dry. Neurological:  Alert and oriented x3. No facial asymmetry. Normal speech. Moving all extremities. Psychiatric:  Appropriate mood.       I have reviewed and interpreted all of the currently available lab results from this visit (if applicable):  Results for orders placed or performed during the hospital encounter of 03/14/22   CBC with Auto Differential   Result Value Ref Range    WBC 20.8 (H) 4.0 - 10.5 K/CU MM    RBC 4.29 4.2 - 5.4 M/CU MM Hemoglobin 12.2 (L) 12.5 - 16.0 GM/DL    Hematocrit 39.8 37 - 47 %    MCV 92.8 78 - 100 FL    MCH 28.4 27 - 31 PG    MCHC 30.7 (L) 32.0 - 36.0 %    RDW 15.4 (H) 11.7 - 14.9 %    Platelets 589 (H) 873 - 440 K/CU MM    MPV 10.1 7.5 - 11.1 FL    Differential Type AUTOMATED DIFFERENTIAL     Segs Relative 81.7 (H) 36 - 66 %    Lymphocytes % 8.6 (L) 24 - 44 %    Monocytes % 8.3 (H) 0 - 4 %    Eosinophils % 0.5 0 - 3 %    Basophils % 0.4 0 - 1 %    Segs Absolute 17.0 K/CU MM    Lymphocytes Absolute 1.8 K/CU MM    Monocytes Absolute 1.7 K/CU MM    Eosinophils Absolute 0.1 K/CU MM    Basophils Absolute 0.1 K/CU MM    Nucleated RBC % 0.0 %    Total Nucleated RBC 0.0 K/CU MM    Total Immature Neutrophil 0.11 K/CU MM    Immature Neutrophil % 0.5 (H) 0 - 0.43 %   Comprehensive Metabolic Panel   Result Value Ref Range    Sodium 139 135 - 145 MMOL/L    Potassium 3.2 (L) 3.5 - 5.1 MMOL/L    Chloride 100 99 - 110 mMol/L    CO2 21 21 - 32 MMOL/L    BUN 14 6 - 23 MG/DL    CREATININE 1.4 (H) 0.6 - 1.1 MG/DL    Glucose 149 (H) 70 - 99 MG/DL    Calcium 9.3 8.3 - 10.6 MG/DL    Albumin 3.4 3.4 - 5.0 GM/DL    Total Protein 6.1 (L) 6.4 - 8.2 GM/DL    Total Bilirubin 0.4 0.0 - 1.0 MG/DL    ALT <5 (L) 10 - 40 U/L    AST 14 (L) 15 - 37 IU/L    Alkaline Phosphatase 127 40 - 129 IU/L    GFR Non- 37 (L) >60 mL/min/1.73m2    GFR  45 (L) >60 mL/min/1.73m2    Anion Gap 18 (H) 4 - 16   Troponin   Result Value Ref Range    Troponin T <0.010 <0.01 NG/ML   Magnesium   Result Value Ref Range    Magnesium 1.7 (L) 1.8 - 2.4 mg/dl   Lactic Acid   Result Value Ref Range    Lactate 2.1 (HH) 0.4 - 2.0 mMOL/L   Lipase   Result Value Ref Range    Lipase 7 (L) 13 - 60 IU/L   EKG 12 Lead   Result Value Ref Range    Ventricular Rate 103 BPM    Atrial Rate 103 BPM    P-R Interval 148 ms    QRS Duration 76 ms    Q-T Interval 352 ms    QTc Calculation (Bazett) 461 ms    P Axis 63 degrees    R Axis -50 degrees    T Axis 72 degrees Diagnosis       Sinus tachycardia  Left axis deviation  Inferior infarct (cited on or before 29-OCT-2019)  Abnormal ECG  When compared with ECG of 19-JAN-2022 15:45,  No significant change was found        Radiographs (if obtained):  Radiologist's Report Reviewed:  XR CHEST PORTABLE    Result Date: 3/14/2022  EXAMINATION: ONE X-RAY VIEW OF THE CHEST 3/14/2022 4:27 pm COMPARISON: January 20, 2022 HISTORY: ORDERING SYSTEM PROVIDED HISTORY: chest pain TECHNOLOGIST PROVIDED HISTORY: Reason for exam:->chest pain Reason for Exam: abdominal pain, diarrhea, AMS, chest pain FINDINGS: The cardiomediastinal silhouette is normal in size. There are stable post sternotomy changes present. The lungs are clear. No pleural effusion or pneumothorax. Right-sided central venous catheter has been removed. No acute cardiopulmonary process. EKG (if obtained): (All EKG's are interpreted by myself in the absence of a cardiologist)      MDM:  67-year-old female presenting with nausea, vomiting, diarrhea for multiple days. Feeling weak. Has not been able to tolerate p.o. Hypertensive on arrival without tachycardia. Systolics in the 17I.  2 L IV fluids ordered with slight improvement in blood pressure. Patient is still mentating well and without acute complaint. Labs show significantly elevated leukocytosis with slightly elevated lactate. Due to this ceftriaxone and Flagyl was started. CT scan is without acute intra-abdominal process. Dr. Adrian Miller to admit. Clinical Impression:  1. Generalized abdominal pain    2. Dehydration    3. Nausea vomiting and diarrhea    4. Hypotension due to hypovolemia      Disposition referral (if applicable):  No follow-up provider specified.   Disposition medications (if applicable):  New Prescriptions    No medications on file     ED Provider Disposition Time  DISPOSITION admit      Comment: Please note this report has been produced using speech recognition software and may contain errors related to that system including errors in grammar, punctuation, and spelling, as well as words and phrases that may be inappropriate. Efforts were made to edit the dictations.         Derik Min MD  03/14/22 0099       Derik Min MD  03/14/22 7275

## 2022-03-15 PROBLEM — N17.9 ARF (ACUTE RENAL FAILURE) (HCC): Status: ACTIVE | Noted: 2022-03-15

## 2022-03-15 LAB
ANION GAP SERPL CALCULATED.3IONS-SCNC: 12 MMOL/L (ref 4–16)
BACTERIA: NEGATIVE /HPF
BASOPHILS ABSOLUTE: 0.1 K/CU MM
BASOPHILS RELATIVE PERCENT: 0.3 % (ref 0–1)
BILIRUBIN URINE: NEGATIVE MG/DL
BLOOD, URINE: ABNORMAL
BUN BLDV-MCNC: 15 MG/DL (ref 6–23)
CALCIUM SERPL-MCNC: 8.9 MG/DL (ref 8.3–10.6)
CHLORIDE BLD-SCNC: 105 MMOL/L (ref 99–110)
CLARITY: CLEAR
CO2: 23 MMOL/L (ref 21–32)
COLOR: YELLOW
CREAT SERPL-MCNC: 1.3 MG/DL (ref 0.6–1.1)
DIFFERENTIAL TYPE: ABNORMAL
EKG ATRIAL RATE: 103 BPM
EKG DIAGNOSIS: NORMAL
EKG P AXIS: 63 DEGREES
EKG P-R INTERVAL: 148 MS
EKG Q-T INTERVAL: 352 MS
EKG QRS DURATION: 76 MS
EKG QTC CALCULATION (BAZETT): 461 MS
EKG R AXIS: -50 DEGREES
EKG T AXIS: 72 DEGREES
EKG VENTRICULAR RATE: 103 BPM
EOSINOPHILS ABSOLUTE: 0.2 K/CU MM
EOSINOPHILS RELATIVE PERCENT: 1.4 % (ref 0–3)
GFR AFRICAN AMERICAN: 49 ML/MIN/1.73M2
GFR NON-AFRICAN AMERICAN: 40 ML/MIN/1.73M2
GLUCOSE BLD-MCNC: 98 MG/DL (ref 70–99)
GLUCOSE, URINE: NEGATIVE MG/DL
HCT VFR BLD CALC: 35.4 % (ref 37–47)
HEMOGLOBIN: 10.9 GM/DL (ref 12.5–16)
IMMATURE NEUTROPHIL %: 0.4 % (ref 0–0.43)
KETONES, URINE: NEGATIVE MG/DL
LEUKOCYTE ESTERASE, URINE: ABNORMAL
LYMPHOCYTES ABSOLUTE: 1.9 K/CU MM
LYMPHOCYTES RELATIVE PERCENT: 12.6 % (ref 24–44)
MAGNESIUM: 1.6 MG/DL (ref 1.8–2.4)
MCH RBC QN AUTO: 28.8 PG (ref 27–31)
MCHC RBC AUTO-ENTMCNC: 30.8 % (ref 32–36)
MCV RBC AUTO: 93.4 FL (ref 78–100)
MONOCYTES ABSOLUTE: 1.1 K/CU MM
MONOCYTES RELATIVE PERCENT: 7.5 % (ref 0–4)
MUCUS: ABNORMAL HPF
NITRITE URINE, QUANTITATIVE: NEGATIVE
NUCLEATED RBC %: 0 %
PDW BLD-RTO: 15.2 % (ref 11.7–14.9)
PH, URINE: 5.5 (ref 5–8)
PLATELET # BLD: 503 K/CU MM (ref 140–440)
PMV BLD AUTO: 9.6 FL (ref 7.5–11.1)
POTASSIUM SERPL-SCNC: 2.8 MMOL/L (ref 3.5–5.1)
PROTEIN UA: ABNORMAL MG/DL
RBC # BLD: 3.79 M/CU MM (ref 4.2–5.4)
RBC URINE: 2 /HPF (ref 0–6)
SEGMENTED NEUTROPHILS ABSOLUTE COUNT: 11.4 K/CU MM
SEGMENTED NEUTROPHILS RELATIVE PERCENT: 77.8 % (ref 36–66)
SODIUM BLD-SCNC: 140 MMOL/L (ref 135–145)
SPECIFIC GRAVITY UA: 1.02 (ref 1–1.03)
SQUAMOUS EPITHELIAL: 2 /HPF
TOTAL IMMATURE NEUTOROPHIL: 0.06 K/CU MM
TOTAL NUCLEATED RBC: 0 K/CU MM
TRICHOMONAS: ABNORMAL /HPF
UROBILINOGEN, URINE: NORMAL MG/DL (ref 0.2–1)
WBC # BLD: 14.7 K/CU MM (ref 4–10.5)
WBC UA: 3 /HPF (ref 0–5)

## 2022-03-15 PROCEDURE — 80048 BASIC METABOLIC PNL TOTAL CA: CPT

## 2022-03-15 PROCEDURE — 81001 URINALYSIS AUTO W/SCOPE: CPT

## 2022-03-15 PROCEDURE — 6370000000 HC RX 637 (ALT 250 FOR IP): Performed by: FAMILY MEDICINE

## 2022-03-15 PROCEDURE — 85025 COMPLETE CBC W/AUTO DIFF WBC: CPT

## 2022-03-15 PROCEDURE — 6360000002 HC RX W HCPCS: Performed by: FAMILY MEDICINE

## 2022-03-15 PROCEDURE — 83735 ASSAY OF MAGNESIUM: CPT

## 2022-03-15 PROCEDURE — 87086 URINE CULTURE/COLONY COUNT: CPT

## 2022-03-15 PROCEDURE — 87077 CULTURE AEROBIC IDENTIFY: CPT

## 2022-03-15 PROCEDURE — 87040 BLOOD CULTURE FOR BACTERIA: CPT

## 2022-03-15 PROCEDURE — 87070 CULTURE OTHR SPECIMN AEROBIC: CPT

## 2022-03-15 PROCEDURE — 36415 COLL VENOUS BLD VENIPUNCTURE: CPT

## 2022-03-15 PROCEDURE — 87186 SC STD MICRODIL/AGAR DIL: CPT

## 2022-03-15 PROCEDURE — 2140000000 HC CCU INTERMEDIATE R&B

## 2022-03-15 PROCEDURE — 2580000003 HC RX 258: Performed by: FAMILY MEDICINE

## 2022-03-15 PROCEDURE — 94761 N-INVAS EAR/PLS OXIMETRY MLT: CPT

## 2022-03-15 PROCEDURE — 93010 ELECTROCARDIOGRAM REPORT: CPT | Performed by: INTERNAL MEDICINE

## 2022-03-15 RX ORDER — ROSUVASTATIN CALCIUM 20 MG/1
40 TABLET, COATED ORAL DAILY
Status: DISCONTINUED | OUTPATIENT
Start: 2022-03-15 | End: 2022-03-19 | Stop reason: HOSPADM

## 2022-03-15 RX ORDER — BENZONATATE 100 MG/1
200 CAPSULE ORAL 3 TIMES DAILY PRN
Status: DISCONTINUED | OUTPATIENT
Start: 2022-03-15 | End: 2022-03-19 | Stop reason: HOSPADM

## 2022-03-15 RX ORDER — MAGNESIUM SULFATE IN WATER 40 MG/ML
2000 INJECTION, SOLUTION INTRAVENOUS PRN
Status: DISCONTINUED | OUTPATIENT
Start: 2022-03-15 | End: 2022-03-19 | Stop reason: HOSPADM

## 2022-03-15 RX ORDER — ACETAMINOPHEN 325 MG/1
650 TABLET ORAL EVERY 6 HOURS PRN
Status: DISCONTINUED | OUTPATIENT
Start: 2022-03-15 | End: 2022-03-19 | Stop reason: HOSPADM

## 2022-03-15 RX ORDER — SODIUM PHOSPHATE, DIBASIC AND SODIUM PHOSPHATE, MONOBASIC 7; 19 G/133ML; G/133ML
1 ENEMA RECTAL DAILY PRN
Status: DISCONTINUED | OUTPATIENT
Start: 2022-03-15 | End: 2022-03-19 | Stop reason: HOSPADM

## 2022-03-15 RX ORDER — SODIUM CHLORIDE 0.9 % (FLUSH) 0.9 %
5-40 SYRINGE (ML) INJECTION PRN
Status: DISCONTINUED | OUTPATIENT
Start: 2022-03-15 | End: 2022-03-19 | Stop reason: HOSPADM

## 2022-03-15 RX ORDER — ACETAMINOPHEN 650 MG/1
650 SUPPOSITORY RECTAL EVERY 6 HOURS PRN
Status: DISCONTINUED | OUTPATIENT
Start: 2022-03-15 | End: 2022-03-19 | Stop reason: HOSPADM

## 2022-03-15 RX ORDER — ONDANSETRON 2 MG/ML
4 INJECTION INTRAMUSCULAR; INTRAVENOUS EVERY 6 HOURS PRN
Status: DISCONTINUED | OUTPATIENT
Start: 2022-03-15 | End: 2022-03-19 | Stop reason: HOSPADM

## 2022-03-15 RX ORDER — POLYETHYLENE GLYCOL 3350 17 G/17G
17 POWDER, FOR SOLUTION ORAL DAILY PRN
Status: DISCONTINUED | OUTPATIENT
Start: 2022-03-15 | End: 2022-03-19 | Stop reason: HOSPADM

## 2022-03-15 RX ORDER — ZOLPIDEM TARTRATE 5 MG/1
5 TABLET ORAL NIGHTLY PRN
Status: DISCONTINUED | OUTPATIENT
Start: 2022-03-15 | End: 2022-03-19 | Stop reason: HOSPADM

## 2022-03-15 RX ORDER — ERGOCALCIFEROL 1.25 MG/1
50000 CAPSULE ORAL WEEKLY
Status: DISCONTINUED | OUTPATIENT
Start: 2022-03-15 | End: 2022-03-19 | Stop reason: HOSPADM

## 2022-03-15 RX ORDER — METRONIDAZOLE 250 MG/1
500 TABLET ORAL EVERY 8 HOURS SCHEDULED
Status: DISCONTINUED | OUTPATIENT
Start: 2022-03-15 | End: 2022-03-17

## 2022-03-15 RX ORDER — DONEPEZIL HYDROCHLORIDE 5 MG/1
5 TABLET, FILM COATED ORAL EVERY EVENING
Status: DISCONTINUED | OUTPATIENT
Start: 2022-03-15 | End: 2022-03-19 | Stop reason: HOSPADM

## 2022-03-15 RX ORDER — PROMETHAZINE HYDROCHLORIDE 25 MG/1
12.5 TABLET ORAL EVERY 6 HOURS PRN
Status: DISCONTINUED | OUTPATIENT
Start: 2022-03-15 | End: 2022-03-19 | Stop reason: HOSPADM

## 2022-03-15 RX ORDER — SODIUM CHLORIDE 9 MG/ML
INJECTION, SOLUTION INTRAVENOUS CONTINUOUS
Status: DISCONTINUED | OUTPATIENT
Start: 2022-03-15 | End: 2022-03-17

## 2022-03-15 RX ORDER — LORAZEPAM 1 MG/1
1 TABLET ORAL EVERY 8 HOURS PRN
Status: DISCONTINUED | OUTPATIENT
Start: 2022-03-15 | End: 2022-03-19 | Stop reason: HOSPADM

## 2022-03-15 RX ORDER — OXYCODONE HYDROCHLORIDE AND ACETAMINOPHEN 5; 325 MG/1; MG/1
1 TABLET ORAL EVERY 8 HOURS PRN
Status: DISCONTINUED | OUTPATIENT
Start: 2022-03-15 | End: 2022-03-19 | Stop reason: HOSPADM

## 2022-03-15 RX ORDER — MORPHINE SULFATE 2 MG/ML
2 INJECTION, SOLUTION INTRAMUSCULAR; INTRAVENOUS EVERY 4 HOURS PRN
Status: DISCONTINUED | OUTPATIENT
Start: 2022-03-15 | End: 2022-03-19 | Stop reason: HOSPADM

## 2022-03-15 RX ORDER — SODIUM CHLORIDE 9 MG/ML
25 INJECTION, SOLUTION INTRAVENOUS PRN
Status: DISCONTINUED | OUTPATIENT
Start: 2022-03-15 | End: 2022-03-19 | Stop reason: HOSPADM

## 2022-03-15 RX ORDER — CALCIUM CARBONATE 200(500)MG
750 TABLET,CHEWABLE ORAL 3 TIMES DAILY PRN
Status: DISCONTINUED | OUTPATIENT
Start: 2022-03-15 | End: 2022-03-19 | Stop reason: HOSPADM

## 2022-03-15 RX ORDER — SODIUM CHLORIDE 0.9 % (FLUSH) 0.9 %
5-40 SYRINGE (ML) INJECTION EVERY 12 HOURS SCHEDULED
Status: DISCONTINUED | OUTPATIENT
Start: 2022-03-15 | End: 2022-03-19 | Stop reason: HOSPADM

## 2022-03-15 RX ORDER — MAGNESIUM OXIDE 400 MG/1
400 TABLET ORAL 2 TIMES DAILY
Status: DISCONTINUED | OUTPATIENT
Start: 2022-03-15 | End: 2022-03-19 | Stop reason: HOSPADM

## 2022-03-15 RX ORDER — MAGNESIUM HYDROXIDE/ALUMINUM HYDROXICE/SIMETHICONE 120; 1200; 1200 MG/30ML; MG/30ML; MG/30ML
30 SUSPENSION ORAL EVERY 6 HOURS PRN
Status: DISCONTINUED | OUTPATIENT
Start: 2022-03-15 | End: 2022-03-19 | Stop reason: HOSPADM

## 2022-03-15 RX ORDER — AMLODIPINE BESYLATE 10 MG/1
10 TABLET ORAL DAILY
Status: DISCONTINUED | OUTPATIENT
Start: 2022-03-15 | End: 2022-03-19 | Stop reason: HOSPADM

## 2022-03-15 RX ORDER — PROMETHAZINE HYDROCHLORIDE 25 MG/ML
12.5 INJECTION, SOLUTION INTRAMUSCULAR; INTRAVENOUS EVERY 6 HOURS PRN
Status: DISCONTINUED | OUTPATIENT
Start: 2022-03-15 | End: 2022-03-19 | Stop reason: HOSPADM

## 2022-03-15 RX ORDER — POTASSIUM CHLORIDE 20 MEQ/1
40 TABLET, EXTENDED RELEASE ORAL PRN
Status: DISCONTINUED | OUTPATIENT
Start: 2022-03-15 | End: 2022-03-19 | Stop reason: HOSPADM

## 2022-03-15 RX ORDER — POTASSIUM CHLORIDE 7.45 MG/ML
10 INJECTION INTRAVENOUS PRN
Status: DISCONTINUED | OUTPATIENT
Start: 2022-03-15 | End: 2022-03-19 | Stop reason: HOSPADM

## 2022-03-15 RX ORDER — CLOPIDOGREL BISULFATE 75 MG/1
75 TABLET ORAL DAILY
Status: DISCONTINUED | OUTPATIENT
Start: 2022-03-15 | End: 2022-03-19 | Stop reason: HOSPADM

## 2022-03-15 RX ADMIN — ACETAMINOPHEN 650 MG: 325 TABLET ORAL at 02:43

## 2022-03-15 RX ADMIN — OXYCODONE AND ACETAMINOPHEN 1 TABLET: 5; 325 TABLET ORAL at 08:51

## 2022-03-15 RX ADMIN — APIXABAN 2.5 MG: 2.5 TABLET, FILM COATED ORAL at 21:56

## 2022-03-15 RX ADMIN — MEROPENEM 1000 MG: 1 INJECTION, POWDER, FOR SOLUTION INTRAVENOUS at 03:35

## 2022-03-15 RX ADMIN — LORAZEPAM 1 MG: 1 TABLET ORAL at 21:56

## 2022-03-15 RX ADMIN — APIXABAN 2.5 MG: 2.5 TABLET, FILM COATED ORAL at 08:48

## 2022-03-15 RX ADMIN — SODIUM CHLORIDE, PRESERVATIVE FREE 10 ML: 5 INJECTION INTRAVENOUS at 08:51

## 2022-03-15 RX ADMIN — MAGNESIUM OXIDE 400 MG (241.3 MG MAGNESIUM) TABLET 400 MG: TABLET at 21:56

## 2022-03-15 RX ADMIN — POTASSIUM CHLORIDE 10 MEQ: 7.46 INJECTION, SOLUTION INTRAVENOUS at 05:18

## 2022-03-15 RX ADMIN — DIBASIC SODIUM PHOSPHATE, MONOBASIC POTASSIUM PHOSPHATE AND MONOBASIC SODIUM PHOSPHATE 1 TABLET: 852; 155; 130 TABLET ORAL at 03:38

## 2022-03-15 RX ADMIN — POTASSIUM CHLORIDE 10 MEQ: 7.46 INJECTION, SOLUTION INTRAVENOUS at 10:34

## 2022-03-15 RX ADMIN — DIBASIC SODIUM PHOSPHATE, MONOBASIC POTASSIUM PHOSPHATE AND MONOBASIC SODIUM PHOSPHATE 1 TABLET: 852; 155; 130 TABLET ORAL at 08:48

## 2022-03-15 RX ADMIN — POTASSIUM CHLORIDE 10 MEQ: 7.46 INJECTION, SOLUTION INTRAVENOUS at 13:21

## 2022-03-15 RX ADMIN — MAGNESIUM OXIDE 400 MG (241.3 MG MAGNESIUM) TABLET 400 MG: TABLET at 08:47

## 2022-03-15 RX ADMIN — DONEPEZIL HYDROCHLORIDE 5 MG: 5 TABLET ORAL at 02:43

## 2022-03-15 RX ADMIN — METRONIDAZOLE 500 MG: 250 TABLET ORAL at 15:24

## 2022-03-15 RX ADMIN — METRONIDAZOLE 500 MG: 250 TABLET ORAL at 21:59

## 2022-03-15 RX ADMIN — POTASSIUM CHLORIDE 10 MEQ: 7.46 INJECTION, SOLUTION INTRAVENOUS at 06:22

## 2022-03-15 RX ADMIN — SODIUM CHLORIDE 1000 ML: 9 INJECTION, SOLUTION INTRAVENOUS at 03:33

## 2022-03-15 RX ADMIN — POTASSIUM CHLORIDE 10 MEQ: 7.46 INJECTION, SOLUTION INTRAVENOUS at 12:04

## 2022-03-15 RX ADMIN — ROSUVASTATIN CALCIUM 40 MG: 20 TABLET, COATED ORAL at 08:47

## 2022-03-15 RX ADMIN — APIXABAN 2.5 MG: 2.5 TABLET, FILM COATED ORAL at 02:43

## 2022-03-15 RX ADMIN — LORAZEPAM 1 MG: 1 TABLET ORAL at 02:43

## 2022-03-15 RX ADMIN — ERGOCALCIFEROL 50000 UNITS: 1.25 CAPSULE ORAL at 08:51

## 2022-03-15 RX ADMIN — DONEPEZIL HYDROCHLORIDE 5 MG: 5 TABLET ORAL at 21:56

## 2022-03-15 RX ADMIN — MEROPENEM 1000 MG: 1 INJECTION, POWDER, FOR SOLUTION INTRAVENOUS at 15:25

## 2022-03-15 RX ADMIN — DIBASIC SODIUM PHOSPHATE, MONOBASIC POTASSIUM PHOSPHATE AND MONOBASIC SODIUM PHOSPHATE 1 TABLET: 852; 155; 130 TABLET ORAL at 21:56

## 2022-03-15 RX ADMIN — CLOPIDOGREL BISULFATE 75 MG: 75 TABLET ORAL at 08:48

## 2022-03-15 RX ADMIN — METRONIDAZOLE 500 MG: 250 TABLET ORAL at 05:10

## 2022-03-15 RX ADMIN — MAGNESIUM OXIDE 400 MG (241.3 MG MAGNESIUM) TABLET 400 MG: TABLET at 02:43

## 2022-03-15 RX ADMIN — POTASSIUM CHLORIDE 10 MEQ: 7.46 INJECTION, SOLUTION INTRAVENOUS at 08:47

## 2022-03-15 RX ADMIN — AMLODIPINE BESYLATE 10 MG: 10 TABLET ORAL at 08:48

## 2022-03-15 ASSESSMENT — PAIN SCALES - GENERAL
PAINLEVEL_OUTOF10: 0
PAINLEVEL_OUTOF10: 6
PAINLEVEL_OUTOF10: 0
PAINLEVEL_OUTOF10: 6
PAINLEVEL_OUTOF10: 0
PAINLEVEL_OUTOF10: 3

## 2022-03-15 NOTE — CARE COORDINATION
.CM met with pt for d/c planning. Introduced self and updated white board. Pt lives with spouse and is independent with ADL's. Family provides her transportation. Pt has a PCP, has insurance, and is able to afford her medication. Pt states that she does not like her PCP and wants a new one. PCP list added to d/c instructions. Pt has a walker, shower seat, and a BSC. C offered and pt declined. Pt denies any d/c needs at this time. D/c plan is home with spouse, no needs. Notify CM if any d/c needs arise.   TE

## 2022-03-15 NOTE — H&P
HISTORY AND PHYSICAL    3/15/2022     Patient Information:    Patient: Luma Rogers     Gender: female  : 1950   Age: 67 y.o. MRN: [id number]  Room : [address] [place] [address]A      Pt`s preferred phone number :[phone]  [facility]  Extended Emergency Contact Information  Primary Emergency Contact: Ariel Pederson  Address: [address] [place], [place] [address]os - [facility], [address] [place] [address] Phone: [phone]  Mobile Phone: [phone]  Relation: Spouse   needed? No  Secondary Emergency Contact: jh boswell  Home Phone: [phone]  Relation: Child        PCP:  Jose M Hernandez MD (Tel: [phone] )    Chief complaint:    Chief Complaint   Patient presents with    Abdominal Pain     x1 wk    Diarrhea     x1 wk    Altered Mental Status     began worsening today. Lab Results   Component Value Date    LABA1C 6.0 2021       Lab Results   Component Value Date    LVEF 55 2021       Body mass index is 28.76 kg/m². History of Present Illness:  Luma Rogers is a 67 y.o. female with   has a past medical history of CAD (coronary artery disease) and Hypertension. who presented to ER with N/V, abd pain and diarrhea   In ER lab data revealed ARF with hyperkalemia   And  XR . And Abd   CT revealed No acute abdominal or pelvic abnormality. Uncomplicated colonic diverticulosis in the sigmoid region. History obtained from patient . REVIEW OF SYSTEMS:   Constitutional: Negative for fever,chills . ENT: Negative for rhinorrhea,  sore throat.   Respiratory: Negative for cough, shortness of breath,wheezing  Cardiovascular: Negative for chest pain, palpitations   Gastrointestinal: + for Abdominal pain, nausea, vomiting, diarrhea  Genitourinary: Negative for polyuria, dysuria   Hematologic/Lymphatic: Negative for bleeding tendency, easy bruising  Musculoskeletal: Negative for myalgias and arthralgias  Neurologic: Negative for confusion,dysarthria. Skin: Negative for itching,rash  Psychiatric: Negative for depression,anxiety, agitation. Endocrine: Negative for polydipsia,polyuria,heat /cold intolerance. Past Medical History:   has a past medical history of CAD (coronary artery disease) and Hypertension. Past Surgical History:   has a past surgical history that includes Coronary artery bypass graft; Neck surgery; Carotid endarterectomy; Cholecystectomy; and IR NONTUNNELED VASCULAR CATHETER > 5 YEARS (1/20/2022). Medications:  No current facility-administered medications on file prior to encounter. Current Outpatient Medications on File Prior to Encounter   Medication Sig Dispense Refill    melatonin 3 MG TABS tablet Take 1 tablet by mouth nightly as needed (sleep) 1 tablet 0    amLODIPine (NORVASC) 10 MG tablet Take 1 tablet by mouth daily 1 tablet 0    phosphorus (K PHOS NEUTRAL) 155-852-130 MG tablet Take 1 tablet by mouth 2 times daily for 7 days 14 tablet 0    apixaban (ELIQUIS) 5 MG TABS tablet Take 1 tablet by mouth 2 times daily 60 tablet 0    clopidogrel (PLAVIX) 75 MG tablet Take 1 tablet by mouth daily 30 tablet 3    donepezil (ARICEPT) 5 MG tablet Take 1 tablet by mouth nightly      rosuvastatin (CRESTOR) 40 MG tablet Take 1 tablet by mouth daily         Allergies: Allergies   Allergen Reactions    Penicillins     Sulfa Antibiotics Swelling        Social History:   reports that she has been smoking cigarettes. She has been smoking about 1.50 packs per day. She has never used smokeless tobacco. She reports that she does not drink alcohol and does not use drugs. Family History:  Family history is unknown by patient. ,       There is no immunization history on file for this patient.     Physical Exam:  BP (!) 119/45   Pulse 69   Temp 97.1 °F (36.2 °C) (Oral)   Resp 14   Ht 4' 9\" (1.448 m)   Wt 132 lb 14.4 oz (60.3 kg)   SpO2 95%   BMI 28.76 kg/m²     General appearance:  no distress . Well nourished  Eyes: Sclera clear, pupils equal  Cardiovascular: Regular rhythm, normal S1, S2. No edema in lower extremities  Respiratory: Clear to auscultation bilaterally, no wheeze, good inspiratory effort  Gastrointestinal: Abdomen soft, non-tender, not distended, normal bowel sounds  Musculoskeletal: No cyanosis in digits, neck supple  Neurology: Cranial nerves grossly intact. Alert and oriented . No speech or motor deficits  Psychiatry: Appropriate affect.  Not agitated  Skin: Warm, dry, normal turgor, no rash    Labs:  CBC:   Lab Results   Component Value Date    WBC 14.7 03/15/2022    RBC 3.79 03/15/2022    HGB 10.9 03/15/2022    HCT 35.4 03/15/2022    MCV 93.4 03/15/2022    MCH 28.8 03/15/2022    MCHC 30.8 03/15/2022    RDW 15.2 03/15/2022     03/15/2022    MPV 9.6 03/15/2022     BMP:    Lab Results   Component Value Date     03/15/2022    K 2.8 03/15/2022     03/15/2022    CO2 23 03/15/2022    BUN 15 03/15/2022    CREATININE 1.3 03/15/2022    CALCIUM 8.9 03/15/2022    GFRAA 49 03/15/2022    LABGLOM 40 03/15/2022    GLUCOSE 98 03/15/2022       Chest Xray:   EKG:    I visualized CXR images and EKG strips      Patient Active Problem List   Diagnosis Code    Hypertensive urgency I16.0    CAD (coronary artery disease) I25.10    Diverticulosis K57.90    Hyperlipidemia with target low density lipoprotein (LDL) cholesterol less than 70 mg/dL E78.5    Cerebrovascular accident ([address] I63.9    Acute cerebrovascular accident (CVA) ([address] I63.9    Expressive aphasia R47.01    Acute kidney injury due to COVID-19 ([address] U07.1, N17.9    Recurrent stenosis of left carotid artery I65.22    Gross hematuria R31.0    COVID-19 virus infection U07.1    Dementia ([address] F03.90    Diarrhea R19.7    Hypertension I10    Sepsis ([address] A41.9    ARF (acute renal failure) ([address] N17.9         Active Hospital Problems    Diagnosis     ARF (acute renal failure) ([address] [N17.9] gastroenteritis   Hypokalemia   Dementia with behavioral disturbance   Hypotension           Assessment/Plan:   Tele   IVF  Lytes balance   Home meds , reviewed and resumed as appropriate   Symptoms releif/Pain control  DVT proph           Tai Crisostomo MD    3/15/2022 7:14 PM

## 2022-03-15 NOTE — PROGRESS NOTES
Pt has not had urine output today. Bladder scanned pt, 0 cc, in bladder so did not straight cath. Unable to get urine sample, or stool sample d/t no output. Notified MD. Kyree Miles new orders. Call light within reach. Will continue to monitor.

## 2022-03-15 NOTE — ED NOTES
Patient had incontinence of stool bed linens changed. Patient appears confused asking about the prisoners and when they are leaving. Primary nurse made aware.               Shana Vega RN  03/14/22 1443

## 2022-03-15 NOTE — PROGRESS NOTES
Attempted to give pt meds, pt spits out half of them, unknown which ones she took and which ones she didn't. Pt states she wants to go home now.

## 2022-03-15 NOTE — PROGRESS NOTES
Assessment complete. Pt alert and awake. Denies pain. Pt having loose green stools, but not enough to get a sample. When pt laying flay BP 97/42, MD notified, sat pt up and BP now 141/50, asymptomatic. Pt receiving K+, no s/s of distress. Pt agitated and wants to go home. Call light within reach. Will continue to monitor.

## 2022-03-16 PROBLEM — E44.0 MODERATE MALNUTRITION (HCC): Chronic | Status: ACTIVE | Noted: 2022-03-16

## 2022-03-16 LAB
ANION GAP SERPL CALCULATED.3IONS-SCNC: 10 MMOL/L (ref 4–16)
BUN BLDV-MCNC: 6 MG/DL (ref 6–23)
C DIFF AG + TOXIN: NORMAL
CALCIUM SERPL-MCNC: 8.3 MG/DL (ref 8.3–10.6)
CHLORIDE BLD-SCNC: 109 MMOL/L (ref 99–110)
CO2: 25 MMOL/L (ref 21–32)
CREAT SERPL-MCNC: 0.7 MG/DL (ref 0.6–1.1)
GFR AFRICAN AMERICAN: >60 ML/MIN/1.73M2
GFR NON-AFRICAN AMERICAN: >60 ML/MIN/1.73M2
GLUCOSE BLD-MCNC: 108 MG/DL (ref 70–99)
MAGNESIUM: 1.6 MG/DL (ref 1.8–2.4)
PHOSPHORUS: 2.2 MG/DL (ref 2.5–4.9)
POTASSIUM SERPL-SCNC: 3 MMOL/L (ref 3.5–5.1)
SODIUM BLD-SCNC: 144 MMOL/L (ref 135–145)
SOURCE: NORMAL

## 2022-03-16 PROCEDURE — 94150 VITAL CAPACITY TEST: CPT

## 2022-03-16 PROCEDURE — 2140000000 HC CCU INTERMEDIATE R&B

## 2022-03-16 PROCEDURE — 87449 NOS EACH ORGANISM AG IA: CPT

## 2022-03-16 PROCEDURE — 36415 COLL VENOUS BLD VENIPUNCTURE: CPT

## 2022-03-16 PROCEDURE — 2580000003 HC RX 258: Performed by: FAMILY MEDICINE

## 2022-03-16 PROCEDURE — 80048 BASIC METABOLIC PNL TOTAL CA: CPT

## 2022-03-16 PROCEDURE — 87324 CLOSTRIDIUM AG IA: CPT

## 2022-03-16 PROCEDURE — 6370000000 HC RX 637 (ALT 250 FOR IP): Performed by: FAMILY MEDICINE

## 2022-03-16 PROCEDURE — 84100 ASSAY OF PHOSPHORUS: CPT

## 2022-03-16 PROCEDURE — 94761 N-INVAS EAR/PLS OXIMETRY MLT: CPT

## 2022-03-16 PROCEDURE — 6360000002 HC RX W HCPCS: Performed by: FAMILY MEDICINE

## 2022-03-16 PROCEDURE — 83735 ASSAY OF MAGNESIUM: CPT

## 2022-03-16 PROCEDURE — 94664 DEMO&/EVAL PT USE INHALER: CPT

## 2022-03-16 RX ORDER — LOPERAMIDE HYDROCHLORIDE 2 MG/1
2 CAPSULE ORAL 4 TIMES DAILY PRN
Status: DISCONTINUED | OUTPATIENT
Start: 2022-03-16 | End: 2022-03-19 | Stop reason: HOSPADM

## 2022-03-16 RX ADMIN — MAGNESIUM OXIDE 400 MG (241.3 MG MAGNESIUM) TABLET 400 MG: TABLET at 08:05

## 2022-03-16 RX ADMIN — POTASSIUM CHLORIDE 10 MEQ: 7.46 INJECTION, SOLUTION INTRAVENOUS at 17:54

## 2022-03-16 RX ADMIN — METRONIDAZOLE 500 MG: 250 TABLET ORAL at 23:32

## 2022-03-16 RX ADMIN — SODIUM CHLORIDE, PRESERVATIVE FREE 10 ML: 5 INJECTION INTRAVENOUS at 08:07

## 2022-03-16 RX ADMIN — SODIUM CHLORIDE: 9 INJECTION, SOLUTION INTRAVENOUS at 11:16

## 2022-03-16 RX ADMIN — MAGNESIUM OXIDE 400 MG (241.3 MG MAGNESIUM) TABLET 400 MG: TABLET at 20:30

## 2022-03-16 RX ADMIN — APIXABAN 2.5 MG: 2.5 TABLET, FILM COATED ORAL at 20:31

## 2022-03-16 RX ADMIN — MAGNESIUM SULFATE 2000 MG: 2 INJECTION INTRAVENOUS at 13:42

## 2022-03-16 RX ADMIN — AMLODIPINE BESYLATE 10 MG: 10 TABLET ORAL at 08:06

## 2022-03-16 RX ADMIN — SODIUM CHLORIDE: 9 INJECTION, SOLUTION INTRAVENOUS at 20:53

## 2022-03-16 RX ADMIN — APIXABAN 2.5 MG: 2.5 TABLET, FILM COATED ORAL at 08:06

## 2022-03-16 RX ADMIN — POTASSIUM CHLORIDE 10 MEQ: 7.46 INJECTION, SOLUTION INTRAVENOUS at 20:27

## 2022-03-16 RX ADMIN — LOPERAMIDE HYDROCHLORIDE 2 MG: 2 CAPSULE ORAL at 18:24

## 2022-03-16 RX ADMIN — POTASSIUM CHLORIDE 10 MEQ: 7.46 INJECTION, SOLUTION INTRAVENOUS at 22:12

## 2022-03-16 RX ADMIN — MEROPENEM 1000 MG: 1 INJECTION, POWDER, FOR SOLUTION INTRAVENOUS at 15:07

## 2022-03-16 RX ADMIN — CLOPIDOGREL BISULFATE 75 MG: 75 TABLET ORAL at 08:05

## 2022-03-16 RX ADMIN — LORAZEPAM 1 MG: 1 TABLET ORAL at 20:31

## 2022-03-16 RX ADMIN — POTASSIUM CHLORIDE 10 MEQ: 7.46 INJECTION, SOLUTION INTRAVENOUS at 13:36

## 2022-03-16 RX ADMIN — METRONIDAZOLE 500 MG: 250 TABLET ORAL at 13:33

## 2022-03-16 RX ADMIN — MEROPENEM 1000 MG: 1 INJECTION, POWDER, FOR SOLUTION INTRAVENOUS at 02:45

## 2022-03-16 RX ADMIN — DIBASIC SODIUM PHOSPHATE, MONOBASIC POTASSIUM PHOSPHATE AND MONOBASIC SODIUM PHOSPHATE 1 TABLET: 852; 155; 130 TABLET ORAL at 08:05

## 2022-03-16 RX ADMIN — ROSUVASTATIN CALCIUM 40 MG: 20 TABLET, COATED ORAL at 08:06

## 2022-03-16 RX ADMIN — METRONIDAZOLE 500 MG: 250 TABLET ORAL at 07:06

## 2022-03-16 RX ADMIN — DONEPEZIL HYDROCHLORIDE 5 MG: 5 TABLET ORAL at 20:31

## 2022-03-16 RX ADMIN — POTASSIUM CHLORIDE 10 MEQ: 7.46 INJECTION, SOLUTION INTRAVENOUS at 16:28

## 2022-03-16 RX ADMIN — DIBASIC SODIUM PHOSPHATE, MONOBASIC POTASSIUM PHOSPHATE AND MONOBASIC SODIUM PHOSPHATE 1 TABLET: 852; 155; 130 TABLET ORAL at 20:30

## 2022-03-16 RX ADMIN — POTASSIUM CHLORIDE 10 MEQ: 7.46 INJECTION, SOLUTION INTRAVENOUS at 19:00

## 2022-03-16 ASSESSMENT — PAIN SCALES - GENERAL
PAINLEVEL_OUTOF10: 0

## 2022-03-16 NOTE — CONSULTS
Comprehensive Nutrition Assessment    Type and Reason for Visit:  Initial,Consult (poor intake/appetite 5 or more days)    Nutrition Recommendations/Plan:   · Advance to a Low Fiber Diet as timely as able  · Begin trial of variety high heather oral nutrition supplements, between meals  · Monitor/replace depleted electrolytes carefully, Pt at high risk for refeeding syndrome    Nutrition Assessment:  Pt admitted with dehydration, generalized abdominal pain, nausea, vomiting, diarrhea. H/O  C. difficile approximately a month ago, completed antibiotics but has had return of similar foul-smelling frequent loose stools over the last week. She reports decreased appetite, increased fatigue and lethargy at home. Currently feeding self and tolerating Full Liquid Diet, will add oral supplements to optimize intake. Wt loss noted per chart review. Pt continues to meet criteria for moderate malnutrition this admission. Will continue to follow as high nutrition risk. Malnutrition Assessment:  Malnutrition Status: Moderate malnutrition    Context:  Acute Illness     Findings of the 6 clinical characteristics of malnutrition:  Energy Intake:  75% or less of estimated energy requirements for 7 or more days  Weight Loss:  Greater than 7.5% over 3 months     Body Fat Loss:  Unable to assess     Muscle Mass Loss:  Unable to assess    Fluid Accumulation:  No significant fluid accumulation     Strength:  Not Performed    Estimated Daily Nutrient Needs:  Energy (kcal):  3506-0225 (25-30 g/kg); Weight Used for Energy Requirements:  Current     Protein (g):  59-71 (1-1.2 g/kg); Weight Used for Protein Requirements:  Current        Fluid (ml/day):  0554-1035; Method Used for Fluid Requirements:  1 ml/kcal      Nutrition Related Findings:  K 3, Mg 1.6, Glu 108, Phos 2.2      Wounds:  None       Current Nutrition Therapies:    ADULT DIET;  Full Liquid    Anthropometric Measures:  · Height: 4' 9\" (144.8 cm)  · Current Body Weight: 130 lb 1.6 oz (59 kg)   · Admission Body Weight: 132 lb 14.4 oz (60.3 kg)    · Usual Body Weight: 151 lb 0.2 oz (68.5 kg) (12/21/21)     · Ideal Body Weight: 85 lbs; % Ideal Body Weight 153.1 %   · BMI: 28.1  · BMI Categories: Overweight (BMI 25.0-29. 9)       Nutrition Diagnosis:   · Moderate malnutrition,In context of acute illness or injury related to altered GI function as evidenced by poor intake prior to admission,weight loss greater than 7.5% in 3 months,nausea,vomiting,diarrhea    Nutrition Interventions:   Food and/or Nutrient Delivery:  Continue Current Diet,Start Oral Nutrition Supplement  Nutrition Education/Counseling:  No recommendation at this time   Coordination of Nutrition Care:  Continue to monitor while inpatient,Feeding Assistance/Environment Change    Goals:  Pt will consume at least half of her meals and supplements       Nutrition Monitoring and Evaluation:   Behavioral-Environmental Outcomes:  None Identified   Food/Nutrient Intake Outcomes:  Food and Nutrient Intake,Supplement Intake  Physical Signs/Symptoms Outcomes:  Biochemical Data,Diarrhea,GI Status,Nausea or Vomiting,Meal Time Behavior,Nutrition Focused Physical Findings,Weight     Discharge Planning:    Continue Oral Nutrition Supplement     Electronically signed by Meagan York RD, LD on 3/16/22 at 3:25 PM EDT    Contact: [phone]

## 2022-03-16 NOTE — PLAN OF CARE
Nutrition Problem #1: Moderate malnutrition,In context of acute illness or injury  Intervention: Food and/or Nutrient Delivery: Continue Current Diet,Start Oral Nutrition Supplement  Nutritional Goals: Pt will consume at least half of her meals and supplements

## 2022-03-16 NOTE — PLAN OF CARE
Problem: Skin Integrity:  Goal: Will show no infection signs and symptoms  Description: Will show no infection signs and symptoms  3/15/2022 2018 by Jonathan Jin RN  Outcome: Ongoing  3/15/2022 1030 by Janet Porter RN  Outcome: Ongoing  Goal: Absence of new skin breakdown  Description: Absence of new skin breakdown  3/15/2022 2018 by Joanthan Jin RN  Outcome: Ongoing  3/15/2022 1030 by Janet Porter RN  Outcome: Ongoing     Problem: Falls - Risk of:  Goal: Will remain free from falls  Description: Will remain free from falls  3/15/2022 2018 by Jonathan Jin RN  Outcome: Ongoing  3/15/2022 1030 by Janet Porter RN  Outcome: Ongoing  Goal: Absence of physical injury  Description: Absence of physical injury  3/15/2022 2018 by Jonathan Jin RN  Outcome: Ongoing  3/15/2022 1030 by Janet Porter RN  Outcome: Ongoing

## 2022-03-17 LAB
ANION GAP SERPL CALCULATED.3IONS-SCNC: 14 MMOL/L (ref 4–16)
BUN BLDV-MCNC: 3 MG/DL (ref 6–23)
CALCIUM SERPL-MCNC: 8.4 MG/DL (ref 8.3–10.6)
CHLORIDE BLD-SCNC: 111 MMOL/L (ref 99–110)
CO2: 25 MMOL/L (ref 21–32)
CREAT SERPL-MCNC: 0.6 MG/DL (ref 0.6–1.1)
CULTURE: ABNORMAL
CULTURE: ABNORMAL
GFR AFRICAN AMERICAN: >60 ML/MIN/1.73M2
GFR NON-AFRICAN AMERICAN: >60 ML/MIN/1.73M2
GLUCOSE BLD-MCNC: 87 MG/DL (ref 70–99)
Lab: ABNORMAL
POTASSIUM SERPL-SCNC: 3.7 MMOL/L (ref 3.5–5.1)
SODIUM BLD-SCNC: 150 MMOL/L (ref 135–145)
SPECIMEN: ABNORMAL

## 2022-03-17 PROCEDURE — 2140000000 HC CCU INTERMEDIATE R&B

## 2022-03-17 PROCEDURE — 94761 N-INVAS EAR/PLS OXIMETRY MLT: CPT

## 2022-03-17 PROCEDURE — 6370000000 HC RX 637 (ALT 250 FOR IP): Performed by: FAMILY MEDICINE

## 2022-03-17 PROCEDURE — 80048 BASIC METABOLIC PNL TOTAL CA: CPT

## 2022-03-17 PROCEDURE — 2580000003 HC RX 258: Performed by: FAMILY MEDICINE

## 2022-03-17 PROCEDURE — 6360000002 HC RX W HCPCS: Performed by: FAMILY MEDICINE

## 2022-03-17 RX ORDER — LINEZOLID 2 MG/ML
600 INJECTION, SOLUTION INTRAVENOUS EVERY 12 HOURS
Status: DISCONTINUED | OUTPATIENT
Start: 2022-03-17 | End: 2022-03-19 | Stop reason: HOSPADM

## 2022-03-17 RX ORDER — DEXTROSE AND SODIUM CHLORIDE 5; .2 G/100ML; G/100ML
INJECTION, SOLUTION INTRAVENOUS CONTINUOUS
Status: DISPENSED | OUTPATIENT
Start: 2022-03-17 | End: 2022-03-18

## 2022-03-17 RX ADMIN — SODIUM CHLORIDE, PRESERVATIVE FREE 10 ML: 5 INJECTION INTRAVENOUS at 22:38

## 2022-03-17 RX ADMIN — AMLODIPINE BESYLATE 10 MG: 10 TABLET ORAL at 08:58

## 2022-03-17 RX ADMIN — MAGNESIUM OXIDE 400 MG (241.3 MG MAGNESIUM) TABLET 400 MG: TABLET at 08:58

## 2022-03-17 RX ADMIN — APIXABAN 2.5 MG: 2.5 TABLET, FILM COATED ORAL at 22:33

## 2022-03-17 RX ADMIN — MEROPENEM 1000 MG: 1 INJECTION, POWDER, FOR SOLUTION INTRAVENOUS at 03:48

## 2022-03-17 RX ADMIN — ZOLPIDEM TARTRATE 5 MG: 5 TABLET ORAL at 22:33

## 2022-03-17 RX ADMIN — LINEZOLID 600 MG: 600 INJECTION, SOLUTION INTRAVENOUS at 13:02

## 2022-03-17 RX ADMIN — DEXTROSE AND SODIUM CHLORIDE: 5; 200 INJECTION, SOLUTION INTRAVENOUS at 16:41

## 2022-03-17 RX ADMIN — DIBASIC SODIUM PHOSPHATE, MONOBASIC POTASSIUM PHOSPHATE AND MONOBASIC SODIUM PHOSPHATE 1 TABLET: 852; 155; 130 TABLET ORAL at 22:32

## 2022-03-17 RX ADMIN — LORAZEPAM 1 MG: 1 TABLET ORAL at 13:07

## 2022-03-17 RX ADMIN — DONEPEZIL HYDROCHLORIDE 5 MG: 5 TABLET ORAL at 22:32

## 2022-03-17 RX ADMIN — LOPERAMIDE HYDROCHLORIDE 2 MG: 2 CAPSULE ORAL at 13:07

## 2022-03-17 RX ADMIN — SODIUM CHLORIDE: 9 INJECTION, SOLUTION INTRAVENOUS at 08:57

## 2022-03-17 RX ADMIN — ROSUVASTATIN CALCIUM 40 MG: 20 TABLET, COATED ORAL at 08:58

## 2022-03-17 RX ADMIN — APIXABAN 2.5 MG: 2.5 TABLET, FILM COATED ORAL at 08:58

## 2022-03-17 RX ADMIN — LORAZEPAM 1 MG: 1 TABLET ORAL at 22:33

## 2022-03-17 RX ADMIN — METRONIDAZOLE 500 MG: 250 TABLET ORAL at 06:54

## 2022-03-17 RX ADMIN — CLOPIDOGREL BISULFATE 75 MG: 75 TABLET ORAL at 08:58

## 2022-03-17 RX ADMIN — DIBASIC SODIUM PHOSPHATE, MONOBASIC POTASSIUM PHOSPHATE AND MONOBASIC SODIUM PHOSPHATE 1 TABLET: 852; 155; 130 TABLET ORAL at 08:57

## 2022-03-17 RX ADMIN — MAGNESIUM OXIDE 400 MG (241.3 MG MAGNESIUM) TABLET 400 MG: TABLET at 22:37

## 2022-03-17 ASSESSMENT — PAIN SCALES - GENERAL
PAINLEVEL_OUTOF10: 0

## 2022-03-17 NOTE — PROGRESS NOTES
Attending Progress Note      PCP: Sanjeev Jo MD      Patient: Areli Stanton   Gender: female  : 1950   Age: 67 y.o. MRN: [id number]  Room  : [address] [place] [address]      Date of Admission: 3/14/2022    Chief Complaint   Patient presents with    Abdominal Pain     x1 wk    Diarrhea     x1 wk    Altered Mental Status     began worsening today. Subjective: comfortable         Medications:  Reviewed  Infusion Medications    sodium chloride 125 mL/hr at 22 2954    sodium chloride       Scheduled Medications    donepezil  5 mg Oral QPM    rosuvastatin  40 mg Oral Daily    apixaban  2.5 mg Oral BID    clopidogrel  75 mg Oral Daily    amLODIPine  10 mg Oral Daily    phosphorus  1 tablet Oral BID    sodium chloride flush  5-40 mL IntraVENous 2 times per day    meropenem  1,000 mg IntraVENous Q12H    metroNIDAZOLE  500 mg Oral 3 times per day    vitamin D  50,000 Units Oral Weekly    magnesium oxide  400 mg Oral BID     PRN Meds: loperamide, sodium chloride flush, sodium chloride, potassium chloride **OR** potassium alternative oral replacement **OR** potassium chloride, magnesium sulfate, promethazine **OR** ondansetron, polyethylene glycol, fleet, acetaminophen **OR** acetaminophen, zolpidem, oxyCODONE-acetaminophen, benzonatate, calcium carbonate, morphine, promethazine, aluminum & magnesium hydroxide-simethicone, LORazepam      Intake/Output Summary (Last 24 hours) at 3/17/2022 0708  Last data filed at 3/17/2022 7107  Gross per 24 hour   Intake 3921.51 ml   Output --   Net 3921.51 ml       Exam:  BP (!) 144/62   Pulse 86   Temp 97.5 °F (36.4 °C) (Oral)   Resp 17   Ht 4' 9\" (1.448 m)   Wt 130 lb 1.6 oz (59 kg)   SpO2 95%   BMI 28.15 kg/m²   General appearance: No distress,   Respiratory:  good air entry , no Rales , No wheezing, or rhonchi,  Cardiovascular: RRR, with normal S1/S2 . Abdomen : Soft, non-tender, non-distended  , normal bowel sounds.   Legs : No edema bilaterally. No DVT signs ,    Neurologic:  Alert and oriented ,        Labs:   Recent Labs     03/14/22  1705 03/15/22  0400   WBC 20.8* 14.7*   HGB 12.2* 10.9*   HCT 39.8 35.4*   * 503*     Recent Labs     03/14/22  1705 03/15/22  0400 03/16/22  1235    140 144   K 3.2* 2.8* 3.0*    105 109   CO2 21 23 25   BUN 14 15 6   CREATININE 1.4* 1.3* 0.7   CALCIUM 9.3 8.9 8.3   PHOS  --   --  2.2*     Recent Labs     03/14/22  1705   AST 14*   ALT <5*   BILITOT 0.4   ALKPHOS 127     No results for input(s): INR in the last 72 hours. No results for input(s): Flavia Divine in the last 72 hours. Assessment/Plan:  Active Hospital Problems    Diagnosis Date Noted    Moderate malnutrition ([facility] [E44.0] 03/16/2022    ARF (acute renal failure) ([facility]) [N17.9] 03/15/2022   gastroenteritis   Hypokalemia   Dementia with behavioral disturbance   Hypotension           Assessment/Plan:   Tele : no event - no fever - on room air   IVF - renal function improved   BP better   Lytes balance   Home meds , reviewed and resumed as appropriate   Symptoms releif/Pain control  DVT Prophylaxis   Diet: ADULT DIET; Full Liquid  ADULT ORAL NUTRITION SUPPLEMENT; Breakfast; Fortified Pudding Oral Supplement  ADULT ORAL NUTRITION SUPPLEMENT; Lunch; Frozen Oral Supplement  ADULT ORAL NUTRITION SUPPLEMENT; Dinner; Standard High Calorie/High Protein Oral Supplement  Code Status: Full Code          Treatment progress and plan was d/w pt/family .     Raquel Vazquez MD

## 2022-03-18 LAB
ANION GAP SERPL CALCULATED.3IONS-SCNC: 8 MMOL/L (ref 4–16)
BASOPHILS ABSOLUTE: 0 K/CU MM
BASOPHILS RELATIVE PERCENT: 0.4 % (ref 0–1)
BUN BLDV-MCNC: 2 MG/DL (ref 6–23)
CALCIUM SERPL-MCNC: 8.4 MG/DL (ref 8.3–10.6)
CHLORIDE BLD-SCNC: 101 MMOL/L (ref 99–110)
CO2: 30 MMOL/L (ref 21–32)
CREAT SERPL-MCNC: 0.6 MG/DL (ref 0.6–1.1)
DIFFERENTIAL TYPE: ABNORMAL
EOSINOPHILS ABSOLUTE: 0.5 K/CU MM
EOSINOPHILS RELATIVE PERCENT: 5.5 % (ref 0–3)
GFR AFRICAN AMERICAN: >60 ML/MIN/1.73M2
GFR NON-AFRICAN AMERICAN: >60 ML/MIN/1.73M2
GLUCOSE BLD-MCNC: 132 MG/DL (ref 70–99)
HCT VFR BLD CALC: 36.6 % (ref 37–47)
HEMOGLOBIN: 11.5 GM/DL (ref 12.5–16)
IMMATURE NEUTROPHIL %: 0.3 % (ref 0–0.43)
LYMPHOCYTES ABSOLUTE: 1.8 K/CU MM
LYMPHOCYTES RELATIVE PERCENT: 19.7 % (ref 24–44)
MAGNESIUM: 1.5 MG/DL (ref 1.8–2.4)
MCH RBC QN AUTO: 28.6 PG (ref 27–31)
MCHC RBC AUTO-ENTMCNC: 31.4 % (ref 32–36)
MCV RBC AUTO: 91 FL (ref 78–100)
MONOCYTES ABSOLUTE: 0.8 K/CU MM
MONOCYTES RELATIVE PERCENT: 9.4 % (ref 0–4)
NUCLEATED RBC %: 0 %
PDW BLD-RTO: 14.8 % (ref 11.7–14.9)
PLATELET # BLD: 564 K/CU MM (ref 140–440)
PMV BLD AUTO: 9.8 FL (ref 7.5–11.1)
POTASSIUM SERPL-SCNC: 3.3 MMOL/L (ref 3.5–5.1)
RBC # BLD: 4.02 M/CU MM (ref 4.2–5.4)
SEGMENTED NEUTROPHILS ABSOLUTE COUNT: 5.8 K/CU MM
SEGMENTED NEUTROPHILS RELATIVE PERCENT: 64.7 % (ref 36–66)
SODIUM BLD-SCNC: 139 MMOL/L (ref 135–145)
TOTAL IMMATURE NEUTOROPHIL: 0.03 K/CU MM
TOTAL NUCLEATED RBC: 0 K/CU MM
WBC # BLD: 8.9 K/CU MM (ref 4–10.5)

## 2022-03-18 PROCEDURE — 2140000000 HC CCU INTERMEDIATE R&B

## 2022-03-18 PROCEDURE — 94761 N-INVAS EAR/PLS OXIMETRY MLT: CPT

## 2022-03-18 PROCEDURE — 85025 COMPLETE CBC W/AUTO DIFF WBC: CPT

## 2022-03-18 PROCEDURE — 83735 ASSAY OF MAGNESIUM: CPT

## 2022-03-18 PROCEDURE — 2580000003 HC RX 258: Performed by: FAMILY MEDICINE

## 2022-03-18 PROCEDURE — 6360000002 HC RX W HCPCS: Performed by: FAMILY MEDICINE

## 2022-03-18 PROCEDURE — 36415 COLL VENOUS BLD VENIPUNCTURE: CPT

## 2022-03-18 PROCEDURE — 6370000000 HC RX 637 (ALT 250 FOR IP): Performed by: FAMILY MEDICINE

## 2022-03-18 PROCEDURE — 80048 BASIC METABOLIC PNL TOTAL CA: CPT

## 2022-03-18 RX ADMIN — LINEZOLID 600 MG: 600 INJECTION, SOLUTION INTRAVENOUS at 02:05

## 2022-03-18 RX ADMIN — DIBASIC SODIUM PHOSPHATE, MONOBASIC POTASSIUM PHOSPHATE AND MONOBASIC SODIUM PHOSPHATE 1 TABLET: 852; 155; 130 TABLET ORAL at 08:20

## 2022-03-18 RX ADMIN — LINEZOLID 600 MG: 600 INJECTION, SOLUTION INTRAVENOUS at 12:40

## 2022-03-18 RX ADMIN — DONEPEZIL HYDROCHLORIDE 5 MG: 5 TABLET ORAL at 22:50

## 2022-03-18 RX ADMIN — APIXABAN 2.5 MG: 2.5 TABLET, FILM COATED ORAL at 22:50

## 2022-03-18 RX ADMIN — POTASSIUM CHLORIDE 40 MEQ: 20 TABLET, EXTENDED RELEASE ORAL at 16:43

## 2022-03-18 RX ADMIN — DIBASIC SODIUM PHOSPHATE, MONOBASIC POTASSIUM PHOSPHATE AND MONOBASIC SODIUM PHOSPHATE 1 TABLET: 852; 155; 130 TABLET ORAL at 22:50

## 2022-03-18 RX ADMIN — SODIUM CHLORIDE, PRESERVATIVE FREE 10 ML: 5 INJECTION INTRAVENOUS at 22:50

## 2022-03-18 RX ADMIN — MAGNESIUM OXIDE 400 MG (241.3 MG MAGNESIUM) TABLET 400 MG: TABLET at 22:50

## 2022-03-18 RX ADMIN — ZOLPIDEM TARTRATE 5 MG: 5 TABLET ORAL at 22:50

## 2022-03-18 RX ADMIN — AMLODIPINE BESYLATE 10 MG: 10 TABLET ORAL at 08:20

## 2022-03-18 RX ADMIN — CLOPIDOGREL BISULFATE 75 MG: 75 TABLET ORAL at 08:20

## 2022-03-18 RX ADMIN — MAGNESIUM OXIDE 400 MG (241.3 MG MAGNESIUM) TABLET 400 MG: TABLET at 08:20

## 2022-03-18 RX ADMIN — LORAZEPAM 1 MG: 1 TABLET ORAL at 22:50

## 2022-03-18 RX ADMIN — APIXABAN 2.5 MG: 2.5 TABLET, FILM COATED ORAL at 08:20

## 2022-03-18 RX ADMIN — ROSUVASTATIN CALCIUM 40 MG: 20 TABLET, COATED ORAL at 08:19

## 2022-03-18 RX ADMIN — SODIUM CHLORIDE, PRESERVATIVE FREE 10 ML: 5 INJECTION INTRAVENOUS at 08:19

## 2022-03-18 ASSESSMENT — PAIN SCALES - GENERAL
PAINLEVEL_OUTOF10: 0

## 2022-03-18 NOTE — PROGRESS NOTES
Attending Progress Note      PCP: Charlie Lennox, MD      Patient: Lillian Mast   Gender: female  : 1950   Age: 67 y.o. MRN: [id number]  Room  : [address] [place] [address]      Date of Admission: 3/14/2022    Chief Complaint   Patient presents with    Abdominal Pain     x1 wk    Diarrhea     x1 wk    Altered Mental Status     began worsening today. Subjective: comfortable         Medications:  Reviewed  Infusion Medications    dextrose 5 % and 0.2 % NaCl 75 mL/hr at 22 2226    sodium chloride       Scheduled Medications    linezolid  600 mg IntraVENous Q12H    donepezil  5 mg Oral QPM    rosuvastatin  40 mg Oral Daily    apixaban  2.5 mg Oral BID    clopidogrel  75 mg Oral Daily    amLODIPine  10 mg Oral Daily    phosphorus  1 tablet Oral BID    sodium chloride flush  5-40 mL IntraVENous 2 times per day    vitamin D  50,000 Units Oral Weekly    magnesium oxide  400 mg Oral BID     PRN Meds: loperamide, sodium chloride flush, sodium chloride, potassium chloride **OR** potassium alternative oral replacement **OR** potassium chloride, magnesium sulfate, promethazine **OR** ondansetron, polyethylene glycol, fleet, acetaminophen **OR** acetaminophen, zolpidem, oxyCODONE-acetaminophen, benzonatate, calcium carbonate, morphine, promethazine, aluminum & magnesium hydroxide-simethicone, LORazepam      Intake/Output Summary (Last 24 hours) at 3/18/2022 0449  Last data filed at 3/17/2022 2226  Gross per 24 hour   Intake 2219.7 ml   Output 50 ml   Net 2169.7 ml       Exam:  BP (!) 156/83   Pulse 87   Temp 98.2 °F (36.8 °C) (Oral)   Resp 20   Ht 4' 9\" (1.448 m)   Wt 130 lb 1.6 oz (59 kg)   SpO2 97%   BMI 28.15 kg/m²   General appearance: No distress,   Respiratory:  good air entry , no Rales , No wheezing, or rhonchi,  Cardiovascular: RRR, with normal S1/S2 . Abdomen : Soft, non-tender, non-distended  , normal bowel sounds. Legs : No edema bilaterally.  No DVT signs ,    Neurologic: Alert and oriented ,        Labs:   No results for input(s): WBC, HGB, HCT, PLT in the last 72 hours. Recent Labs     03/16/22  1235 03/17/22  1140    150*   K 3.0* 3.7    111*   CO2 25 25   BUN 6 3*   CREATININE 0.7 0.6   CALCIUM 8.3 8.4   PHOS 2.2*  --      No results for input(s): AST, ALT, BILIDIR, BILITOT, ALKPHOS in the last 72 hours. No results for input(s): INR in the last 72 hours. No results for input(s): Earnie Lent in the last 72 hours. Assessment/Plan:  Active Hospital Problems    Diagnosis Date Noted    Moderate malnutrition ([facility] [address] [E44.0] 03/16/2022    ARF (acute renal failure) ([facility]) [N17.9] 03/15/2022   gastroenteritis   Hypokalemia   Dementia with behavioral disturbance   Hypotension   Hypernatremia         Assessment/Plan:   Tele : no event - no fever - on room air   Urine c/s : positive for VRE. Brooke Leggett switch abx to zyvox   Switch ivf from nacl to D5 W. .. monitor Na   - renal function improved   Lytes balance   Home meds , reviewed and resumed as appropriate   Symptoms releif/Pain control  DVT Prophylaxis   Diet: ADULT DIET; Full Liquid  ADULT ORAL NUTRITION SUPPLEMENT; Breakfast; Fortified Pudding Oral Supplement  ADULT ORAL NUTRITION SUPPLEMENT; Lunch; Frozen Oral Supplement  ADULT ORAL NUTRITION SUPPLEMENT; Dinner; Standard High Calorie/High Protein Oral Supplement  Code Status: Full Code          Treatment progress and plan was d/w pt/family .     Maribell Gallegos MD

## 2022-03-18 NOTE — PROGRESS NOTES
Attending Progress Note      PCP: Maribell Gallegos MD      Patient: Jimenez Corley   Gender: female  : 1950   Age: 67 y.o. MRN: [id number]  Room  : [address] [place] [address]      Date of Admission: 3/14/2022    Chief Complaint   Patient presents with    Abdominal Pain     x1 wk    Diarrhea     x1 wk    Altered Mental Status     began worsening today. Subjective: comfortable         Medications:  Reviewed  Infusion Medications    sodium chloride       Scheduled Medications    linezolid  600 mg IntraVENous Q12H    donepezil  5 mg Oral QPM    rosuvastatin  40 mg Oral Daily    apixaban  2.5 mg Oral BID    clopidogrel  75 mg Oral Daily    amLODIPine  10 mg Oral Daily    phosphorus  1 tablet Oral BID    sodium chloride flush  5-40 mL IntraVENous 2 times per day    vitamin D  50,000 Units Oral Weekly    magnesium oxide  400 mg Oral BID     PRN Meds: loperamide, sodium chloride flush, sodium chloride, potassium chloride **OR** potassium alternative oral replacement **OR** potassium chloride, magnesium sulfate, promethazine **OR** ondansetron, polyethylene glycol, fleet, acetaminophen **OR** acetaminophen, zolpidem, oxyCODONE-acetaminophen, benzonatate, calcium carbonate, morphine, promethazine, aluminum & magnesium hydroxide-simethicone, LORazepam      Intake/Output Summary (Last 24 hours) at 3/18/2022 1837  Last data filed at 3/18/2022 0602  Gross per 24 hour   Intake 2421.86 ml   Output --   Net 2421.86 ml       Exam:  /76   Pulse 80   Temp 97.5 °F (36.4 °C) (Oral)   Resp (!) 118   Ht 4' 9\" (1.448 m)   Wt 130 lb 1.6 oz (59 kg)   SpO2 98%   BMI 28.15 kg/m²   General appearance: No distress,   Respiratory:  good air entry , no Rales , No wheezing, or rhonchi,  Cardiovascular: RRR, with normal S1/S2 . Abdomen : Soft, non-tender, non-distended  , normal bowel sounds. Legs : No edema bilaterally.  No DVT signs ,    Neurologic:  Alert and oriented ,        Labs:   Recent Labs 03/18/22  1512   WBC 8.9   HGB 11.5*   HCT 36.6*   *     Recent Labs     03/16/22  1235 03/17/22  1140 03/18/22  1512    150* 139   K 3.0* 3.7 3.3*    111* 101   CO2 25 25 30   BUN 6 3* 2*   CREATININE 0.7 0.6 0.6   CALCIUM 8.3 8.4 8.4   PHOS 2.2*  --   --      No results for input(s): AST, ALT, BILIDIR, BILITOT, ALKPHOS in the last 72 hours. No results for input(s): INR in the last 72 hours. No results for input(s): Ethelene Soulier in the last 72 hours. Assessment/Plan:  Active Hospital Problems    Diagnosis Date Noted    Moderate malnutrition ([facility] [address] [E44.0] 03/16/2022    ARF (acute renal failure) ([facility]) [N17.9] 03/15/2022   gastroenteritis   Hypokalemia   Dementia with behavioral disturbance   Hypotension   Hypernatremia         Assessment/Plan:   Hopefully home tomorrow   Tele : no event - no fever - on room air   D/w daughter at bed side   Urine c/s : positive for VRE. Olivia Colder cont  zyvox   Switch ivf from nacl to D5 W. .. monitor Na   - renal function improved   Lytes balance   Home meds , reviewed and resumed as appropriate   Symptoms releif/Pain control  DVT Prophylaxis   Diet: ADULT ORAL NUTRITION SUPPLEMENT; Breakfast; Fortified Pudding Oral Supplement  ADULT ORAL NUTRITION SUPPLEMENT; Lunch; Frozen Oral Supplement  ADULT ORAL NUTRITION SUPPLEMENT; Dinner; Standard High Calorie/High Protein Oral Supplement  ADULT DIET; Regular  Code Status: Full Code          Treatment progress and plan was d/w pt/family .     Brooke Lennox, MD

## 2022-03-18 NOTE — PLAN OF CARE
Problem: Skin Integrity:  Goal: Will show no infection signs and symptoms  Description: Will show no infection signs and symptoms  Outcome: Met This Shift  Goal: Absence of new skin breakdown  Description: Absence of new skin breakdown  Outcome: Met This Shift  Goal: Risk for impaired skin integrity will decrease  Description: Risk for impaired skin integrity will decrease  Outcome: Met This Shift     Problem: Falls - Risk of:  Goal: Will remain free from falls  Description: Will remain free from falls  Outcome: Met This Shift  Goal: Absence of physical injury  Description: Absence of physical injury  Outcome: Met This Shift     Problem: Nutrition  Goal: Optimal nutrition therapy  Outcome: Met This Shift     Problem: Fluid Volume:  Goal: Ability to achieve a balanced intake and output will improve  Description: Ability to achieve a balanced intake and output will improve  Outcome: Met This Shift     Problem: Physical Regulation:  Goal: Will show no signs and symptoms of electrolyte imbalance  Description: Will show no signs and symptoms of electrolyte imbalance  Outcome: Met This Shift  Goal: Prevent transmision of infection  Description: Prevent transmision of infection  Outcome: Met This Shift     Problem: Coping:  Goal: Ability to remain calm will improve  Description: Ability to remain calm will improve  Outcome: Met This Shift     Problem: Safety:  Goal: Ability to remain free from injury will improve  Description: Ability to remain free from injury will improve  Outcome: Met This Shift     Problem: Self-Care:  Goal: Ability to participate in self-care as condition permits will improve  Description: Ability to participate in self-care as condition permits will improve  Outcome: Met This Shift     Problem: Physical Regulation:  Goal: Ability to maintain clinical measurements within normal limits will improve  Description: Ability to maintain clinical measurements within normal limits will improve  Outcome: Ongoing  Note: Routine lab work ordered

## 2022-03-18 NOTE — PROGRESS NOTES
Physician Progress Note      Jeffery Thomson  [facility] #:                  [id number]  :                       1950  ADMIT DATE:       3/14/2022 4:32 PM  [address] [place] DATE:  RESPONDING  PROVIDER #:        Taylor Gray MD          QUERY TEXT:    Internal  Medicine,    Pt admitted with gastroenteritis and  noted to have an abnormal urine culture. If possible, please document in the progress notes and discharge summary if   you are evaluating and/or treating any of the following: The medical record reflects the following:  Risk Factors: age  Clinical Indicators: urine culture + enterococcus,  Treatment: IV Rocephin, Zyvox, Merrem & Flagyl, IVF bolus, labs, imaging    Thank you,  Eliz Garcia RN CDS  [phone]  Options provided:  -- Urinary Tract Infection (UTI)  -- Bacteriuria  -- Other - I will add my own diagnosis  -- Disagree - Not applicable / Not valid  -- Disagree - Clinically unable to determine / Unknown  -- Refer to Clinical Documentation Reviewer    PROVIDER RESPONSE TEXT:    This patient has a UTI.     Query created by: Lisa Kay on 3/18/2022 11:58 AM      Electronically signed by:  Taylor Gray MD 3/18/2022 5:10 PM

## 2022-03-18 NOTE — PROGRESS NOTES
Physician Progress Note      Norma Esparza  CSN #:                  [id number]  :                       1950  ADMIT DATE:       3/14/2022 4:32 PM  [address] [place] DATE:  RESPONDING  PROVIDER #:        Ventura Amin MD          QUERY TEXT:    Internal Medicine,    Pt admitted with N/V/D due to gastroenteritis. Pt noted to have SIRS. If   possible, please document in the progress notes and discharge summary if you   are evaluating and /or treating any of the following: The medical record reflects the following:  Risk Factors: gastroenteritis  Clinical Indicators: +SIRS w/ RR 14-29, HR , BP 48//113, lactate   2.1, WBC 14-20, urine culture + enterococcus  Treatment: labs, imaging, IV atb, IVF bolus, V Rocephin, Zyvox, Merrem &   Flagyl,    Thank you,  Nile Solorzano RN CDS  [phone]  Options provided:  -- Sepsis, present on admission  -- Sepsis, present on admission, now resolved  -- Sepsis, not present on admission  -- Sepsis was ruled out  -- Other - I will add my own diagnosis  -- Disagree - Not applicable / Not valid  -- Disagree - Clinically unable to determine / Unknown  -- Refer to Clinical Documentation Reviewer    PROVIDER RESPONSE TEXT:    This patient has sepsis which was present on admission. Query created by: Vani Jackson on 3/17/2022 1:39 PM      QUERY TEXT:    Internal Medicine,    Pt admitted with gastroenteritis and noted to have elevated creatinine. If   possible, please document in the progress notes and discharge summary if you   are evaluating and/or treating any of the following:     The medical record reflects the following:  Risk Factors: N/V/D  Clinical Indicators: creatinine 1.4 on admit, now 0.6  Treatment: labs, imaging, IVF    Thank you,  Nile Solorzano RN CDS  [phone]    Defined by Kidney Disease Improving Global Outcomes (KDIGO) clinical practice   guideline for acute kidney injury:  -Increase in SCr by greater than or equal to 0.3 mg/dl within 48 hours; or  -Increase or decrease in SCr to greater than or equal to 1.5 times baseline,   which is known or presumed to have occurred within the prior 7 days; or  -Urine volume < 0.5ml/kg/h for 6 hours  Options provided:  -- Acute kidney failure  -- Acute kidney failure ruled out  -- Other - I will add my own diagnosis  -- Disagree - Not applicable / Not valid  -- Disagree - Clinically unable to determine / Unknown  -- Refer to Clinical Documentation Reviewer    PROVIDER RESPONSE TEXT:    This patient is in Acute kidney failure.     Query created by: Tian Barreto on 3/17/2022 1:45 PM      Electronically signed by:  Lord Salena ADAMS 3/18/2022 4:52 AM

## 2022-03-19 VITALS
HEIGHT: 57 IN | TEMPERATURE: 98.2 F | BODY MASS INDEX: 28.07 KG/M2 | RESPIRATION RATE: 23 BRPM | WEIGHT: 130.1 LBS | SYSTOLIC BLOOD PRESSURE: 121 MMHG | OXYGEN SATURATION: 97 % | HEART RATE: 77 BPM | DIASTOLIC BLOOD PRESSURE: 58 MMHG

## 2022-03-19 LAB
ANION GAP SERPL CALCULATED.3IONS-SCNC: 10 MMOL/L (ref 4–16)
BUN BLDV-MCNC: 5 MG/DL (ref 6–23)
CALCIUM SERPL-MCNC: 8.7 MG/DL (ref 8.3–10.6)
CHLORIDE BLD-SCNC: 103 MMOL/L (ref 99–110)
CO2: 30 MMOL/L (ref 21–32)
CREAT SERPL-MCNC: 0.6 MG/DL (ref 0.6–1.1)
GFR AFRICAN AMERICAN: >60 ML/MIN/1.73M2
GFR NON-AFRICAN AMERICAN: >60 ML/MIN/1.73M2
GLUCOSE BLD-MCNC: 116 MG/DL (ref 70–99)
POTASSIUM SERPL-SCNC: 3.7 MMOL/L (ref 3.5–5.1)
SODIUM BLD-SCNC: 143 MMOL/L (ref 135–145)

## 2022-03-19 PROCEDURE — 36415 COLL VENOUS BLD VENIPUNCTURE: CPT

## 2022-03-19 PROCEDURE — 80048 BASIC METABOLIC PNL TOTAL CA: CPT

## 2022-03-19 PROCEDURE — 6370000000 HC RX 637 (ALT 250 FOR IP): Performed by: FAMILY MEDICINE

## 2022-03-19 PROCEDURE — 94761 N-INVAS EAR/PLS OXIMETRY MLT: CPT

## 2022-03-19 PROCEDURE — 6360000002 HC RX W HCPCS: Performed by: FAMILY MEDICINE

## 2022-03-19 RX ORDER — MAGNESIUM OXIDE 400 MG/1
400 TABLET ORAL 2 TIMES DAILY
Qty: 14 TABLET | Refills: 1 | Status: SHIPPED | OUTPATIENT
Start: 2022-03-19 | End: 2022-03-19

## 2022-03-19 RX ORDER — LINEZOLID 600 MG/1
600 TABLET, FILM COATED ORAL 2 TIMES DAILY
Qty: 28 TABLET | Refills: 0 | Status: SHIPPED | OUTPATIENT
Start: 2022-03-19 | End: 2022-03-19 | Stop reason: SDUPTHER

## 2022-03-19 RX ORDER — MAGNESIUM OXIDE 400 MG/1
400 TABLET ORAL 2 TIMES DAILY
Qty: 14 TABLET | Refills: 2 | Status: SHIPPED | OUTPATIENT
Start: 2022-03-19 | End: 2022-03-26

## 2022-03-19 RX ORDER — LINEZOLID 600 MG/1
600 TABLET, FILM COATED ORAL 2 TIMES DAILY
Qty: 28 TABLET | Refills: 1 | Status: SHIPPED | OUTPATIENT
Start: 2022-03-19 | End: 2022-04-02

## 2022-03-19 RX ORDER — MAGNESIUM OXIDE 400 MG/1
400 TABLET ORAL 2 TIMES DAILY
Qty: 14 TABLET | Refills: 0 | Status: SHIPPED | OUTPATIENT
Start: 2022-03-19 | End: 2022-03-19

## 2022-03-19 RX ADMIN — DIBASIC SODIUM PHOSPHATE, MONOBASIC POTASSIUM PHOSPHATE AND MONOBASIC SODIUM PHOSPHATE 1 TABLET: 852; 155; 130 TABLET ORAL at 08:51

## 2022-03-19 RX ADMIN — ROSUVASTATIN CALCIUM 40 MG: 20 TABLET, COATED ORAL at 08:51

## 2022-03-19 RX ADMIN — CLOPIDOGREL BISULFATE 75 MG: 75 TABLET ORAL at 08:51

## 2022-03-19 RX ADMIN — MAGNESIUM OXIDE 400 MG (241.3 MG MAGNESIUM) TABLET 400 MG: TABLET at 08:51

## 2022-03-19 RX ADMIN — LINEZOLID 600 MG: 600 INJECTION, SOLUTION INTRAVENOUS at 01:51

## 2022-03-19 RX ADMIN — AMLODIPINE BESYLATE 10 MG: 10 TABLET ORAL at 08:51

## 2022-03-19 RX ADMIN — APIXABAN 2.5 MG: 2.5 TABLET, FILM COATED ORAL at 08:51

## 2022-03-19 ASSESSMENT — PAIN SCALES - GENERAL
PAINLEVEL_OUTOF10: 0
PAINLEVEL_OUTOF10: 0

## 2022-03-19 NOTE — PLAN OF CARE
Problem: Skin Integrity:  Goal: Will show no infection signs and symptoms  Description: Will show no infection signs and symptoms  Outcome: Met This Shift  Goal: Absence of new skin breakdown  Description: Absence of new skin breakdown  Outcome: Met This Shift  Goal: Risk for impaired skin integrity will decrease  Description: Risk for impaired skin integrity will decrease  Outcome: Met This Shift     Problem: Falls - Risk of:  Goal: Will remain free from falls  Description: Will remain free from falls  Outcome: Met This Shift  Goal: Absence of physical injury  Description: Absence of physical injury  Outcome: Met This Shift     Problem: Nutrition  Goal: Optimal nutrition therapy  Outcome: Met This Shift     Problem: Fluid Volume:  Goal: Ability to achieve a balanced intake and output will improve  Description: Ability to achieve a balanced intake and output will improve  Outcome: Met This Shift     Problem: Physical Regulation:  Goal: Will show no signs and symptoms of electrolyte imbalance  Description: Will show no signs and symptoms of electrolyte imbalance  Outcome: Met This Shift  Goal: Prevent transmision of infection  Description: Prevent transmision of infection  Outcome: Met This Shift     Problem: Coping:  Goal: Ability to remain calm will improve  Description: Ability to remain calm will improve  Outcome: Met This Shift     Problem: Safety:  Goal: Ability to remain free from injury will improve  Description: Ability to remain free from injury will improve  Outcome: Met This Shift     Problem: Self-Care:  Goal: Ability to participate in self-care as condition permits will improve  Description: Ability to participate in self-care as condition permits will improve  Outcome: Met This Shift     Problem: Physical Regulation:  Goal: Ability to maintain clinical measurements within normal limits will improve  Description: Ability to maintain clinical measurements within normal limits will improve  Outcome: Ongoing  Note: Routine lab draws

## 2022-03-19 NOTE — DISCHARGE SUMMARY
Patient: Ling Boggs MD      Gender: female  : 1950   Age: 67 y.o. MRN: [id number]    Admitting Physician: Melissa Davis MD  Discharge Physician: Melissa Davis MD     Code Status: Full Code     Admit Date: 3/14/2022   Discharge Date: 22      Disposition:  Home       Condition at Discharge:  stable . Follow-up appointments:  f/u one week with PCP , and with consultants as recommended . Outpatient to do list: f/u     Pt`s preferred phone number :[phone]  [facility]  Extended Emergency Contact Information  Primary Emergency Contact: Ariel Pederson  Address: [address] [place], [place] [address]os [facility], [address] [place] [address] Phone: [phone]  Mobile Phone: [phone]  Relation: Spouse   needed? No  Secondary Emergency Contact: jh boswell  Home Phone: [phone]  Relation: Child      Discharge Diagnoses: Active Hospital Problems    Diagnosis     Moderate malnutrition ([address] [E44.0]     ARF (acute renal failure) ([address] [N17.9]                  Hospital Course:         Consults.   IP CONSULT TO HOSPITALIST  IP CONSULT TO DIETITIAN        Discharge Medications:   Current Discharge Medication List        START taking these medications    Details   linezolid (ZYVOX) 600 MG tablet Take 1 tablet by mouth 2 times daily for 14 days  Qty: 28 tablet, Refills: 1      magnesium oxide (MAG-OX) 400 MG tablet Take 1 tablet by mouth 2 times daily for 7 days  Qty: 14 tablet, Refills: 1           Current Discharge Medication List        CONTINUE these medications which have CHANGED    Details   apixaban (ELIQUIS) 5 MG TABS tablet Take 1 tablet by mouth 2 times daily  Qty: 60 tablet, Refills: 5           Current Discharge Medication List        CONTINUE these medications which have NOT CHANGED    Details   melatonin 3 MG TABS tablet Take 1 tablet by mouth nightly as needed (sleep)  Qty: 1 tablet, Refills: 0      amLODIPine (NORVASC) 10 MG Requested call back to discuss scheduling new baby appointment. Caller stated that baby was to be seen today(2018).    Please call to discuss and ok to leave detailed message.    tablet Take 1 tablet by mouth daily  Qty: 1 tablet, Refills: 0      phosphorus (K PHOS NEUTRAL) 155-852-130 MG tablet Take 1 tablet by mouth 2 times daily for 7 days  Qty: 14 tablet, Refills: 0      clopidogrel (PLAVIX) 75 MG tablet Take 1 tablet by mouth daily  Qty: 30 tablet, Refills: 3      donepezil (ARICEPT) 5 MG tablet Take 1 tablet by mouth nightly      rosuvastatin (CRESTOR) 40 MG tablet Take 1 tablet by mouth daily           Current Discharge Medication List          Discharge ROS:  A complete review of systems was asked and negative except for      Discharge Exam:  Estimated body mass index is 28.15 kg/m² as calculated from the following:    Height as of this encounter: 4' 9\" (1.448 m). Weight as of this encounter: 130 lb 1.6 oz (59 kg). BP (!) 121/58   Pulse 77   Temp 98.2 °F (36.8 °C) (Oral)   Resp 23   Ht 4' 9\" (1.448 m)   Wt 130 lb 1.6 oz (59 kg)   SpO2 97%   BMI 28.15 kg/m²   General appearance:  NAD  Heart[de-identified] Normal s1/s2, RRR, no murmurs, gallops, or rubs. No leg edema  Lungs:  Clear to auscultation, bilaterally without Rales/Wheezes/Rhonchi. Abdomen: Soft, non-tender, non-distended, bowel sounds present  Musculoskeletal:   no cyanosis, no edema  Neurologic:  Cranial nerves: II-XII intact, grossly non-focal.  Psychiatric:  A & O x3      Labs:  For convenience and continuity at follow-up the following most recent labs are provided:    Lab Results   Component Value Date    WBC 8.9 03/18/2022    HGB 11.5 03/18/2022    HCT 36.6 03/18/2022    MCV 91.0 03/18/2022     03/18/2022     03/19/2022    K 3.7 03/19/2022     03/19/2022    CO2 30 03/19/2022    BUN 5 03/19/2022    CREATININE 0.6 03/19/2022    CALCIUM 8.7 03/19/2022    PHOS 2.2 03/16/2022    ALKPHOS 127 03/14/2022    ALT <5 03/14/2022    AST 14 03/14/2022    BILITOT 0.4 03/14/2022    BILIDIR 0.2 01/03/2022    LABALBU 3.4 03/14/2022    TRIG 233 12/22/2021     Lab Results   Component Value Date    INR 0.79 01/03/2022 INR 0.83 12/21/2021    INR 0.79 10/25/2021       Results for orders placed or performed during the hospital encounter of 03/14/22   Culture, Urine    Specimen: Urine, clean catch   Result Value Ref Range    Specimen URINE CLEAN CATCH     Special Requests CALLED TO TANNER Macias [id number] AT [facility]     Culture Final Report     Culture (A)      ENTEROCOCCUS FAECIUM VRE >100,000 CFU/ml CONTACT PRECAUTIONS INDICATED       Susceptibility    Enterococcus faecium vre - BACTERIAL SUSCEPTIBILITY PANEL ROXANNE     ampicillin >=32 Resistant      linezolid 2 Sensitive      nitrofurantoin 64 Intermediate      tetracycline >=16 Resistant      vancomycin >=32 Resistant    Culture, Blood 1    Specimen: Blood   Result Value Ref Range    Specimen BLOOD     Special Requests NONE     Culture NO GROWTH AT 72 HOURS    Culture, Blood 2    Specimen: Blood   Result Value Ref Range    Specimen BLOOD     Special Requests NONE     Culture NO GROWTH AT 72 HOURS    Clostridium Difficile Toxin/Antigen    Specimen: Stool   Result Value Ref Range    Source STOOL     C DIFF AG + TOXIN  NEGATIVE: NO C. difficile antigen and toxin detected. NEGATIVE: NO C. difficile antigen and toxin detected.    CBC with Auto Differential   Result Value Ref Range    WBC 20.8 (H) 4.0 - 10.5 K/CU MM    RBC 4.29 4.2 - 5.4 M/CU MM    Hemoglobin 12.2 (L) 12.5 - 16.0 GM/DL    Hematocrit 39.8 37 - 47 %    MCV 92.8 78 - 100 FL    MCH 28.4 27 - 31 PG    MCHC 30.7 (L) 32.0 - 36.0 %    RDW 15.4 (H) 11.7 - 14.9 %    Platelets 374 (H) 739 - 440 K/CU MM    MPV 10.1 7.5 - 11.1 FL    Differential Type AUTOMATED DIFFERENTIAL     Segs Relative 81.7 (H) 36 - 66 %    Lymphocytes % 8.6 (L) 24 - 44 %    Monocytes % 8.3 (H) 0 - 4 %    Eosinophils % 0.5 0 - 3 %    Basophils % 0.4 0 - 1 %    Segs Absolute 17.0 K/CU MM    Lymphocytes Absolute 1.8 K/CU MM    Monocytes Absolute 1.7 K/CU MM    Eosinophils Absolute 0.1 K/CU MM    Basophils Absolute 0.1 K/CU MM    Nucleated RBC % 0.0 % Total Nucleated RBC 0.0 K/CU MM    Total Immature Neutrophil 0.11 K/CU MM    Immature Neutrophil % 0.5 (H) 0 - 0.43 %   Comprehensive Metabolic Panel   Result Value Ref Range    Sodium 139 135 - 145 MMOL/L    Potassium 3.2 (L) 3.5 - 5.1 MMOL/L    Chloride 100 99 - 110 mMol/L    CO2 21 21 - 32 MMOL/L    BUN 14 6 - 23 MG/DL    CREATININE 1.4 (H) 0.6 - 1.1 MG/DL    Glucose 149 (H) 70 - 99 MG/DL    Calcium 9.3 8.3 - 10.6 MG/DL    Albumin 3.4 3.4 - 5.0 GM/DL    Total Protein 6.1 (L) 6.4 - 8.2 GM/DL    Total Bilirubin 0.4 0.0 - 1.0 MG/DL    ALT <5 (L) 10 - 40 U/L    AST 14 (L) 15 - 37 IU/L    Alkaline Phosphatase 127 40 - 129 IU/L    GFR Non- 37 (L) >60 mL/min/1.73m2    GFR  45 (L) >60 mL/min/1.73m2    Anion Gap 18 (H) 4 - 16   Troponin   Result Value Ref Range    Troponin T <0.010 <0.01 NG/ML   Magnesium   Result Value Ref Range    Magnesium 1.7 (L) 1.8 - 2.4 mg/dl   Lactic Acid   Result Value Ref Range    Lactate 2.1 (HH) 0.4 - 2.0 mMOL/L   Lipase   Result Value Ref Range    Lipase 7 (L) 13 - 60 IU/L   Urinalysis   Result Value Ref Range    Color, UA YELLOW YELLOW    Clarity, UA CLEAR CLEAR    Glucose, Urine NEGATIVE NEGATIVE MG/DL    Bilirubin Urine NEGATIVE NEGATIVE MG/DL    Ketones, Urine NEGATIVE NEGATIVE MG/DL    Specific Gravity, UA 1.020 1.001 - 1.035    Blood, Urine SMALL (A) NEGATIVE    pH, Urine 5.5 5.0 - 8.0    Protein, UA TRACE (A) NEGATIVE MG/DL    Urobilinogen, Urine NORMAL 0.2 - 1.0 MG/DL    Nitrite Urine, Quantitative NEGATIVE NEGATIVE    Leukocyte Esterase, Urine SMALL (A) NEGATIVE    RBC, UA 2 0 - 6 /HPF    WBC, UA 3 0 - 5 /HPF    Bacteria, UA NEGATIVE NEGATIVE /HPF    Squam Epithel, UA 2 /HPF    Mucus, UA RARE (A) NEGATIVE HPF    Trichomonas, UA NONE SEEN NONE SEEN /HPF   Basic Metabolic Panel w/ Reflex to MG   Result Value Ref Range    Sodium 140 135 - 145 MMOL/L    Potassium 2.8 (LL) 3.5 - 5.1 MMOL/L    Chloride 105 99 - 110 mMol/L    CO2 23 21 - 32 MMOL/L    Anion Gap 12 4 - 16    BUN 15 6 - 23 MG/DL    CREATININE 1.3 (H) 0.6 - 1.1 MG/DL    Glucose 98 70 - 99 MG/DL    Calcium 8.9 8.3 - 10.6 MG/DL    GFR Non-African American 40 (L) >60 mL/min/1.73m2    GFR  49 (L) >60 mL/min/1.73m2   CBC auto differential   Result Value Ref Range    WBC 14.7 (H) 4.0 - 10.5 K/CU MM    RBC 3.79 (L) 4.2 - 5.4 M/CU MM    Hemoglobin 10.9 (L) 12.5 - 16.0 GM/DL    Hematocrit 35.4 (L) 37 - 47 %    MCV 93.4 78 - 100 FL    MCH 28.8 27 - 31 PG    MCHC 30.8 (L) 32.0 - 36.0 %    RDW 15.2 (H) 11.7 - 14.9 %    Platelets 640 (H) 222 - 440 K/CU MM    MPV 9.6 7.5 - 11.1 FL    Differential Type AUTOMATED DIFFERENTIAL     Segs Relative 77.8 (H) 36 - 66 %    Lymphocytes % 12.6 (L) 24 - 44 %    Monocytes % 7.5 (H) 0 - 4 %    Eosinophils % 1.4 0 - 3 %    Basophils % 0.3 0 - 1 %    Segs Absolute 11.4 K/CU MM    Lymphocytes Absolute 1.9 K/CU MM    Monocytes Absolute 1.1 K/CU MM    Eosinophils Absolute 0.2 K/CU MM    Basophils Absolute 0.1 K/CU MM    Nucleated RBC % 0.0 %    Total Nucleated RBC 0.0 K/CU MM    Total Immature Neutrophil 0.06 K/CU MM    Immature Neutrophil % 0.4 0 - 0.43 %   Magnesium   Result Value Ref Range    Magnesium 1.6 (L) 1.8 - 2.4 mg/dl   Basic Metabolic Panel   Result Value Ref Range    Sodium 144 135 - 145 MMOL/L    Potassium 3.0 (LL) 3.5 - 5.1 MMOL/L    Chloride 109 99 - 110 mMol/L    CO2 25 21 - 32 MMOL/L    Anion Gap 10 4 - 16    BUN 6 6 - 23 MG/DL    CREATININE 0.7 0.6 - 1.1 MG/DL    Glucose 108 (H) 70 - 99 MG/DL    Calcium 8.3 8.3 - 10.6 MG/DL    GFR Non-African American >60 >60 mL/min/1.73m2    GFR African American >60 >60 mL/min/1.73m2   Magnesium   Result Value Ref Range    Magnesium 1.6 (L) 1.8 - 2.4 mg/dl   Phosphorus   Result Value Ref Range    Phosphorus 2.2 (L) 2.5 - 4.9 MG/DL   Basic Metabolic Panel w/ Reflex to MG   Result Value Ref Range    Sodium 150 (H) 135 - 145 MMOL/L    Potassium 3.7 3.5 - 5.1 MMOL/L    Chloride 111 (H) 99 - 110 mMol/L    CO2 25 21 - 32 MMOL/L    Anion Gap 14 4 - 16    BUN 3 (L) 6 - 23 MG/DL    CREATININE 0.6 0.6 - 1.1 MG/DL    Glucose 87 70 - 99 MG/DL    Calcium 8.4 8.3 - 10.6 MG/DL    GFR Non-African American >60 >60 mL/min/1.73m2    GFR African American >60 >60 MT/BELLA/4.76B7   Basic Metabolic Panel w/ Reflex to MG   Result Value Ref Range    Sodium 139 135 - 145 MMOL/L    Potassium 3.3 (L) 3.5 - 5.1 MMOL/L    Chloride 101 99 - 110 mMol/L    CO2 30 21 - 32 MMOL/L    Anion Gap 8 4 - 16    BUN 2 (L) 6 - 23 MG/DL    CREATININE 0.6 0.6 - 1.1 MG/DL    Glucose 132 (H) 70 - 99 MG/DL    Calcium 8.4 8.3 - 10.6 MG/DL    GFR Non-African American >60 >60 mL/min/1.73m2    GFR African American >60 >60 mL/min/1.73m2   CBC with Auto Differential   Result Value Ref Range    WBC 8.9 4.0 - 10.5 K/CU MM    RBC 4.02 (L) 4.2 - 5.4 M/CU MM    Hemoglobin 11.5 (L) 12.5 - 16.0 GM/DL    Hematocrit 36.6 (L) 37 - 47 %    MCV 91.0 78 - 100 FL    MCH 28.6 27 - 31 PG    MCHC 31.4 (L) 32.0 - 36.0 %    RDW 14.8 11.7 - 14.9 %    Platelets 207 (H) 311 - 440 K/CU MM    MPV 9.8 7.5 - 11.1 FL    Differential Type AUTOMATED DIFFERENTIAL     Segs Relative 64.7 36 - 66 %    Lymphocytes % 19.7 (L) 24 - 44 %    Monocytes % 9.4 (H) 0 - 4 %    Eosinophils % 5.5 (H) 0 - 3 %    Basophils % 0.4 0 - 1 %    Segs Absolute 5.8 K/CU MM    Lymphocytes Absolute 1.8 K/CU MM    Monocytes Absolute 0.8 K/CU MM    Eosinophils Absolute 0.5 K/CU MM    Basophils Absolute 0.0 K/CU MM    Nucleated RBC % 0.0 %    Total Nucleated RBC 0.0 K/CU MM    Total Immature Neutrophil 0.03 K/CU MM    Immature Neutrophil % 0.3 0 - 0.43 %   Magnesium   Result Value Ref Range    Magnesium 1.5 (L) 1.8 - 2.4 mg/dl   Basic Metabolic Panel w/ Reflex to MG   Result Value Ref Range    Sodium 143 135 - 145 MMOL/L    Potassium 3.7 3.5 - 5.1 MMOL/L    Chloride 103 99 - 110 mMol/L    CO2 30 21 - 32 MMOL/L    Anion Gap 10 4 - 16    BUN 5 (L) 6 - 23 MG/DL    CREATININE 0.6 0.6 - 1.1 MG/DL    Glucose 116 (H) 70 - 99 MG/DL    Calcium 8.7 GI/Bowel: The stomach is contracted and otherwise unremarkable. Loops of small bowel are normal in caliber without evidence for obstruction. The colon contains air and fecal residue. There are scattered uncomplicated diverticula primarily a in the sigmoid region. The appendix is normal. There is no free air or free fluid. Pelvis: The uterus has been surgically removed. Phleboliths are seen in the pelvis. Peritoneum/Retroperitoneum: The psoas muscles are symmetric. The abdominal aorta is normal in caliber. The inferior vena cava is unremarkable. There is no retroperitoneal or mesenteric adenopathy. Bones/Soft Tissues: The extra-abdominal soft tissues are grossly unremarkable except for a midline fat containing hernia superiorly. There is no acute osseous abnormality. No acute abdominal or pelvic abnormality. Uncomplicated colonic diverticulosis in the sigmoid region. XR CHEST PORTABLE    Result Date: 3/14/2022  EXAMINATION: ONE X-RAY VIEW OF THE CHEST 3/14/2022 4:27 pm COMPARISON: January 20, 2022 HISTORY: ORDERING SYSTEM PROVIDED HISTORY: chest pain TECHNOLOGIST PROVIDED HISTORY: Reason for exam:->chest pain Reason for Exam: abdominal pain, diarrhea, AMS, chest pain FINDINGS: The cardiomediastinal silhouette is normal in size. There are stable post sternotomy changes present. The lungs are clear. No pleural effusion or pneumothorax. Right-sided central venous catheter has been removed. No acute cardiopulmonary process. EKG     Rhythm: normal sinus         There is no immunization history on file for this patient. The patient was seen and examined on day of discharge and this discharge summary is in conjunction with any daily progress note from day of discharge. Time Spent on discharge is   >35  min  in the examination, evaluation, counseling and review of medications and discharge plan.             Selena Christianson MD   3/19/2022

## 2022-03-20 LAB
CULTURE: NORMAL
CULTURE: NORMAL
Lab: NORMAL
Lab: NORMAL
SPECIMEN: NORMAL
SPECIMEN: NORMAL

## 2022-03-22 NOTE — PROGRESS NOTES
Physician Progress Note      PATIENT:               Sharyn Esparza  CSN #:                  [id number]  :                       1950  ADMIT DATE:       3/14/2022 4:32 PM  Stefano Waller DATE:        3/19/2022 6:44 PM  RESPONDING  PROVIDER #:        Ventura Amin MD          QUERY TEXT:    Internal Medicine,    Pt admitted with sepsis and UTI and has Moderate malnutrition documented by   the Dietitian consultant. If possible, please document in progress notes and   discharge summary:    The medical record reflects the following:  Risk Factors: chronic illness  Clinical Indicators: Per Dietitian consult: ?  Malnutrition Assessment:  Malnutrition Status: Moderate malnutrition  Context:  Acute Illness  Findings of the 6 clinical characteristics of malnutrition:  Energy Intake:  75% or less of estimated energy requirements for 7 or more   days  Weight Loss:  Greater than 7.5% over 3 months  Body Fat Loss:  Unable to assess  Muscle Mass Loss:  Unable to assess  Fluid Accumulation:  No significant fluid accumulation  Treatment: labs, ? Advance to a Low Fiber Diet as timely as able,  trial of   variety high heather oral nutrition supplements between meals, Monitor/replace   depleted electrolytes carefully    Thank you,  Nile Solorzano RN [facility]  [phone]  Options provided:  -- Moderate malnutrition  confirmed present on admission  -- Moderate malnutrition confirmed not present on admission  -- Moderate malnutrition  ruled out  -- Other - I will add my own diagnosis  -- Disagree - Not applicable / Not valid  -- Disagree - Clinically unable to determine / Unknown  -- Refer to Clinical Documentation Reviewer    PROVIDER RESPONSE TEXT:    The diagnosis of Moderate malnutrition was confirmed as present on admission.     Query created by: Vani Jackson on 3/21/2022 11:29 AM      Electronically signed by:  Ventura Amin MD 3/22/2022 4:13 AM

## 2022-04-03 ENCOUNTER — APPOINTMENT (OUTPATIENT)
Dept: GENERAL RADIOLOGY | Age: 72
DRG: 392 | End: 2022-04-03
Payer: MEDICARE

## 2022-04-03 ENCOUNTER — HOSPITAL ENCOUNTER (INPATIENT)
Age: 72
LOS: 1 days | Discharge: HOME | DRG: 392 | End: 2022-04-04
Attending: STUDENT IN AN ORGANIZED HEALTH CARE EDUCATION/TRAINING PROGRAM | Admitting: FAMILY MEDICINE
Payer: MEDICARE

## 2022-04-03 ENCOUNTER — APPOINTMENT (OUTPATIENT)
Dept: CT IMAGING | Age: 72
DRG: 392 | End: 2022-04-03
Payer: MEDICARE

## 2022-04-03 DIAGNOSIS — R11.2 NON-INTRACTABLE VOMITING WITH NAUSEA, UNSPECIFIED VOMITING TYPE: Primary | ICD-10-CM

## 2022-04-03 PROBLEM — K92.1 BLOOD IN STOOL: Status: ACTIVE | Noted: 2022-04-03

## 2022-04-03 PROBLEM — R11.10 EMESIS: Status: ACTIVE | Noted: 2022-04-03

## 2022-04-03 LAB
ABO/RH: NORMAL
ALBUMIN SERPL-MCNC: 4.5 GM/DL (ref 3.4–5)
ALP BLD-CCNC: 120 IU/L (ref 40–129)
ALT SERPL-CCNC: 16 U/L (ref 10–40)
ANION GAP SERPL CALCULATED.3IONS-SCNC: 12 MMOL/L (ref 4–16)
ANTIBODY SCREEN: NEGATIVE
APTT: 26.6 SECONDS (ref 25.1–37.1)
AST SERPL-CCNC: 27 IU/L (ref 15–37)
BACTERIA: NEGATIVE /HPF
BILIRUB SERPL-MCNC: 0.3 MG/DL (ref 0–1)
BILIRUBIN DIRECT: 0.2 MG/DL (ref 0–0.3)
BILIRUBIN URINE: NEGATIVE MG/DL
BILIRUBIN, INDIRECT: 0.1 MG/DL (ref 0–0.7)
BLOOD, URINE: ABNORMAL
BUN BLDV-MCNC: 15 MG/DL (ref 6–23)
CALCIUM SERPL-MCNC: 10 MG/DL (ref 8.3–10.6)
CHLORIDE BLD-SCNC: 102 MMOL/L (ref 99–110)
CLARITY: CLEAR
CO2: 25 MMOL/L (ref 21–32)
COLOR: YELLOW
CREAT SERPL-MCNC: 0.9 MG/DL (ref 0.6–1.1)
GFR AFRICAN AMERICAN: >60 ML/MIN/1.73M2
GFR NON-AFRICAN AMERICAN: >60 ML/MIN/1.73M2
GLUCOSE BLD-MCNC: 132 MG/DL (ref 70–99)
GLUCOSE, URINE: NEGATIVE MG/DL
HCT VFR BLD CALC: 28.6 % (ref 37–47)
HEMOGLOBIN: 8.6 GM/DL (ref 12.5–16)
INR BLD: 1.22 INDEX
KETONES, URINE: NEGATIVE MG/DL
LACTATE: 2.8 MMOL/L (ref 0.4–2)
LEUKOCYTE ESTERASE, URINE: NEGATIVE
LIPASE: 20 IU/L (ref 13–60)
MCH RBC QN AUTO: 28.4 PG (ref 27–31)
MCHC RBC AUTO-ENTMCNC: 30.1 % (ref 32–36)
MCV RBC AUTO: 94.4 FL (ref 78–100)
MUCUS: ABNORMAL HPF
NITRITE URINE, QUANTITATIVE: NEGATIVE
PDW BLD-RTO: 14.2 % (ref 11.7–14.9)
PH, URINE: 6 (ref 5–8)
PLATELET # BLD: 232 K/CU MM (ref 140–440)
PMV BLD AUTO: 9.2 FL (ref 7.5–11.1)
POTASSIUM SERPL-SCNC: 4.1 MMOL/L (ref 3.5–5.1)
PRO-BNP: 264.1 PG/ML
PROTEIN UA: 30 MG/DL
PROTHROMBIN TIME: 15.8 SECONDS (ref 11.7–14.5)
RBC # BLD: 3.03 M/CU MM (ref 4.2–5.4)
RBC URINE: 2 /HPF (ref 0–6)
SARS-COV-2, NAAT: NOT DETECTED
SODIUM BLD-SCNC: 139 MMOL/L (ref 135–145)
SOURCE: NORMAL
SPECIFIC GRAVITY UA: 1.02 (ref 1–1.03)
TOTAL PROTEIN: 7.5 GM/DL (ref 6.4–8.2)
TRICHOMONAS: ABNORMAL /HPF
TROPONIN T: <0.01 NG/ML
UROBILINOGEN, URINE: 0.2 MG/DL (ref 0.2–1)
WBC # BLD: 6.4 K/CU MM (ref 4–10.5)
WBC UA: 23 /HPF (ref 0–5)

## 2022-04-03 PROCEDURE — 6360000002 HC RX W HCPCS: Performed by: STUDENT IN AN ORGANIZED HEALTH CARE EDUCATION/TRAINING PROGRAM

## 2022-04-03 PROCEDURE — G0378 HOSPITAL OBSERVATION PER HR: HCPCS

## 2022-04-03 PROCEDURE — 86850 RBC ANTIBODY SCREEN: CPT

## 2022-04-03 PROCEDURE — 83690 ASSAY OF LIPASE: CPT

## 2022-04-03 PROCEDURE — 96374 THER/PROPH/DIAG INJ IV PUSH: CPT

## 2022-04-03 PROCEDURE — 87635 SARS-COV-2 COVID-19 AMP PRB: CPT

## 2022-04-03 PROCEDURE — 86900 BLOOD TYPING SEROLOGIC ABO: CPT

## 2022-04-03 PROCEDURE — 85610 PROTHROMBIN TIME: CPT

## 2022-04-03 PROCEDURE — 2580000003 HC RX 258: Performed by: FAMILY MEDICINE

## 2022-04-03 PROCEDURE — 87086 URINE CULTURE/COLONY COUNT: CPT

## 2022-04-03 PROCEDURE — 84484 ASSAY OF TROPONIN QUANT: CPT

## 2022-04-03 PROCEDURE — 83880 ASSAY OF NATRIURETIC PEPTIDE: CPT

## 2022-04-03 PROCEDURE — 81001 URINALYSIS AUTO W/SCOPE: CPT

## 2022-04-03 PROCEDURE — 96361 HYDRATE IV INFUSION ADD-ON: CPT

## 2022-04-03 PROCEDURE — 2580000003 HC RX 258: Performed by: STUDENT IN AN ORGANIZED HEALTH CARE EDUCATION/TRAINING PROGRAM

## 2022-04-03 PROCEDURE — 85027 COMPLETE CBC AUTOMATED: CPT

## 2022-04-03 PROCEDURE — 80053 COMPREHEN METABOLIC PANEL: CPT

## 2022-04-03 PROCEDURE — 85730 THROMBOPLASTIN TIME PARTIAL: CPT

## 2022-04-03 PROCEDURE — 6370000000 HC RX 637 (ALT 250 FOR IP): Performed by: FAMILY MEDICINE

## 2022-04-03 PROCEDURE — 86901 BLOOD TYPING SEROLOGIC RH(D): CPT

## 2022-04-03 PROCEDURE — 99284 EMERGENCY DEPT VISIT MOD MDM: CPT

## 2022-04-03 PROCEDURE — 82248 BILIRUBIN DIRECT: CPT

## 2022-04-03 PROCEDURE — 1200000000 HC SEMI PRIVATE

## 2022-04-03 PROCEDURE — 83605 ASSAY OF LACTIC ACID: CPT

## 2022-04-03 PROCEDURE — 93005 ELECTROCARDIOGRAM TRACING: CPT | Performed by: STUDENT IN AN ORGANIZED HEALTH CARE EDUCATION/TRAINING PROGRAM

## 2022-04-03 PROCEDURE — 6360000004 HC RX CONTRAST MEDICATION: Performed by: STUDENT IN AN ORGANIZED HEALTH CARE EDUCATION/TRAINING PROGRAM

## 2022-04-03 PROCEDURE — 71046 X-RAY EXAM CHEST 2 VIEWS: CPT

## 2022-04-03 PROCEDURE — 74177 CT ABD & PELVIS W/CONTRAST: CPT

## 2022-04-03 RX ORDER — AMLODIPINE BESYLATE 5 MG/1
10 TABLET ORAL DAILY
Status: DISCONTINUED | OUTPATIENT
Start: 2022-04-03 | End: 2022-04-04 | Stop reason: HOSPADM

## 2022-04-03 RX ORDER — MAGNESIUM SULFATE IN WATER 40 MG/ML
2000 INJECTION, SOLUTION INTRAVENOUS PRN
Status: DISCONTINUED | OUTPATIENT
Start: 2022-04-03 | End: 2022-04-04 | Stop reason: HOSPADM

## 2022-04-03 RX ORDER — SODIUM PHOSPHATE, DIBASIC AND SODIUM PHOSPHATE, MONOBASIC 7; 19 G/133ML; G/133ML
1 ENEMA RECTAL DAILY PRN
Status: DISCONTINUED | OUTPATIENT
Start: 2022-04-03 | End: 2022-04-04 | Stop reason: HOSPADM

## 2022-04-03 RX ORDER — SODIUM CHLORIDE 0.9 % (FLUSH) 0.9 %
5-40 SYRINGE (ML) INJECTION EVERY 12 HOURS SCHEDULED
Status: DISCONTINUED | OUTPATIENT
Start: 2022-04-03 | End: 2022-04-04 | Stop reason: HOSPADM

## 2022-04-03 RX ORDER — ONDANSETRON 2 MG/ML
4 INJECTION INTRAMUSCULAR; INTRAVENOUS EVERY 6 HOURS PRN
Status: DISCONTINUED | OUTPATIENT
Start: 2022-04-03 | End: 2022-04-04 | Stop reason: HOSPADM

## 2022-04-03 RX ORDER — PROMETHAZINE HYDROCHLORIDE 25 MG/1
12.5 TABLET ORAL EVERY 6 HOURS PRN
Status: DISCONTINUED | OUTPATIENT
Start: 2022-04-03 | End: 2022-04-04 | Stop reason: HOSPADM

## 2022-04-03 RX ORDER — SODIUM CHLORIDE 0.9 % (FLUSH) 0.9 %
5-40 SYRINGE (ML) INJECTION PRN
Status: DISCONTINUED | OUTPATIENT
Start: 2022-04-03 | End: 2022-04-04 | Stop reason: HOSPADM

## 2022-04-03 RX ORDER — 0.9 % SODIUM CHLORIDE 0.9 %
250 INTRAVENOUS SOLUTION INTRAVENOUS ONCE
Status: COMPLETED | OUTPATIENT
Start: 2022-04-03 | End: 2022-04-03

## 2022-04-03 RX ORDER — SODIUM CHLORIDE 9 MG/ML
INJECTION, SOLUTION INTRAVENOUS PRN
Status: DISCONTINUED | OUTPATIENT
Start: 2022-04-03 | End: 2022-04-04 | Stop reason: HOSPADM

## 2022-04-03 RX ORDER — POTASSIUM CHLORIDE 7.45 MG/ML
10 INJECTION INTRAVENOUS PRN
Status: DISCONTINUED | OUTPATIENT
Start: 2022-04-03 | End: 2022-04-04 | Stop reason: HOSPADM

## 2022-04-03 RX ORDER — POTASSIUM CHLORIDE 20 MEQ/1
40 TABLET, EXTENDED RELEASE ORAL PRN
Status: DISCONTINUED | OUTPATIENT
Start: 2022-04-03 | End: 2022-04-04 | Stop reason: HOSPADM

## 2022-04-03 RX ORDER — ACETAMINOPHEN 325 MG/1
650 TABLET ORAL EVERY 6 HOURS PRN
Status: DISCONTINUED | OUTPATIENT
Start: 2022-04-03 | End: 2022-04-04 | Stop reason: HOSPADM

## 2022-04-03 RX ORDER — SODIUM CHLORIDE 9 MG/ML
INJECTION, SOLUTION INTRAVENOUS CONTINUOUS
Status: DISCONTINUED | OUTPATIENT
Start: 2022-04-03 | End: 2022-04-04 | Stop reason: HOSPADM

## 2022-04-03 RX ORDER — LANOLIN ALCOHOL/MO/W.PET/CERES
3 CREAM (GRAM) TOPICAL NIGHTLY PRN
Status: DISCONTINUED | OUTPATIENT
Start: 2022-04-03 | End: 2022-04-04 | Stop reason: HOSPADM

## 2022-04-03 RX ORDER — ACETAMINOPHEN 650 MG/1
650 SUPPOSITORY RECTAL EVERY 6 HOURS PRN
Status: DISCONTINUED | OUTPATIENT
Start: 2022-04-03 | End: 2022-04-04 | Stop reason: HOSPADM

## 2022-04-03 RX ORDER — ONDANSETRON 2 MG/ML
4 INJECTION INTRAMUSCULAR; INTRAVENOUS EVERY 6 HOURS PRN
Status: DISCONTINUED | OUTPATIENT
Start: 2022-04-03 | End: 2022-04-03 | Stop reason: SDUPTHER

## 2022-04-03 RX ORDER — POLYETHYLENE GLYCOL 3350 17 G/17G
17 POWDER, FOR SOLUTION ORAL DAILY PRN
Status: DISCONTINUED | OUTPATIENT
Start: 2022-04-03 | End: 2022-04-04 | Stop reason: HOSPADM

## 2022-04-03 RX ORDER — ROSUVASTATIN CALCIUM 20 MG/1
40 TABLET, COATED ORAL DAILY
Status: DISCONTINUED | OUTPATIENT
Start: 2022-04-03 | End: 2022-04-04 | Stop reason: HOSPADM

## 2022-04-03 RX ORDER — DONEPEZIL HYDROCHLORIDE 5 MG/1
5 TABLET, FILM COATED ORAL NIGHTLY
Status: DISCONTINUED | OUTPATIENT
Start: 2022-04-03 | End: 2022-04-04 | Stop reason: HOSPADM

## 2022-04-03 RX ADMIN — ONDANSETRON 4 MG: 2 INJECTION INTRAMUSCULAR; INTRAVENOUS at 09:13

## 2022-04-03 RX ADMIN — DONEPEZIL HYDROCHLORIDE 5 MG: 5 TABLET, FILM COATED ORAL at 20:46

## 2022-04-03 RX ADMIN — SODIUM CHLORIDE: 9 INJECTION, SOLUTION INTRAVENOUS at 18:31

## 2022-04-03 RX ADMIN — DIBASIC SODIUM PHOSPHATE, MONOBASIC POTASSIUM PHOSPHATE AND MONOBASIC SODIUM PHOSPHATE 1 TABLET: 852; 155; 130 TABLET ORAL at 22:41

## 2022-04-03 RX ADMIN — IOPAMIDOL 65 ML: 755 INJECTION, SOLUTION INTRAVENOUS at 10:41

## 2022-04-03 RX ADMIN — SODIUM CHLORIDE 250 ML: 9 INJECTION, SOLUTION INTRAVENOUS at 09:11

## 2022-04-03 RX ADMIN — Medication 3 MG: at 20:46

## 2022-04-03 RX ADMIN — SODIUM CHLORIDE, PRESERVATIVE FREE 10 ML: 5 INJECTION INTRAVENOUS at 20:47

## 2022-04-03 RX ADMIN — SODIUM CHLORIDE, PRESERVATIVE FREE 10 ML: 5 INJECTION INTRAVENOUS at 20:46

## 2022-04-03 RX ADMIN — ROSUVASTATIN CALCIUM 40 MG: 20 TABLET, FILM COATED ORAL at 18:32

## 2022-04-03 RX ADMIN — AMLODIPINE BESYLATE 10 MG: 5 TABLET ORAL at 18:32

## 2022-04-03 ASSESSMENT — PAIN SCALES - GENERAL: PAINLEVEL_OUTOF10: 0

## 2022-04-03 NOTE — CARE COORDINATION
CM noted pt as a possible readmission. Pt was previously admited 3/14-3/19 for abdominal pain, diarrhea, and AMS. Per notes pt lives at home with spouse, has PCP, and insurance. DME's listed walker, shower seat, and BSC. Pt is here today for dark stool and coffee ground emesis. CM went to speak to pt. Pt has no needs at this time per pt. Only thing pt wants is food and pt has not been cleared yet for food. CM re offered [address] [employer] pt declined. CM alos offered information for [employer] and [employer]. Pt declined.

## 2022-04-03 NOTE — H&P
HISTORY AND PHYSICAL    4/3/2022     Patient Information:    Patient: Brandon Wilson     Gender: female  : 1950   Age: 67 y.o. MRN: [id number]  Room : ED14/ED-14      Pt`s preferred phone number :[phone]  [facility]  Extended Emergency Contact Information  Primary Emergency Contact: Ariel Pederson  Address: [address] [place], [place] [address]os [facility], [address] [place] [address] Phone: [phone]  Mobile Phone: [phone]  Relation: Spouse   needed? No  Secondary Emergency Contact: jh boswell  Home Phone: [phone]  Relation: Child        PCP:  Rosa Castorena MD (Tel: [phone] )    Chief complaint:    Chief Complaint   Patient presents with    Emesis     coffee ground emesis    Melena     stool is dark      Lab Results   Component Value Date    LABA1C 6.0 2021       Lab Results   Component Value Date    LVEF 55 2021       Body mass index is 26.26 kg/m². History of Present Illness:  Branodn Wilson is a 67 y.o. female with   has a past medical history of CAD (coronary artery disease) and Hypertension. who presented to ER with lack of appetitie associated with nausea and vomiting , also she has dark/black stool and she is on plavix and eliquis     In ER lab data revealed mild anemia , dariela BUN   And  ABD/PLVC   CT revealed no acute finding   History obtained from patient . REVIEW OF SYSTEMS:   Constitutional: Negative for fever,chills . ENT: Negative for rhinorrhea,  sore throat.   Respiratory: Negative for cough, shortness of breath,wheezing  Cardiovascular: Negative for chest pain, palpitations   Gastrointestinal: Negative for Abdominal pain, nausea, vomiting, diarrhea  Genitourinary: Negative for polyuria, dysuria   Hematologic/Lymphatic: Negative for bleeding tendency, easy bruising  Musculoskeletal: Negative for myalgias and arthralgias  Neurologic: Negative for confusion,dysarthria. Skin: Negative for itching,rash  Psychiatric: Negative for depression,anxiety, agitation. Endocrine: Negative for polydipsia,polyuria,heat /cold intolerance. Past Medical History:   has a past medical history of CAD (coronary artery disease) and Hypertension. Past Surgical History:   has a past surgical history that includes Coronary artery bypass graft; Neck surgery; Carotid endarterectomy; Cholecystectomy; and IR NONTUNNELED VASCULAR CATHETER > 5 YEARS (1/20/2022). Medications:  No current facility-administered medications on file prior to encounter. Current Outpatient Medications on File Prior to Encounter   Medication Sig Dispense Refill    apixaban (ELIQUIS) 5 MG TABS tablet Take 1 tablet by mouth 2 times daily 60 tablet 5    melatonin 3 MG TABS tablet Take 1 tablet by mouth nightly as needed (sleep) 1 tablet 0    amLODIPine (NORVASC) 10 MG tablet Take 1 tablet by mouth daily 1 tablet 0    phosphorus (K PHOS NEUTRAL) 155-852-130 MG tablet Take 1 tablet by mouth 2 times daily for 7 days 14 tablet 0    clopidogrel (PLAVIX) 75 MG tablet Take 1 tablet by mouth daily 30 tablet 3    donepezil (ARICEPT) 5 MG tablet Take 1 tablet by mouth nightly      rosuvastatin (CRESTOR) 40 MG tablet Take 1 tablet by mouth daily         Allergies: Allergies   Allergen Reactions    Penicillins     Sulfa Antibiotics Swelling        Social History:   reports that she has been smoking cigarettes. She has been smoking about 1.50 packs per day. She has never used smokeless tobacco. She reports that she does not drink alcohol and does not use drugs. Family History:  Family history is unknown by patient. ,       There is no immunization history on file for this patient. Physical Exam:  /65   Pulse 77   Temp 98.1 °F (36.7 °C) (Oral)   Resp 16   Ht 4' 11\" (1.499 m)   Wt 130 lb (59 kg)   SpO2 92%   BMI 26.26 kg/m²     General appearance:  no distress .  Well nourished  Eyes: Sclera clear, pupils equal  Cardiovascular: Regular rhythm, normal S1, S2. No edema in lower extremities  Respiratory: Clear to auscultation bilaterally, no wheeze, good inspiratory effort  Gastrointestinal: Abdomen soft, non-tender, not distended, normal bowel sounds  Musculoskeletal: No cyanosis in digits, neck supple  Neurology: Cranial nerves grossly intact. Alert and oriented . No speech or motor deficits  Psychiatry: Appropriate affect.  Not agitated  Skin: Warm, dry, normal turgor, no rash    Labs:  CBC:   Lab Results   Component Value Date    WBC 8.9 03/18/2022    RBC 4.02 03/18/2022    HGB 11.5 03/18/2022    HCT 36.6 03/18/2022    MCV 91.0 03/18/2022    MCH 28.6 03/18/2022    MCHC 31.4 03/18/2022    RDW 14.8 03/18/2022     03/18/2022    MPV 9.8 03/18/2022     BMP:    Lab Results   Component Value Date     04/03/2022    K 4.1 04/03/2022     04/03/2022    CO2 25 04/03/2022    BUN 15 04/03/2022    CREATININE 0.9 04/03/2022    CALCIUM 10.0 04/03/2022    GFRAA >60 04/03/2022    LABGLOM >60 04/03/2022    GLUCOSE 132 04/03/2022       Chest Xray:   EKG:    I visualized CXR images and EKG strips      Patient Active Problem List   Diagnosis Code    Hypertensive urgency I16.0    CAD (coronary artery disease) I25.10    Diverticulosis K57.90    Hyperlipidemia with target low density lipoprotein (LDL) cholesterol less than 70 mg/dL E78.5    Cerebrovascular accident ([facility] I63.9    Acute cerebrovascular accident (CVA) ([facility] I63.9    Expressive aphasia R47.01    Acute kidney injury due to COVID-19 ([facility] U07.1, N17.9    Recurrent stenosis of left carotid artery I65.22    Gross hematuria R31.0    COVID-19 virus infection U07.1    Dementia ([facility] F03.90    Diarrhea R19.7    Hypertension I10    Sepsis ([facility] A41.9    ARF (acute renal failure) (HCC) N17.9    Moderate malnutrition (HCC) E44.0    Emesis R11.10    Blood in stool K92.1         Active Hospital Problems    Diagnosis     Emesis [R11.10]     Blood in stool [K92.1]    gastroenteritis   Dementia         Assessment/Plan:   Tele   IVF  NPO , then advance diet   Hold Plavix, Eliquis , monitor H/H   Home meds , reviewed and resumed as appropriate   Symptoms releif/Pain control  DVT proph           Vita Blankenship MD    4/3/2022 5:45 PM

## 2022-04-03 NOTE — ED NOTES
Pt oxygen 88% on room air at rest. This RN attempted to apply oxygen via nasal cannula and pt refused and took off. Will continue to monitor.           Kaleb Lyn RN  04/03/22 1305

## 2022-04-03 NOTE — ED TRIAGE NOTES
Patient arrives via EMS from home. Per EMS \"She has been having dark stools for the past couple weeks and coffee ground emesis for the past two days.   Vitals WNL\"

## 2022-04-03 NOTE — PROGRESS NOTES
Patient brought to room via ER cart. Patient placed in bed, tele monitor attached, and first set of vitals obtained. Admission questions not able to be answered at this time d/t family leaving and patient has dementia.

## 2022-04-03 NOTE — ED PROVIDER NOTES
Emergency Department Encounter    Patient: Amanda Guerra  MRN: [id number]  : 1950  Date of Evaluation: 4/3/2022  ED Provider:  Vamshi Rosales DO    Triage Chief Complaint:   Emesis (coffee ground emesis) and Melena (stool is dark)    [place]:  Amanda Guerra is a 67 y.o. female that presents with nausea and vomiting. Onset 3 to 4 days ago associated with decreased p.o. intake. Last night her daughter noticed that the patient was very weak and at 7:30 AM gave her 7-Up and she threw up brownish colored vomit. She also noticed for the last couple days she has had jet black stool. Also reports abdominal pain on and off during this time however in the ER she has no pain.       ROS - see HPI, below listed is current ROS at time of my eval:  General:  No fevers, no chills, no weakness  Eyes:  No recent vison changes, no discharge  ENT:  No sore throat, no nasal congestion, no hearing changes  Cardiovascular:  No chest pain, no palpitations  Respiratory:  No shortness of breath, no cough, no wheezing  Gastrointestinal:  No pain, (+)nausea, (+)vomiting, no diarrhea, (+) black stool  Musculoskeletal:  No muscle pain, no joint pain  Skin:  No rash, no pruritis, no easy bruising  Neurologic:  No speech problems, no headache, no extremity numbness, no extremity tingling, no extremity weakness  Psychiatric:  No anxiety  Genitourinary:  No dysuria, no hematuria  Endocrine:  No unexpected weight gain, no unexpected weight loss  Extremities:  no edema, no pain    Past Medical History:   Diagnosis Date    CAD (coronary artery disease)     Hypertension      Past Surgical History:   Procedure Laterality Date    CAROTID ENDARTERECTOMY      CHOLECYSTECTOMY      CORONARY ARTERY BYPASS GRAFT      IR NONTUNNELED VASCULAR CATHETER  2022    IR NONTUNNELED VASCULAR CATHETER 2022 [facility]EDURES    NECK SURGERY       Family History   Family history unknown: Yes     Social History     Socioeconomic History  Marital status:      Spouse name: Not on file    Number of children: Not on file    Years of education: Not on file    Highest education level: Not on file   Occupational History    Not on file   Tobacco Use    Smoking status: Current Every Day Smoker     Packs/day: 1.50     Types: Cigarettes    Smokeless tobacco: Never Used   Substance and Sexual Activity    Alcohol use: No    Drug use: No    Sexual activity: Not on file   Other Topics Concern    Not on file   Social History Narrative    Not on file     Social Determinants of Health     Financial Resource Strain:     Difficulty of Paying Living Expenses: Not on file   Food Insecurity:     Worried About Running Out of Food in the Last Year: Not on file    Patricia of Food in the Last Year: Not on file   Transportation Needs:     Lack of Transportation (Medical): Not on file    Lack of Transportation (Non-Medical):  Not on file   Physical Activity:     Days of Exercise per Week: Not on file    Minutes of Exercise per Session: Not on file   Stress:     Feeling of Stress : Not on file   Social Connections:     Frequency of Communication with Friends and Family: Not on file    Frequency of Social Gatherings with Friends and Family: Not on file    Attends Restoration Services: Not on file    Active Member of [address] [place] [address] Proficiency or Organizations: Not on file    Attends Club or Organization Meetings: Not on file    Marital Status: Not on file   Intimate Partner Violence:     Fear of Current or Ex-Partner: Not on file    Emotionally Abused: Not on file    Physically Abused: Not on file    Sexually Abused: Not on file   Housing Stability:     Unable to Pay for Housing in the Last Year: Not on file    Number of [place] in the Last Year: Not on file    Unstable Housing in the Last Year: Not on file     Current Facility-Administered Medications   Medication Dose Route Frequency Provider Last Rate Last Admin    ondansetron (ZOFRAN) injection 4 mg 4 mg IntraVENous Q6H PRN Maximo Meckel, DO   4 mg at 04/03/22 0913     Current Outpatient Medications   Medication Sig Dispense Refill    apixaban (ELIQUIS) 5 MG TABS tablet Take 1 tablet by mouth 2 times daily 60 tablet 5    melatonin 3 MG TABS tablet Take 1 tablet by mouth nightly as needed (sleep) 1 tablet 0    amLODIPine (NORVASC) 10 MG tablet Take 1 tablet by mouth daily 1 tablet 0    phosphorus (K PHOS NEUTRAL) 155-852-130 MG tablet Take 1 tablet by mouth 2 times daily for 7 days 14 tablet 0    clopidogrel (PLAVIX) 75 MG tablet Take 1 tablet by mouth daily 30 tablet 3    donepezil (ARICEPT) 5 MG tablet Take 1 tablet by mouth nightly      rosuvastatin (CRESTOR) 40 MG tablet Take 1 tablet by mouth daily       Allergies   Allergen Reactions    Penicillins     Sulfa Antibiotics Swelling       Nursing Notes Reviewed    Physical Exam:  Triage VS:    ED Triage Vitals [04/03/22 0828]   Enc Vitals Group      /65      Pulse 82      Resp 16      Temp 98.1 °F (36.7 °C)      Temp Source Oral      SpO2 99 %      Weight 130 lb (59 kg)      Height 4' 11\" (1.499 m)      Head Circumference       Peak Flow       Pain Score       Pain Loc       Pain Edu? Excl. in [address]? My pulse ox interpretation is - normal    General appearance:  No acute distress. Skin:  Warm. Dry. Eye:  Extraocular movements intact. Ears, nose, mouth and throat: (+) Oral mucosa dry  Neck:  Trachea midline. Extremity:  No swelling. Normal ROM     Heart:  Regular rate and rhythm, normal S1 & S2, no extra heart sounds. Perfusion:  intact  Respiratory:  Lungs clear to auscultation bilaterally. Respirations nonlabored. Abdominal:  Normal bowel sounds. Soft.  (+) LLQ TTP. Non distended. Back:  No CVA tenderness to palpation     Neurological:  Alert and oriented times 3. No focal neuro deficits.              Psychiatric:  Appropriate    I have reviewed and interpreted all of the currently available lab results from this visit (if applicable):  Results for orders placed or performed during the hospital encounter of 04/03/22   COVID-19, Rapid    Specimen: Nasopharyngeal   Result Value Ref Range    Source UNKNOWN     SARS-CoV-2, NAAT NOT DETECTED NOT DETECTED   Basic Metabolic Panel w/ Reflex to MG   Result Value Ref Range    Sodium 139 135 - 145 MMOL/L    Potassium 4.1 3.5 - 5.1 MMOL/L    Chloride 102 99 - 110 mMol/L    CO2 25 21 - 32 MMOL/L    Anion Gap 12 4 - 16    BUN 15 6 - 23 MG/DL    CREATININE 0.9 0.6 - 1.1 MG/DL    Glucose 132 (H) 70 - 99 MG/DL    Calcium 10.0 8.3 - 10.6 MG/DL    GFR Non-African American >60 >60 mL/min/1.73m2    GFR African American >60 >60 mL/min/1.73m2   Hepatic Function Panel   Result Value Ref Range    Albumin 4.5 3.4 - 5.0 GM/DL    Total Bilirubin 0.3 0.0 - 1.0 MG/DL    Bilirubin, Direct 0.2 0.0 - 0.3 MG/DL    Bilirubin, Indirect 0.1 0 - 0.7 MG/DL    Alkaline Phosphatase 120 40 - 129 IU/L    AST 27 15 - 37 IU/L    ALT 16 10 - 40 U/L    Total Protein 7.5 6.4 - 8.2 GM/DL   Lipase   Result Value Ref Range    Lipase 20 13 - 60 IU/L   Troponin   Result Value Ref Range    Troponin T <0.010 <0.01 NG/ML   Brain Natriuretic Peptide   Result Value Ref Range    Pro-.1 <300 PG/ML   Protime-INR   Result Value Ref Range    Protime 15.8 (H) 11.7 - 14.5 SECONDS    INR 1.22 INDEX   APTT   Result Value Ref Range    aPTT 26.6 25.1 - 37.1 SECONDS   Urinalysis   Result Value Ref Range    Color, UA YELLOW (A) YELLOW    Clarity, UA CLEAR CLEAR    Glucose, Urine NEGATIVE NEGATIVE MG/DL    Bilirubin Urine NEGATIVE NEGATIVE MG/DL    Ketones, Urine NEGATIVE NEGATIVE MG/DL    Specific Gravity, UA 1.025 1.001 - 1.035    Blood, Urine TRACE (A) NEGATIVE    pH, Urine 6.0 5.0 - 8.0    Protein, UA 30 (A) NEGATIVE MG/DL    Urobilinogen, Urine 0.2 0.2 - 1.0 MG/DL    Nitrite Urine, Quantitative NEGATIVE NEGATIVE    Leukocyte Esterase, Urine NEGATIVE NEGATIVE    RBC, UA 2 0 - 6 /HPF    WBC, UA 23 (H) 0 - 5 /HPF    Bacteria, UA NEGATIVE NEGATIVE /HPF    Mucus, UA OCCASIONAL (A) NEGATIVE HPF    Trichomonas, UA NONE SEEN NONE SEEN /HPF   Lactic Acid   Result Value Ref Range    Lactate 2.8 (HH) 0.4 - 2.0 mMOL/L   EKG 12 Lead   Result Value Ref Range    Ventricular Rate 76 BPM    Atrial Rate 76 BPM    P-R Interval 156 ms    QRS Duration 76 ms    Q-T Interval 382 ms    QTc Calculation (Bazett) 429 ms    P Axis -23 degrees    R Axis -45 degrees    T Axis 53 degrees    Diagnosis       Normal sinus rhythm  Low voltage QRS  Left anterior fascicular block  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 EKG (if obtained): (All EKG's are interpreted by myself in the absence of a cardiologist)      MDM:  Kyra Roldan is a 67 y.o. female that presents with nausea and vomiting. Pt's abdominal exam is positive for LLQ however pt does not guard or show signs of severe pain. Pt recent admission for ARF for similar complaints. Will r/o kidney injury, electrolyte abnormalities, diverticulitis. Given dark stool will also r/o anemia. Pt may benefit from inpatient GI evaluation. CT abd/pelvis w/ no acute abnormalities  CXR unremarkable  Labs unremarkable except lactate 2.8    Spoke with daughters and  who do not feel she is safe to go home and wish for her to be admitted for further care. Spoke with PMD who will admit the patient for further evaluation and will discuss with family possibility of SNF placement as her dementia is worsening and is becoming difficult to care for at home.        Clinical Impression:  1. Non-intractable vomiting with nausea, unspecified vomiting type      Disposition referral (if applicable):  No follow-up provider specified. Disposition medications (if applicable):  New Prescriptions    No medications on file     ED Provider Disposition Time  DISPOSITION Decision To Admit 04/03/2022 01:45:16 PM      Comment: Please note this report has been produced using speech recognition software and may contain errors related to that system including errors in grammar, punctuation, and spelling, as well as words and phrases that may be inappropriate. Efforts were made to edit the dictations.         Vamshi Rosales DO  04/03/22 1526

## 2022-04-03 NOTE — ED NOTES
Report called to Phil Zabala on [facility], [address] [place] [address]  04/03/22 3622

## 2022-04-04 VITALS
SYSTOLIC BLOOD PRESSURE: 144 MMHG | TEMPERATURE: 98.2 F | OXYGEN SATURATION: 91 % | WEIGHT: 130 LBS | HEART RATE: 73 BPM | RESPIRATION RATE: 20 BRPM | BODY MASS INDEX: 26.21 KG/M2 | DIASTOLIC BLOOD PRESSURE: 50 MMHG | HEIGHT: 59 IN

## 2022-04-04 LAB
ANION GAP SERPL CALCULATED.3IONS-SCNC: 9 MMOL/L (ref 4–16)
BASOPHILS ABSOLUTE: 0.1 K/CU MM
BASOPHILS RELATIVE PERCENT: 0.9 % (ref 0–1)
BUN BLDV-MCNC: 13 MG/DL (ref 6–23)
CALCIUM SERPL-MCNC: 9 MG/DL (ref 8.3–10.6)
CHLORIDE BLD-SCNC: 107 MMOL/L (ref 99–110)
CO2: 27 MMOL/L (ref 21–32)
CREAT SERPL-MCNC: 0.7 MG/DL (ref 0.6–1.1)
CULTURE: NORMAL
DIFFERENTIAL TYPE: ABNORMAL
EOSINOPHILS ABSOLUTE: 0.3 K/CU MM
EOSINOPHILS RELATIVE PERCENT: 3.2 % (ref 0–3)
GFR AFRICAN AMERICAN: >60 ML/MIN/1.73M2
GFR NON-AFRICAN AMERICAN: >60 ML/MIN/1.73M2
GLUCOSE BLD-MCNC: 100 MG/DL (ref 70–99)
HCT VFR BLD CALC: 28.2 % (ref 37–47)
HCT VFR BLD CALC: 31.9 % (ref 37–47)
HEMOGLOBIN: 8.9 GM/DL (ref 12.5–16)
HEMOGLOBIN: 9.9 GM/DL (ref 12.5–16)
IMMATURE NEUTROPHIL %: 0.3 % (ref 0–0.43)
LYMPHOCYTES ABSOLUTE: 1.6 K/CU MM
LYMPHOCYTES RELATIVE PERCENT: 21.1 % (ref 24–44)
Lab: NORMAL
MCH RBC QN AUTO: 28.5 PG (ref 27–31)
MCHC RBC AUTO-ENTMCNC: 31 % (ref 32–36)
MCV RBC AUTO: 91.9 FL (ref 78–100)
MONOCYTES ABSOLUTE: 0.3 K/CU MM
MONOCYTES RELATIVE PERCENT: 3.4 % (ref 0–4)
NUCLEATED RBC %: 0 %
PDW BLD-RTO: 14.1 % (ref 11.7–14.9)
PLATELET # BLD: 249 K/CU MM (ref 140–440)
PMV BLD AUTO: 9.3 FL (ref 7.5–11.1)
POTASSIUM SERPL-SCNC: 3.9 MMOL/L (ref 3.5–5.1)
RBC # BLD: 3.47 M/CU MM (ref 4.2–5.4)
SEGMENTED NEUTROPHILS ABSOLUTE COUNT: 5.5 K/CU MM
SEGMENTED NEUTROPHILS RELATIVE PERCENT: 71.1 % (ref 36–66)
SODIUM BLD-SCNC: 143 MMOL/L (ref 135–145)
SPECIMEN: NORMAL
TOTAL IMMATURE NEUTOROPHIL: 0.02 K/CU MM
TOTAL NUCLEATED RBC: 0 K/CU MM
WBC # BLD: 7.7 K/CU MM (ref 4–10.5)

## 2022-04-04 PROCEDURE — 85018 HEMOGLOBIN: CPT

## 2022-04-04 PROCEDURE — 6370000000 HC RX 637 (ALT 250 FOR IP): Performed by: FAMILY MEDICINE

## 2022-04-04 PROCEDURE — 85014 HEMATOCRIT: CPT

## 2022-04-04 PROCEDURE — 85025 COMPLETE CBC W/AUTO DIFF WBC: CPT

## 2022-04-04 PROCEDURE — 36415 COLL VENOUS BLD VENIPUNCTURE: CPT

## 2022-04-04 PROCEDURE — G0378 HOSPITAL OBSERVATION PER HR: HCPCS

## 2022-04-04 PROCEDURE — 94761 N-INVAS EAR/PLS OXIMETRY MLT: CPT

## 2022-04-04 PROCEDURE — 80048 BASIC METABOLIC PNL TOTAL CA: CPT

## 2022-04-04 PROCEDURE — 2580000003 HC RX 258: Performed by: FAMILY MEDICINE

## 2022-04-04 RX ORDER — DICYCLOMINE HYDROCHLORIDE 10 MG/1
10 CAPSULE ORAL 4 TIMES DAILY PRN
Qty: 120 CAPSULE | Refills: 1 | Status: SHIPPED | OUTPATIENT
Start: 2022-04-04 | End: 2022-07-13

## 2022-04-04 RX ORDER — ONDANSETRON 4 MG/1
4 TABLET, ORALLY DISINTEGRATING ORAL 3 TIMES DAILY PRN
Qty: 21 TABLET | Refills: 3 | Status: SHIPPED | OUTPATIENT
Start: 2022-04-04 | End: 2022-07-13

## 2022-04-04 RX ORDER — PANTOPRAZOLE SODIUM 40 MG/1
40 TABLET, DELAYED RELEASE ORAL
Qty: 30 TABLET | Refills: 3 | Status: SHIPPED | OUTPATIENT
Start: 2022-04-04 | End: 2022-07-13

## 2022-04-04 RX ADMIN — ROSUVASTATIN CALCIUM 40 MG: 20 TABLET, FILM COATED ORAL at 09:50

## 2022-04-04 RX ADMIN — SODIUM CHLORIDE, PRESERVATIVE FREE 10 ML: 5 INJECTION INTRAVENOUS at 09:53

## 2022-04-04 RX ADMIN — AMLODIPINE BESYLATE 10 MG: 5 TABLET ORAL at 09:50

## 2022-04-04 RX ADMIN — SODIUM CHLORIDE: 9 INJECTION, SOLUTION INTRAVENOUS at 07:43

## 2022-04-04 RX ADMIN — DIBASIC SODIUM PHOSPHATE, MONOBASIC POTASSIUM PHOSPHATE AND MONOBASIC SODIUM PHOSPHATE 1 TABLET: 852; 155; 130 TABLET ORAL at 09:50

## 2022-04-04 ASSESSMENT — PAIN SCALES - GENERAL
PAINLEVEL_OUTOF10: 0

## 2022-04-04 NOTE — DISCHARGE SUMMARY
Patient: Beth Fernandez MD      Gender: female  : 1950   Age: 67 y.o. MRN: [id number]    Admitting Physician: Subhsah Howard MD  Discharge Physician: Subhash Howard MD     Code Status: Full Code     Admit Date: 4/3/2022   Discharge Date: 22      Disposition:  Home       Condition at Discharge:  stable . Follow-up appointments:  f/u one week with PCP , and with consultants as recommended . Outpatient to do list: f/u     Pt`s preferred phone number :[phone]  [facility]  Extended Emergency Contact Information  Primary Emergency Contact: Ariel Pederson  Address: [address] [place] [address], [address] [place] [address] Phone: [phone]  Mobile Phone: [phone]  Relation: Spouse   needed? No  Secondary Emergency Contact: giovani boswelle  Home Phone: [phone]  Relation: Child      Discharge Diagnoses: Active Hospital Problems    Diagnosis     Emesis [R11.10]     Blood in stool [K92.1]                  Hospital Course:         Consults.   IP CONSULT TO HOSPITALIST  IP CONSULT TO PRIMARY CARE PROVIDER        Discharge Medications:   Current Discharge Medication List        START taking these medications    Details   ondansetron (ZOFRAN-ODT) 4 MG disintegrating tablet Take 1 tablet by mouth 3 times daily as needed for Nausea or Vomiting  Qty: 21 tablet, Refills: 3      pantoprazole (PROTONIX) 40 MG tablet Take 1 tablet by mouth every morning (before breakfast)  Qty: 30 tablet, Refills: 3      dicyclomine (BENTYL) 10 MG capsule Take 1 capsule by mouth 4 times daily as needed (abd pain , indigestionb)  Qty: 120 capsule, Refills: 1           Current Discharge Medication List        Current Discharge Medication List        CONTINUE these medications which have NOT CHANGED    Details   apixaban (ELIQUIS) 5 MG TABS tablet Take 1 tablet by mouth 2 times daily  Qty: 60 tablet, Refills: 5      melatonin 3 MG TABS tablet Take 1 tablet by mouth nightly as needed (sleep)  Qty: 1 tablet, Refills: 0      amLODIPine (NORVASC) 10 MG tablet Take 1 tablet by mouth daily  Qty: 1 tablet, Refills: 0      phosphorus (K PHOS NEUTRAL) 155-852-130 MG tablet Take 1 tablet by mouth 2 times daily for 7 days  Qty: 14 tablet, Refills: 0      clopidogrel (PLAVIX) 75 MG tablet Take 1 tablet by mouth daily  Qty: 30 tablet, Refills: 3      donepezil (ARICEPT) 5 MG tablet Take 1 tablet by mouth nightly      rosuvastatin (CRESTOR) 40 MG tablet Take 1 tablet by mouth daily           Current Discharge Medication List          Discharge ROS:  A complete review of systems was asked and negative except for      Discharge Exam:  Estimated body mass index is 26.26 kg/m² as calculated from the following:    Height as of this encounter: 4' 11\" (1.499 m). Weight as of this encounter: 130 lb (59 kg). BP (!) 144/50   Pulse 73   Temp 98.2 °F (36.8 °C) (Oral)   Resp 20   Ht 4' 11\" (1.499 m)   Wt 130 lb (59 kg)   SpO2 91%   BMI 26.26 kg/m²   General appearance:  NAD  Heart[de-identified] Normal s1/s2, RRR, no murmurs, gallops, or rubs. No leg edema  Lungs:  Clear to auscultation, bilaterally without Rales/Wheezes/Rhonchi. Abdomen: Soft, non-tender, non-distended, bowel sounds present  Musculoskeletal:   no cyanosis, no edema  Neurologic:  Cranial nerves: II-XII intact, grossly non-focal.  Psychiatric:  A & O x3      Labs:  For convenience and continuity at follow-up the following most recent labs are provided:    Lab Results   Component Value Date    WBC 7.7 04/04/2022    HGB 8.9 04/04/2022    HCT 28.2 04/04/2022    MCV 91.9 04/04/2022     04/04/2022     04/04/2022    K 3.9 04/04/2022     04/04/2022    CO2 27 04/04/2022    BUN 13 04/04/2022    CREATININE 0.7 04/04/2022    CALCIUM 9.0 04/04/2022    PHOS 2.2 03/16/2022    ALKPHOS 120 04/03/2022    ALT 16 04/03/2022    AST 27 04/03/2022    BILITOT 0.3 04/03/2022    BILIDIR 0.2 04/03/2022    LABALBU 4.5 04/03/2022 TRIG 233 12/22/2021     Lab Results   Component Value Date    INR 1.22 04/03/2022    INR 0.79 01/03/2022    INR 0.83 12/21/2021       Results for orders placed or performed during the hospital encounter of 04/03/22   Culture, Urine    Specimen: Urine, clean catch   Result Value Ref Range    Specimen URINE CLEAN CATCH     Special Requests NONE     Culture Final Report No growth at 18 to 36 hours    COVID-19, Rapid    Specimen: Nasopharyngeal   Result Value Ref Range    Source UNKNOWN     SARS-CoV-2, NAAT NOT DETECTED NOT DETECTED   Basic Metabolic Panel w/ Reflex to MG   Result Value Ref Range    Sodium 139 135 - 145 MMOL/L    Potassium 4.1 3.5 - 5.1 MMOL/L    Chloride 102 99 - 110 mMol/L    CO2 25 21 - 32 MMOL/L    Anion Gap 12 4 - 16    BUN 15 6 - 23 MG/DL    CREATININE 0.9 0.6 - 1.1 MG/DL    Glucose 132 (H) 70 - 99 MG/DL    Calcium 10.0 8.3 - 10.6 MG/DL    GFR Non-African American >60 >60 mL/min/1.73m2    GFR African American >60 >60 mL/min/1.73m2   Hepatic Function Panel   Result Value Ref Range    Albumin 4.5 3.4 - 5.0 GM/DL    Total Bilirubin 0.3 0.0 - 1.0 MG/DL    Bilirubin, Direct 0.2 0.0 - 0.3 MG/DL    Bilirubin, Indirect 0.1 0 - 0.7 MG/DL    Alkaline Phosphatase 120 40 - 129 IU/L    AST 27 15 - 37 IU/L    ALT 16 10 - 40 U/L    Total Protein 7.5 6.4 - 8.2 GM/DL   Lipase   Result Value Ref Range    Lipase 20 13 - 60 IU/L   Troponin   Result Value Ref Range    Troponin T <0.010 <0.01 NG/ML   Brain Natriuretic Peptide   Result Value Ref Range    Pro-.1 <300 PG/ML   Protime-INR   Result Value Ref Range    Protime 15.8 (H) 11.7 - 14.5 SECONDS    INR 1.22 INDEX   APTT   Result Value Ref Range    aPTT 26.6 25.1 - 37.1 SECONDS   Urinalysis   Result Value Ref Range    Color, UA YELLOW (A) YELLOW    Clarity, UA CLEAR CLEAR    Glucose, Urine NEGATIVE NEGATIVE MG/DL    Bilirubin Urine NEGATIVE NEGATIVE MG/DL    Ketones, Urine NEGATIVE NEGATIVE MG/DL    Specific Gravity, UA 1.025 1.001 - 1.035    Blood, Urine TRACE (A) NEGATIVE    pH, Urine 6.0 5.0 - 8.0    Protein, UA 30 (A) NEGATIVE MG/DL    Urobilinogen, Urine 0.2 0.2 - 1.0 MG/DL    Nitrite Urine, Quantitative NEGATIVE NEGATIVE    Leukocyte Esterase, Urine NEGATIVE NEGATIVE    RBC, UA 2 0 - 6 /HPF    WBC, UA 23 (H) 0 - 5 /HPF    Bacteria, UA NEGATIVE NEGATIVE /HPF    Mucus, UA OCCASIONAL (A) NEGATIVE HPF    Trichomonas, UA NONE SEEN NONE SEEN /HPF   Lactic Acid   Result Value Ref Range    Lactate 2.8 (HH) 0.4 - 2.0 mMOL/L   CBC   Result Value Ref Range    WBC 6.4 4.0 - 10.5 K/CU MM    RBC 3.03 (L) 4.2 - 5.4 M/CU MM    Hemoglobin 8.6 (L) 12.5 - 16.0 GM/DL    Hematocrit 28.6 (L) 37 - 47 %    MCV 94.4 78 - 100 FL    MCH 28.4 27 - 31 PG    MCHC 30.1 (L) 32.0 - 36.0 %    RDW 14.2 11.7 - 14.9 %    Platelets 688 180 - 394 K/CU MM    MPV 9.2 7.5 - 11.1 FL   Basic Metabolic Panel w/ Reflex to MG   Result Value Ref Range    Sodium 143 135 - 145 MMOL/L    Potassium 3.9 3.5 - 5.1 MMOL/L    Chloride 107 99 - 110 mMol/L    CO2 27 21 - 32 MMOL/L    Anion Gap 9 4 - 16    BUN 13 6 - 23 MG/DL    CREATININE 0.7 0.6 - 1.1 MG/DL    Glucose 100 (H) 70 - 99 MG/DL    Calcium 9.0 8.3 - 10.6 MG/DL    GFR Non-African American >60 >60 mL/min/1.73m2    GFR African American >60 >60 mL/min/1.73m2   CBC auto differential   Result Value Ref Range    WBC 7.7 4.0 - 10.5 K/CU MM    RBC 3.47 (L) 4.2 - 5.4 M/CU MM    Hemoglobin 9.9 (L) 12.5 - 16.0 GM/DL    Hematocrit 31.9 (L) 37 - 47 %    MCV 91.9 78 - 100 FL    MCH 28.5 27 - 31 PG    MCHC 31.0 (L) 32.0 - 36.0 %    RDW 14.1 11.7 - 14.9 %    Platelets 888 519 - 083 K/CU MM    MPV 9.3 7.5 - 11.1 FL    Differential Type AUTOMATED DIFFERENTIAL     Segs Relative 71.1 (H) 36 - 66 %    Lymphocytes % 21.1 (L) 24 - 44 %    Monocytes % 3.4 0 - 4 %    Eosinophils % 3.2 (H) 0 - 3 %    Basophils % 0.9 0 - 1 %    Segs Absolute 5.5 K/CU MM    Lymphocytes Absolute 1.6 K/CU MM    Monocytes Absolute 0.3 K/CU MM    Eosinophils Absolute 0.3 K/CU MM    Basophils Absolute 0.1 K/CU MM    Nucleated RBC % 0.0 %    Total Nucleated RBC 0.0 K/CU MM    Total Immature Neutrophil 0.02 K/CU MM    Immature Neutrophil % 0.3 0 - 0.43 %   Hemoglobin and Hematocrit   Result Value Ref Range    Hemoglobin 8.9 (L) 12.5 - 16.0 GM/DL    Hematocrit 28.2 (L) 37 - 47 %   EKG 12 Lead   Result Value Ref Range    Ventricular Rate 76 BPM    Atrial Rate 76 BPM    P-R Interval 156 ms    QRS Duration 76 ms    Q-T Interval 382 ms    QTc Calculation (Bazett) 429 ms    P Axis -23 degrees    R Axis -45 degrees    T Axis 53 degrees    Diagnosis       Normal sinus rhythm  Low voltage QRS  Left anterior fascicular block  Inferior infarct (cited on or before 29-OCT-2019)  Cannot rule out Anterior infarct , age undetermined  Abnormal ECG  When compared with ECG of 14-MAR-2022 16:25,  Questionable change in initial forces of Inferior leads  ST no longer depressed in Lateral leads  Nonspecific T wave abnormality now evident in Anterior leads     TYPE AND SCREEN   Result Value Ref Range    ABO/Rh O POSITIVE     Antibody Screen NEGATIVE          Chart review shows recent radiographs:  CT ABDOMEN PELVIS WO CONTRAST Additional Contrast? None    Result Date: 3/14/2022  EXAMINATION: CT OF THE ABDOMEN AND PELVIS WITHOUT CONTRAST 3/14/2022 7:14 pm TECHNIQUE: CT of the abdomen and pelvis was performed without the administration of intravenous contrast. Multiplanar reformatted images are provided for review. Dose modulation, iterative reconstruction, and/or weight based adjustment of the mA/kV was utilized to reduce the radiation dose to as low as reasonably achievable. COMPARISON: CT abdomen and pelvis performed 01/19/2022.  HISTORY: ORDERING SYSTEM PROVIDED HISTORY: generalized abdominal pain TECHNOLOGIST PROVIDED HISTORY: Reason for exam:->generalized abdominal pain Additional Contrast?->None Reason for Exam: generalized abdominal pain Additional signs and symptoms: no Relevant Medical/Surgical History: none FINDINGS: Lower Chest: The lung bases are without consolidation or effusion. The visualized cardiac structures are unremarkable. Organs: The liver and spleen are normal size and overall attenuation. The gallbladder has been removed. The pancreas and adrenal glands are unremarkable. The kidneys are without obstructive uropathy. The ureters are not dilated. The urinary bladder is contracted and otherwise unremarkable. GI/Bowel: The stomach is contracted and otherwise unremarkable. Loops of small bowel are normal in caliber without evidence for obstruction. The colon contains air and fecal residue. There are scattered uncomplicated diverticula primarily a in the sigmoid region. The appendix is normal. There is no free air or free fluid. Pelvis: The uterus has been surgically removed. Phleboliths are seen in the pelvis. Peritoneum/Retroperitoneum: The psoas muscles are symmetric. The abdominal aorta is normal in caliber. The inferior vena cava is unremarkable. There is no retroperitoneal or mesenteric adenopathy. Bones/Soft Tissues: The extra-abdominal soft tissues are grossly unremarkable except for a midline fat containing hernia superiorly. There is no acute osseous abnormality. No acute abdominal or pelvic abnormality. Uncomplicated colonic diverticulosis in the sigmoid region. XR CHEST (2 VW)    Result Date: 4/3/2022  EXAMINATION: TWO XRAY VIEWS OF THE CHEST 4/3/2022 9:31 am COMPARISON: 03/14/2022 HISTORY: ORDERING SYSTEM PROVIDED HISTORY: nausea vomiting TECHNOLOGIST PROVIDED HISTORY: Reason for exam:->nausea vomiting Reason for Exam: nausea vomiting FINDINGS: Evidence of prior CABG with median sternotomy wires and mediastinal clips. The lungs are without acute focal process. There is no effusion or pneumothorax. The cardiomediastinal silhouette is stable. The osseous structures are stable. No acute process.      CT ABDOMEN PELVIS W IV CONTRAST Additional Contrast? None    Result Date: 4/3/2022  EXAMINATION: CT OF THE ABDOMEN AND PELVIS WITH CONTRAST 4/3/2022 10:39 am TECHNIQUE: CT of the abdomen and pelvis was performed with the administration of intravenous contrast. Multiplanar reformatted images are provided for review. Dose modulation, iterative reconstruction, and/or weight based adjustment of the mA/kV was utilized to reduce the radiation dose to as low as reasonably achievable. COMPARISON: 03/14/2022 HISTORY: ORDERING SYSTEM PROVIDED HISTORY: Abdominal pain - nausea - vomiting. TECHNOLOGIST PROVIDED HISTORY: Additional Contrast?->None Reason for exam:->abdominal pain - nausea - vomiting Decision Support Exception - unselect if not a suspected or confirmed emergency medical condition->Emergency Medical Condition (MA) Reason for Exam: Abdominal pain - nausea - vomiting. FINDINGS: Lower Chest: There is prominent lipomatous hypertrophy of the interatrial septum. Aortic valve calcification noted. Coronary atherosclerotic calcifications noted. Median sternotomy wires. Visualized portion of the lower chest demonstrates no acute abnormality. Organs:  Liver enhances normally without evidence of intrahepatic biliary ductal dilatation. Status post cholecystectomy. The spleen, pancreas and adrenal glands are unremarkable. The kidneys enhance symmetrically without evidence of hydronephrosis. GI/Bowel: The stomach and duodenal sweep demonstrate no acute abnormality. The appendix is visualized and is unremarkable. No evidence of acute appendicitis. There is diverticulosis without evidence of diverticulitis. There is no evidence of bowel obstruction. No evidence of abnormal bowel wall thickening or distension. Pelvis: The bladder is unremarkable. Status post hysterectomy. Peritoneum/Retroperitoneum: No evidence of ascites or free air. No evidence of lymphadenopathy. Aorta is normal in caliber. Atherosclerotic calcifications of the aorta and branch vessels. Bones/Soft Tissues: Severe bilateral hip osteoarthritis.   L5-S1 disc degenerative changes. No acute bowel abnormality. Diverticulosis without acute diverticulitis. Normal appendix. No acute abnormality in the abdomen or pelvis. XR CHEST PORTABLE    Result Date: 3/14/2022  EXAMINATION: ONE X-RAY VIEW OF THE CHEST 3/14/2022 4:27 pm COMPARISON: January 20, 2022 HISTORY: ORDERING SYSTEM PROVIDED HISTORY: chest pain TECHNOLOGIST PROVIDED HISTORY: Reason for exam:->chest pain Reason for Exam: abdominal pain, diarrhea, AMS, chest pain FINDINGS: The cardiomediastinal silhouette is normal in size. There are stable post sternotomy changes present. The lungs are clear. No pleural effusion or pneumothorax. Right-sided central venous catheter has been removed. No acute cardiopulmonary process. EKG     Rhythm: normal sinus         There is no immunization history on file for this patient. The patient was seen and examined on day of discharge and this discharge summary is in conjunction with any daily progress note from day of discharge. Time Spent on discharge is   >35  min  in the examination, evaluation, counseling and review of medications and discharge plan.             SignedLetitia Moore MD   4/4/2022

## 2022-04-05 LAB
EKG ATRIAL RATE: 76 BPM
EKG DIAGNOSIS: NORMAL
EKG P AXIS: -23 DEGREES
EKG P-R INTERVAL: 156 MS
EKG Q-T INTERVAL: 382 MS
EKG QRS DURATION: 76 MS
EKG QTC CALCULATION (BAZETT): 429 MS
EKG R AXIS: -45 DEGREES
EKG T AXIS: 53 DEGREES
EKG VENTRICULAR RATE: 76 BPM

## 2022-04-05 PROCEDURE — 93010 ELECTROCARDIOGRAM REPORT: CPT | Performed by: INTERNAL MEDICINE

## 2022-04-14 NOTE — PROGRESS NOTES
Physician Progress Note      PATIENT:               Ida Marrero  CSN #:                  [id number]  :                       1950  ADMIT DATE:       4/3/2022 8:24 AM  Stefano Ponce [place] DATE:        2022 2:32 PM  RESPONDING  PROVIDER #:        Makayla Hamm MD          QUERY TEXT:    Pt admitted with gastroenteritis. Pt noted to have an elevated lactic acid   level. If possible, please document in the progress notes and discharge   summary if you are evaluating and/or treating any of the following: The medical record reflects the following:  Risk Factors: gastroenteritis, dementia  Clinical Indicators: Lactic acid 2.8  Treatment: IVF, labs    LELA Arellano, RN  Clinical   [phone]  Options provided:  -- Lactic Acidosis  -- Lactic acid level clinically insignificant  -- Other - I will add my own diagnosis  -- Disagree - Not applicable / Not valid  -- Disagree - Clinically unable to determine / Unknown  -- Refer to Clinical Documentation Reviewer    PROVIDER RESPONSE TEXT:    This patient has lactic acidosis.     Query created by: Jose Sexton on 2022 4:09 PM      Electronically signed by:  Makayla Hamm MD 2022 2:49 AM

## 2022-07-13 ENCOUNTER — OFFICE VISIT (OUTPATIENT)
Dept: INTERNAL MEDICINE CLINIC | Age: 72
End: 2022-07-13
Payer: MEDICARE

## 2022-07-13 VITALS
SYSTOLIC BLOOD PRESSURE: 132 MMHG | HEART RATE: 77 BPM | DIASTOLIC BLOOD PRESSURE: 68 MMHG | WEIGHT: 121.8 LBS | HEIGHT: 59 IN | BODY MASS INDEX: 24.56 KG/M2 | OXYGEN SATURATION: 93 %

## 2022-07-13 DIAGNOSIS — I25.10 CORONARY ARTERY DISEASE INVOLVING NATIVE CORONARY ARTERY OF NATIVE HEART WITHOUT ANGINA PECTORIS: ICD-10-CM

## 2022-07-13 DIAGNOSIS — F03.90 DEMENTIA WITHOUT BEHAVIORAL DISTURBANCE, UNSPECIFIED DEMENTIA TYPE: ICD-10-CM

## 2022-07-13 DIAGNOSIS — I65.23 BILATERAL CAROTID ARTERY STENOSIS: ICD-10-CM

## 2022-07-13 DIAGNOSIS — E78.5 HYPERLIPIDEMIA, UNSPECIFIED HYPERLIPIDEMIA TYPE: ICD-10-CM

## 2022-07-13 DIAGNOSIS — D64.9 ANEMIA, UNSPECIFIED TYPE: ICD-10-CM

## 2022-07-13 DIAGNOSIS — Z86.73 HISTORY OF CVA (CEREBROVASCULAR ACCIDENT): ICD-10-CM

## 2022-07-13 DIAGNOSIS — I10 HYPERTENSION, UNSPECIFIED TYPE: Primary | ICD-10-CM

## 2022-07-13 DIAGNOSIS — R20.2 PARESTHESIAS IN LEFT HAND: ICD-10-CM

## 2022-07-13 PROCEDURE — 99204 OFFICE O/P NEW MOD 45 MIN: CPT | Performed by: FAMILY MEDICINE

## 2022-07-13 PROCEDURE — 1123F ACP DISCUSS/DSCN MKR DOCD: CPT | Performed by: FAMILY MEDICINE

## 2022-07-13 RX ORDER — DONEPEZIL HYDROCHLORIDE 10 MG/1
10 TABLET, FILM COATED ORAL NIGHTLY
COMMUNITY
End: 2022-10-12

## 2022-07-13 RX ORDER — MEMANTINE HYDROCHLORIDE 5 MG/1
5 TABLET ORAL 2 TIMES DAILY
COMMUNITY
End: 2022-10-12 | Stop reason: SDUPTHER

## 2022-07-13 RX ORDER — ROSUVASTATIN CALCIUM 20 MG/1
20 TABLET, COATED ORAL NIGHTLY
Qty: 30 TABLET | Refills: 3 | Status: SHIPPED | OUTPATIENT
Start: 2022-07-13 | End: 2022-09-14 | Stop reason: SDUPTHER

## 2022-07-13 RX ORDER — ASPIRIN 81 MG/1
81 TABLET ORAL DAILY
COMMUNITY
End: 2022-07-17

## 2022-07-13 SDOH — ECONOMIC STABILITY: FOOD INSECURITY: WITHIN THE PAST 12 MONTHS, YOU WORRIED THAT YOUR FOOD WOULD RUN OUT BEFORE YOU GOT MONEY TO BUY MORE.: NEVER TRUE

## 2022-07-13 SDOH — ECONOMIC STABILITY: FOOD INSECURITY: WITHIN THE PAST 12 MONTHS, THE FOOD YOU BOUGHT JUST DIDN'T LAST AND YOU DIDN'T HAVE MONEY TO GET MORE.: NEVER TRUE

## 2022-07-13 ASSESSMENT — ENCOUNTER SYMPTOMS
BLOOD IN STOOL: 0
COUGH: 0
ABDOMINAL PAIN: 0
DIARRHEA: 1
CONSTIPATION: 0
BACK PAIN: 0
NAUSEA: 0
SHORTNESS OF BREATH: 0

## 2022-07-13 ASSESSMENT — PATIENT HEALTH QUESTIONNAIRE - PHQ9
SUM OF ALL RESPONSES TO PHQ QUESTIONS 1-9: 11
8. MOVING OR SPEAKING SO SLOWLY THAT OTHER PEOPLE COULD HAVE NOTICED. OR THE OPPOSITE, BEING SO FIGETY OR RESTLESS THAT YOU HAVE BEEN MOVING AROUND A LOT MORE THAN USUAL: 1
SUM OF ALL RESPONSES TO PHQ QUESTIONS 1-9: 11
6. FEELING BAD ABOUT YOURSELF - OR THAT YOU ARE A FAILURE OR HAVE LET YOURSELF OR YOUR FAMILY DOWN: 3
SUM OF ALL RESPONSES TO PHQ9 QUESTIONS 1 & 2: 6
10. IF YOU CHECKED OFF ANY PROBLEMS, HOW DIFFICULT HAVE THESE PROBLEMS MADE IT FOR YOU TO DO YOUR WORK, TAKE CARE OF THINGS AT HOME, OR GET ALONG WITH OTHER PEOPLE: 3
2. FEELING DOWN, DEPRESSED OR HOPELESS: 3
1. LITTLE INTEREST OR PLEASURE IN DOING THINGS: 3
SUM OF ALL RESPONSES TO PHQ QUESTIONS 1-9: 11
SUM OF ALL RESPONSES TO PHQ QUESTIONS 1-9: 11
3. TROUBLE FALLING OR STAYING ASLEEP: 0
9. THOUGHTS THAT YOU WOULD BE BETTER OFF DEAD, OR OF HURTING YOURSELF: 0
5. POOR APPETITE OR OVEREATING: 0
4. FEELING TIRED OR HAVING LITTLE ENERGY: 1
7. TROUBLE CONCENTRATING ON THINGS, SUCH AS READING THE NEWSPAPER OR WATCHING TELEVISION: 0

## 2022-07-13 ASSESSMENT — SOCIAL DETERMINANTS OF HEALTH (SDOH): HOW HARD IS IT FOR YOU TO PAY FOR THE VERY BASICS LIKE FOOD, HOUSING, MEDICAL CARE, AND HEATING?: NOT HARD AT ALL

## 2022-07-13 NOTE — PROGRESS NOTES
Maryana Beasley (:  1950) is a 67 y.o. female,New Patient, here for evaluation of the following chief complaint(s):  New Patient (Weight loss, tingling left hand-4th/5th digit), Hypertension, Hyperlipidemia, and Other (Chronic conditions)         ASSESSMENT/PLAN:  1. Hypertension, unspecified type, chronic  On Norvasc  - Urinalysis with Reflex to Culture; Future    2. Coronary artery disease involving native coronary artery of native heart without angina pectoris  - Emily Craig MD, Cardiology, [facility]    3. Paresthesias in left hand- 4th/5th digit, chronic  - Danielle Garcia MD, Physical Medicine, [facility]  - CBC with Auto Differential; Future  - Comprehensive Metabolic Panel; Future  - Vitamin B12 & Folate; Future  - Sedimentation Rate; Future    4. Bilateral carotid artery stenosis  - AFL (Epic) - Bronson Hubbard MD, Cardiothoracic Surgery, [place]    5. Dementia without behavioral disturbance, unspecified dementia type ([facility] [address] chronic  Continue Aricept and Namenda    6. Hyperlipidemia, unspecified hyperlipidemia type, chronic    -Start Crestor 20 mg  - rosuvastatin (CRESTOR) 20 MG tablet; Take 1 tablet by mouth nightly  Dispense: 30 tablet; Refill: 3  - Lipid Panel; Future  - TSH with Reflex to FT4; Future    7. Anemia, unspecified type  - Iron and TIBC; Future  - Ferritin; Future    8. History of CVA (cerebrovascular accident)    Obtain old records  Refused all preventative screens and vaccinations  Persist RTO or call  Return in about 3 months (around 10/13/2022) for HTN, HLD.        Lab Results   Component Value Date    WBC 7.7 2022    HGB 8.9 (L) 2022    HCT 28.2 (L) 2022    MCV 91.9 2022     2022     Lab Results   Component Value Date    CHOL 234 (H) 2021     Lab Results   Component Value Date    TRIG 233 (H) 2021     Lab Results   Component Value Date    HDL 41 2021     Lab Results   Component Value Date LDLDIRECT 150 (H) 12/22/2021     Lab Results   Component Value Date    LABA1C 6.0 12/22/2021     Lab Results   Component Value Date     12/22/2021     Lab Results   Component Value Date     04/04/2022    K 3.9 04/04/2022     04/04/2022    CO2 27 04/04/2022    BUN 13 04/04/2022    CREATININE 0.7 04/04/2022    GLUCOSE 100 (H) 04/04/2022    CALCIUM 9.0 04/04/2022    PROT 7.5 04/03/2022    LABALBU 4.5 04/03/2022    BILITOT 0.3 04/03/2022    ALKPHOS 120 04/03/2022    AST 27 04/03/2022    ALT 16 04/03/2022    LABGLOM >60 04/04/2022    GFRAA >60 04/04/2022     No results found for: TSHFT4, TSH  Lab Results   Component Value Date/Time    COLORU YELLOW 04/03/2022 11:04 AM    LABPH 6.0 04/03/2022 11:04 AM    NITRU NEGATIVE 04/03/2022 11:04 AM    KETUA NEGATIVE 04/03/2022 11:04 AM    UROBILINOGEN 0.2 04/03/2022 11:04 AM    BILIRUBINUR NEGATIVE 04/03/2022 11:04 AM       Subjective   SUBJECTIVE/OBJECTIVE:    HISTORY OF PRESENT ILLNESS:  This is a 67 y.o. female here for a new patient appointment with her daughter for the following:  Patient Active Problem List    Diagnosis Date Noted    Diverticulosis 10/30/2019    Hyperlipidemia with target low density lipoprotein (LDL) cholesterol less than 70 mg/dL 10/30/2019    Emesis 04/03/2022    Moderate malnutrition ([address] 03/16/2022    Gross hematuria 01/09/2022    Dementia ([facility] [address] 01/09/2022    Diarrhea 01/09/2022    Hypertension 01/09/2022    Recurrent stenosis of left carotid artery     Acute kidney injury due to COVID-19 [facility]) 01/03/2022    Expressive aphasia     Cerebrovascular accident ([facility] [address] 10/25/2021    CAD (coronary artery disease)       Last PCP Dr. Simon Record  Paresthesia L hand 4 and 5 th digit. This is not new, but it is bothersome and she would like further evaluation  HTN- stable on medications  CAD- CABG. She has not followed up with a cardiologist. Her daughter is established with Dr. Nba Rehman and she would like a referral  Carotid stenosis.  Hx of CEA in the past. She would like a new referral to  Dr. Nitza MARKHAM-she has not been using her Crestor  Dementia- On Aricept and Namenda  Anemia on last labs in [facility]. Denies bleeding. Patient on blood thinners  Patient lost weight after Covid infection  Hx of CVA    Review of Systems   Constitutional:  Negative for diaphoresis and fever. HENT: Negative. Eyes:  Negative for visual disturbance. Respiratory:  Negative for cough and shortness of breath. Cardiovascular:  Negative for chest pain and palpitations. Gastrointestinal:  Positive for diarrhea (chronic). Negative for abdominal pain, blood in stool, constipation and nausea. Genitourinary:  Negative for difficulty urinating and dysuria. Musculoskeletal:  Positive for gait problem. Negative for back pain and neck pain. Neurological:  Positive for numbness (L hands). Negative for dizziness, weakness, light-headedness and headaches. Psychiatric/Behavioral:  Negative for dysphoric mood. Allergies   Allergen Reactions    Penicillins     Sulfa Antibiotics Swelling     Current Outpatient Medications   Medication Sig Dispense Refill    memantine (NAMENDA) 5 MG tablet Take 5 mg by mouth 2 times daily      aspirin 81 MG EC tablet Take 81 mg by mouth daily      donepezil (ARICEPT) 10 MG tablet Take 10 mg by mouth nightly      rosuvastatin (CRESTOR) 20 MG tablet Take 1 tablet by mouth nightly 30 tablet 3    apixaban (ELIQUIS) 5 MG TABS tablet Take 1 tablet by mouth 2 times daily 60 tablet 5    amLODIPine (NORVASC) 10 MG tablet Take 1 tablet by mouth daily 1 tablet 0    clopidogrel (PLAVIX) 75 MG tablet Take 1 tablet by mouth daily 30 tablet 3     No current facility-administered medications for this visit.        Past Medical History:   Diagnosis Date    Acute cerebrovascular accident (CVA) ([address] 12/21/2021    ARF (acute renal failure) ([facility] [address] 3/15/2022    Blood in stool 4/3/2022    CAD (coronary artery disease)     COVID-19 virus infection 1/9/2022 Hypertension     Hypertensive urgency 10/30/2019       Past Surgical History:   Procedure Laterality Date    CAROTID ENDARTERECTOMY      CHOLECYSTECTOMY      CORONARY ARTERY BYPASS GRAFT      HYSTERECTOMY, TOTAL ABDOMINAL (CERVIX REMOVED)      IR NONTUNNELED VASCULAR CATHETER  2022    IR NONTUNNELED VASCULAR CATHETER 2022 [facility] SPECIAL PROCEDURES    NECK SURGERY         Family History   Problem Relation Age of Onset    Heart Disease Mother     Diabetes Father     Other Child        Social History     Tobacco Use    Smoking status: Former     Packs/day: 3.00     Years: 50.00     Pack years: 150.00     Types: Cigarettes     Quit date: 2022     Years since quittin.5    Smokeless tobacco: Never   Vaping Use    Vaping Use: Never used   Substance Use Topics    Alcohol use: No    Drug use: No            Vitals:    22 1312   BP: 132/68   Site: Right Upper Arm   Position: Sitting   Cuff Size: Medium Adult   Pulse: 77   SpO2: 93%   Weight: 121 lb 12.8 oz (55.2 kg)   Height: 4' 11\" (1.499 m)     Objective   Physical Exam  Vitals reviewed. Constitutional:       General: She is not in acute distress. HENT:      Right Ear: Tympanic membrane normal.      Left Ear: Tympanic membrane normal.   Eyes:      General: No scleral icterus. Extraocular Movements: Extraocular movements intact. Neck:      Vascular: Carotid bruit (r) present. Cardiovascular:      Rate and Rhythm: Normal rate and regular rhythm. Pulmonary:      Effort: Pulmonary effort is normal. No respiratory distress. Breath sounds: Normal breath sounds. Abdominal:      Palpations: Abdomen is soft. Tenderness: There is no abdominal tenderness. Musculoskeletal:      Cervical back: Neck supple. Right lower leg: No edema. Left lower leg: No edema. Skin:     Findings: No rash. Neurological:      Mental Status: She is alert and oriented to person, place, and time. Mental status is at baseline. Cranial Nerves:  No cranial nerve deficit. Gait: Gait abnormal (cane). Psychiatric:         Mood and Affect: Mood normal.              An electronic signature was used to authenticate this note. --Flaquito Mendes, DO     This dictation was generated by voice recognition computer software. Although all attempts are made to edit the dictation for accuracy, there may be errors in the transcription that are not intended.

## 2022-07-14 ENCOUNTER — TELEPHONE (OUTPATIENT)
Dept: CARDIOLOGY CLINIC | Age: 72
End: 2022-07-14

## 2022-07-17 PROBLEM — A41.9 SEPSIS (HCC): Status: RESOLVED | Noted: 2022-01-19 | Resolved: 2022-07-17

## 2022-07-17 PROBLEM — K92.1 BLOOD IN STOOL: Status: RESOLVED | Noted: 2022-04-03 | Resolved: 2022-07-17

## 2022-07-17 PROBLEM — I16.0 HYPERTENSIVE URGENCY: Status: RESOLVED | Noted: 2019-10-30 | Resolved: 2022-07-17

## 2022-07-17 PROBLEM — I63.9 ACUTE CEREBROVASCULAR ACCIDENT (CVA) (HCC): Status: RESOLVED | Noted: 2021-12-21 | Resolved: 2022-07-17

## 2022-07-17 PROBLEM — N17.9 ARF (ACUTE RENAL FAILURE) (HCC): Status: RESOLVED | Noted: 2022-03-15 | Resolved: 2022-07-17

## 2022-07-17 PROBLEM — U07.1 COVID-19 VIRUS INFECTION: Status: RESOLVED | Noted: 2022-01-09 | Resolved: 2022-07-17

## 2022-07-18 ENCOUNTER — TELEPHONE (OUTPATIENT)
Dept: INTERNAL MEDICINE CLINIC | Age: 72
End: 2022-07-18

## 2022-07-19 ENCOUNTER — INITIAL CONSULT (OUTPATIENT)
Dept: CARDIOLOGY CLINIC | Age: 72
End: 2022-07-19
Payer: MEDICARE

## 2022-07-19 VITALS
WEIGHT: 121.6 LBS | DIASTOLIC BLOOD PRESSURE: 84 MMHG | HEART RATE: 77 BPM | HEIGHT: 59 IN | SYSTOLIC BLOOD PRESSURE: 132 MMHG | BODY MASS INDEX: 24.52 KG/M2

## 2022-07-19 DIAGNOSIS — I25.10 ASCVD (ARTERIOSCLEROTIC CARDIOVASCULAR DISEASE): Primary | ICD-10-CM

## 2022-07-19 PROBLEM — Z86.73 H/O: CVA (CEREBROVASCULAR ACCIDENT): Status: ACTIVE | Noted: 2022-07-19

## 2022-07-19 PROCEDURE — 99214 OFFICE O/P EST MOD 30 MIN: CPT | Performed by: INTERNAL MEDICINE

## 2022-07-19 PROCEDURE — 1123F ACP DISCUSS/DSCN MKR DOCD: CPT | Performed by: INTERNAL MEDICINE

## 2022-07-26 ENCOUNTER — PROCEDURE VISIT (OUTPATIENT)
Dept: CARDIOLOGY CLINIC | Age: 72
End: 2022-07-26
Payer: MEDICARE

## 2022-07-26 DIAGNOSIS — R06.00 DYSPNEA, UNSPECIFIED TYPE: Primary | ICD-10-CM

## 2022-07-26 DIAGNOSIS — I25.10 ASCVD (ARTERIOSCLEROTIC CARDIOVASCULAR DISEASE): ICD-10-CM

## 2022-07-26 LAB
LV EF: 60 %
LVEF MODALITY: NORMAL

## 2022-07-26 PROCEDURE — 93016 CV STRESS TEST SUPVJ ONLY: CPT | Performed by: INTERNAL MEDICINE

## 2022-07-26 PROCEDURE — 93017 CV STRESS TEST TRACING ONLY: CPT | Performed by: INTERNAL MEDICINE

## 2022-07-26 PROCEDURE — A9500 TC99M SESTAMIBI: HCPCS | Performed by: INTERNAL MEDICINE

## 2022-07-26 PROCEDURE — 93018 CV STRESS TEST I&R ONLY: CPT | Performed by: INTERNAL MEDICINE

## 2022-07-26 PROCEDURE — 78452 HT MUSCLE IMAGE SPECT MULT: CPT | Performed by: INTERNAL MEDICINE

## 2022-08-04 ENCOUNTER — TELEPHONE (OUTPATIENT)
Dept: CARDIOLOGY CLINIC | Age: 72
End: 2022-08-04

## 2022-08-04 PROBLEM — R94.39 ABNORMAL CARDIOVASCULAR STRESS TEST: Status: ACTIVE | Noted: 2022-07-27

## 2022-08-04 NOTE — TELEPHONE ENCOUNTER
Placed call to patient to relay results below. Voiced understanding. Patient wants to keep previously scheduled f/u appt to discuss results in Sept.    Medium sized defect of moderate severity which is reversible involving    lateral wall of myocardium. Abnormal Stress nuclear scintigraphic study    suggestive of abnormal myocardial perfusion. Mild degree of lateral wall    ischemia noted involving a medium sized area of left ventricular myocardium. Gated images demonstrate normal left ventricular systolic function with EF    of 60 %.         Recommendation    Suggest office visit to discuss results

## 2022-08-10 ENCOUNTER — PATIENT MESSAGE (OUTPATIENT)
Dept: INTERNAL MEDICINE CLINIC | Age: 72
End: 2022-08-10

## 2022-08-10 RX ORDER — AMLODIPINE BESYLATE 10 MG/1
10 TABLET ORAL DAILY
Qty: 30 TABLET | Refills: 3 | Status: SHIPPED | OUTPATIENT
Start: 2022-08-10 | End: 2022-09-14 | Stop reason: SDUPTHER

## 2022-08-11 ENCOUNTER — TELEPHONE (OUTPATIENT)
Dept: INTERNAL MEDICINE CLINIC | Age: 72
End: 2022-08-11

## 2022-08-11 DIAGNOSIS — Z12.31 ENCOUNTER FOR SCREENING MAMMOGRAM FOR MALIGNANT NEOPLASM OF BREAST: Primary | ICD-10-CM

## 2022-08-11 NOTE — TELEPHONE ENCOUNTER
Left VM informing pt she is due for Mammogram, and order has been placed. Advised to call office, if she wants to have order sent anywhere else.

## 2022-08-12 ENCOUNTER — TELEPHONE (OUTPATIENT)
Dept: PHARMACY | Facility: CLINIC | Age: 72
End: 2022-08-12

## 2022-08-12 NOTE — TELEPHONE ENCOUNTER
[facility] CLINICAL PHARMACY: ADHERENCE REVIEW  Identified care gap per [place]: fills at [facility] : Statin adherence    Last Visit: 22    [id number] [address] Records claims through 22 YTD [facility] =  66%; Potential Fail Date: 22 ):   Rosuvastatin 20mg Next refill due: 22    Per [facility]:   Was refilled and picked up on 8/10/22    Lab Results   Component Value Date    CHOL 234 (H) 2021    TRIG 233 (H) 2021    HDL 41 2021    LDLDIRECT 150 (H) 2021     ALT   Date Value Ref Range Status   2022 16 10 - 40 U/L Final     AST   Date Value Ref Range Status   2022 27 15 - 37 IU/L Final     The ASCVD Risk score (James Alberts et al., 2013) failed to calculate for the following reasons: The patient has a prior MI or stroke diagnosis     PLAN  The following are interventions that have been identified:   Refill due 22 (today) with fail date of 22      Future Appointments   Date Time Provider Helena Landrum   2022  1:30 PM SEEMA Peace MD [facility]   2022 11:40 AM Neida Crandall MD [facility]   10/10/2022  3:30 PM SCHEDULE, [facility] AWV LPCHANDA GARVIN NOR [facility]   10/12/2022  2:00 PM Vaishnavi Anderson DO N BHARATHI NOR [facility]       [place] Cordova [facility].  [facility] free: [phone]     Reached patient but she's unsure what she takes.     Verified  from [address] [place] [address] in place:  No  Recommendation Provided To: Patient/Caregiver: 1 via Telephone  Intervention Detail: Adherence Monitorin  Gap Closed?: Yes   Intervention Accepted By: Patient/Caregiver: 1  Time Spent (min): 15

## 2022-08-24 ENCOUNTER — HOSPITAL ENCOUNTER (OUTPATIENT)
Age: 72
Discharge: HOME | End: 2022-08-24
Payer: MEDICARE

## 2022-08-24 DIAGNOSIS — E78.5 HYPERLIPIDEMIA, UNSPECIFIED HYPERLIPIDEMIA TYPE: ICD-10-CM

## 2022-08-24 DIAGNOSIS — R20.2 PARESTHESIAS IN LEFT HAND: ICD-10-CM

## 2022-08-24 DIAGNOSIS — I10 HYPERTENSION, UNSPECIFIED TYPE: ICD-10-CM

## 2022-08-24 DIAGNOSIS — D64.9 ANEMIA, UNSPECIFIED TYPE: ICD-10-CM

## 2022-08-24 LAB
ALBUMIN SERPL-MCNC: 4.6 GM/DL (ref 3.4–5)
ALP BLD-CCNC: 128 IU/L (ref 40–129)
ALT SERPL-CCNC: 14 U/L (ref 10–40)
ANION GAP SERPL CALCULATED.3IONS-SCNC: 9 MMOL/L (ref 4–16)
AST SERPL-CCNC: 29 IU/L (ref 15–37)
BASOPHILS ABSOLUTE: 0.1 K/CU MM
BASOPHILS RELATIVE PERCENT: 0.8 % (ref 0–1)
BILIRUB SERPL-MCNC: 0.2 MG/DL (ref 0–1)
BUN BLDV-MCNC: 16 MG/DL (ref 6–23)
CALCIUM SERPL-MCNC: 9.9 MG/DL (ref 8.3–10.6)
CHLORIDE BLD-SCNC: 103 MMOL/L (ref 99–110)
CHOLESTEROL: 160 MG/DL
CO2: 30 MMOL/L (ref 21–32)
CREAT SERPL-MCNC: 0.7 MG/DL (ref 0.6–1.1)
DIFFERENTIAL TYPE: ABNORMAL
EOSINOPHILS ABSOLUTE: 0.3 K/CU MM
EOSINOPHILS RELATIVE PERCENT: 3.4 % (ref 0–3)
ERYTHROCYTE SEDIMENTATION RATE: 35 MM/HR (ref 0–30)
FERRITIN: 18 NG/ML (ref 15–150)
FOLATE: 11.4 NG/ML (ref 3.1–17.5)
GFR AFRICAN AMERICAN: >60 ML/MIN/1.73M2
GFR NON-AFRICAN AMERICAN: >60 ML/MIN/1.73M2
GLUCOSE BLD-MCNC: 110 MG/DL (ref 70–99)
HCT VFR BLD CALC: 37.2 % (ref 37–47)
HDLC SERPL-MCNC: 65 MG/DL
HEMOGLOBIN: 10.8 GM/DL (ref 12.5–16)
IMMATURE NEUTROPHIL %: 0.3 % (ref 0–0.43)
IRON: 29 UG/DL (ref 37–145)
LDL CHOLESTEROL CALCULATED: 70 MG/DL
LYMPHOCYTES ABSOLUTE: 1.4 K/CU MM
LYMPHOCYTES RELATIVE PERCENT: 18.9 % (ref 24–44)
MCH RBC QN AUTO: 24.1 PG (ref 27–31)
MCHC RBC AUTO-ENTMCNC: 29 % (ref 32–36)
MCV RBC AUTO: 82.9 FL (ref 78–100)
MONOCYTES ABSOLUTE: 0.6 K/CU MM
MONOCYTES RELATIVE PERCENT: 8.6 % (ref 0–4)
NUCLEATED RBC %: 0 %
PCT TRANSFERRIN: 6 % (ref 10–44)
PDW BLD-RTO: 17.1 % (ref 11.7–14.9)
PLATELET # BLD: 547 K/CU MM (ref 140–440)
PMV BLD AUTO: 9.5 FL (ref 7.5–11.1)
POTASSIUM SERPL-SCNC: 4.1 MMOL/L (ref 3.5–5.1)
RBC # BLD: 4.49 M/CU MM (ref 4.2–5.4)
SEGMENTED NEUTROPHILS ABSOLUTE COUNT: 5 K/CU MM
SEGMENTED NEUTROPHILS RELATIVE PERCENT: 68 % (ref 36–66)
SODIUM BLD-SCNC: 142 MMOL/L (ref 135–145)
T4 FREE: 0.88 NG/DL (ref 0.9–1.8)
TOTAL IMMATURE NEUTOROPHIL: 0.02 K/CU MM
TOTAL IRON BINDING CAPACITY: 484 UG/DL (ref 250–450)
TOTAL NUCLEATED RBC: 0 K/CU MM
TOTAL PROTEIN: 6.8 GM/DL (ref 6.4–8.2)
TRIGL SERPL-MCNC: 127 MG/DL
TSH HIGH SENSITIVITY: 3.21 UIU/ML (ref 0.27–4.2)
UNSATURATED IRON BINDING CAPACITY: 455 UG/DL (ref 110–370)
VITAMIN B-12: 523.6 PG/ML (ref 211–911)
WBC # BLD: 7.3 K/CU MM (ref 4–10.5)

## 2022-08-24 PROCEDURE — 81001 URINALYSIS AUTO W/SCOPE: CPT

## 2022-08-24 PROCEDURE — 36415 COLL VENOUS BLD VENIPUNCTURE: CPT

## 2022-08-24 PROCEDURE — 82746 ASSAY OF FOLIC ACID SERUM: CPT

## 2022-08-24 PROCEDURE — 83550 IRON BINDING TEST: CPT

## 2022-08-24 PROCEDURE — 85025 COMPLETE CBC W/AUTO DIFF WBC: CPT

## 2022-08-24 PROCEDURE — 84443 ASSAY THYROID STIM HORMONE: CPT

## 2022-08-24 PROCEDURE — 82728 ASSAY OF FERRITIN: CPT

## 2022-08-24 PROCEDURE — 84439 ASSAY OF FREE THYROXINE: CPT

## 2022-08-24 PROCEDURE — 85652 RBC SED RATE AUTOMATED: CPT

## 2022-08-24 PROCEDURE — 80061 LIPID PANEL: CPT

## 2022-08-24 PROCEDURE — 80053 COMPREHEN METABOLIC PANEL: CPT

## 2022-08-24 PROCEDURE — 83540 ASSAY OF IRON: CPT

## 2022-08-24 PROCEDURE — 82607 VITAMIN B-12: CPT

## 2022-08-30 ENCOUNTER — OFFICE VISIT (OUTPATIENT)
Dept: PHYSICAL MEDICINE AND REHAB | Age: 72
End: 2022-08-30
Payer: MEDICARE

## 2022-08-30 VITALS — TEMPERATURE: 97.2 F

## 2022-08-30 DIAGNOSIS — G56.03 BILATERAL CARPAL TUNNEL SYNDROME: ICD-10-CM

## 2022-08-30 DIAGNOSIS — M79.602 PARESTHESIA AND PAIN OF BOTH UPPER EXTREMITIES: ICD-10-CM

## 2022-08-30 DIAGNOSIS — M79.601 PARESTHESIA AND PAIN OF BOTH UPPER EXTREMITIES: ICD-10-CM

## 2022-08-30 DIAGNOSIS — G56.22 ULNAR NEUROPATHY AT WRIST, LEFT: Primary | ICD-10-CM

## 2022-08-30 DIAGNOSIS — R20.2 PARESTHESIA AND PAIN OF BOTH UPPER EXTREMITIES: ICD-10-CM

## 2022-08-30 PROCEDURE — 95911 NRV CNDJ TEST 9-10 STUDIES: CPT | Performed by: PHYSICAL MEDICINE & REHABILITATION

## 2022-08-30 PROCEDURE — 95886 MUSC TEST DONE W/N TEST COMP: CPT | Performed by: PHYSICAL MEDICINE & REHABILITATION

## 2022-08-30 NOTE — PROGRESS NOTES
EMG REPORT     CHIEF COMPLAINT: Numbness and tingling with weakness of the left hand. HISTORY OF PRESENT ILLNESS: 67 y.o. right hand dominant female with abrupt onset of left hand and wrist numbness, digit numbness and hand  weakness occurring 2 to 3 months ago. She does not recall any precipitating trauma or recent illnesses. She has undergone a right carotid endarterectomy in the past, but it was not prompted by a TIA or CVA. Currently she complains of her pain severity being 3/10. It does not affect her sleep. Her  is poor. She denied having neck pain radiating into the left upper limb. She has no history of diabetes or any thyroid disorder. PHYSICAL EXAMINATION: Alert. About 60 degrees of active cervical spine rotation bilaterally. Spurling's maneuver was negative. Upper limb reflexes were trace and symmetric. Tinel's sign is negative. Left upper limb digit abduction was nil. Thumb opposition MMT was 4+/5. Right upper limb APB MMT was 4/5. Proximal strength was normal.  There was some atrophy of the left hyperthenar eminence. Diminished sensation over rays 4 and 5 (palmar and dorsal surfaces). No tenderness at the left elbow. No history of diabetes or any thyroid disorder. NERVE CONDUCTION STUDIES:     MOTOR         LATENCY NORMAL AMPLITUDE DISTANCE COND. EN. RIGHT  MEDIAN 4.6 < 4.2 msec 2.9 8 cm 49   LEFT  MEDIAN 4.3 < 4.2 msec 6.5 8 cm 59   RIGHT  ULNAR 2.9 < 4.2 msec 7.0 8 cm >50   LEFT  ULNAR Absent < 4.2 msec  8 cm       SENSORY  ORTHODROMIC        LATENCY NORMAL AMPLITUDE DISTANCE   RIGHT MEDIAN 3.4 <2.3 msec 23 10 cm   LEFT  MEDIAN 3.0 < 2.3 msec 31 10 cm   RIGHT  ULNAR 2.7 < 2.3 msec 6 10 cm   LEFT  ULNAR Absent < 2.3 msec  10 cm       Left dorsal ulnar sensory: Absent.         NEEDLE EMG:      RIGHT   LEFT     Insertional Activity Spontaneous  Activity Volutional  MUAP's Insertional Activity Spontaneous  Activity Volutional  MUAP's   Cerv Parasp    Normal None Normal   Infraspinatus    Normal None Normal   Deltoid      Normal None Normal   Biceps      Normal None Normal   Triceps      Normal None Normal   Pronator Teres    Normal None Normal   FCU    Normal None Normal   ADM    Increased ++++ No units observed   Extensor Indicis    Normal None Normal   1st Dorsal Interosseus    Increased ++++ No units observed   Abductor Pollicis Br. Normal None Dec #       FINDINGS:   EMG of the cervical paraspinals and the left upper limb demonstrated no proximal abnormalities. Prevalent muscle membrane irritability without discernible motor unit activation was identified in the hand intrinsic muscles sharing ulnar innervation. Ulnar innervated muscles of the forearm demonstrated normal electrical patterns. In the APB muscles, but your recruitment was diminished and polyphasic city was noted. Median sensory and motor latencies were mildly delayed across each wrist, more so on the right. The left ulnar sensory and motor responses were missing. The right ulnar nerve conductions were reasonably well-maintained with only slight sensory compromise. IMPRESSION:      1. Abnormal EMG. There is a SEVERE ulnar neuropathy most likely at the left wrist.  I cannot exclude some compromise at the elbow due to the severity of the more distal ulnar injury. 2.  Mild median neuropathies at each wrist (R>L mild CTS). 3.  No evidence of a concurrent cervical spinal nerve root lesion (radiculopathy), plexopathy, generalized neuropathy or primary muscle disease.          Thank you for this interesting referral.

## 2022-09-14 DIAGNOSIS — E78.5 HYPERLIPIDEMIA, UNSPECIFIED HYPERLIPIDEMIA TYPE: ICD-10-CM

## 2022-09-14 RX ORDER — ROSUVASTATIN CALCIUM 20 MG/1
20 TABLET, COATED ORAL NIGHTLY
Qty: 30 TABLET | Refills: 1 | Status: SHIPPED | OUTPATIENT
Start: 2022-09-14 | End: 2022-10-12 | Stop reason: SDUPTHER

## 2022-09-14 RX ORDER — AMLODIPINE BESYLATE 10 MG/1
10 TABLET ORAL DAILY
Qty: 30 TABLET | Refills: 1 | Status: SHIPPED | OUTPATIENT
Start: 2022-09-14 | End: 2022-10-12 | Stop reason: SDUPTHER

## 2022-09-16 ENCOUNTER — TELEPHONE (OUTPATIENT)
Dept: PHARMACY | Facility: CLINIC | Age: 72
End: 2022-09-16

## 2022-09-16 NOTE — TELEPHONE ENCOUNTER
[facility] CLINICAL PHARMACY: ADHERENCE REVIEW  Identified care gap per [place]: fills at [facility] : Statin adherence    Last Visit: 7/13/22    Patient not found in Outcomes [id number]    [address] Records claims through 9/12/22; YTD Ian Latham =  71%; Potential Fail Date: 9/17/22 ):   ROSUVASTATIN CALCIUM 20 MG TAB due to refill 9/9/22 for 30 day supply. Per [facility]:   ROSUVASTATIN CALCIUM 20 MG TAB last picked up on 9/14/22 for 30 day supply. 1 refills remaining. Billed through [employer] Results   Component Value Date    CHOL 160 08/24/2022    TRIG 127 08/24/2022    HDL 65 08/24/2022    LDLCALC 70 08/24/2022    LDLDIRECT 150 (H) 12/22/2021     ALT   Date Value Ref Range Status   08/24/2022 14 10 - 40 U/L Final     AST   Date Value Ref Range Status   08/24/2022 29 15 - 37 IU/L Final     The ASCVD Risk score (Hernando Posada, et al., 2013) failed to calculate for the following reasons: The patient has a prior MI or stroke diagnosis     PLAN    Patient recently picked up Rosuvastatin from pharmacy. Had been contacted last month already about switching to 90 d/s and declined. Appears adherent at this time.          Future Appointments   Date Time Provider Helena Landrum   9/21/2022  9:00 AM Mayur Alegre APRN - CNP [facility]   10/10/2022  3:30 PM [address] [facility] Ricci Villagran FPS [facility]   10/12/2022  2:00 PM DO CHANDA [facility] NOR NEYDA       [address]   Direct: [phone]  Phone: toll free [phone]       For Pharmacy Admin Tracking Only    CPA in place:  No  Gap Closed?: Yes   Time Spent (min): 15

## 2022-09-21 ENCOUNTER — OFFICE VISIT (OUTPATIENT)
Dept: CARDIOLOGY CLINIC | Age: 72
End: 2022-09-21
Payer: MEDICARE

## 2022-09-21 VITALS
BODY MASS INDEX: 26.7 KG/M2 | HEIGHT: 58 IN | HEART RATE: 78 BPM | WEIGHT: 127.2 LBS | OXYGEN SATURATION: 97 % | SYSTOLIC BLOOD PRESSURE: 136 MMHG | DIASTOLIC BLOOD PRESSURE: 70 MMHG

## 2022-09-21 DIAGNOSIS — I25.10 CORONARY ARTERY DISEASE DUE TO LIPID RICH PLAQUE: Primary | ICD-10-CM

## 2022-09-21 DIAGNOSIS — I25.83 CORONARY ARTERY DISEASE DUE TO LIPID RICH PLAQUE: Primary | ICD-10-CM

## 2022-09-21 PROCEDURE — 1123F ACP DISCUSS/DSCN MKR DOCD: CPT | Performed by: NURSE PRACTITIONER

## 2022-09-21 PROCEDURE — 99214 OFFICE O/P EST MOD 30 MIN: CPT | Performed by: NURSE PRACTITIONER

## 2022-09-21 ASSESSMENT — ENCOUNTER SYMPTOMS
ORTHOPNEA: 0
GASTROINTESTINAL NEGATIVE: 1
SHORTNESS OF BREATH: 0

## 2022-09-21 NOTE — PROGRESS NOTES
9/21/2022  Primary cardiologist: Dr. Yrn Carlson:   Alexis Newberry  is an established 67 y.o.  female here for a  follow up on NM  stress test      SUBJECTIVE/OBJECTIVE:  Alexis Newberry is a 67 y.o. female with a history of coronary artery disease, CABG, carotid artery disease s/p carotid endarterectomy, CVA,  hyperlipidemia, hypertension and dementia  Carla had CABG X3 LIMA to LAD, SVG to diag , SVG to Cx . HPI :   Alexis Newberry is is with her daughter today. She states  Well. Denies chest pain, shortness of breath or palpitations. She is walking with a cane for stability and balance and denies falls. Review of Systems   Constitutional: Negative for diaphoresis and malaise/fatigue. HENT: Negative. Cardiovascular:  Negative for chest pain, claudication, dyspnea on exertion, irregular heartbeat, leg swelling, near-syncope, orthopnea, palpitations and paroxysmal nocturnal dyspnea. Respiratory:  Negative for shortness of breath. Endocrine: Negative. Skin: Negative. Gastrointestinal: Negative. Neurological:  Negative for dizziness and light-headedness. Psychiatric/Behavioral:  Positive for memory loss. Vitals:    09/21/22 0904   BP: 136/70   Site: Left Upper Arm   Position: Sitting   Cuff Size: Medium Adult   Pulse: 78   SpO2: 97%   Weight: 127 lb 3.2 oz (57.7 kg)   Height: 4' 10\" (1.473 m)     No flowsheet data found. Wt Readings from Last 3 Encounters:   09/21/22 127 lb 3.2 oz (57.7 kg)   07/19/22 120 lb (54.4 kg)   07/19/22 121 lb 9.6 oz (55.2 kg)     Body mass index is 26.58 kg/m². Physical Exam  HENT:      Head: Normocephalic and atraumatic. Eyes:      Extraocular Movements: Extraocular movements intact. Pupils: Pupils are equal, round, and reactive to light. Neck:      Vascular: No carotid bruit. Cardiovascular:      Rate and Rhythm: Normal rate and regular rhythm. Pulses: Normal pulses.    Pulmonary:      Effort: Pulmonary effort is normal.      Breath sounds: Wheezing (expiratory wheeze posterior lung field) present. No rales. Chest:      Chest wall: No tenderness. Abdominal:      General: There is no distension. Palpations: Abdomen is soft. Tenderness: There is no abdominal tenderness. Musculoskeletal:      Cervical back: No tenderness. Right lower leg: No edema. Left lower leg: No edema. Skin:     General: Skin is warm and dry. Capillary Refill: Capillary refill takes less than 2 seconds. Neurological:      General: No focal deficit present. Mental Status: She is alert and oriented to person, place, and time. Psychiatric:         Mood and Affect: Mood normal.         Behavior: Behavior normal.              Current Outpatient Medications   Medication Sig Dispense Refill    amLODIPine (NORVASC) 10 MG tablet Take 1 tablet by mouth daily 30 tablet 1    rosuvastatin (CRESTOR) 20 MG tablet Take 1 tablet by mouth nightly 30 tablet 1    meclizine (ANTIVERT) 25 MG tablet Take 1 tablet by mouth 3 times daily as needed for Dizziness or Nausea 90 tablet 0    memantine (NAMENDA) 5 MG tablet Take 5 mg by mouth 2 times daily      donepezil (ARICEPT) 10 MG tablet Take 10 mg by mouth nightly      apixaban (ELIQUIS) 5 MG TABS tablet Take 1 tablet by mouth 2 times daily 60 tablet 5    clopidogrel (PLAVIX) 75 MG tablet Take 1 tablet by mouth daily 30 tablet 3     No current facility-administered medications for this visit. All pertinent data reviewed and discussed with patient       ASSESSMENT/PLAN:    Coronary artery disease  Arlyn Jacobson has known coronary artery disease with history of CABG x3 with LIMA to the LAD, SVG to diagonal and circumflex the RCA is known to be occluded. She currently denies any symptoms however has noted to have an abnormal stress test with lateral wall ischemia. Lexiscan July 26, 2022  Medium sized defect of moderate severity which is reversible involving    lateral wall of myocardium.  Abnormal Stress nuclear scintigraphic study    suggestive of abnormal myocardial perfusion. Mild degree of lateral wall    ischemia noted involving a medium sized area of left ventricular myocardium. Gated images demonstrate normal left ventricular systolic function with EF    of 60 %. At this time she declines LHC - she reports she is feeling well and is not interested in further testing. We discussed possibility of SVG to CX may have developed blockage as average life span on bypass is about 10 years and is at risk for MI- again she declines further testing  Daughter is her with her today and would like to further discuss results at home with patient's  -  To notify office if she would like to proceed with LHC   Add metoprolol 25 mg twice daily. Tests ordered:  none   Follow-up  3mo     Signed:  ROSA Duke CNP, 9/21/2022, 9:16 AM    An electronic signature was used to authenticate this note. Please note this report has been partially produced using speech recognition software and may contain errors related to that system including errors in grammar, punctuation, and spelling, as well as words and phrases that may be inappropriate. If there are any questions or concerns please feel free to contact the dictating provider for clarification.

## 2022-10-12 ENCOUNTER — COMMUNITY OUTREACH (OUTPATIENT)
Dept: INTERNAL MEDICINE CLINIC | Age: 72
End: 2022-10-12

## 2022-10-12 ENCOUNTER — OFFICE VISIT (OUTPATIENT)
Dept: INTERNAL MEDICINE CLINIC | Age: 72
End: 2022-10-12
Payer: MEDICARE

## 2022-10-12 VITALS
SYSTOLIC BLOOD PRESSURE: 138 MMHG | HEIGHT: 58 IN | DIASTOLIC BLOOD PRESSURE: 70 MMHG | HEART RATE: 71 BPM | BODY MASS INDEX: 28.29 KG/M2 | OXYGEN SATURATION: 97 % | WEIGHT: 134.8 LBS

## 2022-10-12 DIAGNOSIS — G56.22 ULNAR NEUROPATHY OF LEFT UPPER EXTREMITY: ICD-10-CM

## 2022-10-12 DIAGNOSIS — S00.81XA EXCORIATION OF FACE, INITIAL ENCOUNTER: ICD-10-CM

## 2022-10-12 DIAGNOSIS — D50.9 IRON DEFICIENCY ANEMIA, UNSPECIFIED IRON DEFICIENCY ANEMIA TYPE: ICD-10-CM

## 2022-10-12 DIAGNOSIS — F03.918 DEMENTIA WITH OTHER BEHAVIORAL DISTURBANCE, UNSPECIFIED DEMENTIA SEVERITY, UNSPECIFIED DEMENTIA TYPE: ICD-10-CM

## 2022-10-12 DIAGNOSIS — I10 HYPERTENSION, UNSPECIFIED TYPE: Primary | ICD-10-CM

## 2022-10-12 DIAGNOSIS — I25.10 CORONARY ARTERY DISEASE INVOLVING NATIVE CORONARY ARTERY OF NATIVE HEART WITHOUT ANGINA PECTORIS: ICD-10-CM

## 2022-10-12 DIAGNOSIS — E78.5 HYPERLIPIDEMIA, UNSPECIFIED HYPERLIPIDEMIA TYPE: ICD-10-CM

## 2022-10-12 PROCEDURE — 1123F ACP DISCUSS/DSCN MKR DOCD: CPT | Performed by: FAMILY MEDICINE

## 2022-10-12 PROCEDURE — 99214 OFFICE O/P EST MOD 30 MIN: CPT | Performed by: FAMILY MEDICINE

## 2022-10-12 RX ORDER — ROSUVASTATIN CALCIUM 20 MG/1
20 TABLET, COATED ORAL NIGHTLY
Qty: 90 TABLET | Refills: 1 | Status: SHIPPED | OUTPATIENT
Start: 2022-10-12

## 2022-10-12 RX ORDER — MEMANTINE HYDROCHLORIDE 5 MG/1
5 TABLET ORAL 2 TIMES DAILY
Qty: 180 TABLET | Refills: 1 | Status: SHIPPED | OUTPATIENT
Start: 2022-10-12

## 2022-10-12 RX ORDER — CLOPIDOGREL BISULFATE 75 MG/1
75 TABLET ORAL DAILY
Qty: 90 TABLET | Refills: 1 | Status: SHIPPED | OUTPATIENT
Start: 2022-10-12

## 2022-10-12 RX ORDER — AMLODIPINE BESYLATE 10 MG/1
10 TABLET ORAL DAILY
Qty: 90 TABLET | Refills: 1 | Status: SHIPPED | OUTPATIENT
Start: 2022-10-12

## 2022-10-12 RX ORDER — FERROUS SULFATE 325(65) MG
325 TABLET ORAL
Qty: 90 TABLET | Refills: 1 | Status: SHIPPED | OUTPATIENT
Start: 2022-10-12

## 2022-10-12 ASSESSMENT — ENCOUNTER SYMPTOMS
ABDOMINAL PAIN: 0
NAUSEA: 0
SHORTNESS OF BREATH: 0
COUGH: 0
BACK PAIN: 0

## 2022-10-12 NOTE — PROGRESS NOTES
Wilfredo Vargas (:  1950) is a 67 y.o. female,established patient, here for evaluation of the following chief complaint(s):  Numbness (Pt states her left arm & hand is always numb & sometimes hurts ), Hypertension, and Hyperlipidemia         ASSESSMENT/PLAN:  1. Hypertension, unspecified type  - amLODIPine (NORVASC) 10 MG tablet; Take 1 tablet by mouth daily  Dispense: 90 tablet; Refill: 1  - Comprehensive Metabolic Panel; Future    2. Ulnar neuropathy of left upper extremity  - Dang Villegas MD, Orthopedic Surgery, [place]    3. Iron deficiency anemia, unspecified iron deficiency anemia type  -Start:  - ferrous sulfate (IRON 325) 325 (65 Fe) MG tablet; Take 1 tablet by mouth daily (with breakfast)  Dispense: 90 tablet; Refill: 1  ADR's explained  - Ferritin; Future  - Iron and TIBC; Future  - Comprehensive Metabolic Panel; Future    4. Coronary artery disease involving native coronary artery of native heart without angina pectoris  - clopidogrel (PLAVIX) 75 MG tablet; Take 1 tablet by mouth daily  Dispense: 90 tablet; Refill: 1    5. Hyperlipidemia, unspecified hyperlipidemia type  - rosuvastatin (CRESTOR) 20 MG tablet; Take 1 tablet by mouth nightly  Dispense: 90 tablet; Refill: 1  - Lipid Panel; Future    6. Excoriation of face, initial encounter  Avoid picking at face  Use antibiotic ointment to the area    7. Dementia with other behavioral disturbance, unspecified dementia severity, unspecified dementia type  - memantine (NAMENDA) 5 MG tablet; Take 1 tablet by mouth 2 times daily  Dispense: 180 tablet;  Refill: 1  Off Aricept    Patient stopped the Eliquis and does not want to start Xarelto  Patient to contact cardiology to schedule C      On this date 10/12/2022 I have spent 30 minutes reviewing previous notes, test results and face to face with the patient discussing the diagnosis and importance of compliance with the treatment plan as well as documenting on the day of the visit. Return in about 5 months (around 3/12/2023) for HTN, HLD, Dementia. Lab Results   Component Value Date    WBC 7.3 08/24/2022    HGB 10.8 (L) 08/24/2022    HCT 37.2 08/24/2022    MCV 82.9 08/24/2022     (H) 08/24/2022     Lab Results   Component Value Date    CHOL 160 08/24/2022     Lab Results   Component Value Date    TRIG 127 08/24/2022     Lab Results   Component Value Date    HDL 65 08/24/2022     Lab Results   Component Value Date    LDLCALC 70 08/24/2022    LDLDIRECT 150 (H) 12/22/2021     Lab Results   Component Value Date    LABA1C 6.0 12/22/2021     Lab Results   Component Value Date     12/22/2021     Lab Results   Component Value Date     08/24/2022    K 4.1 08/24/2022     08/24/2022    CO2 30 08/24/2022    BUN 16 08/24/2022    CREATININE 0.7 08/24/2022    GLUCOSE 110 (H) 08/24/2022    CALCIUM 9.9 08/24/2022    PROT 6.8 08/24/2022    LABALBU 4.6 08/24/2022    BILITOT 0.2 08/24/2022    ALKPHOS 128 08/24/2022    AST 29 08/24/2022    ALT 14 08/24/2022    LABGLOM >60 08/24/2022    GFRAA >60 08/24/2022       Lab Results   Component Value Date/Time    COLORU YELLOW 04/03/2022 11:04 AM    LABPH 6.0 04/03/2022 11:04 AM    NITRU NEGATIVE 04/03/2022 11:04 AM    KETUA NEGATIVE 04/03/2022 11:04 AM    UROBILINOGEN 0.2 04/03/2022 11:04 AM    BILIRUBINUR NEGATIVE 04/03/2022 11:04 AM     EMG 8/30/22   1. Abnormal EMG. There is a SEVERE ulnar neuropathy most likely at the left wrist.  I cannot exclude some compromise at the elbow due to the severity of the more distal ulnar injury. 2.  Mild median neuropathies at each wrist (R>L mild CTS). 3.  No evidence of a concurrent cervical spinal nerve root lesion (radiculopathy), plexopathy, generalized neuropathy or primary muscle disease.       Subjective   SUBJECTIVE/OBJECTIVE:    HISTORY OF PRESENT ILLNESS:  This is a 67 y.o. female here for the following:  Patient Active Problem List    Diagnosis Date Noted    Diverticulosis 10/30/2019    Abnormal cardiovascular stress test 07/27/2022    H/O: CVA (cerebrovascular accident) 07/19/2022    Hyperlipidemia with target low density lipoprotein (LDL) cholesterol less than 70 mg/dL 10/30/2019    Emesis 04/03/2022    Moderate malnutrition ([facility] [address] 03/16/2022    Gross hematuria 01/09/2022    Dementia ([facility] [address] 01/09/2022    Diarrhea 01/09/2022    Hypertension 01/09/2022    Recurrent stenosis of left carotid artery     Acute kidney injury due to COVID-19 [facility]) 01/03/2022    Expressive aphasia     Cerebrovascular accident ([facility] 10/25/2021    CAD (coronary artery disease)       L ulnar neuropathy- severe at the wrist.  Mild CTS  HTN- stable on medications  HLD- on Crestor 20 mg  Anemia, chronic. She has refused GI evaluation  CAD- cardiology plans to do a LHC. She was not sure she wanted to do   Carotid stenosis. She has seen Dr. Orestes Barrios. She did not repeat the Carotid doppler  Hx of CVA. She was on Plavix and Eliquis after a hospital admission in 1/21. She has stopped the Eliquis now and does not want to start Xarelto    Review of Systems   Constitutional:  Negative for diaphoresis and fever. Respiratory:  Negative for cough and shortness of breath. Cardiovascular:  Negative for chest pain and palpitations. Gastrointestinal:  Negative for abdominal pain and nausea. Genitourinary:  Negative for difficulty urinating. Musculoskeletal:  Negative for back pain. Neurological:  Positive for numbness (LUE). Negative for dizziness and headaches.      Allergies   Allergen Reactions    Penicillins     Sulfa Antibiotics Swelling     Current Outpatient Medications   Medication Sig Dispense Refill    amLODIPine (NORVASC) 10 MG tablet Take 1 tablet by mouth daily 90 tablet 1    rosuvastatin (CRESTOR) 20 MG tablet Take 1 tablet by mouth nightly 90 tablet 1    clopidogrel (PLAVIX) 75 MG tablet Take 1 tablet by mouth daily 90 tablet 1    ferrous sulfate (IRON 325) 325 (65 Fe) MG tablet Take 1 tablet by mouth daily (with breakfast) 90 tablet 1    memantine (NAMENDA) 5 MG tablet Take 1 tablet by mouth 2 times daily 180 tablet 1    metoprolol tartrate (LOPRESSOR) 25 MG tablet Take 1 tablet by mouth 2 times daily 60 tablet 5    meclizine (ANTIVERT) 25 MG tablet Take 1 tablet by mouth 3 times daily as needed for Dizziness or Nausea 90 tablet 0     No current facility-administered medications for this visit. Vitals:    10/12/22 1359   BP: 138/70   Site: Right Upper Arm   Position: Sitting   Cuff Size: Medium Adult   Pulse: 71   SpO2: 97%   Weight: 134 lb 12.8 oz (61.1 kg)   Height: 4' 10\" (1.473 m)     Objective   Physical Exam  Vitals reviewed. Constitutional:       General: She is not in acute distress. Eyes:      Extraocular Movements: Extraocular movements intact. Cardiovascular:      Rate and Rhythm: Normal rate and regular rhythm. Pulmonary:      Effort: Pulmonary effort is normal. No respiratory distress. Breath sounds: Normal breath sounds. Abdominal:      Palpations: Abdomen is soft. Tenderness: There is no abdominal tenderness. Musculoskeletal:      Cervical back: Neck supple. Right lower leg: No edema. Left lower leg: No edema. Skin:     Comments: Excoriation to cheek   Neurological:      Mental Status: She is alert and oriented to person, place, and time. Sensory: Sensory deficit (L hand) present. Psychiatric:         Mood and Affect: Mood normal.              An electronic signature was used to authenticate this note. --Makayla Bianchi DO     This dictation was generated by voice recognition computer software. Although all attempts are made to edit the dictation for accuracy, there may be errors in the transcription that are not intended.

## 2022-10-14 ENCOUNTER — TELEPHONE (OUTPATIENT)
Dept: PHARMACY | Facility: CLINIC | Age: 72
End: 2022-10-14

## 2022-10-14 NOTE — TELEPHONE ENCOUNTER
[facility] [place] [facility]: ADHERENCE REVIEW  Identified care gap per [place]: fills at [facility] : Statin adherence    Last Visit: 10/12/22    [id number] Records claims through 10.10.22YTD [place] =  75%; Potential Fail Date: 10/18/22 ):   Rosuvastatin 20mg Next refill due: 10/12/22    Per Reconciled Dispense Report:   last filled on 10/10/22 for 30 day supply. New script for 90DS sent 10/12/22    Lab Results   Component Value Date    CHOL 160 08/24/2022    TRIG 127 08/24/2022    HDL 65 08/24/2022    LDLCALC 70 08/24/2022    LDLDIRECT 150 (H) 12/22/2021     ALT   Date Value Ref Range Status   08/24/2022 14 10 - 40 U/L Final     AST   Date Value Ref Range Status   08/24/2022 29 15 - 37 IU/L Final     The ASCVD Risk score (Rc DK, et al., 2019) failed to calculate for the following reasons: The patient has a prior MI or stroke diagnosis     PLAN  The following are interventions that have been identified:   none    No patient out reach planned at this time. Future Appointments   Date Time Provider Helena Landrum   1/9/2023  1:10 PM [facility]   3/15/2023  2:00 PM DO CHANDA Burleson NOR NEYDA [facility].    [address] [facility] free: Counts include 234 beds at the [facility] Only    CPA in place:  No  Gap Closed?: No     Time Spent (min): 15

## 2023-02-06 ENCOUNTER — OFFICE VISIT (OUTPATIENT)
Dept: CARDIOLOGY CLINIC | Age: 73
End: 2023-02-06
Payer: MEDICARE

## 2023-02-06 VITALS
HEIGHT: 58 IN | DIASTOLIC BLOOD PRESSURE: 68 MMHG | HEART RATE: 61 BPM | OXYGEN SATURATION: 97 % | WEIGHT: 146.6 LBS | BODY MASS INDEX: 30.77 KG/M2 | SYSTOLIC BLOOD PRESSURE: 124 MMHG

## 2023-02-06 DIAGNOSIS — I25.10 CORONARY ARTERY DISEASE INVOLVING NATIVE CORONARY ARTERY OF NATIVE HEART WITHOUT ANGINA PECTORIS: Primary | ICD-10-CM

## 2023-02-06 PROCEDURE — 3078F DIAST BP <80 MM HG: CPT | Performed by: INTERNAL MEDICINE

## 2023-02-06 PROCEDURE — 1123F ACP DISCUSS/DSCN MKR DOCD: CPT | Performed by: INTERNAL MEDICINE

## 2023-02-06 PROCEDURE — 99214 OFFICE O/P EST MOD 30 MIN: CPT | Performed by: INTERNAL MEDICINE

## 2023-02-06 PROCEDURE — 3074F SYST BP LT 130 MM HG: CPT | Performed by: INTERNAL MEDICINE

## 2023-02-06 RX ORDER — DONEPEZIL HYDROCHLORIDE 10 MG/1
10 TABLET, FILM COATED ORAL NIGHTLY
COMMUNITY

## 2023-02-06 NOTE — PROGRESS NOTES
CARDIOLOGY NOTE      2/6/2023    RE: Ana Santos  (1950)                               TO:  Dr. Camden Pabon, DO            CHIEF Leonard Young is a 67 y.o. female who was seen today for management of coronary artery disease history of CABG , dementia, hyperlipidemia, hypertension, history of carotid endarterectomy                         Fu on abn stress           HPI:                   Pt has h/o coronary artery bypass surgery, history of carotid endarterectomy, history of coronary bypass surgery, CVA in January of this year hyperlipidemia, hypertension, seen today for FU  Ana Santos has the following history recorded in care path:  Patient Active Problem List    Diagnosis Date Noted    Diverticulosis 10/30/2019    Abnormal cardiovascular stress test 07/27/2022    H/O: CVA (cerebrovascular accident) 07/19/2022    Hyperlipidemia with target low density lipoprotein (LDL) cholesterol less than 70 mg/dL 10/30/2019    Emesis 04/03/2022    Moderate malnutrition ([address] 03/16/2022    Gross hematuria 01/09/2022    Dementia ([facility] [address] 01/09/2022    Diarrhea 01/09/2022    Hypertension 01/09/2022    Recurrent stenosis of left carotid artery     Acute kidney injury due to COVID-19 [facility]) 01/03/2022    Expressive aphasia     Cerebrovascular accident ([facility] [address] 10/25/2021    CAD (coronary artery disease)      Current Outpatient Medications   Medication Sig Dispense Refill    donepezil (ARICEPT) 10 MG tablet Take 10 mg by mouth nightly      amLODIPine (NORVASC) 10 MG tablet Take 1 tablet by mouth daily 90 tablet 1    rosuvastatin (CRESTOR) 20 MG tablet Take 1 tablet by mouth nightly 90 tablet 1    clopidogrel (PLAVIX) 75 MG tablet Take 1 tablet by mouth daily 90 tablet 1    memantine (NAMENDA) 5 MG tablet Take 1 tablet by mouth 2 times daily 180 tablet 1    metoprolol tartrate (LOPRESSOR) 25 MG tablet Take 1 tablet by mouth 2 times daily 60 tablet 5    ferrous sulfate (IRON 325) 325 (65 Fe) MG tablet Take 1 tablet by mouth daily (with breakfast) (Patient not taking: Reported on 2023) 90 tablet 1    meclizine (ANTIVERT) 25 MG tablet Take 1 tablet by mouth 3 times daily as needed for Dizziness or Nausea (Patient not taking: Reported on 2023) 90 tablet 0     No current facility-administered medications for this visit. Allergies: Penicillins and Sulfa antibiotics  Past Medical History:   Diagnosis Date    Abnormal cardiovascular stress test 2022    Mild degree of lateral wall ischemia    Acute cerebrovascular accident (CVA) ([facility] [address] 2021    ARF (acute renal failure) ([facility] [address] 03/15/2022    Blood in stool 2022    CAD (coronary artery disease)     COVID-19 virus infection 2022    Hypertension     Hypertensive urgency 10/30/2019     Past Surgical History:   Procedure Laterality Date    CAROTID ENDARTERECTOMY      CHOLECYSTECTOMY      CORONARY ARTERY BYPASS GRAFT      HYSTERECTOMY, TOTAL ABDOMINAL (CERVIX REMOVED)      IR NONTUNNELED VASCULAR CATHETER  2022    IR NONTUNNELED VASCULAR CATHETER 2022 [facility] SPECIAL PROCEDURES    NECK SURGERY        As reviewed   Family History   Problem Relation Age of Onset    Heart Disease Mother     Diabetes Father     Other Child      Social History     Tobacco Use    Smoking status: Former     Packs/day: 3.00     Years: 50.00     Pack years: 150.00     Types: Cigarettes     Quit date: 2022     Years since quittin.0    Smokeless tobacco: Never   Substance Use Topics    Alcohol use: No     Comment: caffeine 1 soda a day        Objective:    Vitals:    23 0926   BP: 124/68   Site: Right Upper Arm   Position: Sitting   Cuff Size: Medium Adult   Pulse: 61   SpO2: 97%   Weight: 146 lb 9.6 oz (66.5 kg)   Height: 4' 10\" (1.473 m)     /68 (Site: Right Upper Arm, Position: Sitting, Cuff Size: Medium Adult)   Pulse 61   Ht 4' 10\" (1.473 m)   Wt 146 lb 9.6 oz (66.5 kg)   SpO2 97%   BMI 30.64 kg/m²     No flowsheet data found.      Wt Readings from Last 3 Encounters:   02/06/23 146 lb 9.6 oz (66.5 kg)   10/12/22 134 lb 12.8 oz (61.1 kg)   09/21/22 127 lb 3.2 oz (57.7 kg)     Body mass index is 30.64 kg/m². GENERAL - Alert, oriented, pleasant, in no apparent distress. EYES: No jaundice, no conjunctival pallor. SKIN: It is warm & dry. No rashes. No Echhymosis    HEENT - No clinically significant abnormalities seen. Neck - Supple. No jugular venous distention noted. No carotid bruits. Cardiovascular - Normal S1 and S2 without obvious murmur or gallop. Extremities - No cyanosis, clubbing, or significant edema. Pulmonary - No respiratory distress. No wheezes or rales. Abdomen - No masses, tenderness, or organomegaly. Musculoskeletal - No significant edema. No joint deformities. No muscle wasting. Neurologic - Cranial nerves II through XII are grossly intact. There were no gross focal neurologic abnormalities.     Lab Review   Lab Results   Component Value Date/Time    CKTOTAL 34 01/19/2022 04:22 PM    TROPONINT <0.010 04/03/2022 08:43 AM     BNP:  No results found for: BNP  PT/INR:    Lab Results   Component Value Date    INR 1.22 04/03/2022     Lab Results   Component Value Date    LABA1C 6.0 12/22/2021     Lab Results   Component Value Date    WBC 7.3 08/24/2022    HCT 37.2 08/24/2022    MCV 82.9 08/24/2022     (H) 08/24/2022     Lab Results   Component Value Date    CHOL 160 08/24/2022    TRIG 127 08/24/2022    HDL 65 08/24/2022    LDLCALC 70 08/24/2022    LDLDIRECT 150 (H) 12/22/2021     Lab Results   Component Value Date    ALT 14 08/24/2022    AST 29 08/24/2022     BMP:    Lab Results   Component Value Date/Time     08/24/2022 07:47 AM    K 4.1 08/24/2022 07:47 AM     08/24/2022 07:47 AM    CO2 30 08/24/2022 07:47 AM    BUN 16 08/24/2022 07:47 AM    CREATININE 0.7 08/24/2022 07:47 AM     CMP:   Lab Results   Component Value Date/Time     08/24/2022 07:47 AM    K 4.1 08/24/2022 07:47 AM     08/24/2022 07:47 AM    CO2 30 08/24/2022 07:47 AM    BUN 16 08/24/2022 07:47 AM    PROT 6.8 08/24/2022 07:47 AM     TSH:    Lab Results   Component Value Date/Time    TSH 3.210 08/24/2022 07:47 AM           Assessment & Plan:    Had CABG X3 LIMA lad , SVG to diag , SVG to Cx   Occluded rca   2010      Has abn stress  LHC dw pt  declined  Moderate cardiac risk for carpal tunnel surgery      -     CORONARY ARTERY DISEASE:  stable post cabg symptomatic     All available  tests in chart reviewed. Management discussed . Testing ordered  lexiscan               on aspirin and Plavix we will continue to monitor                   -  Hypertension: Patients blood pressure is normal. Patient is advised about low sodium diet. Present medical regimen will not be changed. On Norvasc 10 mg daily compliant we will continue to monitor    -  LIPID MANAGEMENT:  Importance of lipid levels discussed with patient   and patient was given dietary advice. NCEP- ATP III guidelines reviewed with patient. -   Changes  in medicines made: No     On Crestor 20 mg p.o. daily we will continue to monitor                         -Carotid endarterectomy last carotid ultrasound as follows 11/21  Bilateral carotid artery plaque formation. However, there is no   hemodynamically significant carotid stenosis, as the degree of luminal   narrowing measures less than 50% bilaterally       -CVA  From January of this year  New acute infarcts in the right middle cerebral and left posterior   cerebral artery territories. Evolving infarcts in the left middle cerebral   artery territory. No acute hemorrhage given artifact. Jeff Michel    Please note this report has been partially produced using speech recognition software and may contain errors related to that system including errors in grammar, punctuation, and spelling, as well as words and phrases that may be inappropriate.  If there are any questions or concerns please feel free to contact the dictating provider for clarification.

## 2023-02-28 ENCOUNTER — OFFICE VISIT (OUTPATIENT)
Dept: ORTHOPEDIC SURGERY | Age: 73
End: 2023-02-28
Payer: MEDICARE

## 2023-02-28 VITALS
BODY MASS INDEX: 30.25 KG/M2 | TEMPERATURE: 97.4 F | HEART RATE: 72 BPM | HEIGHT: 58 IN | WEIGHT: 144.1 LBS | OXYGEN SATURATION: 98 %

## 2023-02-28 DIAGNOSIS — G56.22 ULNAR NEUROPATHY OF LEFT UPPER EXTREMITY: Primary | ICD-10-CM

## 2023-02-28 PROCEDURE — 99203 OFFICE O/P NEW LOW 30 MIN: CPT | Performed by: ORTHOPAEDIC SURGERY

## 2023-02-28 PROCEDURE — 1123F ACP DISCUSS/DSCN MKR DOCD: CPT | Performed by: ORTHOPAEDIC SURGERY

## 2023-02-28 NOTE — PROGRESS NOTES
Patient seen in office today for left wrist pain, that she describes has numbness in the wrist, 4th and 5th fingers. She also has a depressed area between thumb and 2nd finger. DOI: n/a  DOS: n/a    Patient reports 0/10 pain. RICE and medication are not effective to alleviate pain and reduce swelling. Pain also alleviated by: nothing  Pain worsened by: n/a  Patient has not been ordered physical therapy, but would be willing to have in home PT. Patient would be willing to have surgery if it would help rid her of the numbness  Patient is not interested in injection today. Xrays performed in office today.

## 2023-02-28 NOTE — PATIENT INSTRUCTIONS
Continue to weight bear as tolerated  Continue range of motion  Ice and elevate as needed  Tylenol or Motrin for pain  Please see neurology, Dr. Jessica Fuentes. We will send a referral to his office and they will reach out to schedule an appointment. Follow up as needed      We are committed to providing you the best care possible. If you receive a survey after visiting one of our offices, please take time to share your experience concerning your physician office visit. These surveys are confidential and no health information about you is shared. We are eager to improve for you and we are counting on your feedback to help make that happen.

## 2023-02-28 NOTE — PROGRESS NOTES
2/28/2023   Chief Complaint   Patient presents with    Consultation     Left wrist pain, upper arm        History of Present Illness:                             Tejal Berger is a 67 y.o. female who presents today for evaluation of her left upper extremity radiating pain numbness and tingling that extends into the ring and small fingers. She complains of shooting pains that travel from the shoulder into the elbow and hand. She does not recall any specific injuries or falls because of this. She has noticed weakness and clumsiness in the hand and has had atrophy in her first webspace. Patient seen in office today for left wrist pain, that she describes has numbness in the wrist, 4th and 5th fingers. She also has a depressed area between thumb and 2nd finger. DOI: n/a  DOS: n/a     Patient reports 0/10 pain. RICE and medication are not effective to alleviate pain and reduce swelling. Pain also alleviated by: nothing  Pain worsened by: n/a  Patient has not been ordered physical therapy, but would be willing to have in home PT. Patient would be willing to have surgery if it would help rid her of the numbness  Patient is not interested in injection today. Xrays performed in office today. Medical History  Patient's medications, allergies, past medical, surgical, social and family histories were reviewed and updated as appropriate.     Past Medical History:   Diagnosis Date    Abnormal cardiovascular stress test 07/27/2022    Mild degree of lateral wall ischemia    Acute cerebrovascular accident (CVA) ([address] 12/21/2021    ARF (acute renal failure) ([facility] [address] 03/15/2022    Blood in stool 04/03/2022    CAD (coronary artery disease)     COVID-19 virus infection 01/09/2022    Hypertension     Hypertensive urgency 10/30/2019     Past Surgical History:   Procedure Laterality Date    CAROTID ENDARTERECTOMY      CHOLECYSTECTOMY      CORONARY ARTERY BYPASS GRAFT      HYSTERECTOMY, TOTAL ABDOMINAL (CERVIX REMOVED)      IR NONTUNNELED VASCULAR CATHETER  2022    IR NONTUNNELED VASCULAR CATHETER 2022 [facility] PROCED[facility]    NECK SURGERY       Family History   Problem Relation Age of Onset    Heart Disease Mother     Diabetes Father     Other Child      Social History     Socioeconomic History    Marital status:      Spouse name: liane medina    Number of children: 5    Years of education: 8    Highest education level: 8th grade   Tobacco Use    Smoking status: Former     Packs/day: 3.00     Years: 50.00     Pack years: 150.00     Types: Cigarettes     Quit date: 2022     Years since quittin.1    Smokeless tobacco: Never   Vaping Use    Vaping Use: Never used   Substance and Sexual Activity    Alcohol use: No     Comment: caffeine 1 soda a day    Drug use: No    Sexual activity: Yes     Partners: Male     Social Determinants of Health     Financial Resource Strain: Low Risk     Difficulty of Paying Living Expenses: Not hard at all   Food Insecurity: No Food Insecurity    Worried About Running Out of Food in the Last Year: Never true    Ran Out of Food in the Last Year: Never true     Current Outpatient Medications   Medication Sig Dispense Refill    donepezil (ARICEPT) 10 MG tablet Take 10 mg by mouth nightly      amLODIPine (NORVASC) 10 MG tablet Take 1 tablet by mouth daily 90 tablet 1    rosuvastatin (CRESTOR) 20 MG tablet Take 1 tablet by mouth nightly 90 tablet 1    clopidogrel (PLAVIX) 75 MG tablet Take 1 tablet by mouth daily 90 tablet 1    memantine (NAMENDA) 5 MG tablet Take 1 tablet by mouth 2 times daily 180 tablet 1    metoprolol tartrate (LOPRESSOR) 25 MG tablet Take 1 tablet by mouth 2 times daily 60 tablet 5    ferrous sulfate (IRON 325) 325 (65 Fe) MG tablet Take 1 tablet by mouth daily (with breakfast) (Patient not taking: No sig reported) 90 tablet 1    meclizine (ANTIVERT) 25 MG tablet Take 1 tablet by mouth 3 times daily as needed for Dizziness or Nausea (Patient not taking: No sig reported) 90 tablet 0     No current facility-administered medications for this visit. Allergies   Allergen Reactions    Penicillins     Sulfa Antibiotics Swelling         Review of Systems   Constitutional:  Negative for chills and fever. HENT:  Negative for congestion and sneezing. Eyes:  Negative for pain and redness. Respiratory:  Negative for chest tightness, shortness of breath and wheezing. Cardiovascular:  Negative for chest pain and palpitations. Gastrointestinal:  Negative for vomiting. Musculoskeletal:  Positive for arthralgias. Skin:  Negative for color change and rash. Neurological:  Positive for weakness and numbness. Psychiatric/Behavioral:  Negative for agitation. The patient is not nervous/anxious. Examination:  General Exam:  Vitals: Pulse 72   Temp 97.4 °F (36.3 °C)   Ht 4' 10\" (1.473 m)   Wt 144 lb 1.6 oz (65.4 kg)   SpO2 98%   BMI 30.12 kg/m²    Physical Exam  Vitals and nursing note reviewed. Constitutional:       Appearance: Normal appearance. HENT:      Head: Normocephalic and atraumatic. Eyes:      Conjunctiva/sclera: Conjunctivae normal.      Pupils: Pupils are equal, round, and reactive to light. Pulmonary:      Effort: Pulmonary effort is normal.   Musculoskeletal:      Left elbow: No swelling or deformity. Normal range of motion. No tenderness. No radial head, medial epicondyle, lateral epicondyle or olecranon process tenderness. Right wrist: No swelling, deformity, effusion, lacerations, tenderness, bony tenderness or crepitus. Normal range of motion. Cervical back: Normal range of motion. Comments: Left Upper Extremity:    There is moderate tenderness to palpation at the medial aspect of the elbow along the course of the ulnar nerve. Range of motion of the elbow is full in extension and mildly restricted elbow flexion with pain referred to the medial aspect of the elbow. Supination and pronation of the forearm is intact with full range of motion. No instability at the elbow. Mild bony tenderness along the medial epicondyle. Strength of the elbow is 5 out of 5 with flexion and extension. Sensation to light touch is decreased in the ulnar nerve distribution in the hand along the small and ring fingers. There is moderate to severe weakness with  and finger abduction. Moderate weakness flexion strength across the small finger. Positive Tinel sign at the cubital tunnel. Positive ulnar stretch sign at the elbow. 2+ radial pulse with brisk cap refill. Skin is intact without wounds or lesions. Severe wasting of the first dorsal interosseous muscle    Negative carpal compression test and phalen test. Sensation intact in median and radial nerve distribution       Skin:     General: Skin is warm and dry. Neurological:      Mental Status: She is alert and oriented to person, place, and time. Psychiatric:         Mood and Affect: Mood normal.         Behavior: Behavior normal.          Diagnostic testing:  X-ray images were reviewed by myself and discussed with the patient:  XR WRIST LEFT (MIN 3 VIEWS)    Result Date: 2/28/2023  X-ray left wrist: 3 views of the left wrist show normal alignment of the articulations of the wrist and carpal joints. No evidence of fracture or acute abnormality. No soft tissue swelling. No loose bodies. Normal bone density. Impression: Normal left wrist        Office Procedures:  Orders Placed This Encounter   Procedures    Marly Andersen MD, Neurology, [facility]     Referral Priority:   Routine     Referral Type:   Eval and Treat     Referral Reason:   Specialty Services Required     Referred to Provider:   Azul Avila DO     Requested Specialty:   Neurology     Number of Visits Requested:   1       Assessment and Plan  1.   Left upper extremity ulnar neuropathy    I reviewed the previous EMG findings with the patient and explained that the exact etiology of her nerve dysfunction is unclear to me based on the report. We discussed the potential for surgical intervention if there is an obvious area of compression however I am concerned that her neuropathy may be multifactorial or even related to a central issue and not simply a peripheral compression. Therefore I recommended that we have her evaluated by a neurologist prior to considering any surgical intervention. Sent a referral to Dr. Lisa Kay for further evaluation of her neuropathy. She can follow-up here after that consultation if there is indication that surgical decompression may be of benefit. Continue activities as tolerated.     Electronically signed by Delfnio Davdi MD on 2/28/2023 at 5:13 PM

## 2023-03-04 ASSESSMENT — ENCOUNTER SYMPTOMS
SHORTNESS OF BREATH: 0
VOMITING: 0
CHEST TIGHTNESS: 0
EYE PAIN: 0
COLOR CHANGE: 0
EYE REDNESS: 0
WHEEZING: 0

## 2023-03-13 ENCOUNTER — HOSPITAL ENCOUNTER (OUTPATIENT)
Age: 73
Discharge: HOME | End: 2023-03-13
Payer: MEDICARE

## 2023-03-13 DIAGNOSIS — D50.9 IRON DEFICIENCY ANEMIA, UNSPECIFIED IRON DEFICIENCY ANEMIA TYPE: ICD-10-CM

## 2023-03-13 DIAGNOSIS — E78.5 HYPERLIPIDEMIA, UNSPECIFIED HYPERLIPIDEMIA TYPE: ICD-10-CM

## 2023-03-13 DIAGNOSIS — I10 HYPERTENSION, UNSPECIFIED TYPE: ICD-10-CM

## 2023-03-13 LAB
ALBUMIN SERPL-MCNC: 4.2 GM/DL (ref 3.4–5)
ALP BLD-CCNC: 151 IU/L (ref 40–129)
ALT SERPL-CCNC: 7 U/L (ref 10–40)
ANION GAP SERPL CALCULATED.3IONS-SCNC: 14 MMOL/L (ref 4–16)
AST SERPL-CCNC: 21 IU/L (ref 15–37)
BILIRUB SERPL-MCNC: 0.3 MG/DL (ref 0–1)
BUN SERPL-MCNC: 10 MG/DL (ref 6–23)
CALCIUM SERPL-MCNC: 9.7 MG/DL (ref 8.3–10.6)
CHLORIDE BLD-SCNC: 105 MMOL/L (ref 99–110)
CHOLEST SERPL-MCNC: 149 MG/DL
CO2: 23 MMOL/L (ref 21–32)
CREAT SERPL-MCNC: 0.8 MG/DL (ref 0.6–1.1)
FERRITIN: 17 NG/ML (ref 15–150)
GFR SERPL CREATININE-BSD FRML MDRD: >60 ML/MIN/1.73M2
GLUCOSE SERPL-MCNC: 135 MG/DL (ref 70–99)
HDLC SERPL-MCNC: 56 MG/DL
IRON: 46 UG/DL (ref 37–145)
LDLC SERPL CALC-MCNC: 66 MG/DL
PCT TRANSFERRIN: 9 % (ref 10–44)
POTASSIUM SERPL-SCNC: 4.5 MMOL/L (ref 3.5–5.1)
SODIUM BLD-SCNC: 142 MMOL/L (ref 135–145)
TOTAL IRON BINDING CAPACITY: 509 UG/DL (ref 250–450)
TOTAL PROTEIN: 6.9 GM/DL (ref 6.4–8.2)
TRIGL SERPL-MCNC: 136 MG/DL
UNSATURATED IRON BINDING CAPACITY: 463 UG/DL (ref 110–370)

## 2023-03-13 PROCEDURE — 36415 COLL VENOUS BLD VENIPUNCTURE: CPT

## 2023-03-13 PROCEDURE — 80061 LIPID PANEL: CPT

## 2023-03-13 PROCEDURE — 83550 IRON BINDING TEST: CPT

## 2023-03-13 PROCEDURE — 82728 ASSAY OF FERRITIN: CPT

## 2023-03-13 PROCEDURE — 83540 ASSAY OF IRON: CPT

## 2023-03-13 PROCEDURE — 80053 COMPREHEN METABOLIC PANEL: CPT

## 2023-03-15 ENCOUNTER — OFFICE VISIT (OUTPATIENT)
Dept: INTERNAL MEDICINE CLINIC | Age: 73
End: 2023-03-15

## 2023-03-15 VITALS
OXYGEN SATURATION: 93 % | BODY MASS INDEX: 31.07 KG/M2 | WEIGHT: 148 LBS | HEART RATE: 57 BPM | HEIGHT: 58 IN | RESPIRATION RATE: 16 BRPM

## 2023-03-15 DIAGNOSIS — M62.542 ATROPHY OF MUSCLE OF LEFT HAND: ICD-10-CM

## 2023-03-15 DIAGNOSIS — F03.918 DEMENTIA WITH OTHER BEHAVIORAL DISTURBANCE, UNSPECIFIED DEMENTIA SEVERITY, UNSPECIFIED DEMENTIA TYPE: ICD-10-CM

## 2023-03-15 DIAGNOSIS — I10 HYPERTENSION, UNSPECIFIED TYPE: Primary | ICD-10-CM

## 2023-03-15 DIAGNOSIS — I25.10 CORONARY ARTERY DISEASE INVOLVING NATIVE CORONARY ARTERY OF NATIVE HEART WITHOUT ANGINA PECTORIS: ICD-10-CM

## 2023-03-15 DIAGNOSIS — M54.12 CERVICAL RADICULOPATHY: ICD-10-CM

## 2023-03-15 DIAGNOSIS — D50.9 IRON DEFICIENCY ANEMIA, UNSPECIFIED IRON DEFICIENCY ANEMIA TYPE: ICD-10-CM

## 2023-03-15 DIAGNOSIS — G56.22 ULNAR NEUROPATHY OF LEFT UPPER EXTREMITY: ICD-10-CM

## 2023-03-15 RX ORDER — FERROUS SULFATE 325(65) MG
325 TABLET ORAL 2 TIMES DAILY WITH MEALS
Qty: 180 TABLET | Refills: 1 | Status: SHIPPED | OUTPATIENT
Start: 2023-03-15

## 2023-03-15 RX ORDER — CLOPIDOGREL BISULFATE 75 MG/1
75 TABLET ORAL DAILY
Qty: 90 TABLET | Refills: 1 | Status: SHIPPED | OUTPATIENT
Start: 2023-03-15

## 2023-03-15 RX ORDER — MEMANTINE HYDROCHLORIDE 5 MG/1
5 TABLET ORAL 2 TIMES DAILY
Qty: 180 TABLET | Refills: 1 | Status: SHIPPED | OUTPATIENT
Start: 2023-03-15

## 2023-03-15 RX ORDER — AMLODIPINE BESYLATE 10 MG/1
10 TABLET ORAL DAILY
Qty: 90 TABLET | Refills: 1 | Status: SHIPPED | OUTPATIENT
Start: 2023-03-15

## 2023-03-15 RX ORDER — MECLIZINE HYDROCHLORIDE 25 MG/1
25 TABLET ORAL 3 TIMES DAILY PRN
Qty: 90 TABLET | Refills: 0 | Status: CANCELLED | OUTPATIENT
Start: 2023-03-15

## 2023-03-15 SDOH — ECONOMIC STABILITY: HOUSING INSECURITY
IN THE LAST 12 MONTHS, WAS THERE A TIME WHEN YOU DID NOT HAVE A STEADY PLACE TO SLEEP OR SLEPT IN A SHELTER (INCLUDING NOW)?: PATIENT REFUSED

## 2023-03-15 SDOH — ECONOMIC STABILITY: FOOD INSECURITY: WITHIN THE PAST 12 MONTHS, YOU WORRIED THAT YOUR FOOD WOULD RUN OUT BEFORE YOU GOT MONEY TO BUY MORE.: PATIENT DECLINED

## 2023-03-15 SDOH — ECONOMIC STABILITY: INCOME INSECURITY: HOW HARD IS IT FOR YOU TO PAY FOR THE VERY BASICS LIKE FOOD, HOUSING, MEDICAL CARE, AND HEATING?: PATIENT DECLINED

## 2023-03-15 SDOH — ECONOMIC STABILITY: FOOD INSECURITY: WITHIN THE PAST 12 MONTHS, THE FOOD YOU BOUGHT JUST DIDN'T LAST AND YOU DIDN'T HAVE MONEY TO GET MORE.: PATIENT DECLINED

## 2023-03-15 ASSESSMENT — ENCOUNTER SYMPTOMS
COUGH: 0
SHORTNESS OF BREATH: 0
BACK PAIN: 0
NAUSEA: 0
ABDOMINAL PAIN: 0

## 2023-03-15 ASSESSMENT — PATIENT HEALTH QUESTIONNAIRE - PHQ9: DEPRESSION UNABLE TO ASSESS: URGENT/EMERGENT SITUATION

## 2023-03-15 NOTE — PROGRESS NOTES
Giacomo Dakins (:  1950) is a 75 y.o. female,established patient, here for evaluation of the following chief complaint(s):  Follow-up, Hypertension, and Dementia         ASSESSMENT/PLAN:  1. Hypertension, unspecified type  - amLODIPine (NORVASC) 10 MG tablet; Take 1 tablet by mouth daily  Dispense: 90 tablet; Refill: 1    2. Coronary artery disease involving native coronary artery of native heart without angina pectoris  - clopidogrel (PLAVIX) 75 MG tablet; Take 1 tablet by mouth daily  Dispense: 90 tablet; Refill: 1    3. Dementia with other behavioral disturbance, unspecified dementia severity, unspecified dementia type  - memantine (NAMENDA) 5 MG tablet; Take 1 tablet by mouth 2 times daily  Dispense: 180 tablet; Refill: 1    4. Iron deficiency anemia, unspecified iron deficiency anemia type  - ferrous sulfate (IRON 325) 325 (65 Fe) MG tablet; Take 1 tablet by mouth 2 times daily (with meals)  Dispense: 180 tablet; Refill: 1  - CBC with Auto Differential; Future  - Iron and TIBC; Future  - Ferritin; Future    5. Ulnar neuropathy of left upper extremity    6. Atrophy of muscle of left hand    7. Cervical radiculopathy  - XR CERVICAL SPINE (4-5 VIEWS); Future  Keep f/u with Dr. Corinne Partida    On this date 3/15/2023 I have spent 30 minutes reviewing previous notes, test results and face to face with the patient discussing the diagnosis and importance of compliance with the treatment plan as well as documenting on the day of the visit. Return for follow up in 5 1/2 months for  HTN, dementia.        Lab Results   Component Value Date    WBC 7.3 2022    HGB 10.8 (L) 2022    HCT 37.2 2022    MCV 82.9 2022     (H) 2022     Lab Results   Component Value Date    CHOL 149 2023     Lab Results   Component Value Date    TRIG 136 2023     Lab Results   Component Value Date    HDL 56 2023     Lab Results   Component Value Date    LDLCALC 66 2023    LDLDIRECT 150 (H) 12/22/2021     Lab Results   Component Value Date    LABA1C 6.0 12/22/2021     Lab Results   Component Value Date     12/22/2021     Lab Results   Component Value Date     03/13/2023    K 4.5 03/13/2023     03/13/2023    CO2 23 03/13/2023    BUN 10 03/13/2023    CREATININE 0.8 03/13/2023    GLUCOSE 135 (H) 03/13/2023    CALCIUM 9.7 03/13/2023    PROT 6.9 03/13/2023    LABALBU 4.2 03/13/2023    BILITOT 0.3 03/13/2023    ALKPHOS 151 (H) 03/13/2023    AST 21 03/13/2023    ALT 7 (L) 03/13/2023    LABGLOM >60 03/13/2023    GFRAA >60 08/24/2022       Lab Results   Component Value Date/Time    COLORU YELLOW 04/03/2022 11:04 AM    LABPH 6.0 04/03/2022 11:04 AM    NITRU NEGATIVE 04/03/2022 11:04 AM    KETUA NEGATIVE 04/03/2022 11:04 AM    UROBILINOGEN 0.2 04/03/2022 11:04 AM    BILIRUBINUR NEGATIVE 04/03/2022 11:04 AM       Subjective   SUBJECTIVE/OBJECTIVE:    HISTORY OF PRESENT ILLNESS:  This is a 75 y.o. female here with daughter for the following:  Patient Active Problem List    Diagnosis Date Noted    Diverticulosis 10/30/2019    Abnormal cardiovascular stress test 07/27/2022    H/O: CVA (cerebrovascular accident) 07/19/2022    Hyperlipidemia with target low density lipoprotein (LDL) cholesterol less than 70 mg/dL 10/30/2019    Emesis 04/03/2022    Moderate malnutrition ([address] 03/16/2022    Gross hematuria 01/09/2022    Dementia ([address] 01/09/2022    Diarrhea 01/09/2022    Hypertension 01/09/2022    Recurrent stenosis of left carotid artery     Acute kidney injury due to COVID-19 [facility]) 01/03/2022    Expressive aphasia     Cerebrovascular accident ([address] 10/25/2021    CAD (coronary artery disease)       Patient has seen orthopedics for her ulnar neuropathy, and L hand atrophy. She was referred to a orthopedic spine surgeon for further evaluation. Patient with some symptoms of cervical radiculopathy also. She states her hand is numb  HTN- stable on  metoprolol and Norvasc  MARY ELLEN- on iron.  She refused any further work up other than labs  Dementia- on Namenda  CAD- on  Plavix , Crestor and metoprolol. Follows with cardiology  Carotid stenosis    Review of Systems   Constitutional:  Negative for diaphoresis and fever. Respiratory:  Negative for cough and shortness of breath. Cardiovascular:  Negative for chest pain and palpitations. Gastrointestinal:  Negative for abdominal pain and nausea. Genitourinary:  Negative for difficulty urinating. Musculoskeletal:  Negative for back pain. Neurological:  Positive for weakness (L hand) and numbness (LUE). Negative for dizziness and headaches. Allergies   Allergen Reactions    Penicillins     Sulfa Antibiotics Swelling     Current Outpatient Medications   Medication Sig Dispense Refill    amLODIPine (NORVASC) 10 MG tablet Take 1 tablet by mouth daily 90 tablet 1    clopidogrel (PLAVIX) 75 MG tablet Take 1 tablet by mouth daily 90 tablet 1    memantine (NAMENDA) 5 MG tablet Take 1 tablet by mouth 2 times daily 180 tablet 1    ferrous sulfate (IRON 325) 325 (65 Fe) MG tablet Take 1 tablet by mouth 2 times daily (with meals) 180 tablet 1    donepezil (ARICEPT) 10 MG tablet Take 10 mg by mouth nightly      rosuvastatin (CRESTOR) 20 MG tablet Take 1 tablet by mouth nightly 90 tablet 1    metoprolol tartrate (LOPRESSOR) 25 MG tablet Take 1 tablet by mouth 2 times daily 60 tablet 5    meclizine (ANTIVERT) 25 MG tablet Take 1 tablet by mouth 3 times daily as needed for Dizziness or Nausea 90 tablet 0     No current facility-administered medications for this visit. Vitals:    03/15/23 1410   Pulse: 57   Resp: 16   SpO2: 93%   Weight: 148 lb (67.1 kg)   Height: 4' 10\" (1.473 m)     Objective   Physical Exam  Vitals reviewed. Constitutional:       General: She is not in acute distress. Neck:      Vascular: Carotid bruit present. Cardiovascular:      Rate and Rhythm: Normal rate and regular rhythm.    Pulmonary:      Effort: Pulmonary effort is normal. No respiratory distress.      Breath sounds: Normal breath sounds.   Abdominal:      Palpations: Abdomen is soft.      Tenderness: There is no abdominal tenderness.   Musculoskeletal:      Cervical back: Neck supple.      Right lower leg: No edema.      Left lower leg: No edema.   Neurological:      Mental Status: She is alert and oriented to person, place, and time.      Sensory: Sensory deficit (LUE) present.      Motor: Weakness (atrophy L hand) present.      Gait: Gait abnormal (wheelchair).   Psychiatric:         Mood and Affect: Mood normal.              An electronic signature was used to authenticate this note.    --Alyssa Jeronimo DO     This dictation was generated by voice recognition computer software.  Although all attempts are made to edit the dictation for accuracy, there may be errors in the transcription that are not intended.

## 2023-05-03 DIAGNOSIS — E78.5 HYPERLIPIDEMIA, UNSPECIFIED HYPERLIPIDEMIA TYPE: ICD-10-CM

## 2023-05-03 RX ORDER — ROSUVASTATIN CALCIUM 20 MG/1
20 TABLET, COATED ORAL NIGHTLY
Qty: 90 TABLET | Refills: 1 | Status: SHIPPED | OUTPATIENT
Start: 2023-05-03

## 2023-06-20 ENCOUNTER — OFFICE VISIT (OUTPATIENT)
Dept: NEUROLOGY | Age: 73
End: 2023-06-20
Payer: MEDICARE

## 2023-06-20 VITALS
OXYGEN SATURATION: 93 % | SYSTOLIC BLOOD PRESSURE: 130 MMHG | HEIGHT: 58 IN | BODY MASS INDEX: 32.75 KG/M2 | HEART RATE: 71 BPM | WEIGHT: 156 LBS | DIASTOLIC BLOOD PRESSURE: 80 MMHG

## 2023-06-20 DIAGNOSIS — Z86.73 HISTORY OF ISCHEMIC STROKE: ICD-10-CM

## 2023-06-20 DIAGNOSIS — G56.22 ULNAR NEUROPATHY AT WRIST, LEFT: Primary | ICD-10-CM

## 2023-06-20 DIAGNOSIS — R41.3 MEMORY IMPAIRMENT: ICD-10-CM

## 2023-06-20 PROCEDURE — 99204 OFFICE O/P NEW MOD 45 MIN: CPT | Performed by: PSYCHIATRY & NEUROLOGY

## 2023-06-20 PROCEDURE — 3078F DIAST BP <80 MM HG: CPT | Performed by: PSYCHIATRY & NEUROLOGY

## 2023-06-20 PROCEDURE — 1123F ACP DISCUSS/DSCN MKR DOCD: CPT | Performed by: PSYCHIATRY & NEUROLOGY

## 2023-06-20 PROCEDURE — 3074F SYST BP LT 130 MM HG: CPT | Performed by: PSYCHIATRY & NEUROLOGY

## 2023-08-06 ENCOUNTER — HOSPITAL ENCOUNTER (INPATIENT)
Age: 73
LOS: 10 days | Discharge: NURSING, REHAB OR LONG-TERM CARE | DRG: 177 | End: 2023-08-16
Attending: EMERGENCY MEDICINE | Admitting: HOSPITALIST
Payer: MEDICARE

## 2023-08-06 ENCOUNTER — APPOINTMENT (OUTPATIENT)
Dept: CT IMAGING | Age: 73
DRG: 177 | End: 2023-08-06
Payer: MEDICARE

## 2023-08-06 ENCOUNTER — APPOINTMENT (OUTPATIENT)
Dept: GENERAL RADIOLOGY | Age: 73
DRG: 177 | End: 2023-08-06
Payer: MEDICARE

## 2023-08-06 DIAGNOSIS — R41.82 ALTERED MENTAL STATUS, UNSPECIFIED ALTERED MENTAL STATUS TYPE: Primary | ICD-10-CM

## 2023-08-06 DIAGNOSIS — F03.90 DEMENTIA, UNSPECIFIED DEMENTIA SEVERITY, UNSPECIFIED DEMENTIA TYPE, UNSPECIFIED WHETHER BEHAVIORAL, PSYCHOTIC, OR MOOD DISTURBANCE OR ANXIETY (HCC): ICD-10-CM

## 2023-08-06 DIAGNOSIS — H53.8 BLURRED VISION: ICD-10-CM

## 2023-08-06 DIAGNOSIS — D50.9 IRON DEFICIENCY ANEMIA, UNSPECIFIED IRON DEFICIENCY ANEMIA TYPE: ICD-10-CM

## 2023-08-06 DIAGNOSIS — R93.89 ABNORMAL COMPUTED TOMOGRAPHY ANGIOGRAPHY (CTA): ICD-10-CM

## 2023-08-06 DIAGNOSIS — R42 DIZZINESS: ICD-10-CM

## 2023-08-06 DIAGNOSIS — J18.9 PNEUMONIA DUE TO INFECTIOUS ORGANISM, UNSPECIFIED LATERALITY, UNSPECIFIED PART OF LUNG: ICD-10-CM

## 2023-08-06 LAB
ALBUMIN SERPL-MCNC: 3.9 GM/DL (ref 3.4–5)
ALP BLD-CCNC: 128 IU/L (ref 40–129)
ALT SERPL-CCNC: 10 U/L (ref 10–40)
AMMONIA: 11 UMOL/L (ref 11–51)
AMPHETAMINES: NEGATIVE
ANION GAP SERPL CALCULATED.3IONS-SCNC: 12 MMOL/L (ref 4–16)
APTT: 27.4 SECONDS (ref 25.1–37.1)
AST SERPL-CCNC: 32 IU/L (ref 15–37)
BACTERIA: NEGATIVE /HPF
BARBITURATE SCREEN URINE: NEGATIVE
BASOPHILS ABSOLUTE: 0 K/CU MM
BASOPHILS RELATIVE PERCENT: 0.3 % (ref 0–1)
BENZODIAZEPINE SCREEN, URINE: NEGATIVE
BILIRUB SERPL-MCNC: 0.3 MG/DL (ref 0–1)
BILIRUBIN URINE: NEGATIVE
BLOOD, URINE: NORMAL
BUN SERPL-MCNC: 12 MG/DL (ref 6–23)
CALCIUM SERPL-MCNC: 8.8 MG/DL (ref 8.3–10.6)
CANNABINOID SCREEN URINE: NEGATIVE
CHLORIDE BLD-SCNC: 98 MMOL/L (ref 99–110)
CLARITY: CLEAR
CO2: 24 MMOL/L (ref 21–32)
COCAINE METABOLITE: NEGATIVE
COLOR: YELLOW
CREAT SERPL-MCNC: 0.9 MG/DL (ref 0.6–1.1)
DIFFERENTIAL TYPE: ABNORMAL
EKG ATRIAL RATE: 106 BPM
EKG DIAGNOSIS: NORMAL
EKG P AXIS: 74 DEGREES
EKG P-R INTERVAL: 164 MS
EKG Q-T INTERVAL: 332 MS
EKG QRS DURATION: 80 MS
EKG QTC CALCULATION (BAZETT): 441 MS
EKG R AXIS: 27 DEGREES
EKG T AXIS: 101 DEGREES
EKG VENTRICULAR RATE: 106 BPM
EOSINOPHILS ABSOLUTE: 0 K/CU MM
EOSINOPHILS RELATIVE PERCENT: 0 % (ref 0–3)
FENTANYL URINE: NEGATIVE
GFR SERPL CREATININE-BSD FRML MDRD: >60 ML/MIN/1.73M2
GLUCOSE BLD-MCNC: 124 MG/DL
GLUCOSE BLD-MCNC: 124 MG/DL (ref 70–99)
GLUCOSE SERPL-MCNC: 113 MG/DL (ref 70–99)
GLUCOSE, URINE: NEGATIVE MG/DL
HCT VFR BLD CALC: 40.2 % (ref 37–47)
HEMOGLOBIN: 12.3 GM/DL (ref 12.5–16)
IMMATURE NEUTROPHIL %: 0.3 % (ref 0–0.43)
INR BLD: 1.1 INDEX
KETONES, URINE: 15 MG/DL
LEUKOCYTE ESTERASE, URINE: NEGATIVE
LYMPHOCYTES ABSOLUTE: 0.5 K/CU MM
LYMPHOCYTES RELATIVE PERCENT: 6.6 % (ref 24–44)
MAGNESIUM: 1.9 MG/DL (ref 1.8–2.4)
MCH RBC QN AUTO: 26.4 PG (ref 27–31)
MCHC RBC AUTO-ENTMCNC: 30.6 % (ref 32–36)
MCV RBC AUTO: 86.3 FL (ref 78–100)
MONOCYTES ABSOLUTE: 1.1 K/CU MM
MONOCYTES RELATIVE PERCENT: 13.5 % (ref 0–4)
NITRITE URINE, QUANTITATIVE: NEGATIVE
NUCLEATED RBC %: 0 %
OPIATES, URINE: NEGATIVE
OXYCODONE, OPI5M: NEGATIVE
PDW BLD-RTO: 16.4 % (ref 11.7–14.9)
PH, URINE: 5
PLATELET # BLD: 312 K/CU MM (ref 140–440)
PMV BLD AUTO: 9.4 FL (ref 7.5–11.1)
POTASSIUM SERPL-SCNC: 3.5 MMOL/L (ref 3.5–5.1)
PRO-BNP: 1077 PG/ML
PROTEIN UA: 30 MG/DL
PROTHROMBIN TIME: 14.4 SECONDS (ref 11.7–14.5)
RBC # BLD: 4.66 M/CU MM (ref 4.2–5.4)
RBC URINE: 0 /HPF
SEGMENTED NEUTROPHILS ABSOLUTE COUNT: 6.2 K/CU MM
SEGMENTED NEUTROPHILS RELATIVE PERCENT: 79.3 % (ref 36–66)
SODIUM BLD-SCNC: 134 MMOL/L (ref 135–145)
SPECIFIC GRAVITY UA: 1.01
SQUAMOUS EPITHELIAL: 1 /HPF
TOTAL IMMATURE NEUTOROPHIL: 0.02 K/CU MM
TOTAL NUCLEATED RBC: 0 K/CU MM
TOTAL PROTEIN: 6.3 GM/DL (ref 6.4–8.2)
TRICHOMONAS: NORMAL /HPF
TROPONIN T: <0.01 NG/ML
TSH SERPL DL<=0.005 MIU/L-ACNC: 1.37 UIU/ML (ref 0.27–4.2)
UROBILINOGEN, URINE: 0.2 MG/DL
WBC # BLD: 7.8 K/CU MM (ref 4–10.5)
WBC UA: <1 /HPF

## 2023-08-06 PROCEDURE — P9612 CATHETERIZE FOR URINE SPEC: HCPCS

## 2023-08-06 PROCEDURE — 85730 THROMBOPLASTIN TIME PARTIAL: CPT

## 2023-08-06 PROCEDURE — 1200000000 HC SEMI PRIVATE

## 2023-08-06 PROCEDURE — 80307 DRUG TEST PRSMV CHEM ANLYZR: CPT

## 2023-08-06 PROCEDURE — 96375 TX/PRO/DX INJ NEW DRUG ADDON: CPT

## 2023-08-06 PROCEDURE — 99285 EMERGENCY DEPT VISIT HI MDM: CPT

## 2023-08-06 PROCEDURE — 6360000002 HC RX W HCPCS: Performed by: EMERGENCY MEDICINE

## 2023-08-06 PROCEDURE — 2580000003 HC RX 258: Performed by: HOSPITALIST

## 2023-08-06 PROCEDURE — 85025 COMPLETE CBC W/AUTO DIFF WBC: CPT

## 2023-08-06 PROCEDURE — 6360000002 HC RX W HCPCS: Performed by: HOSPITALIST

## 2023-08-06 PROCEDURE — 81001 URINALYSIS AUTO W/SCOPE: CPT

## 2023-08-06 PROCEDURE — 82962 GLUCOSE BLOOD TEST: CPT

## 2023-08-06 PROCEDURE — 6370000000 HC RX 637 (ALT 250 FOR IP): Performed by: HOSPITALIST

## 2023-08-06 PROCEDURE — 84443 ASSAY THYROID STIM HORMONE: CPT

## 2023-08-06 PROCEDURE — 84484 ASSAY OF TROPONIN QUANT: CPT

## 2023-08-06 PROCEDURE — 71045 X-RAY EXAM CHEST 1 VIEW: CPT

## 2023-08-06 PROCEDURE — 83880 ASSAY OF NATRIURETIC PEPTIDE: CPT

## 2023-08-06 PROCEDURE — 70450 CT HEAD/BRAIN W/O DYE: CPT

## 2023-08-06 PROCEDURE — 87086 URINE CULTURE/COLONY COUNT: CPT

## 2023-08-06 PROCEDURE — 70496 CT ANGIOGRAPHY HEAD: CPT

## 2023-08-06 PROCEDURE — 87186 SC STD MICRODIL/AGAR DIL: CPT

## 2023-08-06 PROCEDURE — 83735 ASSAY OF MAGNESIUM: CPT

## 2023-08-06 PROCEDURE — 87040 BLOOD CULTURE FOR BACTERIA: CPT

## 2023-08-06 PROCEDURE — 80053 COMPREHEN METABOLIC PANEL: CPT

## 2023-08-06 PROCEDURE — 93005 ELECTROCARDIOGRAM TRACING: CPT | Performed by: EMERGENCY MEDICINE

## 2023-08-06 PROCEDURE — 6360000004 HC RX CONTRAST MEDICATION: Performed by: EMERGENCY MEDICINE

## 2023-08-06 PROCEDURE — 93010 ELECTROCARDIOGRAM REPORT: CPT | Performed by: INTERNAL MEDICINE

## 2023-08-06 PROCEDURE — 82140 ASSAY OF AMMONIA: CPT

## 2023-08-06 PROCEDURE — 87077 CULTURE AEROBIC IDENTIFY: CPT

## 2023-08-06 PROCEDURE — 85610 PROTHROMBIN TIME: CPT

## 2023-08-06 RX ORDER — POLYETHYLENE GLYCOL 3350 17 G/17G
17 POWDER, FOR SOLUTION ORAL DAILY PRN
Status: DISCONTINUED | OUTPATIENT
Start: 2023-08-06 | End: 2023-08-16 | Stop reason: HOSPADM

## 2023-08-06 RX ORDER — CLOPIDOGREL BISULFATE 75 MG/1
75 TABLET ORAL DAILY
Status: DISCONTINUED | OUTPATIENT
Start: 2023-08-06 | End: 2023-08-16 | Stop reason: HOSPADM

## 2023-08-06 RX ORDER — FERROUS SULFATE 325(65) MG
325 TABLET ORAL 2 TIMES DAILY WITH MEALS
Status: DISCONTINUED | OUTPATIENT
Start: 2023-08-06 | End: 2023-08-16 | Stop reason: HOSPADM

## 2023-08-06 RX ORDER — SODIUM CHLORIDE 9 MG/ML
INJECTION, SOLUTION INTRAVENOUS PRN
Status: DISCONTINUED | OUTPATIENT
Start: 2023-08-06 | End: 2023-08-16 | Stop reason: HOSPADM

## 2023-08-06 RX ORDER — SODIUM CHLORIDE 0.9 % (FLUSH) 0.9 %
5-40 SYRINGE (ML) INJECTION PRN
Status: DISCONTINUED | OUTPATIENT
Start: 2023-08-06 | End: 2023-08-16 | Stop reason: HOSPADM

## 2023-08-06 RX ORDER — AMLODIPINE BESYLATE 10 MG/1
10 TABLET ORAL DAILY
Status: DISCONTINUED | OUTPATIENT
Start: 2023-08-06 | End: 2023-08-16 | Stop reason: HOSPADM

## 2023-08-06 RX ORDER — ONDANSETRON 4 MG/1
4 TABLET, ORALLY DISINTEGRATING ORAL EVERY 8 HOURS PRN
Status: DISCONTINUED | OUTPATIENT
Start: 2023-08-06 | End: 2023-08-16 | Stop reason: HOSPADM

## 2023-08-06 RX ORDER — MECLIZINE HYDROCHLORIDE 25 MG/1
25 TABLET ORAL 3 TIMES DAILY PRN
Status: DISCONTINUED | OUTPATIENT
Start: 2023-08-06 | End: 2023-08-16 | Stop reason: HOSPADM

## 2023-08-06 RX ORDER — LORAZEPAM 2 MG/ML
1 INJECTION INTRAMUSCULAR ONCE
Status: COMPLETED | OUTPATIENT
Start: 2023-08-06 | End: 2023-08-06

## 2023-08-06 RX ORDER — ONDANSETRON 2 MG/ML
4 INJECTION INTRAMUSCULAR; INTRAVENOUS EVERY 6 HOURS PRN
Status: DISCONTINUED | OUTPATIENT
Start: 2023-08-06 | End: 2023-08-16 | Stop reason: HOSPADM

## 2023-08-06 RX ORDER — LABETALOL HYDROCHLORIDE 5 MG/ML
10 INJECTION, SOLUTION INTRAVENOUS EVERY 10 MIN PRN
Status: DISCONTINUED | OUTPATIENT
Start: 2023-08-06 | End: 2023-08-16 | Stop reason: HOSPADM

## 2023-08-06 RX ORDER — DONEPEZIL HYDROCHLORIDE 10 MG/1
10 TABLET, FILM COATED ORAL NIGHTLY
Status: DISCONTINUED | OUTPATIENT
Start: 2023-08-06 | End: 2023-08-16 | Stop reason: HOSPADM

## 2023-08-06 RX ORDER — MEMANTINE HYDROCHLORIDE 5 MG/1
5 TABLET ORAL 2 TIMES DAILY
Status: DISCONTINUED | OUTPATIENT
Start: 2023-08-06 | End: 2023-08-16 | Stop reason: HOSPADM

## 2023-08-06 RX ORDER — ROSUVASTATIN CALCIUM 20 MG/1
20 TABLET, COATED ORAL NIGHTLY
Status: DISCONTINUED | OUTPATIENT
Start: 2023-08-06 | End: 2023-08-16 | Stop reason: HOSPADM

## 2023-08-06 RX ORDER — LEVOFLOXACIN 5 MG/ML
750 INJECTION, SOLUTION INTRAVENOUS EVERY 24 HOURS
Status: DISCONTINUED | OUTPATIENT
Start: 2023-08-06 | End: 2023-08-06

## 2023-08-06 RX ORDER — ENOXAPARIN SODIUM 100 MG/ML
40 INJECTION SUBCUTANEOUS DAILY
Status: DISCONTINUED | OUTPATIENT
Start: 2023-08-06 | End: 2023-08-07

## 2023-08-06 RX ORDER — SODIUM CHLORIDE 0.9 % (FLUSH) 0.9 %
5-40 SYRINGE (ML) INJECTION EVERY 12 HOURS SCHEDULED
Status: DISCONTINUED | OUTPATIENT
Start: 2023-08-06 | End: 2023-08-16 | Stop reason: HOSPADM

## 2023-08-06 RX ADMIN — FERROUS SULFATE TAB 325 MG (65 MG ELEMENTAL FE) 325 MG: 325 (65 FE) TAB at 18:45

## 2023-08-06 RX ADMIN — LEVOFLOXACIN 750 MG: 5 INJECTION, SOLUTION INTRAVENOUS at 17:12

## 2023-08-06 RX ADMIN — ROSUVASTATIN CALCIUM 20 MG: 20 TABLET, FILM COATED ORAL at 20:47

## 2023-08-06 RX ADMIN — AMLODIPINE BESYLATE 10 MG: 10 TABLET ORAL at 18:45

## 2023-08-06 RX ADMIN — ENOXAPARIN SODIUM 40 MG: 100 INJECTION SUBCUTANEOUS at 18:46

## 2023-08-06 RX ADMIN — SODIUM CHLORIDE, PRESERVATIVE FREE 10 ML: 5 INJECTION INTRAVENOUS at 20:52

## 2023-08-06 RX ADMIN — IOPAMIDOL 75 ML: 755 INJECTION, SOLUTION INTRAVENOUS at 14:23

## 2023-08-06 RX ADMIN — METOPROLOL TARTRATE 25 MG: 25 TABLET, FILM COATED ORAL at 20:48

## 2023-08-06 RX ADMIN — MEMANTINE HYDROCHLORIDE 5 MG: 5 TABLET ORAL at 20:47

## 2023-08-06 RX ADMIN — CLOPIDOGREL BISULFATE 75 MG: 75 TABLET ORAL at 18:45

## 2023-08-06 RX ADMIN — LORAZEPAM 1 MG: 2 INJECTION INTRAMUSCULAR; INTRAVENOUS at 14:06

## 2023-08-06 RX ADMIN — DONEPEZIL HYDROCHLORIDE 10 MG: 10 TABLET ORAL at 20:47

## 2023-08-06 NOTE — H&P
V2.0  History and Physical      Name:  Sena Tovar /Age/Sex: 1950  (75 y.o. female)   MRN & CSN:  [id number] & [id number] Encounter Date/Time: 2023 4:00 PM EDT   Location:  01TR- PCP: Ying Jj, [address] [place] [address] Day: 1    Assessment and Plan:   Sena Tovar is a 75 y.o. female who presents with encephalopathy    Dizziness and blurred vision-concern for stroke. CT head : Old infarctions on left frontal, bilateral parietal and left occipital lobes and bilateral basal ganglia and right thalamus. CTA head and neck: 80% stenosis at origin of left internal carotid artery; 75% stenosis at origin of right vertebral artery; question of severe stenosis in the cavernous segment of left internal carotid artery. Consult neurology and interventional neurology. On Plavix and statin. MRI brain. Check echo. PT/OT/SLP. Acute encephalopathy-may be due to stroke. May also be due to delirium on top of dementia. Check for reversible causes. Check PCO2, TSH, and ammonia levels. Sitter at bedside. UA still pending. Dementia-on Aricept and Namenda. Pulmonary vascular congestion-seen on CXR with questionable perihilar edema. BNP 1K. Patient is asymptomatic and not having any dyspnea. We will hold off on any diuretics. Bilateral internal carotid artery stenosis-seen on CTA head and neck. Consult interventional neurology.     HTN  CVA   CAD    Disposition:   Current Living situation: At home  Expected Disposition: TBD  Estimated D/C:     Diet Diet NPO   DVT Prophylaxis [] Lovenox, []  Heparin, [] SCDs, [] Ambulation,  [] Eliquis, [] Xarelto   Code Status Prior   Surrogate Decision Maker/ POA [facility]     Personally reviewed Lab Studies and Imaging     Discussed management of the case with ER physician who recommended admission        History from:     electronic medical record, ER physician  Patient is confused and cannot get a reliable history at this time    History of Present

## 2023-08-06 NOTE — ED NOTES
Pts daughter, Galina Rajput at bedside. States she called ems due to the pt being more confused today than normal. States she was not walking normal and that she seems very \"antsy\" and unable to focus. Also states she was found downstairs this morning with the fan knocked over and the chair knocked over but doesn't think she fell bc if she did, she wouldn't of been able to get up. Pt is not c/o of any falls or injuries at this time.       Bridger Peñaloza RN  08/06/23 5534

## 2023-08-06 NOTE — ED NOTES
Pt still remains in CT for scans. Pt is continuously moving during scans. Unable to scan pt.  aware and placing orders.       Estela Lund RN  08/06/23 [id number]

## 2023-08-06 NOTE — ED PROVIDER NOTES
admitted to hospital medicine. PICC team consulted because she ripped her IV out she was given Ativan to help calm her down as she is agitated and confused. No other questions or concerns admitted. CLINICAL IMPRESSION:  Final diagnoses: Altered mental status, unspecified altered mental status type   Dizziness   Blurred vision   Dementia, unspecified dementia severity, unspecified dementia type, unspecified whether behavioral, psychotic, or mood disturbance or anxiety (HCC)   Abnormal computed tomography angiography (CTA)   Pneumonia due to infectious organism, unspecified laterality, unspecified part of lung       (Please note that portions of this note may have been completed with a voice recognition program. Efforts were made to edit the dictations but occasionally words aremis-transcribed.)    DISPOSITION REFERRAL (if applicable):  No follow-up provider specified.     DISPOSITION MEDICATIONS (if applicable):  New Prescriptions    No medications on file          Ector Sharp, [address] [facility],[address], DO  08/06/23 7468

## 2023-08-06 NOTE — ED TRIAGE NOTES
Pt arrived via ems with c/o ams and blurred vision in her right eye. Per ems, family told them she went to bed normal last night 11pm and woke up this morning at 0900 confused. Pt has hx of dementia. Family is unsure if it is her dementia worsening or new onset confusion.

## 2023-08-06 NOTE — ED NOTES
Pt attempting to pull IV out and take herself off the monitor.  aware and placing an order for a safety sitter.       Emelia Abraham RN  08/06/23 [id number]

## 2023-08-07 ENCOUNTER — TELEPHONE (OUTPATIENT)
Age: 73
End: 2023-08-07

## 2023-08-07 LAB
ALBUMIN SERPL-MCNC: 4 GM/DL (ref 3.4–5)
ALP BLD-CCNC: 123 IU/L (ref 40–129)
ALT SERPL-CCNC: 9 U/L (ref 10–40)
ANION GAP SERPL CALCULATED.3IONS-SCNC: 15 MMOL/L (ref 4–16)
AST SERPL-CCNC: 28 IU/L (ref 15–37)
B PARAP IS1001 DNA NPH QL NAA+NON-PROBE: NOT DETECTED
B PERT.PT PRMT NPH QL NAA+NON-PROBE: NOT DETECTED
BILIRUB SERPL-MCNC: 0.3 MG/DL (ref 0–1)
BILIRUBIN DIRECT: 0.2 MG/DL (ref 0–0.3)
BILIRUBIN, INDIRECT: 0.1 MG/DL (ref 0–0.7)
BUN SERPL-MCNC: 13 MG/DL (ref 6–23)
C PNEUM DNA NPH QL NAA+NON-PROBE: NOT DETECTED
CALCIUM SERPL-MCNC: 9.3 MG/DL (ref 8.3–10.6)
CHLORIDE BLD-SCNC: 103 MMOL/L (ref 99–110)
CHOLEST SERPL-MCNC: 99 MG/DL
CO2: 23 MMOL/L (ref 21–32)
CREAT SERPL-MCNC: 0.9 MG/DL (ref 0.6–1.1)
ESTIMATED AVERAGE GLUCOSE: 128 MG/DL
FLUAV H1 2009 PAN RNA NPH NAA+NON-PROBE: NOT DETECTED
FLUAV H1 RNA NPH QL NAA+NON-PROBE: NOT DETECTED
FLUAV H3 RNA NPH QL NAA+NON-PROBE: NOT DETECTED
FLUAV RNA NPH QL NAA+NON-PROBE: NOT DETECTED
FLUBV RNA NPH QL NAA+NON-PROBE: NOT DETECTED
FOLATE SERPL-MCNC: 12.8 NG/ML (ref 3.1–17.5)
GFR SERPL CREATININE-BSD FRML MDRD: >60 ML/MIN/1.73M2
GLUCOSE SERPL-MCNC: 104 MG/DL (ref 70–99)
HADV DNA NPH QL NAA+NON-PROBE: NOT DETECTED
HBA1C MFR BLD: 6.1 % (ref 4.2–6.3)
HCOV 229E RNA NPH QL NAA+NON-PROBE: NOT DETECTED
HCOV HKU1 RNA NPH QL NAA+NON-PROBE: NOT DETECTED
HCOV NL63 RNA NPH QL NAA+NON-PROBE: NOT DETECTED
HCOV OC43 RNA NPH QL NAA+NON-PROBE: NOT DETECTED
HCT VFR BLD CALC: 43.4 % (ref 37–47)
HDLC SERPL-MCNC: 41 MG/DL
HEMOGLOBIN: 13.4 GM/DL (ref 12.5–16)
HMPV RNA NPH QL NAA+NON-PROBE: NOT DETECTED
HPIV1 RNA NPH QL NAA+NON-PROBE: NOT DETECTED
HPIV2 RNA NPH QL NAA+NON-PROBE: NOT DETECTED
HPIV3 RNA NPH QL NAA+NON-PROBE: NOT DETECTED
HPIV4 RNA NPH QL NAA+NON-PROBE: NOT DETECTED
LDLC SERPL CALC-MCNC: 42 MG/DL
LV EF: 58 %
LVEF MODALITY: NORMAL
M PNEUMO DNA NPH QL NAA+NON-PROBE: NOT DETECTED
MCH RBC QN AUTO: 26.6 PG (ref 27–31)
MCHC RBC AUTO-ENTMCNC: 30.9 % (ref 32–36)
MCV RBC AUTO: 86.1 FL (ref 78–100)
PDW BLD-RTO: 16.6 % (ref 11.7–14.9)
PLATELET # BLD: 296 K/CU MM (ref 140–440)
PMV BLD AUTO: 9.5 FL (ref 7.5–11.1)
POTASSIUM SERPL-SCNC: 3.4 MMOL/L (ref 3.5–5.1)
RBC # BLD: 5.04 M/CU MM (ref 4.2–5.4)
RSV RNA NPH QL NAA+NON-PROBE: NOT DETECTED
RV+EV RNA NPH QL NAA+NON-PROBE: NOT DETECTED
SARS-COV-2 RNA NPH QL NAA+NON-PROBE: ABNORMAL
SODIUM BLD-SCNC: 141 MMOL/L (ref 135–145)
TOTAL PROTEIN: 6.5 GM/DL (ref 6.4–8.2)
TRIGL SERPL-MCNC: 81 MG/DL
TROPONIN T: <0.01 NG/ML
VITAMIN B-12: 1214 PG/ML (ref 211–911)
WBC # BLD: 6.8 K/CU MM (ref 4–10.5)

## 2023-08-07 PROCEDURE — 82607 VITAMIN B-12: CPT

## 2023-08-07 PROCEDURE — 0202U NFCT DS 22 TRGT SARS-COV-2: CPT

## 2023-08-07 PROCEDURE — 2700000000 HC OXYGEN THERAPY PER DAY

## 2023-08-07 PROCEDURE — 93306 TTE W/DOPPLER COMPLETE: CPT

## 2023-08-07 PROCEDURE — 80061 LIPID PANEL: CPT

## 2023-08-07 PROCEDURE — 99222 1ST HOSP IP/OBS MODERATE 55: CPT | Performed by: NURSE PRACTITIONER

## 2023-08-07 PROCEDURE — 6360000002 HC RX W HCPCS: Performed by: HOSPITALIST

## 2023-08-07 PROCEDURE — 85027 COMPLETE CBC AUTOMATED: CPT

## 2023-08-07 PROCEDURE — 80053 COMPREHEN METABOLIC PANEL: CPT

## 2023-08-07 PROCEDURE — 99223 1ST HOSP IP/OBS HIGH 75: CPT | Performed by: PSYCHIATRY & NEUROLOGY

## 2023-08-07 PROCEDURE — 82746 ASSAY OF FOLIC ACID SERUM: CPT

## 2023-08-07 PROCEDURE — 87899 AGENT NOS ASSAY W/OPTIC: CPT

## 2023-08-07 PROCEDURE — 84484 ASSAY OF TROPONIN QUANT: CPT

## 2023-08-07 PROCEDURE — 36415 COLL VENOUS BLD VENIPUNCTURE: CPT

## 2023-08-07 PROCEDURE — 94761 N-INVAS EAR/PLS OXIMETRY MLT: CPT

## 2023-08-07 PROCEDURE — 92610 EVALUATE SWALLOWING FUNCTION: CPT

## 2023-08-07 PROCEDURE — 6370000000 HC RX 637 (ALT 250 FOR IP): Performed by: NURSE PRACTITIONER

## 2023-08-07 PROCEDURE — 1200000000 HC SEMI PRIVATE

## 2023-08-07 PROCEDURE — 82248 BILIRUBIN DIRECT: CPT

## 2023-08-07 PROCEDURE — 2580000003 HC RX 258: Performed by: HOSPITALIST

## 2023-08-07 PROCEDURE — 6370000000 HC RX 637 (ALT 250 FOR IP): Performed by: HOSPITALIST

## 2023-08-07 PROCEDURE — 83036 HEMOGLOBIN GLYCOSYLATED A1C: CPT

## 2023-08-07 RX ORDER — LORAZEPAM 2 MG/ML
0.5 INJECTION INTRAMUSCULAR
Status: ACTIVE | OUTPATIENT
Start: 2023-08-07 | End: 2023-08-08

## 2023-08-07 RX ORDER — ENOXAPARIN SODIUM 100 MG/ML
40 INJECTION SUBCUTANEOUS 2 TIMES DAILY
Status: DISCONTINUED | OUTPATIENT
Start: 2023-08-07 | End: 2023-08-16 | Stop reason: HOSPADM

## 2023-08-07 RX ORDER — ACETAMINOPHEN 325 MG/1
650 TABLET ORAL EVERY 6 HOURS PRN
Status: DISCONTINUED | OUTPATIENT
Start: 2023-08-07 | End: 2023-08-16 | Stop reason: HOSPADM

## 2023-08-07 RX ORDER — DEXAMETHASONE 4 MG/1
6 TABLET ORAL DAILY
Status: COMPLETED | OUTPATIENT
Start: 2023-08-07 | End: 2023-08-16

## 2023-08-07 RX ORDER — LEVOFLOXACIN 5 MG/ML
750 INJECTION, SOLUTION INTRAVENOUS EVERY 24 HOURS
Status: COMPLETED | OUTPATIENT
Start: 2023-08-07 | End: 2023-08-08

## 2023-08-07 RX ORDER — ACETAMINOPHEN 650 MG/1
650 SUPPOSITORY RECTAL EVERY 6 HOURS PRN
Status: DISCONTINUED | OUTPATIENT
Start: 2023-08-07 | End: 2023-08-16 | Stop reason: HOSPADM

## 2023-08-07 RX ORDER — ASPIRIN 81 MG/1
81 TABLET, CHEWABLE ORAL DAILY
Status: DISCONTINUED | OUTPATIENT
Start: 2023-08-07 | End: 2023-08-16 | Stop reason: HOSPADM

## 2023-08-07 RX ADMIN — DONEPEZIL HYDROCHLORIDE 10 MG: 10 TABLET ORAL at 20:23

## 2023-08-07 RX ADMIN — SODIUM CHLORIDE, PRESERVATIVE FREE 10 ML: 5 INJECTION INTRAVENOUS at 08:14

## 2023-08-07 RX ADMIN — ENOXAPARIN SODIUM 40 MG: 100 INJECTION SUBCUTANEOUS at 08:13

## 2023-08-07 RX ADMIN — MEMANTINE HYDROCHLORIDE 5 MG: 5 TABLET ORAL at 20:23

## 2023-08-07 RX ADMIN — FERROUS SULFATE TAB 325 MG (65 MG ELEMENTAL FE) 325 MG: 325 (65 FE) TAB at 08:13

## 2023-08-07 RX ADMIN — METOPROLOL TARTRATE 25 MG: 25 TABLET, FILM COATED ORAL at 20:23

## 2023-08-07 RX ADMIN — MEMANTINE HYDROCHLORIDE 5 MG: 5 TABLET ORAL at 08:13

## 2023-08-07 RX ADMIN — ASPIRIN 81 MG: 81 TABLET, CHEWABLE ORAL at 13:54

## 2023-08-07 RX ADMIN — CLOPIDOGREL BISULFATE 75 MG: 75 TABLET ORAL at 08:13

## 2023-08-07 RX ADMIN — FERROUS SULFATE TAB 325 MG (65 MG ELEMENTAL FE) 325 MG: 325 (65 FE) TAB at 19:30

## 2023-08-07 RX ADMIN — SODIUM CHLORIDE, PRESERVATIVE FREE 10 ML: 5 INJECTION INTRAVENOUS at 20:23

## 2023-08-07 RX ADMIN — LEVOFLOXACIN 750 MG: 5 INJECTION, SOLUTION INTRAVENOUS at 19:29

## 2023-08-07 RX ADMIN — ROSUVASTATIN CALCIUM 20 MG: 20 TABLET, FILM COATED ORAL at 20:23

## 2023-08-07 RX ADMIN — ENOXAPARIN SODIUM 40 MG: 100 INJECTION SUBCUTANEOUS at 20:23

## 2023-08-07 RX ADMIN — AMLODIPINE BESYLATE 10 MG: 10 TABLET ORAL at 08:13

## 2023-08-07 RX ADMIN — METOPROLOL TARTRATE 25 MG: 25 TABLET, FILM COATED ORAL at 08:13

## 2023-08-07 RX ADMIN — DEXAMETHASONE 6 MG: 4 TABLET ORAL at 13:54

## 2023-08-07 NOTE — TELEPHONE ENCOUNTER
----- Message from ROSA Richter CNP sent at 8/7/2023  2:55 PM EDT -----  Regarding: outpatient follow up  Aníbal Ortiz,      This patient is currently admitted but will need outpatient follow up with me in our office when she is discharged. Thanks  [employer].

## 2023-08-08 ENCOUNTER — APPOINTMENT (OUTPATIENT)
Dept: MRI IMAGING | Age: 73
DRG: 177 | End: 2023-08-08
Payer: MEDICARE

## 2023-08-08 LAB
25(OH)D3 SERPL-MCNC: 17.82 NG/ML
ANION GAP SERPL CALCULATED.3IONS-SCNC: 14 MMOL/L (ref 4–16)
BASOPHILS ABSOLUTE: 0 K/CU MM
BASOPHILS RELATIVE PERCENT: 0.1 % (ref 0–1)
BUN SERPL-MCNC: 18 MG/DL (ref 6–23)
CALCIUM SERPL-MCNC: 9.5 MG/DL (ref 8.3–10.6)
CHLORIDE BLD-SCNC: 101 MMOL/L (ref 99–110)
CO2: 23 MMOL/L (ref 21–32)
CREAT SERPL-MCNC: 0.8 MG/DL (ref 0.6–1.1)
CRP SERPL HS-MCNC: 14.2 MG/L
CULTURE: ABNORMAL
DIFFERENTIAL TYPE: ABNORMAL
EOSINOPHILS ABSOLUTE: 0 K/CU MM
EOSINOPHILS RELATIVE PERCENT: 0 % (ref 0–3)
FERRITIN: 158 NG/ML (ref 15–150)
GFR SERPL CREATININE-BSD FRML MDRD: >60 ML/MIN/1.73M2
GLUCOSE SERPL-MCNC: 132 MG/DL (ref 70–99)
HCT VFR BLD CALC: 44.3 % (ref 37–47)
HEMOGLOBIN: 13.6 GM/DL (ref 12.5–16)
IMMATURE NEUTROPHIL %: 0.3 % (ref 0–0.43)
LYMPHOCYTES ABSOLUTE: 0.7 K/CU MM
LYMPHOCYTES RELATIVE PERCENT: 10.6 % (ref 24–44)
Lab: ABNORMAL
MCH RBC QN AUTO: 26.2 PG (ref 27–31)
MCHC RBC AUTO-ENTMCNC: 30.7 % (ref 32–36)
MCV RBC AUTO: 85.2 FL (ref 78–100)
MONOCYTES ABSOLUTE: 0.5 K/CU MM
MONOCYTES RELATIVE PERCENT: 6.6 % (ref 0–4)
NUCLEATED RBC %: 0 %
PDW BLD-RTO: 16.1 % (ref 11.7–14.9)
PLATELET # BLD: 307 K/CU MM (ref 140–440)
PMV BLD AUTO: 9.6 FL (ref 7.5–11.1)
POTASSIUM SERPL-SCNC: 3.5 MMOL/L (ref 3.5–5.1)
PRO-BNP: 216.4 PG/ML
PROCALCITONIN SERPL-MCNC: 0.06 NG/ML
RBC # BLD: 5.2 M/CU MM (ref 4.2–5.4)
SEGMENTED NEUTROPHILS ABSOLUTE COUNT: 5.6 K/CU MM
SEGMENTED NEUTROPHILS RELATIVE PERCENT: 82.4 % (ref 36–66)
SODIUM BLD-SCNC: 138 MMOL/L (ref 135–145)
SPECIMEN: ABNORMAL
TOTAL IMMATURE NEUTOROPHIL: 0.02 K/CU MM
TOTAL NUCLEATED RBC: 0 K/CU MM
WBC # BLD: 6.8 K/CU MM (ref 4–10.5)

## 2023-08-08 PROCEDURE — 70551 MRI BRAIN STEM W/O DYE: CPT

## 2023-08-08 PROCEDURE — 80048 BASIC METABOLIC PNL TOTAL CA: CPT

## 2023-08-08 PROCEDURE — 85025 COMPLETE CBC W/AUTO DIFF WBC: CPT

## 2023-08-08 PROCEDURE — 6370000000 HC RX 637 (ALT 250 FOR IP): Performed by: HOSPITALIST

## 2023-08-08 PROCEDURE — 6360000002 HC RX W HCPCS: Performed by: HOSPITALIST

## 2023-08-08 PROCEDURE — 6370000000 HC RX 637 (ALT 250 FOR IP): Performed by: NURSE PRACTITIONER

## 2023-08-08 PROCEDURE — 36415 COLL VENOUS BLD VENIPUNCTURE: CPT

## 2023-08-08 PROCEDURE — 2580000003 HC RX 258: Performed by: HOSPITALIST

## 2023-08-08 PROCEDURE — 82270 OCCULT BLOOD FECES: CPT

## 2023-08-08 PROCEDURE — 82728 ASSAY OF FERRITIN: CPT

## 2023-08-08 PROCEDURE — 1200000000 HC SEMI PRIVATE

## 2023-08-08 PROCEDURE — 86140 C-REACTIVE PROTEIN: CPT

## 2023-08-08 PROCEDURE — 83880 ASSAY OF NATRIURETIC PEPTIDE: CPT

## 2023-08-08 PROCEDURE — 84145 PROCALCITONIN (PCT): CPT

## 2023-08-08 PROCEDURE — 87449 NOS EACH ORGANISM AG IA: CPT

## 2023-08-08 PROCEDURE — 2700000000 HC OXYGEN THERAPY PER DAY

## 2023-08-08 PROCEDURE — 82306 VITAMIN D 25 HYDROXY: CPT

## 2023-08-08 PROCEDURE — 94761 N-INVAS EAR/PLS OXIMETRY MLT: CPT

## 2023-08-08 PROCEDURE — 6360000002 HC RX W HCPCS: Performed by: FAMILY MEDICINE

## 2023-08-08 RX ORDER — VITAMIN B COMPLEX
2000 TABLET ORAL DAILY
Status: DISCONTINUED | OUTPATIENT
Start: 2023-08-08 | End: 2023-08-16 | Stop reason: HOSPADM

## 2023-08-08 RX ORDER — CIPROFLOXACIN 2 MG/ML
400 INJECTION, SOLUTION INTRAVENOUS EVERY 12 HOURS
Status: DISCONTINUED | OUTPATIENT
Start: 2023-08-08 | End: 2023-08-09

## 2023-08-08 RX ADMIN — AMLODIPINE BESYLATE 10 MG: 10 TABLET ORAL at 10:18

## 2023-08-08 RX ADMIN — ENOXAPARIN SODIUM 40 MG: 100 INJECTION SUBCUTANEOUS at 20:40

## 2023-08-08 RX ADMIN — METOPROLOL TARTRATE 25 MG: 25 TABLET, FILM COATED ORAL at 10:18

## 2023-08-08 RX ADMIN — Medication 2000 UNITS: at 18:29

## 2023-08-08 RX ADMIN — ASPIRIN 81 MG: 81 TABLET, CHEWABLE ORAL at 10:18

## 2023-08-08 RX ADMIN — METOPROLOL TARTRATE 25 MG: 25 TABLET, FILM COATED ORAL at 20:40

## 2023-08-08 RX ADMIN — SODIUM CHLORIDE, PRESERVATIVE FREE 10 ML: 5 INJECTION INTRAVENOUS at 11:23

## 2023-08-08 RX ADMIN — LEVOFLOXACIN 750 MG: 5 INJECTION, SOLUTION INTRAVENOUS at 18:29

## 2023-08-08 RX ADMIN — ROSUVASTATIN CALCIUM 20 MG: 20 TABLET, FILM COATED ORAL at 20:40

## 2023-08-08 RX ADMIN — CIPROFLOXACIN 400 MG: 400 INJECTION, SOLUTION INTRAVENOUS at 23:52

## 2023-08-08 RX ADMIN — CLOPIDOGREL BISULFATE 75 MG: 75 TABLET ORAL at 10:17

## 2023-08-08 RX ADMIN — FERROUS SULFATE TAB 325 MG (65 MG ELEMENTAL FE) 325 MG: 325 (65 FE) TAB at 10:17

## 2023-08-08 RX ADMIN — DONEPEZIL HYDROCHLORIDE 10 MG: 10 TABLET ORAL at 20:40

## 2023-08-08 RX ADMIN — SODIUM CHLORIDE, PRESERVATIVE FREE 10 ML: 5 INJECTION INTRAVENOUS at 20:41

## 2023-08-08 RX ADMIN — MEMANTINE HYDROCHLORIDE 5 MG: 5 TABLET ORAL at 20:40

## 2023-08-08 RX ADMIN — MEMANTINE HYDROCHLORIDE 5 MG: 5 TABLET ORAL at 10:18

## 2023-08-08 RX ADMIN — DEXAMETHASONE 6 MG: 4 TABLET ORAL at 10:17

## 2023-08-08 RX ADMIN — ENOXAPARIN SODIUM 40 MG: 100 INJECTION SUBCUTANEOUS at 10:19

## 2023-08-08 RX ADMIN — FERROUS SULFATE TAB 325 MG (65 MG ELEMENTAL FE) 325 MG: 325 (65 FE) TAB at 18:48

## 2023-08-08 NOTE — CARE COORDINATION
LSW spoke with pt regarding her discharge plans. Pt lives with her  in a 2 story home. Pt stated she stays on the first floor. Pt has a walker and cane at home. PT denies having HC. Pt stated she is independent of her ADLs. Pt has a PCP and has insurance to help with ADLs. Pt plans home with  and denies needs. 08/08/23 6310   Service Assessment   Patient Orientation Alert and Oriented   Cognition Alert   History Provided By Patient   Primary Ninfa Ramirez Spouse/Significant Other   Patient's Healthcare Decision Maker is: Legal Next of Kin   PCP Verified by CM Yes   Last Visit to PCP Within last 3 months   Prior Functional Level Independent in ADLs/IADLs   Current Functional Level Assistance with the following:   Can patient return to prior living arrangement Yes   Ability to make needs known: Good   Family able to assist with home care needs: Yes   Would you like for me to discuss the discharge plan with any other family members/significant others, and if so, who? No   Discharge Planning   Patient expects to be discharged to: House   Condition of Participation: Discharge Planning   The Patient and/or Patient Representative was provided with a Choice of Provider? Patient   The Patient and/Or Patient Representative agree with the Discharge Plan?  Yes

## 2023-08-09 ENCOUNTER — APPOINTMENT (OUTPATIENT)
Dept: CT IMAGING | Age: 73
DRG: 177 | End: 2023-08-09
Payer: MEDICARE

## 2023-08-09 LAB
CRP SERPL HS-MCNC: 4.8 MG/L
GLUCOSE BLD-MCNC: 166 MG/DL (ref 70–99)
HEMOCCULT SP1 STL QL: POSITIVE

## 2023-08-09 PROCEDURE — 74176 CT ABD & PELVIS W/O CONTRAST: CPT

## 2023-08-09 PROCEDURE — 94761 N-INVAS EAR/PLS OXIMETRY MLT: CPT

## 2023-08-09 PROCEDURE — 97162 PT EVAL MOD COMPLEX 30 MIN: CPT

## 2023-08-09 PROCEDURE — 97166 OT EVAL MOD COMPLEX 45 MIN: CPT

## 2023-08-09 PROCEDURE — 36415 COLL VENOUS BLD VENIPUNCTURE: CPT

## 2023-08-09 PROCEDURE — 1200000000 HC SEMI PRIVATE

## 2023-08-09 PROCEDURE — 6370000000 HC RX 637 (ALT 250 FOR IP): Performed by: NURSE PRACTITIONER

## 2023-08-09 PROCEDURE — 97530 THERAPEUTIC ACTIVITIES: CPT

## 2023-08-09 PROCEDURE — 6360000002 HC RX W HCPCS: Performed by: HOSPITALIST

## 2023-08-09 PROCEDURE — 6370000000 HC RX 637 (ALT 250 FOR IP): Performed by: HOSPITALIST

## 2023-08-09 PROCEDURE — 86140 C-REACTIVE PROTEIN: CPT

## 2023-08-09 PROCEDURE — 2580000003 HC RX 258: Performed by: HOSPITALIST

## 2023-08-09 PROCEDURE — 82962 GLUCOSE BLOOD TEST: CPT

## 2023-08-09 RX ORDER — LANOLIN ALCOHOL/MO/W.PET/CERES
6 CREAM (GRAM) TOPICAL NIGHTLY PRN
Status: DISCONTINUED | OUTPATIENT
Start: 2023-08-09 | End: 2023-08-16 | Stop reason: HOSPADM

## 2023-08-09 RX ADMIN — Medication 2000 UNITS: at 10:26

## 2023-08-09 RX ADMIN — ROSUVASTATIN CALCIUM 20 MG: 20 TABLET, FILM COATED ORAL at 20:17

## 2023-08-09 RX ADMIN — DONEPEZIL HYDROCHLORIDE 10 MG: 10 TABLET ORAL at 20:18

## 2023-08-09 RX ADMIN — METOPROLOL TARTRATE 25 MG: 25 TABLET, FILM COATED ORAL at 10:27

## 2023-08-09 RX ADMIN — CLOPIDOGREL BISULFATE 75 MG: 75 TABLET ORAL at 10:25

## 2023-08-09 RX ADMIN — DEXAMETHASONE 6 MG: 4 TABLET ORAL at 10:25

## 2023-08-09 RX ADMIN — SODIUM CHLORIDE, PRESERVATIVE FREE 10 ML: 5 INJECTION INTRAVENOUS at 20:19

## 2023-08-09 RX ADMIN — MEMANTINE HYDROCHLORIDE 5 MG: 5 TABLET ORAL at 10:25

## 2023-08-09 RX ADMIN — ENOXAPARIN SODIUM 40 MG: 100 INJECTION SUBCUTANEOUS at 20:18

## 2023-08-09 RX ADMIN — ASPIRIN 81 MG: 81 TABLET, CHEWABLE ORAL at 10:25

## 2023-08-09 RX ADMIN — FERROUS SULFATE TAB 325 MG (65 MG ELEMENTAL FE) 325 MG: 325 (65 FE) TAB at 10:30

## 2023-08-09 RX ADMIN — MEMANTINE HYDROCHLORIDE 5 MG: 5 TABLET ORAL at 20:18

## 2023-08-09 RX ADMIN — ENOXAPARIN SODIUM 40 MG: 100 INJECTION SUBCUTANEOUS at 10:26

## 2023-08-09 RX ADMIN — AMLODIPINE BESYLATE 10 MG: 10 TABLET ORAL at 10:25

## 2023-08-10 PROCEDURE — 2580000003 HC RX 258: Performed by: HOSPITALIST

## 2023-08-10 PROCEDURE — 1200000000 HC SEMI PRIVATE

## 2023-08-10 PROCEDURE — 6360000002 HC RX W HCPCS: Performed by: HOSPITALIST

## 2023-08-10 PROCEDURE — 94761 N-INVAS EAR/PLS OXIMETRY MLT: CPT

## 2023-08-10 PROCEDURE — 6370000000 HC RX 637 (ALT 250 FOR IP): Performed by: NURSE PRACTITIONER

## 2023-08-10 PROCEDURE — 6370000000 HC RX 637 (ALT 250 FOR IP): Performed by: HOSPITALIST

## 2023-08-10 RX ADMIN — METOPROLOL TARTRATE 25 MG: 25 TABLET, FILM COATED ORAL at 09:41

## 2023-08-10 RX ADMIN — DEXAMETHASONE 6 MG: 4 TABLET ORAL at 09:41

## 2023-08-10 RX ADMIN — CLOPIDOGREL BISULFATE 75 MG: 75 TABLET ORAL at 09:41

## 2023-08-10 RX ADMIN — ROSUVASTATIN CALCIUM 20 MG: 20 TABLET, FILM COATED ORAL at 21:13

## 2023-08-10 RX ADMIN — SODIUM CHLORIDE, PRESERVATIVE FREE 10 ML: 5 INJECTION INTRAVENOUS at 09:40

## 2023-08-10 RX ADMIN — DONEPEZIL HYDROCHLORIDE 10 MG: 10 TABLET ORAL at 21:14

## 2023-08-10 RX ADMIN — Medication 2000 UNITS: at 09:41

## 2023-08-10 RX ADMIN — METOPROLOL TARTRATE 25 MG: 25 TABLET, FILM COATED ORAL at 21:14

## 2023-08-10 RX ADMIN — MEMANTINE HYDROCHLORIDE 5 MG: 5 TABLET ORAL at 21:14

## 2023-08-10 RX ADMIN — ASPIRIN 81 MG: 81 TABLET, CHEWABLE ORAL at 09:41

## 2023-08-10 RX ADMIN — MEMANTINE HYDROCHLORIDE 5 MG: 5 TABLET ORAL at 09:41

## 2023-08-10 RX ADMIN — ENOXAPARIN SODIUM 40 MG: 100 INJECTION SUBCUTANEOUS at 21:13

## 2023-08-10 RX ADMIN — ENOXAPARIN SODIUM 40 MG: 100 INJECTION SUBCUTANEOUS at 09:41

## 2023-08-10 RX ADMIN — AMLODIPINE BESYLATE 10 MG: 10 TABLET ORAL at 09:41

## 2023-08-10 RX ADMIN — SODIUM CHLORIDE, PRESERVATIVE FREE 10 ML: 5 INJECTION INTRAVENOUS at 21:15

## 2023-08-10 NOTE — CARE COORDINATION
LSW spoke with pt  this morning and talked with him about PT/OT recs.  asked for this LSW to call his dght sometime after 1:00 today. LSW called pt dgelsa Aguirre and spoke with her about PT/OT recs. She is agreeable and stated pt has been to [facility] in the past and requested LSW try there.  LSW called Kira with [facility] and made referral.

## 2023-08-11 LAB
CREATININE URINE: 109.6 MG/DL (ref 28–217)
CULTURE: NORMAL
CULTURE: NORMAL
HCT VFR BLD CALC: 38 % (ref 37–47)
HEMOGLOBIN: 11.7 GM/DL (ref 12.5–16)
Lab: NORMAL
Lab: NORMAL
MCH RBC QN AUTO: 26.3 PG (ref 27–31)
MCHC RBC AUTO-ENTMCNC: 30.8 % (ref 32–36)
MCV RBC AUTO: 85.4 FL (ref 78–100)
PDW BLD-RTO: 16.1 % (ref 11.7–14.9)
PLATELET # BLD: 323 K/CU MM (ref 140–440)
PMV BLD AUTO: 9.9 FL (ref 7.5–11.1)
PROT/CREAT RATIO, UR: 0.3
RBC # BLD: 4.45 M/CU MM (ref 4.2–5.4)
SPECIMEN: NORMAL
SPECIMEN: NORMAL
URINE TOTAL PROTEIN: 29.1 MG/DL
WBC # BLD: 8.5 K/CU MM (ref 4–10.5)

## 2023-08-11 PROCEDURE — 84156 ASSAY OF PROTEIN URINE: CPT

## 2023-08-11 PROCEDURE — 6360000002 HC RX W HCPCS: Performed by: HOSPITALIST

## 2023-08-11 PROCEDURE — 36415 COLL VENOUS BLD VENIPUNCTURE: CPT

## 2023-08-11 PROCEDURE — 2580000003 HC RX 258: Performed by: HOSPITALIST

## 2023-08-11 PROCEDURE — 97530 THERAPEUTIC ACTIVITIES: CPT

## 2023-08-11 PROCEDURE — 6370000000 HC RX 637 (ALT 250 FOR IP): Performed by: NURSE PRACTITIONER

## 2023-08-11 PROCEDURE — 94761 N-INVAS EAR/PLS OXIMETRY MLT: CPT

## 2023-08-11 PROCEDURE — 1200000000 HC SEMI PRIVATE

## 2023-08-11 PROCEDURE — 82570 ASSAY OF URINE CREATININE: CPT

## 2023-08-11 PROCEDURE — 6370000000 HC RX 637 (ALT 250 FOR IP): Performed by: HOSPITALIST

## 2023-08-11 PROCEDURE — 85027 COMPLETE CBC AUTOMATED: CPT

## 2023-08-11 RX ADMIN — METOPROLOL TARTRATE 25 MG: 25 TABLET, FILM COATED ORAL at 20:44

## 2023-08-11 RX ADMIN — ENOXAPARIN SODIUM 40 MG: 100 INJECTION SUBCUTANEOUS at 10:28

## 2023-08-11 RX ADMIN — CLOPIDOGREL BISULFATE 75 MG: 75 TABLET ORAL at 10:27

## 2023-08-11 RX ADMIN — Medication 2000 UNITS: at 10:27

## 2023-08-11 RX ADMIN — METOPROLOL TARTRATE 25 MG: 25 TABLET, FILM COATED ORAL at 10:27

## 2023-08-11 RX ADMIN — MEMANTINE HYDROCHLORIDE 5 MG: 5 TABLET ORAL at 20:45

## 2023-08-11 RX ADMIN — SODIUM CHLORIDE, PRESERVATIVE FREE 10 ML: 5 INJECTION INTRAVENOUS at 10:27

## 2023-08-11 RX ADMIN — DEXAMETHASONE 6 MG: 4 TABLET ORAL at 10:27

## 2023-08-11 RX ADMIN — AMLODIPINE BESYLATE 10 MG: 10 TABLET ORAL at 10:28

## 2023-08-11 RX ADMIN — MEMANTINE HYDROCHLORIDE 5 MG: 5 TABLET ORAL at 10:27

## 2023-08-11 RX ADMIN — ROSUVASTATIN CALCIUM 20 MG: 20 TABLET, FILM COATED ORAL at 20:44

## 2023-08-11 RX ADMIN — ASPIRIN 81 MG: 81 TABLET, CHEWABLE ORAL at 10:27

## 2023-08-11 RX ADMIN — SODIUM CHLORIDE, PRESERVATIVE FREE 10 ML: 5 INJECTION INTRAVENOUS at 20:45

## 2023-08-11 RX ADMIN — DONEPEZIL HYDROCHLORIDE 10 MG: 10 TABLET ORAL at 20:45

## 2023-08-11 NOTE — PLAN OF CARE
Problem: Discharge Planning  Goal: Discharge to home or other facility with appropriate resources  8/11/2023 1600 by Hasmukh Cox RN  Outcome: Progressing  Flowsheets (Taken 8/11/2023 1023)  Discharge to home or other facility with appropriate resources:   Identify barriers to discharge with patient and caregiver   Arrange for needed discharge resources and transportation as appropriate   Identify discharge learning needs (meds, wound care, etc)   Refer to discharge planning if patient needs post-hospital services based on physician order or complex needs related to functional status, cognitive ability or social support system  8/11/2023 0415 by Jazzmine Bauman RN  Outcome: Progressing     Problem: Safety - Adult  Goal: Free from fall injury  8/11/2023 1600 by Hasmukh Cox RN  Outcome: Progressing  8/11/2023 0415 by Jazzmine Bauman RN  Outcome: Progressing     Problem: Chronic Conditions and Co-morbidities  Goal: Patient's chronic conditions and co-morbidity symptoms are monitored and maintained or improved  8/11/2023 1600 by Hasmukh Cox RN  Outcome: Progressing  Flowsheets (Taken 8/11/2023 1023)  Care Plan - Patient's Chronic Conditions and Co-Morbidity Symptoms are Monitored and Maintained or Improved:   Monitor and assess patient's chronic conditions and comorbid symptoms for stability, deterioration, or improvement   Collaborate with multidisciplinary team to address chronic and comorbid conditions and prevent exacerbation or deterioration  8/11/2023 0415 by Jazzmine Bauman RN  Outcome: Progressing     Problem: Nutrition Deficit:  Goal: Optimize nutritional status  Outcome: Progressing

## 2023-08-11 NOTE — CARE COORDINATION
LSW spoke with Donaldo Niteo with [facility] and they can take pt once stable. Need updated PT/OT notes WB note placed. Precert has been started. PASS completed and packet started.

## 2023-08-12 LAB
ANION GAP SERPL CALCULATED.3IONS-SCNC: 12 MMOL/L (ref 4–16)
BASOPHILS ABSOLUTE: 0 K/CU MM
BASOPHILS RELATIVE PERCENT: 0.2 % (ref 0–1)
BUN SERPL-MCNC: 26 MG/DL (ref 6–23)
CALCIUM SERPL-MCNC: 9.3 MG/DL (ref 8.3–10.6)
CHLORIDE BLD-SCNC: 101 MMOL/L (ref 99–110)
CO2: 27 MMOL/L (ref 21–32)
CREAT SERPL-MCNC: 0.9 MG/DL (ref 0.6–1.1)
DIFFERENTIAL TYPE: ABNORMAL
EOSINOPHILS ABSOLUTE: 0 K/CU MM
EOSINOPHILS RELATIVE PERCENT: 0 % (ref 0–3)
GFR SERPL CREATININE-BSD FRML MDRD: >60 ML/MIN/1.73M2
GLUCOSE SERPL-MCNC: 101 MG/DL (ref 70–99)
HCT VFR BLD CALC: 37 % (ref 37–47)
HEMOGLOBIN: 11.3 GM/DL (ref 12.5–16)
IMMATURE NEUTROPHIL %: 1.7 % (ref 0–0.43)
L PNEUMO AG UR QL IA: NEGATIVE
LYMPHOCYTES ABSOLUTE: 1.4 K/CU MM
LYMPHOCYTES RELATIVE PERCENT: 9.7 % (ref 24–44)
MCH RBC QN AUTO: 26.6 PG (ref 27–31)
MCHC RBC AUTO-ENTMCNC: 30.5 % (ref 32–36)
MCV RBC AUTO: 87.1 FL (ref 78–100)
MONOCYTES ABSOLUTE: 0.7 K/CU MM
MONOCYTES RELATIVE PERCENT: 4.6 % (ref 0–4)
NUCLEATED RBC %: 0.4 %
PDW BLD-RTO: 16.3 % (ref 11.7–14.9)
PLATELET # BLD: 382 K/CU MM (ref 140–440)
PMV BLD AUTO: 10.2 FL (ref 7.5–11.1)
POTASSIUM SERPL-SCNC: 3.8 MMOL/L (ref 3.5–5.1)
RBC # BLD: 4.25 M/CU MM (ref 4.2–5.4)
REASON FOR REJECTION: NORMAL
REJECTED TEST: NORMAL
S PNEUM AG CSF QL: NORMAL
SEGMENTED NEUTROPHILS ABSOLUTE COUNT: 12.3 K/CU MM
SEGMENTED NEUTROPHILS RELATIVE PERCENT: 83.8 % (ref 36–66)
SODIUM BLD-SCNC: 140 MMOL/L (ref 135–145)
TOTAL IMMATURE NEUTOROPHIL: 0.25 K/CU MM
TOTAL NUCLEATED RBC: 0.1 K/CU MM
WBC # BLD: 14.7 K/CU MM (ref 4–10.5)

## 2023-08-12 PROCEDURE — 2580000003 HC RX 258: Performed by: HOSPITALIST

## 2023-08-12 PROCEDURE — 36415 COLL VENOUS BLD VENIPUNCTURE: CPT

## 2023-08-12 PROCEDURE — 6370000000 HC RX 637 (ALT 250 FOR IP): Performed by: HOSPITALIST

## 2023-08-12 PROCEDURE — 80048 BASIC METABOLIC PNL TOTAL CA: CPT

## 2023-08-12 PROCEDURE — 6360000002 HC RX W HCPCS: Performed by: HOSPITALIST

## 2023-08-12 PROCEDURE — 85025 COMPLETE CBC W/AUTO DIFF WBC: CPT

## 2023-08-12 PROCEDURE — 6370000000 HC RX 637 (ALT 250 FOR IP): Performed by: NURSE PRACTITIONER

## 2023-08-12 PROCEDURE — 94761 N-INVAS EAR/PLS OXIMETRY MLT: CPT

## 2023-08-12 PROCEDURE — 1200000000 HC SEMI PRIVATE

## 2023-08-12 RX ADMIN — DONEPEZIL HYDROCHLORIDE 10 MG: 10 TABLET ORAL at 20:38

## 2023-08-12 RX ADMIN — SODIUM CHLORIDE, PRESERVATIVE FREE 10 ML: 5 INJECTION INTRAVENOUS at 08:42

## 2023-08-12 RX ADMIN — ENOXAPARIN SODIUM 40 MG: 100 INJECTION SUBCUTANEOUS at 08:42

## 2023-08-12 RX ADMIN — AMLODIPINE BESYLATE 10 MG: 10 TABLET ORAL at 08:40

## 2023-08-12 RX ADMIN — CLOPIDOGREL BISULFATE 75 MG: 75 TABLET ORAL at 08:40

## 2023-08-12 RX ADMIN — MEMANTINE HYDROCHLORIDE 5 MG: 5 TABLET ORAL at 08:40

## 2023-08-12 RX ADMIN — ROSUVASTATIN CALCIUM 20 MG: 20 TABLET, FILM COATED ORAL at 20:38

## 2023-08-12 RX ADMIN — SODIUM CHLORIDE, PRESERVATIVE FREE 10 ML: 5 INJECTION INTRAVENOUS at 20:38

## 2023-08-12 RX ADMIN — METOPROLOL TARTRATE 25 MG: 25 TABLET, FILM COATED ORAL at 08:40

## 2023-08-12 RX ADMIN — MEMANTINE HYDROCHLORIDE 5 MG: 5 TABLET ORAL at 20:38

## 2023-08-12 RX ADMIN — ASPIRIN 81 MG: 81 TABLET, CHEWABLE ORAL at 08:40

## 2023-08-12 RX ADMIN — Medication 2000 UNITS: at 08:40

## 2023-08-12 RX ADMIN — METOPROLOL TARTRATE 25 MG: 25 TABLET, FILM COATED ORAL at 20:38

## 2023-08-12 RX ADMIN — DEXAMETHASONE 6 MG: 4 TABLET ORAL at 08:40

## 2023-08-12 NOTE — PLAN OF CARE
Problem: Discharge Planning  Goal: Discharge to home or other facility with appropriate resources  Outcome: Progressing  Flowsheets (Taken 8/12/2023 0836)  Discharge to home or other facility with appropriate resources:   Identify barriers to discharge with patient and caregiver   Arrange for needed discharge resources and transportation as appropriate   Identify discharge learning needs (meds, wound care, etc)   Refer to discharge planning if patient needs post-hospital services based on physician order or complex needs related to functional status, cognitive ability or social support system     Problem: Safety - Adult  Goal: Free from fall injury  Outcome: Progressing  Flowsheets (Taken 8/12/2023 0835)  Free From Fall Injury: Instruct family/caregiver on patient safety     Problem: Chronic Conditions and Co-morbidities  Goal: Patient's chronic conditions and co-morbidity symptoms are monitored and maintained or improved  Outcome: Progressing  Flowsheets (Taken 8/12/2023 0836)  Care Plan - Patient's Chronic Conditions and Co-Morbidity Symptoms are Monitored and Maintained or Improved:   Monitor and assess patient's chronic conditions and comorbid symptoms for stability, deterioration, or improvement   Collaborate with multidisciplinary team to address chronic and comorbid conditions and prevent exacerbation or deterioration     Problem: Nutrition Deficit:  Goal: Optimize nutritional status  Outcome: Progressing

## 2023-08-12 NOTE — PLAN OF CARE
Problem: Discharge Planning  Goal: Discharge to home or other facility with appropriate resources  8/12/2023 0029 by Sharyn Moore RN  Outcome: Progressing  8/11/2023 1600 by Ney Manuel RN  Outcome: Progressing  Flowsheets (Taken 8/11/2023 1023)  Discharge to home or other facility with appropriate resources:   Identify barriers to discharge with patient and caregiver   Arrange for needed discharge resources and transportation as appropriate   Identify discharge learning needs (meds, wound care, etc)   Refer to discharge planning if patient needs post-hospital services based on physician order or complex needs related to functional status, cognitive ability or social support system     Problem: Safety - Adult  Goal: Free from fall injury  8/12/2023 0029 by Sharyn Moore RN  Outcome: Progressing  8/11/2023 1600 by Ney Manuel RN  Outcome: Progressing     Problem: Chronic Conditions and Co-morbidities  Goal: Patient's chronic conditions and co-morbidity symptoms are monitored and maintained or improved  8/12/2023 0029 by Sharyn Moore RN  Outcome: Progressing  8/11/2023 1600 by Ney Manuel RN  Outcome: Progressing  Flowsheets (Taken 8/11/2023 1023)  Care Plan - Patient's Chronic Conditions and Co-Morbidity Symptoms are Monitored and Maintained or Improved:   Monitor and assess patient's chronic conditions and comorbid symptoms for stability, deterioration, or improvement   Collaborate with multidisciplinary team to address chronic and comorbid conditions and prevent exacerbation or deterioration     Problem: Nutrition Deficit:  Goal: Optimize nutritional status  8/12/2023 0029 by Sharyn Moore RN  Outcome: Progressing  8/11/2023 1600 by Ney Manuel RN  Outcome: Progressing

## 2023-08-13 LAB
APTT: 25.6 SECONDS (ref 25.1–37.1)
HIGH SENSITIVE C-REACTIVE PROTEIN: 0.4 MG/L (ref 0–5)
INR BLD: 0.9 INDEX
PROCALCITONIN SERPL-MCNC: 0.03 NG/ML
PROTHROMBIN TIME: 12.7 SECONDS (ref 11.7–14.5)

## 2023-08-13 PROCEDURE — 6370000000 HC RX 637 (ALT 250 FOR IP): Performed by: HOSPITALIST

## 2023-08-13 PROCEDURE — 6370000000 HC RX 637 (ALT 250 FOR IP): Performed by: NURSE PRACTITIONER

## 2023-08-13 PROCEDURE — 6360000002 HC RX W HCPCS: Performed by: HOSPITALIST

## 2023-08-13 PROCEDURE — 1200000000 HC SEMI PRIVATE

## 2023-08-13 PROCEDURE — APPSS60 APP SPLIT SHARED TIME 46-60 MINUTES: Performed by: NURSE PRACTITIONER

## 2023-08-13 PROCEDURE — 84145 PROCALCITONIN (PCT): CPT

## 2023-08-13 PROCEDURE — 86141 C-REACTIVE PROTEIN HS: CPT

## 2023-08-13 PROCEDURE — 2580000003 HC RX 258: Performed by: HOSPITALIST

## 2023-08-13 PROCEDURE — 94761 N-INVAS EAR/PLS OXIMETRY MLT: CPT

## 2023-08-13 PROCEDURE — 99221 1ST HOSP IP/OBS SF/LOW 40: CPT | Performed by: SURGERY

## 2023-08-13 PROCEDURE — 85610 PROTHROMBIN TIME: CPT

## 2023-08-13 PROCEDURE — 36415 COLL VENOUS BLD VENIPUNCTURE: CPT

## 2023-08-13 PROCEDURE — 85730 THROMBOPLASTIN TIME PARTIAL: CPT

## 2023-08-13 RX ADMIN — METOPROLOL TARTRATE 25 MG: 25 TABLET, FILM COATED ORAL at 08:57

## 2023-08-13 RX ADMIN — ASPIRIN 81 MG: 81 TABLET, CHEWABLE ORAL at 08:57

## 2023-08-13 RX ADMIN — MEMANTINE HYDROCHLORIDE 5 MG: 5 TABLET ORAL at 08:57

## 2023-08-13 RX ADMIN — METOPROLOL TARTRATE 25 MG: 25 TABLET, FILM COATED ORAL at 23:05

## 2023-08-13 RX ADMIN — DEXAMETHASONE 6 MG: 4 TABLET ORAL at 08:58

## 2023-08-13 RX ADMIN — DONEPEZIL HYDROCHLORIDE 10 MG: 10 TABLET ORAL at 23:05

## 2023-08-13 RX ADMIN — Medication 2000 UNITS: at 08:58

## 2023-08-13 RX ADMIN — AMLODIPINE BESYLATE 10 MG: 10 TABLET ORAL at 08:58

## 2023-08-13 RX ADMIN — ENOXAPARIN SODIUM 40 MG: 100 INJECTION SUBCUTANEOUS at 08:58

## 2023-08-13 RX ADMIN — ENOXAPARIN SODIUM 40 MG: 100 INJECTION SUBCUTANEOUS at 23:06

## 2023-08-13 RX ADMIN — CLOPIDOGREL BISULFATE 75 MG: 75 TABLET ORAL at 08:57

## 2023-08-13 RX ADMIN — SODIUM CHLORIDE, PRESERVATIVE FREE 10 ML: 5 INJECTION INTRAVENOUS at 23:06

## 2023-08-13 RX ADMIN — ROSUVASTATIN CALCIUM 20 MG: 20 TABLET, FILM COATED ORAL at 23:05

## 2023-08-13 RX ADMIN — SODIUM CHLORIDE, PRESERVATIVE FREE 10 ML: 5 INJECTION INTRAVENOUS at 08:58

## 2023-08-13 RX ADMIN — MEMANTINE HYDROCHLORIDE 5 MG: 5 TABLET ORAL at 23:05

## 2023-08-13 NOTE — CONSULTS
[address] THERAPY EVALUATION    Akil Gambino, 1950, 3016/3016-A, 8/9/2023      Discharge Recommendation: SNF     History:  [place]:  The primary encounter diagnosis was Altered mental status, unspecified altered mental status type. Diagnoses of Dizziness, Blurred vision, Dementia, unspecified dementia severity, unspecified dementia type, unspecified whether behavioral, psychotic, or mood disturbance or anxiety (HCC), Abnormal computed tomography angiography (CTA), and Pneumonia due to infectious organism, unspecified laterality, unspecified part of lung were also pertinent to this visit. Past Medical History:   Diagnosis Date    Abnormal cardiovascular stress test 07/27/2022    Mild degree of lateral wall ischemia    Acute cerebrovascular accident (CVA) ([address]) 12/21/2021    ARF (acute renal failure) ([address]) 03/15/2022    Blood in stool 04/03/2022    CAD (coronary artery disease)     COVID-19 virus infection 01/09/2022    Hypertension     Hypertensive urgency 10/30/2019         Subjective:  Patient states:  \"There were a bunch of girls in here last night conducting a study group and I did not appreciate it\"  Pain: denied   Communication with other providers: PT, RN   Restrictions: general precautions, fall risk, droplet + ISO   No one at bedside    Home Setup/Prior level of function:  Social/Functional History  Lives With: Spouse  Type of Home: House  Home Layout: Two level  Home Access: Stairs to enter without rails  Entrance Stairs - Number of Steps: 2  Bathroom Shower/Tub: Tub/Shower unit, Shower chair with back  Bathroom Toilet: Standard  Bathroom Equipment: Grab bars in [address] [place] [address]: 145 [facility], [address] Help From: Family  ADL Assistance: [address] Independent  Homemaking Responsibilities: Yes  Ambulation Assistance: Independent  Transfer Assistance: Independent  Would benefit from confirmation with family d/t pt
[address] PHYSICAL THERAPY EVALUATION  Kwame Maguire, 1950, 3016/3016-A, 8/9/2023    History  [place]:  The primary encounter diagnosis was Altered mental status, unspecified altered mental status type. Diagnoses of Dizziness, Blurred vision, Dementia, unspecified dementia severity, unspecified dementia type, unspecified whether behavioral, psychotic, or mood disturbance or anxiety (HCC), Abnormal computed tomography angiography (CTA), and Pneumonia due to infectious organism, unspecified laterality, unspecified part of lung were also pertinent to this visit. Patient  has a past medical history of Abnormal cardiovascular stress test, Acute cerebrovascular accident (CVA) ([address]), ARF (acute renal failure) ([address]), Blood in stool, CAD (coronary artery disease), COVID-19 virus infection, Hypertension, and Hypertensive urgency. Patient  has a past surgical history that includes Coronary artery bypass graft; Neck surgery; Carotid endarterectomy; Cholecystectomy; IR NONTUNNELED VASCULAR CATHETER > 5 YEARS (1/20/2022); and Hysterectomy, total abdominal.    Subjective:    Patient states:  \"I need to call the , they took all my phones. \"      Pain:  denies pain.       Communication with other providers:  Handoff to RN, OT    Restrictions: general precautions, fall risk, droplet plus isolation, contact isolation    Home Setup/Prior level of function  Social/Functional History  Lives With: Spouse  Type of Home: House  Home Layout: Two level  Home Access: Stairs to enter without rails  Entrance Stairs - Number of Steps: 2  Bathroom Shower/Tub: Tub/Shower unit, Shower chair with back  Bathroom Toilet: Standard  Bathroom Equipment: Grab bars in [address] [place] [address]: 145 [facility], [address] Help From: Family  ADL Assistance: [address] Independent  Homemaking Responsibilities: Yes  Ambulation Assistance: Independent  Transfer Assistance:
Consult Note  [address] Surgery    Patient ID:  Frederick Lazar  Date: 2023 9:47 AM   MRN#: [id number] :1950   Admission Date:2023 Age/Sex:73 y.o. female     Date: 23    Reason for Evaluation:  hematoma     Chief Complaint   Patient presents with    Blurred Vision     Right eye    Altered Mental Status     Confusion. Pt does have hx of dementia. Family is unsure if it is her dementia worsening or new onset       Requesting Physician: Dr. Jose Finn MD    History Obtained From:  patient, electronic medical record    HISTORY OF PRESENT ILLNESS:    Edliang Cabello is a 75 y.o. female presenting with abdominal bruising. Location: across lower abdomen  Quality, Severity: minimal tenderness  Timing, Duration: 3-4 days ago  Context, Modifying Factors: states noticed bruising after Lovenox injection. States pain has improved especially with heat  Associated Signs & Symptoms: none  Denies:  fever, chills, nausea, vomiting    Workup Includes: CT abdomen/pelvis  1. Significant stranding identified in the subcutaneous fat of the left  hemiabdomen. No drainable fluid collections. Foci of subcutaneous gas are  also seen related to injections. 2. Small hiatal hernia. 3. Cholecystectomy and hysterectomy. 4. Severe atherosclerosis. 5. Diverticulosis.     Past Medical History:    Past Medical History:   Diagnosis Date    Abnormal cardiovascular stress test 2022    Mild degree of lateral wall ischemia    Acute cerebrovascular accident (CVA) ([address]) 2021    ARF (acute renal failure) ([address]) 03/15/2022    Blood in stool 2022    CAD (coronary artery disease)     COVID-19 virus infection 2022    Hypertension     Hypertensive urgency 10/30/2019       Past Surgical History:    Past Surgical History:   Procedure Laterality Date    CAROTID ENDARTERECTOMY      CHOLECYSTECTOMY      CORONARY ARTERY BYPASS GRAFT      HYSTERECTOMY, TOTAL ABDOMINAL (CERVIX REMOVED)
stenosis at the origin of the left internal carotid artery,  difficult to evaluate due to motion artifacts. 75% stenosis at the origin of the right vertebral artery. Question of severe stenosis in the cavernous segment of the left internal  carotid artery. Specimen Collected: 08/06/23 14:52 EDT Last Resulted: 08/06/23 15:08 EDT             ASSESSMENT/PLAN:     58-year-old female with a pmh of dementia, stroke and left carotid CEA presenting with blurred vision and AMS. CTA limited     -Neuroimaging:    CT of the head:  No acute intracranial abnormalities. Old infarctions in the left frontal, bilateral parietal and left occipital lobes. Old lacunar infarcts in the bilateral basal ganglia and right thalamus. Mild parenchymal volume loss. Mild chronic microvascular disease. CTA Head and neck:  Limited due to motion artifact but demonstrated Question of a 80% stenosis at the origin of the left internal carotid artery,difficult to evaluate due to motion artifacts. 75% stenosis at the origin of the right vertebral artery. Question of severe stenosis in the cavernous segment of the left internal carotid artery. MRI of the brain is pending.    -Pertinent images discussed/reviewed with Dr. Em English who has fully participated in the care of this patient and agrees with plan. -Spoke with daughter on the phone regarding the patients ICA, vertebral, and intracranial carotid stenosis. Recommended a diagnostic cerebral angiogram with possible stent of the left ICA. Plan for the patient to follow up with our office as an OP.  -Continue Aspirin, Plavix, and Crestor for secondary prevention of stroke  -PT/OT/ST    Thank you for allowing us to participate in the care of your patient. If there are any questions regarding evaluation please feel free to contact us.      ROSA Talavera - LAKESHIA, 8/7/2023
for allowing us to participate in the care of your patient. If there are any questions regarding evaluation please feel free to contact us. Fran Madison, ROSA - LAKESHIA, 8/7/2023     Patient with worsening confusion in setting of multiple infections. Sleepy on my exam but nonfocal. Cta with multiple areas of stenosis. Appreciate Dr. Ray Gonzalez input. Attending Note:  I have rounded on this patient with Alek ATKINSON. I have reviewed the chart and we have discussed this case in detail. The patient was seen and examined by myself. Pertinent labs and imaging have been personally reviewed. Our findings and impressions were discussed with the patient. I concur with the Nurse Practioner's assessment and plan.     Edmond Rubio, DO
Normal vision: sees adequately in most situations; can see medication labels, newsprint

## 2023-08-13 NOTE — PLAN OF CARE
Problem: Discharge Planning  Goal: Discharge to home or other facility with appropriate resources  Outcome: Progressing  Flowsheets (Taken 8/13/2023 0855)  Discharge to home or other facility with appropriate resources:   Identify barriers to discharge with patient and caregiver   Arrange for needed discharge resources and transportation as appropriate   Identify discharge learning needs (meds, wound care, etc)   Refer to discharge planning if patient needs post-hospital services based on physician order or complex needs related to functional status, cognitive ability or social support system     Problem: Safety - Adult  Goal: Free from fall injury  Outcome: Progressing  Flowsheets (Taken 8/13/2023 0855)  Free From Fall Injury: Instruct family/caregiver on patient safety     Problem: Chronic Conditions and Co-morbidities  Goal: Patient's chronic conditions and co-morbidity symptoms are monitored and maintained or improved  Outcome: Progressing  Flowsheets (Taken 8/13/2023 0855)  Care Plan - Patient's Chronic Conditions and Co-Morbidity Symptoms are Monitored and Maintained or Improved:   Monitor and assess patient's chronic conditions and comorbid symptoms for stability, deterioration, or improvement   Collaborate with multidisciplinary team to address chronic and comorbid conditions and prevent exacerbation or deterioration     Problem: Nutrition Deficit:  Goal: Optimize nutritional status  Outcome: Progressing

## 2023-08-13 NOTE — PLAN OF CARE
Problem: Discharge Planning  Goal: Discharge to home or other facility with appropriate resources  8/13/2023 0008 by Maurita Castleman, RN  Outcome: Progressing  8/12/2023 1543 by Barbra Moy RN  Outcome: Progressing  Flowsheets (Taken 8/12/2023 3312)  Discharge to home or other facility with appropriate resources:   Identify barriers to discharge with patient and caregiver   Arrange for needed discharge resources and transportation as appropriate   Identify discharge learning needs (meds, wound care, etc)   Refer to discharge planning if patient needs post-hospital services based on physician order or complex needs related to functional status, cognitive ability or social support system     Problem: Safety - Adult  Goal: Free from fall injury  8/13/2023 0008 by Maurita Castleman, RN  Outcome: Progressing  8/12/2023 1543 by Barbra Moy RN  Outcome: Progressing  Flowsheets (Taken 8/12/2023 0835)  Free From Fall Injury: Instruct family/caregiver on patient safety     Problem: Chronic Conditions and Co-morbidities  Goal: Patient's chronic conditions and co-morbidity symptoms are monitored and maintained or improved  8/13/2023 0008 by Maurita Castleman, RN  Outcome: Progressing  8/12/2023 1543 by Barbra Moy RN  Outcome: Progressing  Flowsheets (Taken 8/12/2023 0836)  Care Plan - Patient's Chronic Conditions and Co-Morbidity Symptoms are Monitored and Maintained or Improved:   Monitor and assess patient's chronic conditions and comorbid symptoms for stability, deterioration, or improvement   Collaborate with multidisciplinary team to address chronic and comorbid conditions and prevent exacerbation or deterioration     Problem: Nutrition Deficit:  Goal: Optimize nutritional status  8/13/2023 0008 by Maurita Castleman, RN  Outcome: Progressing  8/12/2023 1543 by Barbra Moy RN  Outcome: Progressing

## 2023-08-14 PROBLEM — S30.1XXA ABDOMINAL WALL HEMATOMA: Status: ACTIVE | Noted: 2023-08-14

## 2023-08-14 LAB
ALBUMIN SERPL-MCNC: 3.5 GM/DL (ref 3.4–5)
ALP BLD-CCNC: 79 IU/L (ref 40–128)
ALT SERPL-CCNC: 10 U/L (ref 10–40)
ANION GAP SERPL CALCULATED.3IONS-SCNC: 9 MMOL/L (ref 4–16)
AST SERPL-CCNC: 23 IU/L (ref 15–37)
BILIRUB SERPL-MCNC: 0.4 MG/DL (ref 0–1)
BUN SERPL-MCNC: 25 MG/DL (ref 6–23)
CALCIUM SERPL-MCNC: 8.8 MG/DL (ref 8.3–10.6)
CHLORIDE BLD-SCNC: 102 MMOL/L (ref 99–110)
CO2: 28 MMOL/L (ref 21–32)
CREAT SERPL-MCNC: 0.7 MG/DL (ref 0.6–1.1)
GFR SERPL CREATININE-BSD FRML MDRD: >60 ML/MIN/1.73M2
GLUCOSE SERPL-MCNC: 101 MG/DL (ref 70–99)
HCT VFR BLD CALC: 33.1 % (ref 37–47)
HCT VFR BLD CALC: 36.8 % (ref 37–47)
HEMOGLOBIN: 10.1 GM/DL (ref 12.5–16)
HEMOGLOBIN: 11.5 GM/DL (ref 12.5–16)
HIGH SENSITIVE C-REACTIVE PROTEIN: 0.3 MG/L (ref 0–5)
MCH RBC QN AUTO: 26.4 PG (ref 27–31)
MCHC RBC AUTO-ENTMCNC: 30.5 % (ref 32–36)
MCV RBC AUTO: 86.4 FL (ref 78–100)
PDW BLD-RTO: 16.1 % (ref 11.7–14.9)
PLATELET # BLD: 397 K/CU MM (ref 140–440)
PMV BLD AUTO: 10.2 FL (ref 7.5–11.1)
POTASSIUM SERPL-SCNC: 4.2 MMOL/L (ref 3.5–5.1)
PROCALCITONIN SERPL-MCNC: 0.03 NG/ML
RBC # BLD: 3.83 M/CU MM (ref 4.2–5.4)
SODIUM BLD-SCNC: 139 MMOL/L (ref 135–145)
TOTAL PROTEIN: 5.5 GM/DL (ref 6.4–8.2)
WBC # BLD: 15.9 K/CU MM (ref 4–10.5)

## 2023-08-14 PROCEDURE — 97535 SELF CARE MNGMENT TRAINING: CPT

## 2023-08-14 PROCEDURE — 97530 THERAPEUTIC ACTIVITIES: CPT

## 2023-08-14 PROCEDURE — 94761 N-INVAS EAR/PLS OXIMETRY MLT: CPT

## 2023-08-14 PROCEDURE — 6370000000 HC RX 637 (ALT 250 FOR IP): Performed by: NURSE PRACTITIONER

## 2023-08-14 PROCEDURE — 86141 C-REACTIVE PROTEIN HS: CPT

## 2023-08-14 PROCEDURE — 85014 HEMATOCRIT: CPT

## 2023-08-14 PROCEDURE — 80053 COMPREHEN METABOLIC PANEL: CPT

## 2023-08-14 PROCEDURE — 1200000000 HC SEMI PRIVATE

## 2023-08-14 PROCEDURE — 84145 PROCALCITONIN (PCT): CPT

## 2023-08-14 PROCEDURE — 36415 COLL VENOUS BLD VENIPUNCTURE: CPT

## 2023-08-14 PROCEDURE — 6360000002 HC RX W HCPCS: Performed by: HOSPITALIST

## 2023-08-14 PROCEDURE — 85027 COMPLETE CBC AUTOMATED: CPT

## 2023-08-14 PROCEDURE — 2580000003 HC RX 258: Performed by: HOSPITALIST

## 2023-08-14 PROCEDURE — 85018 HEMOGLOBIN: CPT

## 2023-08-14 PROCEDURE — 6370000000 HC RX 637 (ALT 250 FOR IP): Performed by: HOSPITALIST

## 2023-08-14 RX ADMIN — METOPROLOL TARTRATE 25 MG: 25 TABLET, FILM COATED ORAL at 10:44

## 2023-08-14 RX ADMIN — DEXAMETHASONE 6 MG: 4 TABLET ORAL at 10:44

## 2023-08-14 RX ADMIN — AMLODIPINE BESYLATE 10 MG: 10 TABLET ORAL at 10:44

## 2023-08-14 RX ADMIN — ROSUVASTATIN CALCIUM 20 MG: 20 TABLET, FILM COATED ORAL at 21:54

## 2023-08-14 RX ADMIN — SODIUM CHLORIDE, PRESERVATIVE FREE 10 ML: 5 INJECTION INTRAVENOUS at 10:45

## 2023-08-14 RX ADMIN — CLOPIDOGREL BISULFATE 75 MG: 75 TABLET ORAL at 10:44

## 2023-08-14 RX ADMIN — MEMANTINE HYDROCHLORIDE 5 MG: 5 TABLET ORAL at 21:54

## 2023-08-14 RX ADMIN — Medication 2000 UNITS: at 10:44

## 2023-08-14 RX ADMIN — ASPIRIN 81 MG: 81 TABLET, CHEWABLE ORAL at 10:45

## 2023-08-14 RX ADMIN — DONEPEZIL HYDROCHLORIDE 10 MG: 10 TABLET ORAL at 21:54

## 2023-08-14 RX ADMIN — MEMANTINE HYDROCHLORIDE 5 MG: 5 TABLET ORAL at 10:44

## 2023-08-14 RX ADMIN — SODIUM CHLORIDE, PRESERVATIVE FREE 10 ML: 5 INJECTION INTRAVENOUS at 21:54

## 2023-08-14 NOTE — CARE COORDINATION
Pt has been denied to go to [facility]. Insurance is requesting a peer to peer. This has to be completed by 2:30PM.  YASMIN PS the NP Kitty Hamilton  and gave him this info. Call [phone]  Name: Marsha Turner  : 1950  Member ID # [id number]  Ref# [id number].

## 2023-08-14 NOTE — CARE COORDINATION
Per fax from [facility], patient is approved for SNF admission. Auth # [id number]. Approved through 8/18.

## 2023-08-14 NOTE — PLAN OF CARE
Problem: Discharge Planning  Goal: Discharge to home or other facility with appropriate resources  8/14/2023 0210 by Kumar Stewart RN  Outcome: Progressing  8/13/2023 1619 by Biju Mariscal RN  Outcome: Progressing  Flowsheets (Taken 8/13/2023 5040)  Discharge to home or other facility with appropriate resources:   Identify barriers to discharge with patient and caregiver   Arrange for needed discharge resources and transportation as appropriate   Identify discharge learning needs (meds, wound care, etc)   Refer to discharge planning if patient needs post-hospital services based on physician order or complex needs related to functional status, cognitive ability or social support system     Problem: Safety - Adult  Goal: Free from fall injury  8/14/2023 0210 by Kumar Stewart RN  Outcome: Progressing  8/13/2023 1619 by Biju Mariscal RN  Outcome: Progressing  Flowsheets (Taken 8/13/2023 0855)  Free From Fall Injury: Instruct family/caregiver on patient safety     Problem: Chronic Conditions and Co-morbidities  Goal: Patient's chronic conditions and co-morbidity symptoms are monitored and maintained or improved  8/14/2023 0210 by Kumar Stewart RN  Outcome: Progressing  8/13/2023 1619 by Biju Mariscal RN  Outcome: Progressing  Flowsheets (Taken 8/13/2023 0855)  Care Plan - Patient's Chronic Conditions and Co-Morbidity Symptoms are Monitored and Maintained or Improved:   Monitor and assess patient's chronic conditions and comorbid symptoms for stability, deterioration, or improvement   Collaborate with multidisciplinary team to address chronic and comorbid conditions and prevent exacerbation or deterioration     Problem: Nutrition Deficit:  Goal: Optimize nutritional status  8/14/2023 0210 by Kumar Stewart RN  Outcome: Progressing  8/13/2023 1619 by Biju Mariscal RN  Outcome: Progressing

## 2023-08-15 LAB
ALBUMIN SERPL-MCNC: 3.5 GM/DL (ref 3.4–5)
ALP BLD-CCNC: 76 IU/L (ref 40–128)
ALT SERPL-CCNC: 11 U/L (ref 10–40)
ANION GAP SERPL CALCULATED.3IONS-SCNC: 11 MMOL/L (ref 4–16)
AST SERPL-CCNC: 24 IU/L (ref 15–37)
BILIRUB SERPL-MCNC: 0.5 MG/DL (ref 0–1)
BUN SERPL-MCNC: 23 MG/DL (ref 6–23)
CALCIUM SERPL-MCNC: 8.9 MG/DL (ref 8.3–10.6)
CHLORIDE BLD-SCNC: 104 MMOL/L (ref 99–110)
CO2: 26 MMOL/L (ref 21–32)
CREAT SERPL-MCNC: 0.6 MG/DL (ref 0.6–1.1)
GFR SERPL CREATININE-BSD FRML MDRD: >60 ML/MIN/1.73M2
GLUCOSE SERPL-MCNC: 103 MG/DL (ref 70–99)
HCT VFR BLD CALC: 33.6 % (ref 37–47)
HEMOGLOBIN: 10.3 GM/DL (ref 12.5–16)
MAGNESIUM: 2.1 MG/DL (ref 1.8–2.4)
MCH RBC QN AUTO: 26.8 PG (ref 27–31)
MCHC RBC AUTO-ENTMCNC: 30.7 % (ref 32–36)
MCV RBC AUTO: 87.5 FL (ref 78–100)
PDW BLD-RTO: 16.2 % (ref 11.7–14.9)
PLATELET # BLD: 458 K/CU MM (ref 140–440)
PMV BLD AUTO: 10.5 FL (ref 7.5–11.1)
POTASSIUM SERPL-SCNC: 4.2 MMOL/L (ref 3.5–5.1)
RBC # BLD: 3.84 M/CU MM (ref 4.2–5.4)
SODIUM BLD-SCNC: 141 MMOL/L (ref 135–145)
TOTAL PROTEIN: 5.5 GM/DL (ref 6.4–8.2)
WBC # BLD: 15.3 K/CU MM (ref 4–10.5)

## 2023-08-15 PROCEDURE — 82247 BILIRUBIN TOTAL: CPT

## 2023-08-15 PROCEDURE — 2580000003 HC RX 258: Performed by: HOSPITALIST

## 2023-08-15 PROCEDURE — 84075 ASSAY ALKALINE PHOSPHATASE: CPT

## 2023-08-15 PROCEDURE — 82040 ASSAY OF SERUM ALBUMIN: CPT

## 2023-08-15 PROCEDURE — 6360000002 HC RX W HCPCS: Performed by: HOSPITALIST

## 2023-08-15 PROCEDURE — 6370000000 HC RX 637 (ALT 250 FOR IP): Performed by: NURSE PRACTITIONER

## 2023-08-15 PROCEDURE — 80048 BASIC METABOLIC PNL TOTAL CA: CPT

## 2023-08-15 PROCEDURE — 83735 ASSAY OF MAGNESIUM: CPT

## 2023-08-15 PROCEDURE — 94761 N-INVAS EAR/PLS OXIMETRY MLT: CPT

## 2023-08-15 PROCEDURE — 36415 COLL VENOUS BLD VENIPUNCTURE: CPT

## 2023-08-15 PROCEDURE — 85027 COMPLETE CBC AUTOMATED: CPT

## 2023-08-15 PROCEDURE — 84460 ALANINE AMINO (ALT) (SGPT): CPT

## 2023-08-15 PROCEDURE — 84155 ASSAY OF PROTEIN SERUM: CPT

## 2023-08-15 PROCEDURE — 84450 TRANSFERASE (AST) (SGOT): CPT

## 2023-08-15 PROCEDURE — 2580000003 HC RX 258: Performed by: STUDENT IN AN ORGANIZED HEALTH CARE EDUCATION/TRAINING PROGRAM

## 2023-08-15 PROCEDURE — 6370000000 HC RX 637 (ALT 250 FOR IP): Performed by: HOSPITALIST

## 2023-08-15 PROCEDURE — 1200000000 HC SEMI PRIVATE

## 2023-08-15 RX ORDER — SODIUM CHLORIDE, SODIUM LACTATE, POTASSIUM CHLORIDE, CALCIUM CHLORIDE 600; 310; 30; 20 MG/100ML; MG/100ML; MG/100ML; MG/100ML
INJECTION, SOLUTION INTRAVENOUS CONTINUOUS
Status: DISCONTINUED | OUTPATIENT
Start: 2023-08-15 | End: 2023-08-16

## 2023-08-15 RX ADMIN — MEMANTINE HYDROCHLORIDE 5 MG: 5 TABLET ORAL at 20:50

## 2023-08-15 RX ADMIN — DEXAMETHASONE 6 MG: 4 TABLET ORAL at 11:13

## 2023-08-15 RX ADMIN — MEMANTINE HYDROCHLORIDE 5 MG: 5 TABLET ORAL at 11:14

## 2023-08-15 RX ADMIN — SODIUM CHLORIDE, POTASSIUM CHLORIDE, SODIUM LACTATE AND CALCIUM CHLORIDE: 600; 310; 30; 20 INJECTION, SOLUTION INTRAVENOUS at 16:39

## 2023-08-15 RX ADMIN — METOPROLOL TARTRATE 25 MG: 25 TABLET, FILM COATED ORAL at 11:14

## 2023-08-15 RX ADMIN — CLOPIDOGREL BISULFATE 75 MG: 75 TABLET ORAL at 11:14

## 2023-08-15 RX ADMIN — SODIUM CHLORIDE, PRESERVATIVE FREE 10 ML: 5 INJECTION INTRAVENOUS at 11:14

## 2023-08-15 RX ADMIN — ROSUVASTATIN CALCIUM 20 MG: 20 TABLET, FILM COATED ORAL at 20:50

## 2023-08-15 RX ADMIN — SODIUM CHLORIDE, PRESERVATIVE FREE 10 ML: 5 INJECTION INTRAVENOUS at 20:52

## 2023-08-15 RX ADMIN — Medication 2000 UNITS: at 11:13

## 2023-08-15 RX ADMIN — DONEPEZIL HYDROCHLORIDE 10 MG: 10 TABLET ORAL at 20:50

## 2023-08-15 RX ADMIN — ASPIRIN 81 MG: 81 TABLET, CHEWABLE ORAL at 11:13

## 2023-08-15 RX ADMIN — Medication 6 MG: at 20:50

## 2023-08-15 RX ADMIN — AMLODIPINE BESYLATE 10 MG: 10 TABLET ORAL at 11:13

## 2023-08-16 VITALS
DIASTOLIC BLOOD PRESSURE: 59 MMHG | BODY MASS INDEX: 30.2 KG/M2 | OXYGEN SATURATION: 94 % | HEART RATE: 58 BPM | SYSTOLIC BLOOD PRESSURE: 151 MMHG | TEMPERATURE: 98.1 F | WEIGHT: 149.8 LBS | RESPIRATION RATE: 18 BRPM | HEIGHT: 59 IN

## 2023-08-16 LAB
BANDED NEUTROPHILS ABSOLUTE COUNT: 0.64 K/CU MM
BANDED NEUTROPHILS RELATIVE PERCENT: 5 % (ref 5–11)
DIFFERENTIAL TYPE: ABNORMAL
HCT VFR BLD CALC: 31.7 % (ref 37–47)
HCT VFR BLD CALC: 32.2 % (ref 37–47)
HEMOGLOBIN: 10.1 GM/DL (ref 12.5–16)
HEMOGLOBIN: 9.6 GM/DL (ref 12.5–16)
LYMPHOCYTES ABSOLUTE: 2 K/CU MM
LYMPHOCYTES RELATIVE PERCENT: 16 % (ref 24–44)
MCH RBC QN AUTO: 26.8 PG (ref 27–31)
MCHC RBC AUTO-ENTMCNC: 30.3 % (ref 32–36)
MCV RBC AUTO: 88.5 FL (ref 78–100)
METAMYELOCYTES ABSOLUTE COUNT: 0.25 K/CU MM
METAMYELOCYTES PERCENT: 2 %
MONOCYTES ABSOLUTE: 1.4 K/CU MM
MONOCYTES RELATIVE PERCENT: 11 % (ref 0–4)
MYELOCYTE PERCENT: 1 %
MYELOCYTES ABSOLUTE COUNT: 0.13 K/CU MM
PDW BLD-RTO: 16.6 % (ref 11.7–14.9)
PLATELET # BLD: 413 K/CU MM (ref 140–440)
PMV BLD AUTO: 10.2 FL (ref 7.5–11.1)
RBC # BLD: 3.58 M/CU MM (ref 4.2–5.4)
RBC # BLD: ABNORMAL 10*6/UL
SEGMENTED NEUTROPHILS ABSOLUTE COUNT: 8.3 K/CU MM
SEGMENTED NEUTROPHILS RELATIVE PERCENT: 65 % (ref 36–66)
WBC # BLD: 12.7 K/CU MM (ref 4–10.5)

## 2023-08-16 PROCEDURE — 85014 HEMATOCRIT: CPT

## 2023-08-16 PROCEDURE — 97116 GAIT TRAINING THERAPY: CPT

## 2023-08-16 PROCEDURE — 97530 THERAPEUTIC ACTIVITIES: CPT

## 2023-08-16 PROCEDURE — 85007 BL SMEAR W/DIFF WBC COUNT: CPT

## 2023-08-16 PROCEDURE — 6370000000 HC RX 637 (ALT 250 FOR IP): Performed by: NURSE PRACTITIONER

## 2023-08-16 PROCEDURE — 6370000000 HC RX 637 (ALT 250 FOR IP): Performed by: HOSPITALIST

## 2023-08-16 PROCEDURE — 85018 HEMOGLOBIN: CPT

## 2023-08-16 PROCEDURE — 85027 COMPLETE CBC AUTOMATED: CPT

## 2023-08-16 PROCEDURE — 94761 N-INVAS EAR/PLS OXIMETRY MLT: CPT

## 2023-08-16 PROCEDURE — 36415 COLL VENOUS BLD VENIPUNCTURE: CPT

## 2023-08-16 PROCEDURE — 2580000003 HC RX 258: Performed by: HOSPITALIST

## 2023-08-16 PROCEDURE — 6360000002 HC RX W HCPCS: Performed by: HOSPITALIST

## 2023-08-16 RX ORDER — ASPIRIN 81 MG/1
81 TABLET, CHEWABLE ORAL DAILY
Qty: 30 TABLET | Refills: 3 | Status: SHIPPED | OUTPATIENT
Start: 2023-08-17

## 2023-08-16 RX ORDER — CHOLECALCIFEROL (VITAMIN D3) 50 MCG
1000 TABLET ORAL DAILY
Qty: 15 TABLET | Refills: 0 | Status: SHIPPED | OUTPATIENT
Start: 2023-08-17 | End: 2023-09-16

## 2023-08-16 RX ADMIN — DEXAMETHASONE 6 MG: 4 TABLET ORAL at 09:33

## 2023-08-16 RX ADMIN — SODIUM CHLORIDE, PRESERVATIVE FREE 10 ML: 5 INJECTION INTRAVENOUS at 09:36

## 2023-08-16 RX ADMIN — AMLODIPINE BESYLATE 10 MG: 10 TABLET ORAL at 09:34

## 2023-08-16 RX ADMIN — ASPIRIN 81 MG: 81 TABLET, CHEWABLE ORAL at 09:33

## 2023-08-16 RX ADMIN — Medication 2000 UNITS: at 09:33

## 2023-08-16 RX ADMIN — CLOPIDOGREL BISULFATE 75 MG: 75 TABLET ORAL at 09:34

## 2023-08-16 RX ADMIN — MEMANTINE HYDROCHLORIDE 5 MG: 5 TABLET ORAL at 09:33

## 2023-08-16 NOTE — DISCHARGE INSTRUCTIONS
Please take your Medications regularly and set up appointments to follow up with your Primary Care Physician, Neuro Interventionist and Cardiologist soon  If having any new, persistent or worsening symptoms including but not limited to  cough, fever, shortness of breath, chest pain, phlegm, etc please return to nearest facility for evaluation  Please go through Printed reading material and feel free to reach out in case of any queries, concerns or issues per contact provided

## 2023-08-16 NOTE — DISCHARGE SUMMARY
V2.0  Discharge Summary    Name:  Jarrett Quick /Age/Sex: 1950 (75 y.o. female)   Admit Date: 2023  Discharge Date: 23    MRN & CSN:  [id number] & [id number] Encounter Date and Time 23 2:25 PM EDT    Attending:  Alize Sharma MD Discharging Provider: Alize Sharma MD       Hospital Course:     Brief HPI: Jarrett Quick is a 75 y.o. female who presents with dizziness and blurred vision. There was concern for stroke like symptoms, CT Head w old infarcts, CTA head w 01% LICA and 75% Right Verterbral artery stenosis, MRI brain  w Cerebral atrophy; previous areas of injury or infarct in left frontal, left parietal occipital, and right parietal occipital lobe; previous areas of injury in left cerebellum; remote lacunar infarcts in basal ganglia and thalami. TTE EF 55-60%, PT /OT - SNF. Likely iso Covid  per neurology. Plan for outpatient Neuro Intervention follow up. Pt s/p O2 which was weaned and pt completed steroid therapy. Pt's confusion/ encephalopathy improved and delirium considered to be iso covid / baseline dementia, currently stable. Hospital course c/b concern for Abdomen wall Hematoma, lovenox shots held, H/H monitored, currently stable ~ 10's. Brief Problem Based Course: 1. Stroke like symptoms- resolved    -Presenting with dizziness and blurred vision now resolved   -CT head : Old infarctions on left frontal, bilateral parietal and left occipital lobes and bilateral basal ganglia and right thalamus. -CTA head and neck: 80% stenosis at origin of left internal carotid artery; 75% stenosis at origin of right vertebral artery; question of severe stenosis in the cavernous segment of left internal carotid artery.   -MRI brain : Cerebral atrophy; previous areas of injury or infarct in left frontal, left parietal occipital, and right parietal occipital lobe; previous areas of injury in left cerebellum; remote lacunar infarcts in basal ganglia and thalami  -Echo: EF

## 2023-08-16 NOTE — CARE COORDINATION
Pt is on discharge to go to [facility]. [facility] to  pt at 5:30PM. [facility] paperwork completed and placed on packet. YASMIN faxed AVS  with both CHUCKY. Copy of AVS placed in packet. RN, lonnie and Kira aware of discharge and  time. VM was left for dght.

## 2023-08-16 NOTE — CARE COORDINATION
Pt precert was received pt on Monday. Precert is good until the 18th however they are requesting updated notes on 8/16. CM will send in updated PT/OT notes. Pt can go to [facility] once stable. CM will need CHUCKY completed by RN and NP. If pt is discharged after hours please complete the following. ... Call report to [phone]  Fax completed AVS with both CHUCKY on the AVS and any written Rx [phone]. Joelle Sorensen [phone] and call family.

## 2023-08-17 NOTE — TELEPHONE ENCOUNTER
Spoke to [facility]. Appointment scheduled on Wednesday, 8/23/23 @ 845 am with Roselyn Leyden APRN-CNP. Location of appointment given to nursing facility in detail. There will be a family member or caregiver from facility that will accompany patient at this visit. Nothing further needed at this time.

## 2023-08-22 ENCOUNTER — TELEPHONE (OUTPATIENT)
Age: 73
End: 2023-08-22

## 2023-08-22 NOTE — TELEPHONE ENCOUNTER
Left message at [facility] for scheduling/transportation to return call to the office 3 times with no response to confirm patient's appointment with Cayden ATIKNSON on Wednesday, 8/23/23 at 845 am. Will send a letter to facility if patient does not show for appointment.

## 2023-08-22 NOTE — PROGRESS NOTES
stenosis    Continue ASA and Plavix for secondary stroke prevention    Pertinent images discussed/reviewed with Dr. Kath Schultz who has fully participated in the care of this patient and agrees with plan. -Diagnostic cerebral angiogram with possible stent of the left ICA as well as to evaluate right ICA, left cavernous, and right vertebral artery stenosis. The risk and benefits of the procedure have been discussed. There is a 0.5% risk of serious injury to the CFA or groin area that could require blood transfusion or surgery. There is a 10% risk of mild hematoma in the groin area. For diagnostic cases there is a 1% risk of stroke or serious complication, for intervention cases there is a 5% risk of stroke or serious complication, for ischemic stroke interventions there is a 10% risk of stroke or worsening. Other risk include but are not limited to infection, dissection, radiation injury, hemorrhage, contrast reactions, nephrotoxicity and death. History of stroke    Continue ASA, Plavix, and statin for secondary stroke prevention    History of stroke in multiple vascular distributions in 2021 with no known stroke etiology. 21 day event monitor for evaluation of PAF    Stroke risk factor modification:    Long-term goal BP < 130/80  Per AHA/ASA prevention guidelines recommend LDL less than 70 for stroke prevention  Goal HgbA1c < 7  Limited alcohol consumption, weight control/reduction and regular physical activity. Discussed stroke signs and symptoms (BE FAST)including balance, eyes, facial numbness, arm weakness or numbness, speech difficulty, time to call 911.    > 35 minutes of time spent included chart review, obtaining history, patient examination, developing plan of care, and documentation. Patient advised to continue follow-up with PCP and healthcare providers for management of other medical issues. Thank you for allowing us to participate in the care of your patient.   If there are any

## 2023-08-23 ENCOUNTER — OFFICE VISIT (OUTPATIENT)
Age: 73
End: 2023-08-23
Payer: MEDICARE

## 2023-08-23 VITALS
SYSTOLIC BLOOD PRESSURE: 126 MMHG | DIASTOLIC BLOOD PRESSURE: 74 MMHG | HEART RATE: 83 BPM | RESPIRATION RATE: 16 BRPM | OXYGEN SATURATION: 94 %

## 2023-08-23 DIAGNOSIS — I65.01 ASYMPTOMATIC STENOSIS OF RIGHT VERTEBRAL ARTERY: ICD-10-CM

## 2023-08-23 DIAGNOSIS — Z86.73 HISTORY OF STROKE: ICD-10-CM

## 2023-08-23 DIAGNOSIS — I65.22 STENOSIS OF INTRACRANIAL PORTIONS OF LEFT INTERNAL CAROTID ARTERY: ICD-10-CM

## 2023-08-23 DIAGNOSIS — I65.22 STENOSIS OF LEFT CAROTID ARTERY: Primary | ICD-10-CM

## 2023-08-23 PROCEDURE — 1123F ACP DISCUSS/DSCN MKR DOCD: CPT | Performed by: NURSE PRACTITIONER

## 2023-08-23 PROCEDURE — 3074F SYST BP LT 130 MM HG: CPT | Performed by: NURSE PRACTITIONER

## 2023-08-23 PROCEDURE — 3078F DIAST BP <80 MM HG: CPT | Performed by: NURSE PRACTITIONER

## 2023-08-23 PROCEDURE — 99204 OFFICE O/P NEW MOD 45 MIN: CPT | Performed by: NURSE PRACTITIONER

## 2023-08-23 RX ORDER — ATORVASTATIN CALCIUM 40 MG/1
40 TABLET, FILM COATED ORAL DAILY
COMMUNITY

## 2023-10-15 DIAGNOSIS — F03.918 DEMENTIA WITH OTHER BEHAVIORAL DISTURBANCE, UNSPECIFIED DEMENTIA SEVERITY, UNSPECIFIED DEMENTIA TYPE (HCC): ICD-10-CM

## 2023-10-16 RX ORDER — MEMANTINE HYDROCHLORIDE 5 MG/1
5 TABLET ORAL 2 TIMES DAILY
Qty: 180 TABLET | Refills: 0 | Status: SHIPPED | OUTPATIENT
Start: 2023-10-16 | End: 2023-10-25 | Stop reason: SDUPTHER

## 2023-10-25 ENCOUNTER — OFFICE VISIT (OUTPATIENT)
Dept: INTERNAL MEDICINE CLINIC | Age: 73
End: 2023-10-25
Payer: MEDICARE

## 2023-10-25 VITALS
WEIGHT: 140 LBS | DIASTOLIC BLOOD PRESSURE: 74 MMHG | HEART RATE: 56 BPM | SYSTOLIC BLOOD PRESSURE: 132 MMHG | BODY MASS INDEX: 28.28 KG/M2 | OXYGEN SATURATION: 98 %

## 2023-10-25 DIAGNOSIS — Z09 HOSPITAL DISCHARGE FOLLOW-UP: ICD-10-CM

## 2023-10-25 DIAGNOSIS — I10 HYPERTENSION, UNSPECIFIED TYPE: ICD-10-CM

## 2023-10-25 DIAGNOSIS — Z86.73 HISTORY OF CVA (CEREBROVASCULAR ACCIDENT): ICD-10-CM

## 2023-10-25 DIAGNOSIS — I65.23 BILATERAL CAROTID ARTERY STENOSIS: ICD-10-CM

## 2023-10-25 DIAGNOSIS — E55.9 VITAMIN D DEFICIENCY: ICD-10-CM

## 2023-10-25 DIAGNOSIS — D64.9 ANEMIA, UNSPECIFIED TYPE: ICD-10-CM

## 2023-10-25 DIAGNOSIS — E78.5 HYPERLIPIDEMIA, UNSPECIFIED HYPERLIPIDEMIA TYPE: ICD-10-CM

## 2023-10-25 DIAGNOSIS — I25.10 CORONARY ARTERY DISEASE INVOLVING NATIVE CORONARY ARTERY OF NATIVE HEART WITHOUT ANGINA PECTORIS: Primary | ICD-10-CM

## 2023-10-25 DIAGNOSIS — F03.918 DEMENTIA WITH OTHER BEHAVIORAL DISTURBANCE, UNSPECIFIED DEMENTIA SEVERITY, UNSPECIFIED DEMENTIA TYPE (HCC): ICD-10-CM

## 2023-10-25 PROBLEM — I77.1 STRICTURE OF ARTERY (HCC): Status: RESOLVED | Noted: 2023-10-25 | Resolved: 2023-10-25

## 2023-10-25 PROBLEM — I77.1 STRICTURE OF ARTERY (HCC): Status: ACTIVE | Noted: 2023-10-25

## 2023-10-25 PROBLEM — E44.0 MODERATE MALNUTRITION (HCC): Chronic | Status: RESOLVED | Noted: 2022-03-16 | Resolved: 2023-10-25

## 2023-10-25 PROCEDURE — 1111F DSCHRG MED/CURRENT MED MERGE: CPT | Performed by: FAMILY MEDICINE

## 2023-10-25 PROCEDURE — 1123F ACP DISCUSS/DSCN MKR DOCD: CPT | Performed by: FAMILY MEDICINE

## 2023-10-25 PROCEDURE — 3078F DIAST BP <80 MM HG: CPT | Performed by: FAMILY MEDICINE

## 2023-10-25 PROCEDURE — 99214 OFFICE O/P EST MOD 30 MIN: CPT | Performed by: FAMILY MEDICINE

## 2023-10-25 PROCEDURE — 3074F SYST BP LT 130 MM HG: CPT | Performed by: FAMILY MEDICINE

## 2023-10-25 RX ORDER — AMLODIPINE BESYLATE 10 MG/1
10 TABLET ORAL DAILY
Qty: 90 TABLET | Refills: 1 | Status: SHIPPED | OUTPATIENT
Start: 2023-10-25

## 2023-10-25 RX ORDER — ROSUVASTATIN CALCIUM 20 MG/1
20 TABLET, COATED ORAL DAILY
Qty: 90 TABLET | Refills: 1 | Status: SHIPPED | OUTPATIENT
Start: 2023-10-25

## 2023-10-25 RX ORDER — CHOLECALCIFEROL (VITAMIN D3) 125 MCG
CAPSULE ORAL
Qty: 90 TABLET | Refills: 1 | Status: SHIPPED | OUTPATIENT
Start: 2023-10-25

## 2023-10-25 RX ORDER — ROSUVASTATIN CALCIUM 20 MG/1
20 TABLET, COATED ORAL DAILY
COMMUNITY
Start: 2023-10-09 | End: 2023-10-25 | Stop reason: SDUPTHER

## 2023-10-25 RX ORDER — MEMANTINE HYDROCHLORIDE 5 MG/1
5 TABLET ORAL 2 TIMES DAILY
Qty: 180 TABLET | Refills: 1 | Status: SHIPPED | OUTPATIENT
Start: 2023-10-25

## 2023-10-25 RX ORDER — CLOPIDOGREL BISULFATE 75 MG/1
75 TABLET ORAL DAILY
Qty: 90 TABLET | Refills: 1 | Status: SHIPPED | OUTPATIENT
Start: 2023-10-25

## 2023-10-25 RX ORDER — DONEPEZIL HYDROCHLORIDE 10 MG/1
10 TABLET, FILM COATED ORAL NIGHTLY
Qty: 90 TABLET | Refills: 1 | Status: SHIPPED | OUTPATIENT
Start: 2023-10-25

## 2023-10-25 RX ORDER — DONEPEZIL HYDROCHLORIDE 10 MG/1
10 TABLET, FILM COATED ORAL NIGHTLY
COMMUNITY
End: 2023-10-25 | Stop reason: SDUPTHER

## 2023-10-25 ASSESSMENT — PATIENT HEALTH QUESTIONNAIRE - PHQ9
9. THOUGHTS THAT YOU WOULD BE BETTER OFF DEAD, OR OF HURTING YOURSELF: 0
SUM OF ALL RESPONSES TO PHQ9 QUESTIONS 1 & 2: 2
6. FEELING BAD ABOUT YOURSELF - OR THAT YOU ARE A FAILURE OR HAVE LET YOURSELF OR YOUR FAMILY DOWN: NOT AT ALL
SUM OF ALL RESPONSES TO PHQ QUESTIONS 1-9: 3
2. FEELING DOWN, DEPRESSED OR HOPELESS: 1
SUM OF ALL RESPONSES TO PHQ QUESTIONS 1-9: 3
7. TROUBLE CONCENTRATING ON THINGS, SUCH AS READING THE NEWSPAPER OR WATCHING TELEVISION: NOT AT ALL
1. LITTLE INTEREST OR PLEASURE IN DOING THINGS: SEVERAL DAYS
3. TROUBLE FALLING OR STAYING ASLEEP: NOT AT ALL
5. POOR APPETITE OR OVEREATING: 0
SUM OF ALL RESPONSES TO PHQ QUESTIONS 1-9: 3
2. FEELING DOWN, DEPRESSED OR HOPELESS: SEVERAL DAYS
10. IF YOU CHECKED OFF ANY PROBLEMS, HOW DIFFICULT HAVE THESE PROBLEMS MADE IT FOR YOU TO DO YOUR WORK, TAKE CARE OF THINGS AT HOME, OR GET ALONG WITH OTHER PEOPLE: 1
SUM OF ALL RESPONSES TO PHQ QUESTIONS 1-9: 3
1. LITTLE INTEREST OR PLEASURE IN DOING THINGS: 1
5. POOR APPETITE OR OVEREATING: NOT AT ALL
4. FEELING TIRED OR HAVING LITTLE ENERGY: 1
SUM OF ALL RESPONSES TO PHQ9 QUESTIONS 1 & 2: 2
6. FEELING BAD ABOUT YOURSELF - OR THAT YOU ARE A FAILURE OR HAVE LET YOURSELF OR YOUR FAMILY DOWN: 0
SUM OF ALL RESPONSES TO PHQ QUESTIONS 1-9: 3
4. FEELING TIRED OR HAVING LITTLE ENERGY: SEVERAL DAYS
8. MOVING OR SPEAKING SO SLOWLY THAT OTHER PEOPLE COULD HAVE NOTICED. OR THE OPPOSITE, BEING SO FIGETY OR RESTLESS THAT YOU HAVE BEEN MOVING AROUND A LOT MORE THAN USUAL: 0
9. THOUGHTS THAT YOU WOULD BE BETTER OFF DEAD, OR OF HURTING YOURSELF: NOT AT ALL
7. TROUBLE CONCENTRATING ON THINGS, SUCH AS READING THE NEWSPAPER OR WATCHING TELEVISION: 0
10. IF YOU CHECKED OFF ANY PROBLEMS, HOW DIFFICULT HAVE THESE PROBLEMS MADE IT FOR YOU TO DO YOUR WORK, TAKE CARE OF THINGS AT HOME, OR GET ALONG WITH OTHER PEOPLE: SOMEWHAT DIFFICULT
8. MOVING OR SPEAKING SO SLOWLY THAT OTHER PEOPLE COULD HAVE NOTICED. OR THE OPPOSITE - BEING SO FIDGETY OR RESTLESS THAT YOU HAVE BEEN MOVING AROUND A LOT MORE THAN USUAL: NOT AT ALL
3. TROUBLE FALLING OR STAYING ASLEEP: 0

## 2023-10-25 NOTE — PROGRESS NOTES
Accurate as of October 25, 2023 11:59 PM. If you have any questions, ask your nurse or doctor.                 START taking these medications      Vitamin D3 50 MCG (2000 UT) Tabs  Take one tablet by mouth daily  Started by: Ximena Snyder DO            CONTINUE taking these medications      amLODIPine 10 MG tablet  Commonly known as: NORVASC  Take 1 tablet by mouth daily     clopidogrel 75 MG tablet  Commonly known as: PLAVIX  Take 1 tablet by mouth daily     donepezil 10 MG tablet  Commonly known as: ARICEPT  Take 1 tablet by mouth nightly     memantine 5 MG tablet  Commonly known as: NAMENDA  Take 1 tablet by mouth 2 times daily     metoprolol tartrate 25 MG tablet  Commonly known as: LOPRESSOR  Take 1 tablet by mouth 2 times daily     rosuvastatin 20 MG tablet  Commonly known as: CRESTOR  Take 1 tablet by mouth daily               Where to Get Your Medications        These medications were sent to [facility] [phone] - F [phone]  [address] [address]      Phone: [phone]   amLODIPine 10 MG tablet  clopidogrel 75 MG tablet  donepezil 10 MG tablet  memantine 5 MG tablet  metoprolol tartrate 25 MG tablet  rosuvastatin 20 MG tablet  Vitamin D3 50 MCG (2000 UT) Tabs           Medications marked \"taking\" at this time  Outpatient Medications Marked as Taking for the 10/25/23 encounter (Office Visit) with Ximena Snyder DO   Medication Sig Dispense Refill    clopidogrel (PLAVIX) 75 MG tablet Take 1 tablet by mouth daily 90 tablet 1    amLODIPine (NORVASC) 10 MG tablet Take 1 tablet by mouth daily 90 tablet 1    memantine (NAMENDA) 5 MG tablet Take 1 tablet by mouth 2 times daily 180 tablet 1    rosuvastatin (CRESTOR) 20 MG tablet Take 1 tablet by mouth daily 90 tablet 1    metoprolol tartrate (LOPRESSOR) 25 MG tablet Take 1 tablet by mouth 2 times daily 180 tablet 1    donepezil (ARICEPT) 10 MG tablet Take 1 tablet by

## 2024-04-24 ENCOUNTER — OFFICE VISIT (OUTPATIENT)
Dept: INTERNAL MEDICINE CLINIC | Age: 74
End: 2024-04-24
Payer: MEDICARE

## 2024-04-24 VITALS
WEIGHT: 138 LBS | BODY MASS INDEX: 27.87 KG/M2 | OXYGEN SATURATION: 95 % | HEART RATE: 60 BPM | SYSTOLIC BLOOD PRESSURE: 120 MMHG | DIASTOLIC BLOOD PRESSURE: 60 MMHG

## 2024-04-24 DIAGNOSIS — I10 HYPERTENSION, UNSPECIFIED TYPE: ICD-10-CM

## 2024-04-24 DIAGNOSIS — I25.10 CORONARY ARTERY DISEASE INVOLVING NATIVE CORONARY ARTERY OF NATIVE HEART WITHOUT ANGINA PECTORIS: ICD-10-CM

## 2024-04-24 DIAGNOSIS — D64.9 ANEMIA, UNSPECIFIED TYPE: ICD-10-CM

## 2024-04-24 DIAGNOSIS — I65.23 BILATERAL CAROTID ARTERY STENOSIS: ICD-10-CM

## 2024-04-24 DIAGNOSIS — E55.9 VITAMIN D DEFICIENCY: ICD-10-CM

## 2024-04-24 DIAGNOSIS — E78.5 HYPERLIPIDEMIA, UNSPECIFIED HYPERLIPIDEMIA TYPE: ICD-10-CM

## 2024-04-24 DIAGNOSIS — F03.918 DEMENTIA WITH OTHER BEHAVIORAL DISTURBANCE, UNSPECIFIED DEMENTIA SEVERITY, UNSPECIFIED DEMENTIA TYPE (HCC): Primary | ICD-10-CM

## 2024-04-24 PROBLEM — Z86.73 HISTORY OF STROKE: Status: ACTIVE | Noted: 2024-04-24

## 2024-04-24 PROCEDURE — G2211 COMPLEX E/M VISIT ADD ON: HCPCS | Performed by: FAMILY MEDICINE

## 2024-04-24 PROCEDURE — 36415 COLL VENOUS BLD VENIPUNCTURE: CPT | Performed by: FAMILY MEDICINE

## 2024-04-24 PROCEDURE — 3074F SYST BP LT 130 MM HG: CPT | Performed by: FAMILY MEDICINE

## 2024-04-24 PROCEDURE — 3078F DIAST BP <80 MM HG: CPT | Performed by: FAMILY MEDICINE

## 2024-04-24 PROCEDURE — 99214 OFFICE O/P EST MOD 30 MIN: CPT | Performed by: FAMILY MEDICINE

## 2024-04-24 PROCEDURE — 1123F ACP DISCUSS/DSCN MKR DOCD: CPT | Performed by: FAMILY MEDICINE

## 2024-04-24 RX ORDER — CLOPIDOGREL BISULFATE 75 MG/1
75 TABLET ORAL DAILY
Qty: 90 TABLET | Refills: 1 | Status: SHIPPED | OUTPATIENT
Start: 2024-04-24

## 2024-04-24 RX ORDER — CHOLECALCIFEROL (VITAMIN D3) 125 MCG
CAPSULE ORAL
Qty: 90 TABLET | Refills: 1 | Status: SHIPPED | OUTPATIENT
Start: 2024-04-24

## 2024-04-24 RX ORDER — MEMANTINE HYDROCHLORIDE 5 MG/1
5 TABLET ORAL 2 TIMES DAILY
Qty: 180 TABLET | Refills: 1 | Status: SHIPPED | OUTPATIENT
Start: 2024-04-24

## 2024-04-24 RX ORDER — AMLODIPINE BESYLATE 10 MG/1
10 TABLET ORAL DAILY
Qty: 90 TABLET | Refills: 1 | Status: SHIPPED | OUTPATIENT
Start: 2024-04-24

## 2024-04-24 RX ORDER — ROSUVASTATIN CALCIUM 20 MG/1
20 TABLET, COATED ORAL DAILY
Qty: 90 TABLET | Refills: 1 | Status: SHIPPED | OUTPATIENT
Start: 2024-04-24

## 2024-04-24 RX ORDER — DONEPEZIL HYDROCHLORIDE 10 MG/1
10 TABLET, FILM COATED ORAL NIGHTLY
Qty: 90 TABLET | Refills: 1 | Status: SHIPPED | OUTPATIENT
Start: 2024-04-24

## 2024-04-24 SDOH — ECONOMIC STABILITY: HOUSING INSECURITY
IN THE LAST 12 MONTHS, WAS THERE A TIME WHEN YOU DID NOT HAVE A STEADY PLACE TO SLEEP OR SLEPT IN A SHELTER (INCLUDING NOW)?: NO

## 2024-04-24 SDOH — ECONOMIC STABILITY: FOOD INSECURITY: WITHIN THE PAST 12 MONTHS, YOU WORRIED THAT YOUR FOOD WOULD RUN OUT BEFORE YOU GOT MONEY TO BUY MORE.: NEVER TRUE

## 2024-04-24 SDOH — ECONOMIC STABILITY: INCOME INSECURITY: HOW HARD IS IT FOR YOU TO PAY FOR THE VERY BASICS LIKE FOOD, HOUSING, MEDICAL CARE, AND HEATING?: NOT HARD AT ALL

## 2024-04-24 SDOH — ECONOMIC STABILITY: FOOD INSECURITY: WITHIN THE PAST 12 MONTHS, THE FOOD YOU BOUGHT JUST DIDN'T LAST AND YOU DIDN'T HAVE MONEY TO GET MORE.: NEVER TRUE

## 2024-04-24 ASSESSMENT — PATIENT HEALTH QUESTIONNAIRE - PHQ9
2. FEELING DOWN, DEPRESSED OR HOPELESS: NOT AT ALL
SUM OF ALL RESPONSES TO PHQ QUESTIONS 1-9: 0
SUM OF ALL RESPONSES TO PHQ QUESTIONS 1-9: 0
SUM OF ALL RESPONSES TO PHQ9 QUESTIONS 1 & 2: 0
SUM OF ALL RESPONSES TO PHQ QUESTIONS 1-9: 0
1. LITTLE INTEREST OR PLEASURE IN DOING THINGS: NOT AT ALL
SUM OF ALL RESPONSES TO PHQ QUESTIONS 1-9: 0

## 2024-04-24 NOTE — PROGRESS NOTES
Carla Pederson (:  1950) is a 74 y.o. female,established patient, here for evaluation of the following chief complaint(s):  6 Month Follow-Up and Hypertension, and Dementia      Assessment & Plan   ASSESSMENT/PLAN:  1. Dementia with other behavioral disturbance, unspecified dementia severity, unspecified dementia type (HCC)  - donepezil (ARICEPT) 10 MG tablet; Take 1 tablet by mouth nightly  Dispense: 90 tablet; Refill: 1  - memantine (NAMENDA) 5 MG tablet; Take 1 tablet by mouth 2 times daily  Dispense: 180 tablet; Refill: 1    2. Hypertension, unspecified type  - CBC with Auto Differential  - Comprehensive Metabolic Panel  - amLODIPine (NORVASC) 10 MG tablet; Take 1 tablet by mouth daily  Dispense: 90 tablet; Refill: 1  - metoprolol tartrate (LOPRESSOR) 25 MG tablet; Take 1 tablet by mouth 2 times daily  Dispense: 180 tablet; Refill: 1    3. Vitamin D deficiency  - Vitamin D 25 Hydroxy  - Cholecalciferol (VITAMIN D3) 50 MCG (2000 UT) TABS; Take one tablet by mouth daily  Dispense: 90 tablet; Refill: 1    4. Bilateral carotid artery stenosis  She has declined further follow up of this issue    5. Anemia, unspecified type  - Iron and TIBC  - Ferritin    6. Coronary artery disease involving native coronary artery of native heart without angina pectoris  - clopidogrel (PLAVIX) 75 MG tablet; Take 1 tablet by mouth daily  Dispense: 90 tablet; Refill: 1    7. Hyperlipidemia, unspecified hyperlipidemia type  - rosuvastatin (CRESTOR) 20 MG tablet; Take 1 tablet by mouth daily  Dispense: 90 tablet; Refill: 1    Medications reconciled and discussed with the patient  Return in about 6 months (around 10/24/2024) for HLD, dementia.       Lab Results   Component Value Date    WBC 12.7 (H) 2023    HGB 10.1 (L) 2023    HCT 32.2 (L) 2023    MCV 88.5 2023     2023     Lab Results   Component Value Date    CHOL 99 2023     Lab Results   Component Value Date    TRIG 81 2023

## 2024-04-25 LAB
25(OH)D3 SERPL-MCNC: 30.5 NG/ML
ALBUMIN SERPL-MCNC: 4.3 G/DL (ref 3.4–5)
ALBUMIN/GLOB SERPL: 1.7 {RATIO} (ref 1.1–2.2)
ALP SERPL-CCNC: 133 U/L (ref 40–129)
ALT SERPL-CCNC: 21 U/L (ref 10–40)
ANION GAP SERPL CALCULATED.3IONS-SCNC: 16 MMOL/L (ref 3–16)
AST SERPL-CCNC: 29 U/L (ref 15–37)
BASOPHILS # BLD: 0 K/UL (ref 0–0.2)
BASOPHILS NFR BLD: 0.7 %
BILIRUB SERPL-MCNC: <0.2 MG/DL (ref 0–1)
BUN SERPL-MCNC: 17 MG/DL (ref 7–20)
CALCIUM SERPL-MCNC: 10.3 MG/DL (ref 8.3–10.6)
CHLORIDE SERPL-SCNC: 103 MMOL/L (ref 99–110)
CO2 SERPL-SCNC: 24 MMOL/L (ref 21–32)
CREAT SERPL-MCNC: 0.7 MG/DL (ref 0.6–1.2)
DEPRECATED RDW RBC AUTO: 14.7 % (ref 12.4–15.4)
EOSINOPHIL # BLD: 0.1 K/UL (ref 0–0.6)
EOSINOPHIL NFR BLD: 2 %
FERRITIN SERPL IA-MCNC: 48.8 NG/ML (ref 15–150)
GFR SERPLBLD CREATININE-BSD FMLA CKD-EPI: >90 ML/MIN/{1.73_M2}
GLUCOSE SERPL-MCNC: 81 MG/DL (ref 70–99)
HCT VFR BLD AUTO: 46 % (ref 36–48)
HGB BLD-MCNC: 15.3 G/DL (ref 12–16)
IRON SATN MFR SERPL: 16 % (ref 15–50)
IRON SERPL-MCNC: 70 UG/DL (ref 37–145)
LYMPHOCYTES # BLD: 1.4 K/UL (ref 1–5.1)
LYMPHOCYTES NFR BLD: 20.8 %
MCH RBC QN AUTO: 29.8 PG (ref 26–34)
MCHC RBC AUTO-ENTMCNC: 33.3 G/DL (ref 31–36)
MCV RBC AUTO: 89.2 FL (ref 80–100)
MONOCYTES # BLD: 0.7 K/UL (ref 0–1.3)
MONOCYTES NFR BLD: 10.3 %
NEUTROPHILS # BLD: 4.6 K/UL (ref 1.7–7.7)
NEUTROPHILS NFR BLD: 66.2 %
PLATELET # BLD AUTO: 379 K/UL (ref 135–450)
PMV BLD AUTO: 8.4 FL (ref 5–10.5)
POTASSIUM SERPL-SCNC: 5.1 MMOL/L (ref 3.5–5.1)
PROT SERPL-MCNC: 6.9 G/DL (ref 6.4–8.2)
RBC # BLD AUTO: 5.16 M/UL (ref 4–5.2)
SODIUM SERPL-SCNC: 143 MMOL/L (ref 136–145)
TIBC SERPL-MCNC: 438 UG/DL (ref 260–445)
WBC # BLD AUTO: 6.9 K/UL (ref 4–11)

## 2024-06-17 DIAGNOSIS — I10 HYPERTENSION, UNSPECIFIED TYPE: ICD-10-CM

## 2024-06-17 DIAGNOSIS — E78.5 HYPERLIPIDEMIA, UNSPECIFIED HYPERLIPIDEMIA TYPE: ICD-10-CM

## 2024-06-17 DIAGNOSIS — I25.10 CORONARY ARTERY DISEASE INVOLVING NATIVE CORONARY ARTERY OF NATIVE HEART WITHOUT ANGINA PECTORIS: ICD-10-CM

## 2024-06-17 DIAGNOSIS — F03.918 DEMENTIA WITH OTHER BEHAVIORAL DISTURBANCE, UNSPECIFIED DEMENTIA SEVERITY, UNSPECIFIED DEMENTIA TYPE (HCC): ICD-10-CM

## 2024-06-17 RX ORDER — MEMANTINE HYDROCHLORIDE 5 MG/1
5 TABLET ORAL 2 TIMES DAILY
Qty: 180 TABLET | Refills: 1 | Status: SHIPPED | OUTPATIENT
Start: 2024-06-17

## 2024-06-17 RX ORDER — CLOPIDOGREL BISULFATE 75 MG/1
75 TABLET ORAL DAILY
Qty: 90 TABLET | Refills: 1 | Status: SHIPPED | OUTPATIENT
Start: 2024-06-17

## 2024-06-17 RX ORDER — AMLODIPINE BESYLATE 10 MG/1
10 TABLET ORAL DAILY
Qty: 90 TABLET | Refills: 1 | Status: SHIPPED | OUTPATIENT
Start: 2024-06-17

## 2024-06-17 RX ORDER — DONEPEZIL HYDROCHLORIDE 10 MG/1
10 TABLET, FILM COATED ORAL NIGHTLY
Qty: 90 TABLET | Refills: 1 | Status: SHIPPED | OUTPATIENT
Start: 2024-06-17

## 2024-06-17 RX ORDER — ROSUVASTATIN CALCIUM 20 MG/1
20 TABLET, COATED ORAL DAILY
Qty: 90 TABLET | Refills: 1 | Status: SHIPPED | OUTPATIENT
Start: 2024-06-17

## 2024-10-11 ENCOUNTER — HOSPITAL ENCOUNTER (EMERGENCY)
Age: 74
Discharge: HOME | End: 2024-10-11
Payer: MEDICARE

## 2024-10-11 ENCOUNTER — APPOINTMENT (OUTPATIENT)
Dept: GENERAL RADIOLOGY | Age: 74
End: 2024-10-11
Payer: MEDICARE

## 2024-10-11 VITALS
SYSTOLIC BLOOD PRESSURE: 166 MMHG | HEART RATE: 67 BPM | RESPIRATION RATE: 16 BRPM | OXYGEN SATURATION: 96 % | WEIGHT: 140 LBS | TEMPERATURE: 97.8 F | BODY MASS INDEX: 29.39 KG/M2 | HEIGHT: 58 IN | DIASTOLIC BLOOD PRESSURE: 63 MMHG

## 2024-10-11 DIAGNOSIS — M17.12 PRIMARY OSTEOARTHRITIS OF LEFT KNEE: ICD-10-CM

## 2024-10-11 DIAGNOSIS — S86.912A STRAIN OF LEFT KNEE, INITIAL ENCOUNTER: ICD-10-CM

## 2024-10-11 DIAGNOSIS — M25.562 ACUTE PAIN OF LEFT KNEE: Primary | ICD-10-CM

## 2024-10-11 PROCEDURE — 73564 X-RAY EXAM KNEE 4 OR MORE: CPT

## 2024-10-11 PROCEDURE — 99283 EMERGENCY DEPT VISIT LOW MDM: CPT

## 2024-10-11 PROCEDURE — 6370000000 HC RX 637 (ALT 250 FOR IP)

## 2024-10-11 RX ORDER — CYCLOBENZAPRINE HCL 5 MG
5 TABLET ORAL 2 TIMES DAILY PRN
Qty: 10 TABLET | Refills: 0 | Status: SHIPPED | OUTPATIENT
Start: 2024-10-11 | End: 2024-10-21

## 2024-10-11 RX ORDER — ACETAMINOPHEN 500 MG
1000 TABLET ORAL 3 TIMES DAILY
Qty: 360 TABLET | Refills: 1 | Status: SHIPPED | OUTPATIENT
Start: 2024-10-11

## 2024-10-11 RX ORDER — ACETAMINOPHEN 500 MG
1000 TABLET ORAL
Status: COMPLETED | OUTPATIENT
Start: 2024-10-11 | End: 2024-10-11

## 2024-10-11 RX ORDER — CYCLOBENZAPRINE HCL 10 MG
10 TABLET ORAL ONCE
Status: COMPLETED | OUTPATIENT
Start: 2024-10-11 | End: 2024-10-11

## 2024-10-11 RX ADMIN — CYCLOBENZAPRINE 10 MG: 10 TABLET, FILM COATED ORAL at 10:41

## 2024-10-11 RX ADMIN — ACETAMINOPHEN 1000 MG: 500 TABLET ORAL at 10:41

## 2024-10-11 ASSESSMENT — LIFESTYLE VARIABLES
HOW OFTEN DO YOU HAVE A DRINK CONTAINING ALCOHOL: NEVER
HOW MANY STANDARD DRINKS CONTAINING ALCOHOL DO YOU HAVE ON A TYPICAL DAY: PATIENT DOES NOT DRINK

## 2024-10-11 ASSESSMENT — PAIN - FUNCTIONAL ASSESSMENT
PAIN_FUNCTIONAL_ASSESSMENT: 0-10
PAIN_FUNCTIONAL_ASSESSMENT: 0-10

## 2024-10-11 ASSESSMENT — PAIN DESCRIPTION - LOCATION: LOCATION: LEG

## 2024-10-11 ASSESSMENT — PAIN DESCRIPTION - DESCRIPTORS: DESCRIPTORS: SHARP

## 2024-10-11 ASSESSMENT — PAIN SCALES - GENERAL
PAINLEVEL_OUTOF10: 10
PAINLEVEL_OUTOF10: 0

## 2024-10-11 ASSESSMENT — PAIN DESCRIPTION - ORIENTATION: ORIENTATION: LEFT

## 2024-10-11 NOTE — ED PROVIDER NOTES
[facility] EMERGENCY DEPARTMENT  EMERGENCY DEPARTMENT ENCOUNTER        Pt Name: Carla Pederson  MRN: [id number]  Birthdate 1950  Date of evaluation: 10/11/2024  Provider: ROSA Bullock - LAKESHIA  PCP: Alyssa Jeronimo DO  Note Started: 10:26 AM EDT 10/11/24      VINNY. I have evaluated this patient.        CHIEF COMPLAINT       Chief Complaint   Patient presents with    Leg Pain     Left sided. Behind knee.        HISTORY OF PRESENT ILLNESS: 1 or more Elements     History From: Patient    Limitations to history : None    Social Determinants Significantly Affecting Health : None    Chief Complaint: Left knee pain    Carla Pederson is a 74 y.o. female with a history of HTN, stroke on Plavix HLD who presents to ED stating 2 days ago she started having knee pain.  She states today the pain in her knee is preventing her from being able to bend it or walk on it.  She denies any injury to this knee.  Denies any falls.  Denies any redness or swelling.  States it is just painful to bend.  Denies any history of knee pain in the past.  Denies any recent fevers illnesses infections.  Denies any numbness or loss of strength.  Denies any chest pain or abdominal pain.    Nursing Notes were all reviewed and agreed with or any disagreements were addressed in the HPI.    REVIEW OF SYSTEMS :      Review of Systems    Positives and Pertinent negatives as per HPI.     SURGICAL HISTORY     Past Surgical History:   Procedure Laterality Date    CARDIAC SURGERY  ?    Can’t remember    CAROTID ENDARTERECTOMY      CHOLECYSTECTOMY      CORONARY ARTERY BYPASS GRAFT      HYSTERECTOMY, TOTAL ABDOMINAL (CERVIX REMOVED)      IR NONTUNNELED VASCULAR CATHETER  01/20/2022    IR NONTUNNELED VASCULAR CATHETER 1/20/2022 [facility]    NECK SURGERY         CURRENTMEDICATIONS       Discharge Medication List as of 10/11/2024 11:47 AM        CONTINUE these medications which have NOT CHANGED

## 2025-01-09 DIAGNOSIS — E78.5 HYPERLIPIDEMIA, UNSPECIFIED HYPERLIPIDEMIA TYPE: ICD-10-CM

## 2025-01-09 DIAGNOSIS — I10 HYPERTENSION, UNSPECIFIED TYPE: ICD-10-CM

## 2025-01-09 RX ORDER — ROSUVASTATIN CALCIUM 20 MG/1
20 TABLET, COATED ORAL DAILY
Qty: 90 TABLET | Refills: 0 | Status: SHIPPED | OUTPATIENT
Start: 2025-01-09

## 2025-01-09 RX ORDER — AMLODIPINE BESYLATE 10 MG/1
10 TABLET ORAL DAILY
Qty: 90 TABLET | Refills: 0 | Status: SHIPPED | OUTPATIENT
Start: 2025-01-09

## 2025-01-30 DIAGNOSIS — I10 HYPERTENSION, UNSPECIFIED TYPE: ICD-10-CM

## 2025-01-30 DIAGNOSIS — F03.918 DEMENTIA WITH OTHER BEHAVIORAL DISTURBANCE, UNSPECIFIED DEMENTIA SEVERITY, UNSPECIFIED DEMENTIA TYPE (HCC): ICD-10-CM

## 2025-01-30 RX ORDER — METOPROLOL TARTRATE 25 MG/1
25 TABLET, FILM COATED ORAL 2 TIMES DAILY
Qty: 180 TABLET | Refills: 0 | Status: SHIPPED | OUTPATIENT
Start: 2025-01-30

## 2025-01-30 RX ORDER — DONEPEZIL HYDROCHLORIDE 10 MG/1
10 TABLET, FILM COATED ORAL NIGHTLY
Qty: 90 TABLET | Refills: 0 | Status: SHIPPED | OUTPATIENT
Start: 2025-01-30

## 2025-02-04 ENCOUNTER — OFFICE VISIT (OUTPATIENT)
Dept: INTERNAL MEDICINE CLINIC | Age: 75
End: 2025-02-04
Payer: MEDICARE

## 2025-02-04 VITALS
SYSTOLIC BLOOD PRESSURE: 130 MMHG | HEART RATE: 56 BPM | WEIGHT: 140 LBS | DIASTOLIC BLOOD PRESSURE: 68 MMHG | OXYGEN SATURATION: 94 % | BODY MASS INDEX: 29.26 KG/M2

## 2025-02-04 DIAGNOSIS — F03.918 DEMENTIA WITH OTHER BEHAVIORAL DISTURBANCE, UNSPECIFIED DEMENTIA SEVERITY, UNSPECIFIED DEMENTIA TYPE (HCC): ICD-10-CM

## 2025-02-04 DIAGNOSIS — E55.9 VITAMIN D DEFICIENCY: ICD-10-CM

## 2025-02-04 DIAGNOSIS — Z00.00 WELCOME TO MEDICARE PREVENTIVE VISIT: Primary | ICD-10-CM

## 2025-02-04 DIAGNOSIS — I10 HYPERTENSION, UNSPECIFIED TYPE: ICD-10-CM

## 2025-02-04 DIAGNOSIS — Z86.73 HISTORY OF CVA (CEREBROVASCULAR ACCIDENT): ICD-10-CM

## 2025-02-04 DIAGNOSIS — E78.5 HYPERLIPIDEMIA, UNSPECIFIED HYPERLIPIDEMIA TYPE: ICD-10-CM

## 2025-02-04 DIAGNOSIS — R73.03 PREDIABETES: ICD-10-CM

## 2025-02-04 DIAGNOSIS — I25.10 CORONARY ARTERY DISEASE INVOLVING NATIVE CORONARY ARTERY OF NATIVE HEART WITHOUT ANGINA PECTORIS: ICD-10-CM

## 2025-02-04 PROCEDURE — 3078F DIAST BP <80 MM HG: CPT | Performed by: FAMILY MEDICINE

## 2025-02-04 PROCEDURE — G0402 INITIAL PREVENTIVE EXAM: HCPCS | Performed by: FAMILY MEDICINE

## 2025-02-04 PROCEDURE — 1123F ACP DISCUSS/DSCN MKR DOCD: CPT | Performed by: FAMILY MEDICINE

## 2025-02-04 PROCEDURE — 1160F RVW MEDS BY RX/DR IN RCRD: CPT | Performed by: FAMILY MEDICINE

## 2025-02-04 PROCEDURE — 1159F MED LIST DOCD IN RCRD: CPT | Performed by: FAMILY MEDICINE

## 2025-02-04 PROCEDURE — 3075F SYST BP GE 130 - 139MM HG: CPT | Performed by: FAMILY MEDICINE

## 2025-02-04 RX ORDER — ROSUVASTATIN CALCIUM 20 MG/1
20 TABLET, COATED ORAL DAILY
Qty: 90 TABLET | Refills: 1 | Status: SHIPPED | OUTPATIENT
Start: 2025-02-04

## 2025-02-04 RX ORDER — CHOLECALCIFEROL (VITAMIN D3) 50 MCG
TABLET ORAL
Qty: 90 TABLET | Refills: 1 | Status: SHIPPED | OUTPATIENT
Start: 2025-02-04

## 2025-02-04 RX ORDER — CLOPIDOGREL BISULFATE 75 MG/1
75 TABLET ORAL DAILY
Qty: 90 TABLET | Refills: 1 | Status: SHIPPED | OUTPATIENT
Start: 2025-02-04

## 2025-02-04 RX ORDER — AMLODIPINE BESYLATE 10 MG/1
10 TABLET ORAL DAILY
Qty: 90 TABLET | Refills: 1 | Status: SHIPPED | OUTPATIENT
Start: 2025-02-04

## 2025-02-04 RX ORDER — MEMANTINE HYDROCHLORIDE 5 MG/1
5 TABLET ORAL 2 TIMES DAILY
Qty: 180 TABLET | Refills: 1 | Status: SHIPPED | OUTPATIENT
Start: 2025-02-04

## 2025-02-04 SDOH — ECONOMIC STABILITY: FOOD INSECURITY: WITHIN THE PAST 12 MONTHS, YOU WORRIED THAT YOUR FOOD WOULD RUN OUT BEFORE YOU GOT MONEY TO BUY MORE.: NEVER TRUE

## 2025-02-04 SDOH — ECONOMIC STABILITY: FOOD INSECURITY: WITHIN THE PAST 12 MONTHS, THE FOOD YOU BOUGHT JUST DIDN'T LAST AND YOU DIDN'T HAVE MONEY TO GET MORE.: NEVER TRUE

## 2025-02-04 ASSESSMENT — PATIENT HEALTH QUESTIONNAIRE - PHQ9
SUM OF ALL RESPONSES TO PHQ QUESTIONS 1-9: 0
1. LITTLE INTEREST OR PLEASURE IN DOING THINGS: NOT AT ALL
SUM OF ALL RESPONSES TO PHQ QUESTIONS 1-9: 0
SUM OF ALL RESPONSES TO PHQ9 QUESTIONS 1 & 2: 0
2. FEELING DOWN, DEPRESSED OR HOPELESS: NOT AT ALL
SUM OF ALL RESPONSES TO PHQ QUESTIONS 1-9: 0
SUM OF ALL RESPONSES TO PHQ QUESTIONS 1-9: 0

## 2025-02-25 LAB
A/G RATIO: 1.9 RATIO (ref 0.8–2.6)
ALBUMIN: 4.5 G/DL (ref 3.5–5.2)
ALP BLD-CCNC: 134 U/L (ref 23–144)
ALT SERPL-CCNC: 12 U/L (ref 0–60)
AST SERPL-CCNC: 20 U/L (ref 0–55)
BASOPHILS ABSOLUTE: 0 K/UL (ref 0–0.3)
BASOPHILS RELATIVE PERCENT: 0.5 % (ref 0–2)
BILIRUB SERPL-MCNC: 0.3 MG/DL (ref 0–1.2)
BUN / CREAT RATIO: 21 (ref 7–25)
BUN BLDV-MCNC: 19 MG/DL (ref 3–29)
CALCIUM SERPL-MCNC: 10.1 MG/DL (ref 8.5–10.5)
CHLORIDE BLD-SCNC: 104 MEQ/L (ref 96–110)
CHOLESTEROL, TOTAL: 164 MG/DL
CO2: 28 MEQ/L (ref 19–32)
CREAT SERPL-MCNC: 0.9 MG/DL (ref 0.5–1.2)
DIFFERENTIAL COUNT: ABNORMAL
EOSINOPHILS ABSOLUTE: 0.1 K/UL (ref 0–0.5)
EOSINOPHILS RELATIVE PERCENT: 1.7 % (ref 0–5)
ESTIMATED GLOMERULAR FILTRATION RATE CREATININE EQUATION: 67 MLS/MIN/1.73M2
FASTING STATUS: ABNORMAL
GLOBULIN: 2.4 G/DL (ref 1.9–3.6)
GLUCOSE BLD-MCNC: 108 MG/DL (ref 70–99)
HBA1C MFR BLD: 5.8 %
HCT VFR BLD CALC: 47.4 % (ref 34–49)
HDLC SERPL-MCNC: 58 MG/DL
HEMOGLOBIN: 15.7 G/DL (ref 11.2–15.7)
IMMATURE GRANS (ABS): 0 K/UL (ref 0–0.1)
IMMATURE GRANULOCYTES %: 0.3 %
LDL CHOLESTEROL: 85 MG/DL
LYMPHOCYTES ABSOLUTE: 1.7 K/UL (ref 0.9–4.1)
LYMPHOCYTES RELATIVE PERCENT: 21.8 % (ref 14–51)
MCH RBC QN AUTO: 30 PG (ref 26–34)
MCHC RBC AUTO-ENTMCNC: 33.1 G/DL (ref 30.7–35.5)
MCV RBC AUTO: 90.5 FL (ref 80–100)
MONOCYTES ABSOLUTE: 0.7 K/UL (ref 0.2–1)
MONOCYTES RELATIVE PERCENT: 8.8 % (ref 4–12)
NEUTROPHILS ABSOLUTE: 5.2 K/UL (ref 1.8–7.5)
NEUTROPHILS RELATIVE PERCENT: 66.9 % (ref 42–80)
PDW BLD-RTO: 13.1 %
PLATELET # BLD: 404 K/UL (ref 140–400)
PMV BLD AUTO: 9.9 FL (ref 7.2–11.7)
POTASSIUM SERPL-SCNC: 4.6 MEQ/L (ref 3.4–5.3)
RBC # BLD: 5.24 M/UL (ref 3.95–5.26)
RETICULOCYTE ABSOLUTE COUNT: 0 /100 WBC
SODIUM BLD-SCNC: 144 MEQ/L (ref 135–148)
TOTAL PROTEIN: 6.9 G/DL (ref 6–8.3)
TRIGL SERPL-MCNC: 106 MG/DL
VLDLC SERPL CALC-MCNC: 21 MG/DL (ref 4–38)
WBC # BLD: 7.7 K/UL (ref 3.5–10.9)

## 2025-04-11 DIAGNOSIS — I10 HYPERTENSION, UNSPECIFIED TYPE: ICD-10-CM

## 2025-04-11 DIAGNOSIS — E78.5 HYPERLIPIDEMIA, UNSPECIFIED HYPERLIPIDEMIA TYPE: ICD-10-CM

## 2025-04-11 RX ORDER — ROSUVASTATIN CALCIUM 20 MG/1
20 TABLET, COATED ORAL DAILY
Qty: 90 TABLET | Refills: 1 | Status: SHIPPED | OUTPATIENT
Start: 2025-04-11

## 2025-04-11 RX ORDER — AMLODIPINE BESYLATE 10 MG/1
10 TABLET ORAL DAILY
Qty: 90 TABLET | Refills: 1 | Status: SHIPPED | OUTPATIENT
Start: 2025-04-11

## 2025-05-05 DIAGNOSIS — I10 HYPERTENSION, UNSPECIFIED TYPE: ICD-10-CM

## 2025-05-05 DIAGNOSIS — F03.918 DEMENTIA WITH OTHER BEHAVIORAL DISTURBANCE, UNSPECIFIED DEMENTIA SEVERITY, UNSPECIFIED DEMENTIA TYPE (HCC): ICD-10-CM

## 2025-05-05 RX ORDER — METOPROLOL TARTRATE 25 MG/1
25 TABLET, FILM COATED ORAL 2 TIMES DAILY
Qty: 180 TABLET | Refills: 1 | Status: SHIPPED | OUTPATIENT
Start: 2025-05-05

## 2025-05-05 RX ORDER — DONEPEZIL HYDROCHLORIDE 10 MG/1
10 TABLET, FILM COATED ORAL NIGHTLY
Qty: 90 TABLET | Refills: 1 | Status: SHIPPED | OUTPATIENT
Start: 2025-05-05

## 2025-05-08 DIAGNOSIS — I10 HYPERTENSION, UNSPECIFIED TYPE: ICD-10-CM

## 2025-05-08 DIAGNOSIS — F03.918 DEMENTIA WITH OTHER BEHAVIORAL DISTURBANCE, UNSPECIFIED DEMENTIA SEVERITY, UNSPECIFIED DEMENTIA TYPE (HCC): ICD-10-CM

## 2025-05-08 RX ORDER — METOPROLOL TARTRATE 25 MG/1
25 TABLET, FILM COATED ORAL 2 TIMES DAILY
Qty: 180 TABLET | Refills: 1 | OUTPATIENT
Start: 2025-05-08

## 2025-05-08 RX ORDER — DONEPEZIL HYDROCHLORIDE 10 MG/1
10 TABLET, FILM COATED ORAL NIGHTLY
Qty: 90 TABLET | Refills: 1 | OUTPATIENT
Start: 2025-05-08

## 2025-06-30 DIAGNOSIS — I25.10 CORONARY ARTERY DISEASE INVOLVING NATIVE CORONARY ARTERY OF NATIVE HEART WITHOUT ANGINA PECTORIS: ICD-10-CM

## 2025-06-30 RX ORDER — CLOPIDOGREL BISULFATE 75 MG/1
75 TABLET ORAL DAILY
Qty: 90 TABLET | Refills: 1 | Status: SHIPPED | OUTPATIENT
Start: 2025-06-30

## 2025-08-12 ENCOUNTER — OFFICE VISIT (OUTPATIENT)
Dept: INTERNAL MEDICINE CLINIC | Age: 75
End: 2025-08-12
Payer: MEDICARE

## 2025-08-12 VITALS
DIASTOLIC BLOOD PRESSURE: 80 MMHG | SYSTOLIC BLOOD PRESSURE: 130 MMHG | OXYGEN SATURATION: 94 % | WEIGHT: 138 LBS | BODY MASS INDEX: 28.84 KG/M2 | HEART RATE: 51 BPM

## 2025-08-12 DIAGNOSIS — F03.A18 MILD DEMENTIA WITH OTHER BEHAVIORAL DISTURBANCE, UNSPECIFIED DEMENTIA TYPE (HCC): ICD-10-CM

## 2025-08-12 DIAGNOSIS — R00.1 BRADYCARDIA: ICD-10-CM

## 2025-08-12 DIAGNOSIS — E78.00 PURE HYPERCHOLESTEROLEMIA: ICD-10-CM

## 2025-08-12 DIAGNOSIS — R73.03 PREDIABETES: ICD-10-CM

## 2025-08-12 DIAGNOSIS — I25.10 CORONARY ARTERY DISEASE INVOLVING NATIVE CORONARY ARTERY OF NATIVE HEART WITHOUT ANGINA PECTORIS: Primary | ICD-10-CM

## 2025-08-12 DIAGNOSIS — I10 PRIMARY HYPERTENSION: ICD-10-CM

## 2025-08-12 PROCEDURE — 3079F DIAST BP 80-89 MM HG: CPT | Performed by: FAMILY MEDICINE

## 2025-08-12 PROCEDURE — 1159F MED LIST DOCD IN RCRD: CPT | Performed by: FAMILY MEDICINE

## 2025-08-12 PROCEDURE — 1160F RVW MEDS BY RX/DR IN RCRD: CPT | Performed by: FAMILY MEDICINE

## 2025-08-12 PROCEDURE — 99214 OFFICE O/P EST MOD 30 MIN: CPT | Performed by: FAMILY MEDICINE

## 2025-08-12 PROCEDURE — 1123F ACP DISCUSS/DSCN MKR DOCD: CPT | Performed by: FAMILY MEDICINE

## 2025-08-12 PROCEDURE — 93000 ELECTROCARDIOGRAM COMPLETE: CPT | Performed by: FAMILY MEDICINE

## 2025-08-12 PROCEDURE — 3075F SYST BP GE 130 - 139MM HG: CPT | Performed by: FAMILY MEDICINE

## 2025-08-12 PROCEDURE — 36415 COLL VENOUS BLD VENIPUNCTURE: CPT | Performed by: FAMILY MEDICINE

## 2025-08-12 RX ORDER — METOPROLOL TARTRATE 25 MG/1
25 TABLET, FILM COATED ORAL 2 TIMES DAILY
Qty: 180 TABLET | Refills: 1 | Status: CANCELLED | OUTPATIENT
Start: 2025-08-12

## 2025-08-12 RX ORDER — DONEPEZIL HYDROCHLORIDE 10 MG/1
10 TABLET, FILM COATED ORAL NIGHTLY
Qty: 90 TABLET | Refills: 1 | Status: CANCELLED | OUTPATIENT
Start: 2025-08-12

## 2025-08-12 ASSESSMENT — ENCOUNTER SYMPTOMS
ABDOMINAL PAIN: 0
NAUSEA: 0
SHORTNESS OF BREATH: 0
COUGH: 0

## 2025-08-13 LAB
BASOPHILS # BLD: 0.1 K/UL (ref 0–0.2)
BASOPHILS NFR BLD: 0.8 %
DEPRECATED RDW RBC AUTO: 14.7 % (ref 12.4–15.4)
EOSINOPHIL # BLD: 0.2 K/UL (ref 0–0.6)
EOSINOPHIL NFR BLD: 2.4 %
HCT VFR BLD AUTO: 44.7 % (ref 36–48)
HGB BLD-MCNC: 15 G/DL (ref 12–16)
LYMPHOCYTES # BLD: 1.5 K/UL (ref 1–5.1)
LYMPHOCYTES NFR BLD: 21.5 %
MCH RBC QN AUTO: 30.2 PG (ref 26–34)
MCHC RBC AUTO-ENTMCNC: 33.6 G/DL (ref 31–36)
MCV RBC AUTO: 89.8 FL (ref 80–100)
MONOCYTES # BLD: 0.8 K/UL (ref 0–1.3)
MONOCYTES NFR BLD: 11.1 %
NEUTROPHILS # BLD: 4.4 K/UL (ref 1.7–7.7)
NEUTROPHILS NFR BLD: 64.2 %
PLATELET # BLD AUTO: 369 K/UL (ref 135–450)
PMV BLD AUTO: 8.2 FL (ref 5–10.5)
RBC # BLD AUTO: 4.97 M/UL (ref 4–5.2)
TSH SERPL DL<=0.005 MIU/L-ACNC: 3.68 UIU/ML (ref 0.27–4.2)
WBC # BLD AUTO: 6.8 K/UL (ref 4–11)